# Patient Record
Sex: MALE | Race: WHITE | Employment: OTHER | URBAN - METROPOLITAN AREA
[De-identification: names, ages, dates, MRNs, and addresses within clinical notes are randomized per-mention and may not be internally consistent; named-entity substitution may affect disease eponyms.]

---

## 2017-03-16 ENCOUNTER — OFFICE VISIT (OUTPATIENT)
Dept: FAMILY MEDICINE CLINIC | Age: 67
End: 2017-03-16

## 2017-03-16 VITALS
SYSTOLIC BLOOD PRESSURE: 150 MMHG | WEIGHT: 232.8 LBS | HEIGHT: 70 IN | RESPIRATION RATE: 12 BRPM | TEMPERATURE: 96.5 F | DIASTOLIC BLOOD PRESSURE: 80 MMHG | HEART RATE: 84 BPM | BODY MASS INDEX: 33.33 KG/M2

## 2017-03-16 DIAGNOSIS — R19.7 DIARRHEA OF PRESUMED INFECTIOUS ORIGIN: Primary | ICD-10-CM

## 2017-03-16 PROCEDURE — 99213 OFFICE O/P EST LOW 20 MIN: CPT | Performed by: FAMILY MEDICINE

## 2017-03-16 RX ORDER — METRONIDAZOLE 500 MG/1
500 TABLET ORAL 3 TIMES DAILY
Qty: 21 TABLET | Refills: 0 | Status: SHIPPED | OUTPATIENT
Start: 2017-03-16 | End: 2017-06-03 | Stop reason: SDUPTHER

## 2017-03-16 RX ORDER — SILDENAFIL CITRATE 20 MG/1
20 TABLET ORAL PRN
COMMUNITY
End: 2019-03-26

## 2017-03-16 ASSESSMENT — PATIENT HEALTH QUESTIONNAIRE - PHQ9
SUM OF ALL RESPONSES TO PHQ9 QUESTIONS 1 & 2: 0
2. FEELING DOWN, DEPRESSED OR HOPELESS: 0
1. LITTLE INTEREST OR PLEASURE IN DOING THINGS: 0
SUM OF ALL RESPONSES TO PHQ QUESTIONS 1-9: 0

## 2017-03-17 DIAGNOSIS — R19.7 DIARRHEA OF PRESUMED INFECTIOUS ORIGIN: ICD-10-CM

## 2017-03-18 LAB
CLOSTRIDIUM DIFFICILE DNA AMPLIFICATION: NORMAL
CRYPTOSPORIDIUM ANTIGEN STOOL: NORMAL
GIARDIA ANTIGEN STOOL: NORMAL
LACTOFERRIN, FECAL: POSITIVE

## 2017-03-20 LAB
CULTURE, STOOL: NORMAL
E COLI SHIGA TOXIN ASSAY: NORMAL
OVA AND PARASITE TRICHROME: NEGATIVE
OVA AND PARASITE WET MOUNT: NEGATIVE

## 2017-03-30 RX ORDER — ESZOPICLONE 2 MG/1
TABLET, FILM COATED ORAL
Qty: 30 TABLET | Refills: 2 | Status: SHIPPED | OUTPATIENT
Start: 2017-03-30 | End: 2017-07-10 | Stop reason: SDUPTHER

## 2017-04-06 RX ORDER — INDOMETHACIN 50 MG/1
50 CAPSULE ORAL
Qty: 90 CAPSULE | Refills: 1 | Status: SHIPPED | OUTPATIENT
Start: 2017-04-06 | End: 2019-04-18 | Stop reason: ALTCHOICE

## 2017-06-05 RX ORDER — METRONIDAZOLE 500 MG/1
TABLET ORAL
Qty: 21 TABLET | Refills: 0 | Status: SHIPPED | OUTPATIENT
Start: 2017-06-05 | End: 2017-12-06 | Stop reason: ALTCHOICE

## 2017-07-10 RX ORDER — ESZOPICLONE 2 MG/1
TABLET, FILM COATED ORAL
Qty: 30 TABLET | Refills: 2 | Status: ON HOLD | OUTPATIENT
Start: 2017-07-10 | End: 2021-05-18

## 2017-10-12 RX ORDER — LISINOPRIL 20 MG/1
TABLET ORAL
Qty: 30 TABLET | Refills: 5 | Status: SHIPPED | OUTPATIENT
Start: 2017-10-12 | End: 2018-05-11 | Stop reason: SDUPTHER

## 2017-10-12 NOTE — TELEPHONE ENCOUNTER
Pharmacy requests refill on medication. Please approve or deny this request.  Last seen by you on 3/16/17. No future appointments.

## 2017-12-06 ENCOUNTER — OFFICE VISIT (OUTPATIENT)
Dept: FAMILY MEDICINE CLINIC | Age: 67
End: 2017-12-06

## 2017-12-06 ENCOUNTER — HOSPITAL ENCOUNTER (OUTPATIENT)
Dept: GENERAL RADIOLOGY | Age: 67
Discharge: HOME | End: 2017-12-06
Payer: COMMERCIAL

## 2017-12-06 VITALS
HEART RATE: 76 BPM | BODY MASS INDEX: 32.64 KG/M2 | DIASTOLIC BLOOD PRESSURE: 82 MMHG | TEMPERATURE: 98 F | OXYGEN SATURATION: 98 % | SYSTOLIC BLOOD PRESSURE: 130 MMHG | HEIGHT: 70 IN | WEIGHT: 228 LBS

## 2017-12-06 DIAGNOSIS — R20.2 RIGHT HAND PARESTHESIA: ICD-10-CM

## 2017-12-06 DIAGNOSIS — G89.29 CHRONIC RIGHT SHOULDER PAIN: ICD-10-CM

## 2017-12-06 DIAGNOSIS — M75.42 IMPINGEMENT SYNDROME OF LEFT SHOULDER: ICD-10-CM

## 2017-12-06 DIAGNOSIS — R06.83 SNORING: ICD-10-CM

## 2017-12-06 DIAGNOSIS — R20.2 RIGHT HAND PARESTHESIA: Primary | ICD-10-CM

## 2017-12-06 DIAGNOSIS — M25.511 CHRONIC RIGHT SHOULDER PAIN: ICD-10-CM

## 2017-12-06 PROCEDURE — 99214 OFFICE O/P EST MOD 30 MIN: CPT | Performed by: FAMILY MEDICINE

## 2017-12-06 PROCEDURE — 73030 X-RAY EXAM OF SHOULDER: CPT

## 2017-12-06 RX ORDER — MELOXICAM 7.5 MG/1
TABLET ORAL
Qty: 30 TABLET | Refills: 5 | Status: SHIPPED | OUTPATIENT
Start: 2017-12-06 | End: 2019-03-26

## 2017-12-06 NOTE — PROGRESS NOTES
Chief Complaint   Patient presents with    Snoring    Shoulder Pain     Both     HPI: Hayden Borden is a 74 y.o. male presenting for follow-up of Snoring and Shoulder Pain. I last saw the patient 9 months ago. Pain severity is 1/10 on the left shoulder. He is able to abduct to 90 degrees. He does have tingling in the right hand. He does do a lot of computer work. He does snore while sleeping and notes restless sleep. He will wake up with the right hand numb. His energy during the day is poor. He denies any neck pain. Past Medical History:   Diagnosis Date    Hyperlipidemia     Hypertension     Prostate cancer [facility])        Past Surgical History:   Procedure Laterality Date    OTHER SURGICAL HISTORY      stent       family history includes Cancer in his brother, father, and mother; Diabetes in his maternal grandmother. Social History     Social History    Marital status: Legally      Spouse name: N/A    Number of children: N/A    Years of education: N/A     Occupational History    Not on file.      Social History Main Topics    Smoking status: Former Smoker     Packs/day: 2.00     Years: 25.00     Quit date: 1/3/1995    Smokeless tobacco: Never Used    Alcohol use 2.4 oz/week     2 Cans of beer, 2 Shots of liquor per week    Drug use: No    Sexual activity: Not on file     Other Topics Concern    Not on file     Social History Narrative    No narrative on file       Allergies   Allergen Reactions    Morphine Swelling and Rash       Review of Systems - General ROS: negative  Psychological ROS: negative  ENT ROS: negative  Hematological and Lymphatic ROS: negative  Respiratory ROS: no cough, shortness of breath, or wheezing  Cardiovascular ROS: no chest pain or dyspnea on exertion  Gastrointestinal ROS: no abdominal pain, change in bowel habits, or black or bloody stools  Genito-Urinary ROS: no dysuria, trouble voiding, or hematuria  Musculoskeletal ROS: positive for - pain in bilateral shoulder  Neurological ROS: no TIA or stroke symptoms  Dermatological ROS: negative    Vitals:    12/06/17 1533   BP: 130/82   Site: Right Arm   Position: Sitting   Cuff Size: Medium Adult   Pulse: 76   Temp: 98 °F (36.7 °C)   TempSrc: Temporal   SpO2: 98%   Weight: 228 lb (103.4 kg)   Height: 5' 10\" (1.778 m)     Physical Examination: General appearance - alert, well appearing, and in no distress and acyanotic, in no respiratory distress. Obese. Mental status - alert, oriented to person, place, and time, normal mood, behavior, speech, dress, motor activity, and thought processes, affect appropriate to mood  Neck - supple, no significant adenopathy, carotids upstroke normal bilaterally, no bruits, thyroid exam: thyroid is normal in size without nodules or tenderness  Right shoulder exam: ROM reveals 90 External ROM. T7 Internal ROM, normal Abduction, and normal adduction across his chest. Motor Strength is 5/ 5 in all directions. Drop arm testing is negative. No pain is noted with ROM. DTRs are 2+ normal at the biceps and brachioradialis bilaterally and symmetrically. Left shoulder exam: ROM reveals 80 External ROM. T8 Internal ROM, 90 degrees of active Abduction, and normal adduction across his chest. Motor Strength is 4+/ 5 in all directions. Drop arm testing is negative. Pain is noted with abduction. DTRs are 2+ normal at the biceps and brachioradialis bilaterally and symmetrically. Extremities - peripheral pulses normal, no pedal edema, no clubbing or cyanosis  Skin - normal coloration and turgor, no rashes, no suspicious skin lesions noted    1. Right hand paresthesia  EMG    XR SHOULDER RIGHT (MIN 2 VIEWS)   2. Impingement syndrome of left shoulder  XR SHOULDER LEFT (MIN 2 VIEWS)    Ambulatory referral to Physical Therapy   3. Chronic right shoulder pain  Ambulatory referral to Physical Therapy   4.  Snoring  Baseline Diagnostic Sleep Study     I have reviewed the following diagnostic data: Bilateral shoulder x-rays - unremarkable. Please see report for additional information. Orders Placed This Encounter   Procedures    XR SHOULDER RIGHT (MIN 2 VIEWS)     Standing Status:   Future     Number of Occurrences:   1     Standing Expiration Date:   2018     Order Specific Question:   Reason for exam:     Answer:   right shoulder pain    XR SHOULDER LEFT (MIN 2 VIEWS)     Standing Status:   Future     Number of Occurrences:   1     Standing Expiration Date:   2018     Order Specific Question:   Reason for exam:     Answer:   left shoulder pain    Ambulatory referral to Physical Therapy     Referral Priority:   Routine     Referral Type:   Eval and Treat     Referral Reason:   Specialty Services Required     Requested Specialty:   Physical Therapy     Number of Visits Requested:   1    EMG     Paresthesias of the right hand  Bilateral shoulder pain     Scheduling Instructions:      Dr. Jimmy Moura     Order Specific Question:   Which body part? Answer:   upper extremities    Baseline Diagnostic Sleep Study     Order Specific Question:   Adult or Pediatric     Answer:   Adult Study (>7 Years)     Comments:   snoring       Orders Placed This Encounter   Medications    meloxicam (MOBIC) 7.5 MG tablet     Si po daily to twice daily with food prn     Dispense:  30 tablet     Refill:  5     Patient will be referred for an EMG/NCV to rule out possible carpal tunnel syndrome. He was begun on meloxicam 7.5 mg by mouth daily to twice a day with food when necessary. He was referred for PT as well. Will call patient with results of testing when available and need for follow up if indicated. Return in about 3 months (around 3/6/2018).

## 2017-12-27 ENCOUNTER — HOSPITAL ENCOUNTER (OUTPATIENT)
Dept: SLEEP CENTER | Age: 67
Discharge: HOME | End: 2017-12-27
Payer: COMMERCIAL

## 2017-12-27 PROCEDURE — 95810 POLYSOM 6/> YRS 4/> PARAM: CPT

## 2018-01-05 ENCOUNTER — HOSPITAL ENCOUNTER (OUTPATIENT)
Dept: SLEEP CENTER | Age: 68
Discharge: HOME | End: 2018-01-05
Payer: COMMERCIAL

## 2018-01-05 PROCEDURE — 95811 POLYSOM 6/>YRS CPAP 4/> PARM: CPT

## 2018-01-08 ENCOUNTER — HOSPITAL ENCOUNTER (OUTPATIENT)
Dept: PHYSICAL THERAPY | Age: 68
Setting detail: THERAPIES SERIES
Discharge: HOME | End: 2018-01-08
Payer: COMMERCIAL

## 2018-01-09 ENCOUNTER — OFFICE VISIT (OUTPATIENT)
Dept: FAMILY MEDICINE CLINIC | Age: 68
End: 2018-01-09

## 2018-01-09 VITALS
OXYGEN SATURATION: 97 % | WEIGHT: 225 LBS | HEIGHT: 70 IN | TEMPERATURE: 98.3 F | SYSTOLIC BLOOD PRESSURE: 140 MMHG | HEART RATE: 75 BPM | DIASTOLIC BLOOD PRESSURE: 80 MMHG | BODY MASS INDEX: 32.21 KG/M2

## 2018-01-09 DIAGNOSIS — G47.33 OSA ON CPAP: ICD-10-CM

## 2018-01-09 DIAGNOSIS — Z99.89 OSA ON CPAP: ICD-10-CM

## 2018-01-09 PROCEDURE — 99213 OFFICE O/P EST LOW 20 MIN: CPT | Performed by: FAMILY MEDICINE

## 2018-01-09 NOTE — PROGRESS NOTES
Sitting Sitting   Cuff Size: Large Adult Large Adult   Pulse: 75    Temp: 98.3 °F (36.8 °C)    TempSrc: Temporal    SpO2: 97%    Weight: 225 lb (102.1 kg)    Height: 5' 10\" (1.778 m)      Physical Examination: General appearance - alert, well appearing, and in no distress and acyanotic, in no respiratory distress. Obese. Mental status - alert, oriented to person, place, and time, normal mood, behavior, speech, dress, motor activity, and thought processes, affect appropriate to mood  No further exam was done today. 1. UTE on CPAP       I have reviewed the following diagnostic data: CPAP titration study was reviewed. It was recommended that he be prescribed nasal CPAP at 5-6 cm H2O. Please see report for additional information. Orders Placed This Encounter   Medications    CPAP Machine MISC     Sig: by Does not apply route Use nightly as directed. Pressure setting 5 cm H2O. Dispense:  1 each     Refill:  0     CPAP was ordered for nasal mask at 5 cm H2O. Patient will follow-up to see how he is doing on the CPAP and 4-6 weeks. Return in about 4 weeks (around 2/6/2018).

## 2018-01-11 ENCOUNTER — TELEPHONE (OUTPATIENT)
Dept: FAMILY MEDICINE CLINIC | Age: 68
End: 2018-01-11

## 2018-02-26 ENCOUNTER — OFFICE VISIT (OUTPATIENT)
Dept: FAMILY MEDICINE CLINIC | Age: 68
End: 2018-02-26
Payer: COMMERCIAL

## 2018-02-26 VITALS
SYSTOLIC BLOOD PRESSURE: 138 MMHG | HEART RATE: 76 BPM | RESPIRATION RATE: 18 BRPM | DIASTOLIC BLOOD PRESSURE: 74 MMHG | BODY MASS INDEX: 31.02 KG/M2 | TEMPERATURE: 97.5 F | OXYGEN SATURATION: 97 % | HEIGHT: 70 IN | WEIGHT: 216.7 LBS

## 2018-02-26 DIAGNOSIS — J01.90 ACUTE BACTERIAL SINUSITIS: Primary | ICD-10-CM

## 2018-02-26 DIAGNOSIS — B96.89 ACUTE BACTERIAL SINUSITIS: Primary | ICD-10-CM

## 2018-02-26 PROCEDURE — 99213 OFFICE O/P EST LOW 20 MIN: CPT | Performed by: NURSE PRACTITIONER

## 2018-02-26 RX ORDER — AMOXICILLIN AND CLAVULANATE POTASSIUM 875; 125 MG/1; MG/1
1 TABLET, FILM COATED ORAL 2 TIMES DAILY
Qty: 14 TABLET | Refills: 0 | Status: SHIPPED | OUTPATIENT
Start: 2018-02-26 | End: 2018-03-06 | Stop reason: SDUPTHER

## 2018-02-26 RX ORDER — FLUTICASONE PROPIONATE 50 MCG
2 SPRAY, SUSPENSION (ML) NASAL DAILY
Qty: 1 BOTTLE | Refills: 0 | Status: SHIPPED | OUTPATIENT
Start: 2018-02-26 | End: 2019-04-18 | Stop reason: ALTCHOICE

## 2018-02-27 ASSESSMENT — ENCOUNTER SYMPTOMS
SORE THROAT: 0
SHORTNESS OF BREATH: 0
SWOLLEN GLANDS: 0
HOARSE VOICE: 0
COUGH: 0
SINUS PRESSURE: 1
SINUS COMPLAINT: 1

## 2018-03-06 ENCOUNTER — TELEPHONE (OUTPATIENT)
Dept: FAMILY MEDICINE CLINIC | Age: 68
End: 2018-03-06

## 2018-03-06 DIAGNOSIS — B96.89 ACUTE BACTERIAL SINUSITIS: ICD-10-CM

## 2018-03-06 DIAGNOSIS — J01.90 ACUTE BACTERIAL SINUSITIS: ICD-10-CM

## 2018-03-06 RX ORDER — AMOXICILLIN AND CLAVULANATE POTASSIUM 875; 125 MG/1; MG/1
1 TABLET, FILM COATED ORAL 2 TIMES DAILY
Qty: 10 TABLET | Refills: 0 | Status: SHIPPED | OUTPATIENT
Start: 2018-03-06 | End: 2018-03-11

## 2018-03-06 RX ORDER — GEMFIBROZIL 600 MG/1
TABLET, FILM COATED ORAL
Qty: 30 TABLET | Refills: 2 | Status: SHIPPED | OUTPATIENT
Start: 2018-03-06 | End: 2018-06-11 | Stop reason: SDUPTHER

## 2018-03-06 RX ORDER — LOVASTATIN 20 MG/1
TABLET ORAL
Qty: 30 TABLET | Refills: 2 | Status: SHIPPED | OUTPATIENT
Start: 2018-03-06 | End: 2018-06-11 | Stop reason: SDUPTHER

## 2018-03-06 NOTE — TELEPHONE ENCOUNTER
Pharmacy requests refill on medication.  Please approve or deny this request.    LOV 1/9/2018    Future Appointments  Date Time Provider Sana Ramos   3/24/2018 9:15 AM Ezra Peters MD [address]

## 2018-03-24 ENCOUNTER — OFFICE VISIT (OUTPATIENT)
Dept: FAMILY MEDICINE CLINIC | Age: 68
End: 2018-03-24
Payer: COMMERCIAL

## 2018-03-24 VITALS
SYSTOLIC BLOOD PRESSURE: 138 MMHG | BODY MASS INDEX: 30.64 KG/M2 | HEIGHT: 70 IN | OXYGEN SATURATION: 98 % | TEMPERATURE: 98.1 F | WEIGHT: 214 LBS | DIASTOLIC BLOOD PRESSURE: 84 MMHG | HEART RATE: 78 BPM

## 2018-03-24 DIAGNOSIS — G47.33 OSA ON CPAP: Primary | ICD-10-CM

## 2018-03-24 DIAGNOSIS — F51.01 PRIMARY INSOMNIA: ICD-10-CM

## 2018-03-24 DIAGNOSIS — I25.10 CORONARY ARTERY DISEASE INVOLVING NATIVE CORONARY ARTERY OF NATIVE HEART WITHOUT ANGINA PECTORIS: ICD-10-CM

## 2018-03-24 DIAGNOSIS — B35.1 ONYCHOMYCOSIS: ICD-10-CM

## 2018-03-24 DIAGNOSIS — C61 PROSTATE CANCER (HCC): ICD-10-CM

## 2018-03-24 DIAGNOSIS — E78.00 PURE HYPERCHOLESTEROLEMIA: ICD-10-CM

## 2018-03-24 DIAGNOSIS — R01.1 CARDIAC MURMUR: ICD-10-CM

## 2018-03-24 DIAGNOSIS — I10 ESSENTIAL HYPERTENSION: ICD-10-CM

## 2018-03-24 DIAGNOSIS — Z99.89 OSA ON CPAP: Primary | ICD-10-CM

## 2018-03-24 PROCEDURE — 99213 OFFICE O/P EST LOW 20 MIN: CPT | Performed by: FAMILY MEDICINE

## 2018-03-24 RX ORDER — DOXEPIN HYDROCHLORIDE 3 MG/1
1 TABLET ORAL NIGHTLY PRN
Qty: 30 TABLET | Refills: 1 | Status: SHIPPED | OUTPATIENT
Start: 2018-03-24 | End: 2019-07-18

## 2018-03-24 ASSESSMENT — PATIENT HEALTH QUESTIONNAIRE - PHQ9
1. LITTLE INTEREST OR PLEASURE IN DOING THINGS: 0
SUM OF ALL RESPONSES TO PHQ9 QUESTIONS 1 & 2: 0
2. FEELING DOWN, DEPRESSED OR HOPELESS: 0
SUM OF ALL RESPONSES TO PHQ QUESTIONS 1-9: 0

## 2018-03-24 NOTE — PROGRESS NOTES
Chief Complaint   Patient presents with    Follow Up After Procedure     1/9/18     HPI: Constantin Long is a 74 y.o. male presenting for follow-up of testing and breathing. I last saw the patient 2 months ago. He is not sleeping as well as he would like despite using his CPAP machine. He is on Lupron for his prostate cancer. He does note a toenail fungus as well. He is taking apple cider vinegar as well. He uses the the CPAP machine nightly for 7-8 hours per night with 2 events per night. Patient was having some hot flashes with the Lupron but that has improved. Patient notes that he is no longer on the megestrol. Patient does note a history of a heart murmur as a child. Past Medical History:   Diagnosis Date    Hyperlipidemia     Hypertension     Prostate cancer [facility])        Past Surgical History:   Procedure Laterality Date    OTHER SURGICAL HISTORY      stent       family history includes Cancer in his brother, father, and mother; Diabetes in his maternal grandmother. Social History     Social History    Marital status: Legally      Spouse name: N/A    Number of children: N/A    Years of education: N/A     Occupational History    Not on file.      Social History Main Topics    Smoking status: Former Smoker     Packs/day: 2.00     Years: 25.00     Quit date: 1/3/1995    Smokeless tobacco: Never Used    Alcohol use 2.4 oz/week     2 Cans of beer, 2 Shots of liquor per week    Drug use: No    Sexual activity: Not on file     Other Topics Concern    Not on file     Social History Narrative    No narrative on file       Allergies   Allergen Reactions    Morphine Swelling and Rash       Review of Systems - General ROS: negative  Psychological ROS: negative  ENT ROS: negative  Hematological and Lymphatic ROS: negative  Respiratory ROS: no cough, shortness of breath, or wheezing  Cardiovascular ROS: no chest pain or dyspnea on exertion  Gastrointestinal ROS: no abdominal pain, change in bowel habits, or black or bloody stools  Genito-Urinary ROS: no dysuria, trouble voiding, or hematuria  Musculoskeletal ROS: negative  Neurological ROS: no TIA or stroke symptoms  Dermatological ROS: negative    Vitals:    03/24/18 0917   BP: 138/84   Pulse: 78   Temp: 98.1 °F (36.7 °C)   TempSrc: Temporal   SpO2: 98%   Weight: 214 lb (97.1 kg)   Height: 5' 10\" (1.778 m)     Physical Examination: General appearance - alert, well appearing, and in no distress and acyanotic, in no respiratory distress. Obese. Mental status - alert, oriented to person, place, and time, normal mood, behavior, speech, dress, motor activity, and thought processes, affect appropriate to mood  Neck - supple, no significant adenopathy, carotids upstroke normal bilaterally, no bruits, thyroid exam: thyroid is normal in size without nodules or tenderness  Chest - clear to auscultation, no wheezes, rales or rhonchi, symmetric air entry  Heart - normal rate, regular rhythm, normal S1, S2, no murmurs, rubs, clicks or gallops, systolic murmur 3/6 at apex. Occasional ectopic beats are noted. Lower extremities - no cyanosis, clubbing or edema. His left great toenail does reveal what appears to be more of a subungual hemorrhage at the proximal aspect of the nail. It should resolve with time. 1. UTE on CPAP     2. Essential hypertension     3. Pure hypercholesterolemia     4. Coronary artery disease involving native coronary artery of native heart without angina pectoris     5. Prostate cancer ([facility], [place] [address]     6. Primary insomnia     7. Onychomycosis     8. Cardiac murmur  ECHO Complete 2D W Doppler W Color     I have reviewed the following diagnostic data: NA. Please see report for additional information.       Orders Placed This Encounter   Medications    Doxepin HCl (SILENOR) 3 MG TABS     Sig: Take 3 mg by mouth nightly as needed (insom)     Dispense:  30 tablet     Refill:  1     Patient is to try melatonin 3-5 mg prior to bedtime to help with his restful sleep. If that is ineffective, he may try the Silenor 3 mg nightly as needed. Patient will continue on the CPAP. Patient is to return for an echocardiogram for further evaluation of his heart murmur. Return in about 3 months (around 6/24/2018) for follow up on medications, follow up on HTN.

## 2018-03-29 ENCOUNTER — TELEPHONE (OUTPATIENT)
Dept: FAMILY MEDICINE CLINIC | Age: 68
End: 2018-03-29

## 2018-03-31 NOTE — TELEPHONE ENCOUNTER
Recommend patient either try a multivitamin OTC or B complex vitamin OTC. The only other option might be taking some prescription Ritalin which is a stimulant.

## 2018-04-12 ENCOUNTER — HOSPITAL ENCOUNTER (OUTPATIENT)
Dept: NON INVASIVE DIAGNOSTICS | Age: 68
Discharge: HOME | End: 2018-04-12
Payer: COMMERCIAL

## 2018-04-12 DIAGNOSIS — R01.1 CARDIAC MURMUR: ICD-10-CM

## 2018-04-12 LAB
LV EF: 55 %
LVEF MODALITY: NORMAL

## 2018-04-12 PROCEDURE — 93306 TTE W/DOPPLER COMPLETE: CPT

## 2018-04-16 ENCOUNTER — TELEPHONE (OUTPATIENT)
Dept: FAMILY MEDICINE CLINIC | Age: 68
End: 2018-04-16

## 2018-04-16 RX ORDER — AMOXICILLIN 875 MG/1
875 TABLET, COATED ORAL 2 TIMES DAILY
Qty: 20 TABLET | Refills: 0 | Status: SHIPPED | OUTPATIENT
Start: 2018-04-16 | End: 2018-04-26

## 2018-05-11 RX ORDER — LISINOPRIL 20 MG/1
TABLET ORAL
Qty: 30 TABLET | Refills: 5 | Status: SHIPPED | OUTPATIENT
Start: 2018-05-11 | End: 2018-11-15 | Stop reason: SDUPTHER

## 2018-06-02 ENCOUNTER — OFFICE VISIT (OUTPATIENT)
Dept: FAMILY MEDICINE CLINIC | Age: 68
End: 2018-06-02
Payer: COMMERCIAL

## 2018-06-02 VITALS
WEIGHT: 212 LBS | TEMPERATURE: 97.7 F | HEIGHT: 70 IN | HEART RATE: 73 BPM | SYSTOLIC BLOOD PRESSURE: 138 MMHG | DIASTOLIC BLOOD PRESSURE: 72 MMHG | BODY MASS INDEX: 30.35 KG/M2 | OXYGEN SATURATION: 98 % | RESPIRATION RATE: 16 BRPM

## 2018-06-02 DIAGNOSIS — Z12.11 SCREENING FOR COLON CANCER: ICD-10-CM

## 2018-06-02 DIAGNOSIS — C61 PROSTATE CANCER (HCC): Primary | ICD-10-CM

## 2018-06-02 DIAGNOSIS — R53.83 FATIGUE, UNSPECIFIED TYPE: ICD-10-CM

## 2018-06-02 PROCEDURE — 96372 THER/PROPH/DIAG INJ SC/IM: CPT | Performed by: NURSE PRACTITIONER

## 2018-06-02 PROCEDURE — 99213 OFFICE O/P EST LOW 20 MIN: CPT | Performed by: NURSE PRACTITIONER

## 2018-06-02 RX ORDER — MELATONIN 10 MG
CAPSULE ORAL
Status: ON HOLD | COMMUNITY
End: 2021-05-18

## 2018-06-02 RX ORDER — CYANOCOBALAMIN 1000 UG/ML
1000 INJECTION INTRAMUSCULAR; SUBCUTANEOUS ONCE
Status: COMPLETED | OUTPATIENT
Start: 2018-06-02 | End: 2018-06-02

## 2018-06-02 RX ORDER — MEGESTROL ACETATE 40 MG/1
TABLET ORAL
Refills: 1 | COMMUNITY
Start: 2018-05-16 | End: 2019-03-26

## 2018-06-02 RX ADMIN — CYANOCOBALAMIN 1000 MCG: 1000 INJECTION INTRAMUSCULAR; SUBCUTANEOUS at 12:24

## 2018-06-11 RX ORDER — GEMFIBROZIL 600 MG/1
TABLET, FILM COATED ORAL
Qty: 30 TABLET | Refills: 2 | Status: SHIPPED | OUTPATIENT
Start: 2018-06-11 | End: 2018-09-12 | Stop reason: SDUPTHER

## 2018-06-11 RX ORDER — LOVASTATIN 20 MG/1
TABLET ORAL
Qty: 30 TABLET | Refills: 2 | Status: SHIPPED | OUTPATIENT
Start: 2018-06-11 | End: 2018-09-14 | Stop reason: SDUPTHER

## 2018-06-15 DIAGNOSIS — R53.83 FATIGUE, UNSPECIFIED TYPE: ICD-10-CM

## 2018-06-15 LAB
FOLATE: 11.6 NG/ML (ref 7.3–26.1)
VITAMIN B-12: 362 PG/ML (ref 232–1245)
VITAMIN D 25-HYDROXY: 36.3 NG/ML (ref 30–100)

## 2018-06-27 ENCOUNTER — OFFICE VISIT (OUTPATIENT)
Dept: FAMILY MEDICINE CLINIC | Age: 68
End: 2018-06-27
Payer: COMMERCIAL

## 2018-06-27 VITALS
TEMPERATURE: 98.9 F | BODY MASS INDEX: 30.03 KG/M2 | SYSTOLIC BLOOD PRESSURE: 124 MMHG | DIASTOLIC BLOOD PRESSURE: 74 MMHG | WEIGHT: 209.8 LBS | HEIGHT: 70 IN | OXYGEN SATURATION: 97 % | HEART RATE: 80 BPM

## 2018-06-27 DIAGNOSIS — E78.00 PURE HYPERCHOLESTEROLEMIA: ICD-10-CM

## 2018-06-27 DIAGNOSIS — I25.10 CORONARY ARTERY DISEASE INVOLVING NATIVE CORONARY ARTERY OF NATIVE HEART WITHOUT ANGINA PECTORIS: ICD-10-CM

## 2018-06-27 DIAGNOSIS — I10 ESSENTIAL HYPERTENSION: Primary | ICD-10-CM

## 2018-06-27 DIAGNOSIS — R53.83 FATIGUE, UNSPECIFIED TYPE: ICD-10-CM

## 2018-06-27 DIAGNOSIS — R73.03 PREDIABETES: ICD-10-CM

## 2018-06-27 PROCEDURE — 99214 OFFICE O/P EST MOD 30 MIN: CPT | Performed by: FAMILY MEDICINE

## 2018-06-27 PROCEDURE — 96372 THER/PROPH/DIAG INJ SC/IM: CPT | Performed by: FAMILY MEDICINE

## 2018-06-27 RX ORDER — CYANOCOBALAMIN 1000 UG/ML
1000 INJECTION INTRAMUSCULAR; SUBCUTANEOUS ONCE
Status: COMPLETED | OUTPATIENT
Start: 2018-06-27 | End: 2018-06-27

## 2018-06-27 RX ADMIN — CYANOCOBALAMIN 1000 MCG: 1000 INJECTION INTRAMUSCULAR; SUBCUTANEOUS at 18:15

## 2018-06-27 NOTE — PROGRESS NOTES
Chief Complaint   Patient presents with    Follow-up     03/24/2018    Sleep Apnea    Hypertension    Hyperlipidemia    Coronary Artery Disease    Prostate Cancer    Insomnia    Nail Problem    Heart Murmur    Discuss Medications     HPI: Cinthya Amezquita is a 74 y.o. male presenting for follow-up of UTE, HTN, HLD, CAD, Prostate Cancer, Insomnia, Onychomycosis, and Heart Murmur. I last saw the patient 03/24/2018. He did injure the left ring finger and is being followed by an orthopedic physician. He did just Have the splint removed from the finger and is now able to flex it. He does complain of some fatigue and would like a second injection of vitamin B12. He did seem to notice some improvement after his first injection. Past Medical History:   Diagnosis Date    Hyperlipidemia     Hypertension     Prostate cancer [facility])        Past Surgical History:   Procedure Laterality Date    OTHER SURGICAL HISTORY      stent       family history includes Cancer in his brother, father, and mother; Diabetes in his maternal grandmother. Social History     Social History    Marital status: Legally      Spouse name: N/A    Number of children: N/A    Years of education: N/A     Occupational History    Not on file.      Social History Main Topics    Smoking status: Former Smoker     Packs/day: 2.00     Years: 25.00     Quit date: 1/3/1995    Smokeless tobacco: Never Used    Alcohol use 2.4 oz/week     2 Cans of beer, 2 Shots of liquor per week    Drug use: No    Sexual activity: Not on file     Other Topics Concern    Not on file     Social History Narrative    No narrative on file       Allergies   Allergen Reactions    Morphine Swelling and Rash       Review of Systems - General ROS: negative  Psychological ROS: negative  ENT ROS: negative  Hematological and Lymphatic ROS: negative  Respiratory ROS: no cough, shortness of breath, or wheezing  Cardiovascular ROS: no chest pain or dyspnea on exertion  Gastrointestinal ROS: no abdominal pain, change in bowel habits, or black or bloody stools  Genito-Urinary ROS: frequency/urgency  Musculoskeletal ROS: positive for - pain in left 4th finger  Neurological ROS: no TIA or stroke symptoms  Dermatological ROS: negative    Vitals:    06/27/18 1630   BP: 124/74   Site: Right Arm   Position: Sitting   Cuff Size: Medium Adult   Pulse: 80   Temp: 98.9 °F (37.2 °C)   TempSrc: Temporal   SpO2: 97%   Weight: 209 lb 12.8 oz (95.2 kg)   Height: 5' 10\" (1.778 m)     BP on recheck 144/68. Physical Examination: General appearance - alert, well appearing, and in no distress, normal appearing weight and acyanotic, in no respiratory distress  Mental status - alert, oriented to person, place, and time, normal mood, behavior, speech, dress, motor activity, and thought processes, affect appropriate to mood  Neck - supple, no significant adenopathy, carotids upstroke normal bilaterally, no bruits, thyroid exam: thyroid is normal in size without nodules or tenderness  Chest - clear to auscultation, no wheezes, rales or rhonchi, symmetric air entry  Heart - normal rate, regular rhythm, normal S1, S2, no murmurs, rubs, clicks or gallops  Abdomen - soft, nontender, nondistended, no masses or organomegaly  bowel sounds normal  Extremities - peripheral pulses normal, no pedal edema, no clubbing or cyanosis  Skin - normal coloration and turgor, no rashes, no suspicious skin lesions noted. Diagnosis Orders   1. Essential hypertension     2. Pure hypercholesterolemia     3. Coronary artery disease involving native coronary artery of native heart without angina pectoris     4. Prediabetes     5. Fatigue, unspecified type       I have reviewed the following diagnostic data: NA.  Please see report for additional information.     Orders Placed This Encounter   Medications    cyanocobalamin injection 1,000 mcg     He will see if the oncologist can send me a copy of his labs done every month.  He is willing to proceed with a colonoscopy in the fall. Patient will continue current medications as prescribed. Patient was given a vitamin B12 injection IM today. Return in about 3 months (around 9/27/2018) for follow up on medications, follow up on test results. [facility] Counter

## 2018-08-25 ENCOUNTER — NURSE ONLY (OUTPATIENT)
Dept: FAMILY MEDICINE CLINIC | Age: 68
End: 2018-08-25
Payer: COMMERCIAL

## 2018-08-25 DIAGNOSIS — R53.83 FATIGUE, UNSPECIFIED TYPE: Primary | ICD-10-CM

## 2018-08-25 PROCEDURE — 96372 THER/PROPH/DIAG INJ SC/IM: CPT | Performed by: NURSE PRACTITIONER

## 2018-08-25 RX ORDER — CYANOCOBALAMIN 1000 UG/ML
1000 INJECTION INTRAMUSCULAR; SUBCUTANEOUS ONCE
Status: COMPLETED | OUTPATIENT
Start: 2018-08-25 | End: 2018-08-25

## 2018-08-25 RX ADMIN — CYANOCOBALAMIN 1000 MCG: 1000 INJECTION INTRAMUSCULAR; SUBCUTANEOUS at 12:36

## 2018-09-14 NOTE — TELEPHONE ENCOUNTER
PHARMACY REQUESTING REFILL  PATIENT LAST SEEN 6/27/18  LAST REFILL 6/11/18  PLEASE APPROVE OR DENY. No future appointments.

## 2018-09-17 RX ORDER — GEMFIBROZIL 600 MG/1
TABLET, FILM COATED ORAL
Qty: 30 TABLET | Refills: 3 | Status: SHIPPED | OUTPATIENT
Start: 2018-09-17 | End: 2019-01-19 | Stop reason: SDUPTHER

## 2018-09-17 RX ORDER — LOVASTATIN 20 MG/1
TABLET ORAL
Qty: 30 TABLET | Refills: 3 | Status: SHIPPED | OUTPATIENT
Start: 2018-09-17 | End: 2019-02-20 | Stop reason: SDUPTHER

## 2018-10-06 ENCOUNTER — NURSE ONLY (OUTPATIENT)
Dept: FAMILY MEDICINE CLINIC | Age: 68
End: 2018-10-06

## 2018-10-06 DIAGNOSIS — E53.8 B12 DEFICIENCY: Primary | ICD-10-CM

## 2018-10-06 RX ORDER — CYANOCOBALAMIN 1000 UG/ML
1000 INJECTION INTRAMUSCULAR; SUBCUTANEOUS ONCE
Status: DISCONTINUED | OUTPATIENT
Start: 2018-10-06 | End: 2022-06-05

## 2018-11-09 ENCOUNTER — NURSE ONLY (OUTPATIENT)
Dept: FAMILY MEDICINE CLINIC | Age: 68
End: 2018-11-09
Payer: COMMERCIAL

## 2018-11-09 DIAGNOSIS — R53.83 FATIGUE, UNSPECIFIED TYPE: Primary | ICD-10-CM

## 2018-11-09 PROCEDURE — 96372 THER/PROPH/DIAG INJ SC/IM: CPT | Performed by: FAMILY MEDICINE

## 2018-11-09 RX ORDER — CYANOCOBALAMIN 1000 UG/ML
1000 INJECTION INTRAMUSCULAR; SUBCUTANEOUS ONCE
Status: COMPLETED | OUTPATIENT
Start: 2018-11-09 | End: 2018-11-09

## 2018-11-09 RX ADMIN — CYANOCOBALAMIN 1000 MCG: 1000 INJECTION INTRAMUSCULAR; SUBCUTANEOUS at 15:40

## 2018-11-15 RX ORDER — LISINOPRIL 20 MG/1
TABLET ORAL
Qty: 30 TABLET | Refills: 5 | Status: SHIPPED | OUTPATIENT
Start: 2018-11-15 | End: 2019-04-18 | Stop reason: SDUPTHER

## 2018-11-15 NOTE — TELEPHONE ENCOUNTER
Pharmacy requests refill on medication. Please approve or deny this request.    LOV 6/2/2018    No future appointments.

## 2019-01-19 RX ORDER — GEMFIBROZIL 600 MG/1
TABLET, FILM COATED ORAL
Qty: 30 TABLET | Refills: 5 | Status: SHIPPED | OUTPATIENT
Start: 2019-01-19 | End: 2019-04-18 | Stop reason: SDUPTHER

## 2019-02-20 RX ORDER — LOVASTATIN 20 MG/1
TABLET ORAL
Qty: 30 TABLET | Refills: 3 | Status: SHIPPED | OUTPATIENT
Start: 2019-02-20 | End: 2019-04-22 | Stop reason: SDUPTHER

## 2019-02-25 ENCOUNTER — TELEPHONE (OUTPATIENT)
Dept: FAMILY MEDICINE CLINIC | Age: 69
End: 2019-02-25

## 2019-02-25 ENCOUNTER — TELEPHONE (OUTPATIENT)
Dept: ADMINISTRATIVE | Age: 69
End: 2019-02-25

## 2019-02-28 ENCOUNTER — TELEPHONE (OUTPATIENT)
Dept: ENDOCRINOLOGY | Age: 69
End: 2019-02-28

## 2019-03-10 ENCOUNTER — OFFICE VISIT (OUTPATIENT)
Dept: PRIMARY CARE CLINIC | Age: 69
End: 2019-03-10
Payer: COMMERCIAL

## 2019-03-10 VITALS
HEART RATE: 75 BPM | DIASTOLIC BLOOD PRESSURE: 76 MMHG | WEIGHT: 218 LBS | RESPIRATION RATE: 14 BRPM | OXYGEN SATURATION: 96 % | BODY MASS INDEX: 31.21 KG/M2 | TEMPERATURE: 97.7 F | SYSTOLIC BLOOD PRESSURE: 130 MMHG | HEIGHT: 70 IN

## 2019-03-10 DIAGNOSIS — B37.9 CANDIDIASIS: Primary | ICD-10-CM

## 2019-03-10 DIAGNOSIS — R21 RASH AND NONSPECIFIC SKIN ERUPTION: ICD-10-CM

## 2019-03-10 PROCEDURE — 99213 OFFICE O/P EST LOW 20 MIN: CPT | Performed by: NURSE PRACTITIONER

## 2019-03-10 RX ORDER — FLUCONAZOLE 150 MG/1
150 TABLET ORAL ONCE
Qty: 2 TABLET | Refills: 0 | Status: SHIPPED | OUTPATIENT
Start: 2019-03-10 | End: 2019-03-10

## 2019-03-10 ASSESSMENT — ENCOUNTER SYMPTOMS
NAUSEA: 0
SHORTNESS OF BREATH: 0
VOMITING: 0
BACK PAIN: 0
RHINORRHEA: 0
ABDOMINAL PAIN: 0
WHEEZING: 0
COUGH: 0
SORE THROAT: 0

## 2019-03-10 ASSESSMENT — PATIENT HEALTH QUESTIONNAIRE - PHQ9
SUM OF ALL RESPONSES TO PHQ QUESTIONS 1-9: 0
SUM OF ALL RESPONSES TO PHQ9 QUESTIONS 1 & 2: 0
2. FEELING DOWN, DEPRESSED OR HOPELESS: 0
1. LITTLE INTEREST OR PLEASURE IN DOING THINGS: 0
SUM OF ALL RESPONSES TO PHQ QUESTIONS 1-9: 0

## 2019-03-11 DIAGNOSIS — R21 RASH AND NONSPECIFIC SKIN ERUPTION: ICD-10-CM

## 2019-03-11 DIAGNOSIS — B37.9 CANDIDIASIS: ICD-10-CM

## 2019-03-14 LAB
GRAM STAIN RESULT: ABNORMAL
ORGANISM: ABNORMAL
ORGANISM: ABNORMAL
WOUND/ABSCESS: ABNORMAL

## 2019-03-26 ENCOUNTER — OFFICE VISIT (OUTPATIENT)
Dept: FAMILY MEDICINE CLINIC | Age: 69
End: 2019-03-26
Payer: COMMERCIAL

## 2019-03-26 VITALS
BODY MASS INDEX: 30.64 KG/M2 | OXYGEN SATURATION: 97 % | HEART RATE: 70 BPM | HEIGHT: 70 IN | DIASTOLIC BLOOD PRESSURE: 80 MMHG | SYSTOLIC BLOOD PRESSURE: 136 MMHG | WEIGHT: 214 LBS

## 2019-03-26 DIAGNOSIS — F51.01 PRIMARY INSOMNIA: ICD-10-CM

## 2019-03-26 DIAGNOSIS — G47.33 OSA ON CPAP: ICD-10-CM

## 2019-03-26 DIAGNOSIS — I25.10 CORONARY ARTERY DISEASE INVOLVING NATIVE CORONARY ARTERY OF NATIVE HEART WITHOUT ANGINA PECTORIS: ICD-10-CM

## 2019-03-26 DIAGNOSIS — R53.83 FATIGUE, UNSPECIFIED TYPE: ICD-10-CM

## 2019-03-26 DIAGNOSIS — I25.10 CORONARY ARTERY DISEASE INVOLVING NATIVE HEART WITHOUT ANGINA PECTORIS, UNSPECIFIED VESSEL OR LESION TYPE: ICD-10-CM

## 2019-03-26 DIAGNOSIS — Z99.89 OSA ON CPAP: ICD-10-CM

## 2019-03-26 DIAGNOSIS — Z92.3 S/P RADIATION THERAPY: ICD-10-CM

## 2019-03-26 DIAGNOSIS — N52.2 DRUG-INDUCED ERECTILE DYSFUNCTION: ICD-10-CM

## 2019-03-26 DIAGNOSIS — I25.2 OLD MI (MYOCARDIAL INFARCTION): ICD-10-CM

## 2019-03-26 DIAGNOSIS — L02.92 BOILS: ICD-10-CM

## 2019-03-26 DIAGNOSIS — I10 ESSENTIAL HYPERTENSION: ICD-10-CM

## 2019-03-26 DIAGNOSIS — C61 PROSTATE CANCER (HCC): Primary | ICD-10-CM

## 2019-03-26 DIAGNOSIS — R73.03 PREDIABETES: ICD-10-CM

## 2019-03-26 DIAGNOSIS — E78.00 PURE HYPERCHOLESTEROLEMIA: ICD-10-CM

## 2019-03-26 PROCEDURE — 96372 THER/PROPH/DIAG INJ SC/IM: CPT | Performed by: FAMILY MEDICINE

## 2019-03-26 PROCEDURE — 99214 OFFICE O/P EST MOD 30 MIN: CPT | Performed by: FAMILY MEDICINE

## 2019-03-26 RX ORDER — CYANOCOBALAMIN 1000 UG/ML
1000 INJECTION INTRAMUSCULAR; SUBCUTANEOUS ONCE
Status: COMPLETED | OUTPATIENT
Start: 2019-03-26 | End: 2019-03-26

## 2019-03-26 RX ORDER — CLOTRIMAZOLE AND BETAMETHASONE DIPROPIONATE 10; .64 MG/G; MG/G
CREAM TOPICAL
Refills: 1 | COMMUNITY
Start: 2019-03-11 | End: 2019-07-18

## 2019-03-26 RX ORDER — SULFAMETHOXAZOLE AND TRIMETHOPRIM 800; 160 MG/1; MG/1
1 TABLET ORAL 2 TIMES DAILY
Qty: 20 TABLET | Refills: 0 | Status: SHIPPED | OUTPATIENT
Start: 2019-03-26 | End: 2019-04-05

## 2019-03-26 RX ORDER — DIAPER,BRIEF,INFANT-TODD,DISP
EACH MISCELLANEOUS 2 TIMES DAILY
COMMUNITY
End: 2019-07-18

## 2019-03-26 RX ORDER — VITAMIN E 268 MG
400 CAPSULE ORAL DAILY
Status: ON HOLD | COMMUNITY
End: 2021-07-29

## 2019-03-26 RX ORDER — MULTIVIT-MIN/IRON/FOLIC ACID/K 18-600-40
CAPSULE ORAL
COMMUNITY
End: 2020-11-06

## 2019-03-26 RX ADMIN — CYANOCOBALAMIN 1000 MCG: 1000 INJECTION INTRAMUSCULAR; SUBCUTANEOUS at 09:39

## 2019-04-18 ENCOUNTER — OFFICE VISIT (OUTPATIENT)
Dept: FAMILY MEDICINE CLINIC | Age: 69
End: 2019-04-18
Payer: MEDICARE

## 2019-04-18 VITALS
TEMPERATURE: 97.4 F | HEART RATE: 65 BPM | HEIGHT: 70 IN | DIASTOLIC BLOOD PRESSURE: 72 MMHG | OXYGEN SATURATION: 99 % | BODY MASS INDEX: 30.92 KG/M2 | WEIGHT: 216 LBS | SYSTOLIC BLOOD PRESSURE: 132 MMHG

## 2019-04-18 DIAGNOSIS — M77.8 RIGHT ELBOW TENDONITIS: Primary | ICD-10-CM

## 2019-04-18 DIAGNOSIS — C61 PROSTATE CANCER (HCC): ICD-10-CM

## 2019-04-18 DIAGNOSIS — R23.2 HOT FLASH IN MALE: ICD-10-CM

## 2019-04-18 PROCEDURE — 1036F TOBACCO NON-USER: CPT | Performed by: NURSE PRACTITIONER

## 2019-04-18 PROCEDURE — G8598 ASA/ANTIPLAT THER USED: HCPCS | Performed by: NURSE PRACTITIONER

## 2019-04-18 PROCEDURE — 1123F ACP DISCUSS/DSCN MKR DOCD: CPT | Performed by: NURSE PRACTITIONER

## 2019-04-18 PROCEDURE — G8417 CALC BMI ABV UP PARAM F/U: HCPCS | Performed by: NURSE PRACTITIONER

## 2019-04-18 PROCEDURE — 99213 OFFICE O/P EST LOW 20 MIN: CPT | Performed by: NURSE PRACTITIONER

## 2019-04-18 PROCEDURE — 3017F COLORECTAL CA SCREEN DOC REV: CPT | Performed by: NURSE PRACTITIONER

## 2019-04-18 PROCEDURE — 4040F PNEUMOC VAC/ADMIN/RCVD: CPT | Performed by: NURSE PRACTITIONER

## 2019-04-18 PROCEDURE — G8427 DOCREV CUR MEDS BY ELIG CLIN: HCPCS | Performed by: NURSE PRACTITIONER

## 2019-04-18 RX ORDER — LISINOPRIL 20 MG/1
20 TABLET ORAL DAILY
Qty: 90 TABLET | Refills: 1 | Status: SHIPPED | OUTPATIENT
Start: 2019-04-18 | End: 2019-08-28 | Stop reason: SDUPTHER

## 2019-04-18 RX ORDER — GEMFIBROZIL 600 MG/1
600 TABLET, FILM COATED ORAL 2 TIMES DAILY
Qty: 180 TABLET | Refills: 1 | Status: ON HOLD | OUTPATIENT
Start: 2019-04-18 | End: 2019-08-19 | Stop reason: SINTOL

## 2019-04-18 ASSESSMENT — ENCOUNTER SYMPTOMS
SHORTNESS OF BREATH: 0
COUGH: 0

## 2019-04-18 NOTE — PROGRESS NOTES
Subjective  Chief Complaint   Patient presents with    Elbow Pain     states that he has tennis elbow in his RT elbow and his hand goes numb at night and throbs and tingles. Arm Pain    The incident occurred more than 1 week ago. The incident occurred at home. The injury mechanism was repetitive motion (was using a steamer doing carpet cleaning with a friend). The pain is present in the right elbow. The quality of the pain is described as aching and burning. The pain radiates to the right hand and right arm. The pain is moderate. The pain has been fluctuating since the incident. Associated symptoms include numbness (right hand) and tingling (right hand). Pertinent negatives include no chest pain. The symptoms are aggravated by movement, lifting and palpation. He has tried immobilization, NSAIDs and rest for the symptoms. Having hot flashes, was on a female fertility drug last year as a clinical trial for treatment of prostate CA and has struggled with hot flashes since that time. Has tried melatonin and a Rx from that doctor (can't remember what it was) and that did not help either. Hs had these since he went through radiation years ago, but has worsened since the hormone therapy. Will be seeing his oncologist again in June.      Past Medical History:   Diagnosis Date    CAD (coronary artery disease)     Hyperlipidemia     Hypertension     Old MI (myocardial infarction) 2/3/2006    UTE on CPAP     Prostate cancer [facility])      Patient Active Problem List    Diagnosis Date Noted    S/P radiation therapy 03/26/2019    Drug-induced erectile dysfunction 03/26/2019    CAD (coronary artery disease)     Primary insomnia 03/24/2018    UTE on CPAP 01/09/2018    Prediabetes 05/11/2016    Hypertension     Prostate cancer ([facility] [address]     Hyperlipidemia     Coronary artery disease involving native coronary artery of native heart without angina pectoris 02/03/2006    Old MI (myocardial infarction) 02/03/2006 Past Surgical History:   Procedure Laterality Date    CORONARY ANGIOPLASTY WITH STENT PLACEMENT      X3     Family History   Problem Relation Age of Onset    Cancer Mother     Cancer Father     Cancer Brother     Diabetes Maternal Grandmother      Social History     Socioeconomic History    Marital status: Legally      Spouse name: None    Number of children: None    Years of education: None    Highest education level: None   Occupational History    None   Social Needs    Financial resource strain: None    Food insecurity:     Worry: None     Inability: None    Transportation needs:     Medical: None     Non-medical: None   Tobacco Use    Smoking status: Former Smoker     Packs/day: 2.00     Years: 25.00     Pack years: 50.00     Last attempt to quit: 1/3/1995     Years since quittin.3    Smokeless tobacco: Never Used   Substance and Sexual Activity    Alcohol use: Yes     Alcohol/week: 2.4 oz     Types: 2 Cans of beer, 2 Shots of liquor per week    Drug use: No    Sexual activity: None   Lifestyle    Physical activity:     Days per week: None     Minutes per session: None    Stress: None   Relationships    Social connections:     Talks on phone: None     Gets together: None     Attends Yazidism service: None     Active member of club or organization: None     Attends meetings of clubs or organizations: None     Relationship status: None    Intimate partner violence:     Fear of current or ex partner: None     Emotionally abused: None     Physically abused: None     Forced sexual activity: None   Other Topics Concern    None   Social History Narrative    None     Current Outpatient Medications on File Prior to Visit   Medication Sig Dispense Refill    clotrimazole-betamethasone (LOTRISONE) 1-0.05 % cream APPLY TO THE AFFECTED AREA(S) THREE TIMES DAILY  1    bacitracin zinc 500 UNIT/GM ointment Apply topically 2 times daily Apply topically 2 times daily.       Cholecalciferol (VITAMIN D) 2000 units CAPS capsule Take by mouth      vitamin E 400 UNIT capsule Take 400 Units by mouth daily      lovastatin (MEVACOR) 20 MG tablet TAKE 1 TABLET BY MOUTH NIGHTLY WITH evening meal 30 tablet 3    gemfibrozil (LOPID) 600 MG tablet TAKE 1 TABLET BY MOUTH EVERY DAY 30 tablet 5    lisinopril (PRINIVIL;ZESTRIL) 20 MG tablet TAKE 1 TABLET BY MOUTH EVERY DAY 30 tablet 5    Melatonin 10 MG CAPS Take by mouth      CPAP Machine MISC by Does not apply route Use nightly as directed. Pressure setting 5 cm H2O. 1 each 0    eszopiclone (LUNESTA) 2 MG TABS TAKE 1 TABLET BY MOUTH IN THE EVENING for sleep 30 tablet 2    aspirin 81 MG tablet Take 81 mg by mouth daily      Ascorbic Acid (VITAMIN C PO) Take 500 mg by mouth      Doxepin HCl (SILENOR) 3 MG TABS Take 3 mg by mouth nightly as needed (insom) 30 tablet 1     Current Facility-Administered Medications on File Prior to Visit   Medication Dose Route Frequency Provider Last Rate Last Dose    cyanocobalamin injection 1,000 mcg  1,000 mcg Intramuscular Once [facility], APRN - [place]         Allergies   Allergen Reactions    Morphine Swelling and Rash       Review of Systems   Constitutional: Negative for chills, diaphoresis, fatigue and fever. Respiratory: Negative for cough and shortness of breath. Cardiovascular: Negative for chest pain, palpitations and leg swelling. Endocrine: Positive for heat intolerance. Musculoskeletal: Positive for arthralgias (right elbow). Neurological: Positive for tingling (right hand) and numbness (right hand). Negative for dizziness. Objective  Vitals:    04/18/19 1008   BP: 132/72   Site: Left Upper Arm   Position: Sitting   Cuff Size: Large Adult   Pulse: 65   Temp: 97.4 °F (36.3 °C)   TempSrc: Tympanic   SpO2: 99%   Weight: 216 lb (98 kg)   Height: 5' 10\" (1.778 m)     Physical Exam   Constitutional: He is oriented to person, place, and time.  He appears well-developed and well-nourished. No distress. HENT:   Head: Normocephalic and atraumatic. Right Ear: External ear normal.   Left Ear: External ear normal.   Cardiovascular: Normal rate, regular rhythm and normal heart sounds. Pulmonary/Chest: Effort normal and breath sounds normal. No respiratory distress. Musculoskeletal: Normal range of motion. Right elbow: He exhibits normal range of motion, no swelling, no effusion, no deformity and no laceration. Tenderness found. Radial head and medial epicondyle tenderness noted. Neurological: He is alert and oriented to person, place, and time. No cranial nerve deficit. Skin: He is not diaphoretic. Assessment& Plan    1. Right elbow tendonitis  Referral to PT as ordered. Elbow brace as discussed. Discussed referral to ortho for possible cortisone injection. He will hold off at this time. If no improvement with PT, will call for referral.  - [address]    2. Prostate cancer [facility])  F/u with oncologist in June as scheduled. 3. Hot flash in male  Will discuss further with his oncologist in June. Side effects, adverse effects of the medication prescribed today, as well as treatment plan/ rationale and result expectations have been discussed with the patient who expresses understanding and desires to proceed. Close follow up to evaluate treatment results and for coordination of care. I have reviewed the patient's medical history in detail and updated the computerized patient record. As always, patient is advised that if symptoms worsen in any way they will proceed to the nearest emergency room.        Orders Placed This Encounter   Procedures   [address] [facility]     Referral Priority:   Routine     Referral Type:   Eval and Treat     Referral Reason:   Specialty Services Required     Requested Specialty:   Physical Therapy     Number of Visits Requested:   1       No orders of the defined types were placed in this encounter. Return if symptoms worsen or fail to improve.     Haile Edmond, APRN - CNP

## 2019-04-22 RX ORDER — LOVASTATIN 20 MG/1
20 TABLET ORAL NIGHTLY
Qty: 90 TABLET | Refills: 1 | Status: SHIPPED | OUTPATIENT
Start: 2019-04-22 | End: 2019-08-28 | Stop reason: SDUPTHER

## 2019-05-06 ENCOUNTER — APPOINTMENT (OUTPATIENT)
Dept: GENERAL RADIOLOGY | Age: 69
End: 2019-05-06
Payer: MEDICARE

## 2019-05-06 ENCOUNTER — HOSPITAL ENCOUNTER (EMERGENCY)
Age: 69
Discharge: HOME | End: 2019-05-06
Payer: MEDICARE

## 2019-05-06 VITALS
HEART RATE: 70 BPM | RESPIRATION RATE: 18 BRPM | DIASTOLIC BLOOD PRESSURE: 69 MMHG | WEIGHT: 215 LBS | SYSTOLIC BLOOD PRESSURE: 137 MMHG | BODY MASS INDEX: 30.78 KG/M2 | TEMPERATURE: 97.7 F | HEIGHT: 70 IN | OXYGEN SATURATION: 96 %

## 2019-05-06 DIAGNOSIS — E86.0 DEHYDRATION: Primary | ICD-10-CM

## 2019-05-06 DIAGNOSIS — R55 NEAR SYNCOPE: ICD-10-CM

## 2019-05-06 LAB
ALBUMIN SERPL-MCNC: 4.2 G/DL (ref 3.5–4.6)
ALP BLD-CCNC: 187 U/L (ref 35–104)
ALT SERPL-CCNC: 13 U/L (ref 0–41)
ANION GAP SERPL CALCULATED.3IONS-SCNC: 20 MEQ/L (ref 9–15)
AST SERPL-CCNC: 16 U/L (ref 0–40)
BASOPHILS ABSOLUTE: 0.1 K/UL (ref 0–0.2)
BASOPHILS RELATIVE PERCENT: 0.8 %
BILIRUB SERPL-MCNC: <0.2 MG/DL (ref 0.2–0.7)
BUN BLDV-MCNC: 22 MG/DL (ref 8–23)
CALCIUM SERPL-MCNC: 9.7 MG/DL (ref 8.5–9.9)
CHLORIDE BLD-SCNC: 103 MEQ/L (ref 95–107)
CO2: 18 MEQ/L (ref 20–31)
CREAT SERPL-MCNC: 1.07 MG/DL (ref 0.7–1.2)
EKG ATRIAL RATE: 68 BPM
EKG P AXIS: 60 DEGREES
EKG P-R INTERVAL: 232 MS
EKG Q-T INTERVAL: 422 MS
EKG QRS DURATION: 98 MS
EKG QTC CALCULATION (BAZETT): 448 MS
EKG R AXIS: -29 DEGREES
EKG T AXIS: 41 DEGREES
EKG VENTRICULAR RATE: 68 BPM
EOSINOPHILS ABSOLUTE: 0.3 K/UL (ref 0–0.7)
EOSINOPHILS RELATIVE PERCENT: 3.3 %
GFR AFRICAN AMERICAN: >60
GFR NON-AFRICAN AMERICAN: >60
GLOBULIN: 2.6 G/DL (ref 2.3–3.5)
GLUCOSE BLD-MCNC: 151 MG/DL (ref 70–99)
HCT VFR BLD CALC: 42.4 % (ref 42–52)
HEMOGLOBIN: 14.9 G/DL (ref 14–18)
LYMPHOCYTES ABSOLUTE: 2 K/UL (ref 1–4.8)
LYMPHOCYTES RELATIVE PERCENT: 21.6 %
MCH RBC QN AUTO: 33 PG (ref 27–31.3)
MCHC RBC AUTO-ENTMCNC: 35.2 % (ref 33–37)
MCV RBC AUTO: 93.9 FL (ref 80–100)
MONOCYTES ABSOLUTE: 0.6 K/UL (ref 0.2–0.8)
MONOCYTES RELATIVE PERCENT: 6.2 %
NEUTROPHILS ABSOLUTE: 6.3 K/UL (ref 1.4–6.5)
NEUTROPHILS RELATIVE PERCENT: 68.1 %
PDW BLD-RTO: 14.2 % (ref 11.5–14.5)
PLATELET # BLD: 338 K/UL (ref 130–400)
POTASSIUM SERPL-SCNC: 3.7 MEQ/L (ref 3.4–4.9)
RBC # BLD: 4.52 M/UL (ref 4.7–6.1)
SODIUM BLD-SCNC: 141 MEQ/L (ref 135–144)
TOTAL CK: 68 U/L (ref 0–190)
TOTAL PROTEIN: 6.8 G/DL (ref 6.3–8)
TROPONIN: <0.01 NG/ML (ref 0–0.01)
WBC # BLD: 9.2 K/UL (ref 4.8–10.8)

## 2019-05-06 PROCEDURE — 99285 EMERGENCY DEPT VISIT HI MDM: CPT

## 2019-05-06 PROCEDURE — 96360 HYDRATION IV INFUSION INIT: CPT

## 2019-05-06 PROCEDURE — 71045 X-RAY EXAM CHEST 1 VIEW: CPT

## 2019-05-06 PROCEDURE — 85025 COMPLETE CBC W/AUTO DIFF WBC: CPT

## 2019-05-06 PROCEDURE — 93005 ELECTROCARDIOGRAM TRACING: CPT

## 2019-05-06 PROCEDURE — 96361 HYDRATE IV INFUSION ADD-ON: CPT

## 2019-05-06 PROCEDURE — 6370000000 HC RX 637 (ALT 250 FOR IP): Performed by: PHYSICIAN ASSISTANT

## 2019-05-06 PROCEDURE — 2580000003 HC RX 258: Performed by: PHYSICIAN ASSISTANT

## 2019-05-06 PROCEDURE — 80053 COMPREHEN METABOLIC PANEL: CPT

## 2019-05-06 PROCEDURE — 82550 ASSAY OF CK (CPK): CPT

## 2019-05-06 PROCEDURE — 84484 ASSAY OF TROPONIN QUANT: CPT

## 2019-05-06 RX ORDER — 0.9 % SODIUM CHLORIDE 0.9 %
1000 INTRAVENOUS SOLUTION INTRAVENOUS ONCE
Status: COMPLETED | OUTPATIENT
Start: 2019-05-06 | End: 2019-05-06

## 2019-05-06 RX ORDER — ASPIRIN 81 MG/1
162 TABLET, CHEWABLE ORAL ONCE
Status: COMPLETED | OUTPATIENT
Start: 2019-05-06 | End: 2019-05-06

## 2019-05-06 RX ADMIN — ASPIRIN 81 MG 162 MG: 81 TABLET ORAL at 21:09

## 2019-05-06 RX ADMIN — SODIUM CHLORIDE 1000 ML: 9 INJECTION, SOLUTION INTRAVENOUS at 21:09

## 2019-05-06 ASSESSMENT — ENCOUNTER SYMPTOMS
ALLERGIC/IMMUNOLOGIC NEGATIVE: 1
DIARRHEA: 0
ABDOMINAL PAIN: 0
SHORTNESS OF BREATH: 0
COLOR CHANGE: 0
APNEA: 0
NAUSEA: 1
VOMITING: 0
EYE PAIN: 0
TROUBLE SWALLOWING: 0

## 2019-05-06 ASSESSMENT — PAIN SCALES - GENERAL: PAINLEVEL_OUTOF10: 0

## 2019-05-06 NOTE — ED TRIAGE NOTES
Pt to ed from [facility] with complaints of dizziness, weakness and diaphoresis whie at [employer] awaiting food. Per ems, pt's BP was 82/48 on arrival. Pt denies hx of DM. Pt reports cardiac hx of 3 stents placed in 2006/2007. Pt is AOx3, calm and cooperative. PT report drinking one vodka cranberry and smoking marijuana PTA. Deneis NVD. PT Resps are even and unlabored. Pt recently completed clinical trial for prostate CA.  [facility]

## 2019-05-07 NOTE — ED NOTES
Pt had 20g PIV to RAC, placed by ems. Removed by this RN.  Catheter intact     Carlos Tobias  05/06/19 [id number]

## 2019-05-07 NOTE — ED PROVIDER NOTES
Negative for hematuria. Musculoskeletal: Negative for neck pain and neck stiffness. Skin: Negative for color change. Allergic/Immunologic: Negative. Neurological: Positive for dizziness, syncope (near) and light-headedness. Negative for numbness. Hematological: Negative. Psychiatric/Behavioral: Negative. All other systems reviewed and are negative. Except as noted above the remainder of the review of systems was reviewed and negative. PAST MEDICAL HISTORY     Past Medical History:   Diagnosis Date    CAD (coronary artery disease)     Hyperlipidemia     Hypertension     Old MI (myocardial infarction) 2/3/2006    UTE on CPAP     Prostate cancer ([facility])          SURGICALHISTORY       Past Surgical History:   Procedure Laterality Date    CORONARY ANGIOPLASTY WITH STENT PLACEMENT      X3         CURRENT MEDICATIONS       Previous Medications    ASCORBIC ACID (VITAMIN C PO)    Take 500 mg by mouth    ASPIRIN 81 MG TABLET    Take 81 mg by mouth daily    BACITRACIN ZINC 500 UNIT/GM OINTMENT    Apply topically 2 times daily Apply topically 2 times daily. CHOLECALCIFEROL (VITAMIN D) 2000 UNITS CAPS CAPSULE    Take by mouth    CLOTRIMAZOLE-BETAMETHASONE (LOTRISONE) 1-0.05 % CREAM    APPLY TO THE AFFECTED AREA(S) THREE TIMES DAILY    CPAP MACHINE MISC    by Does not apply route Use nightly as directed. Pressure setting 5 cm H2O.     DOXEPIN HCL (SILENOR) 3 MG TABS    Take 3 mg by mouth nightly as needed (insom)    ESZOPICLONE (LUNESTA) 2 MG TABS    TAKE 1 TABLET BY MOUTH IN THE EVENING for sleep    GEMFIBROZIL (LOPID) 600 MG TABLET    Take 1 tablet by mouth 2 times daily    LISINOPRIL (PRINIVIL;ZESTRIL) 20 MG TABLET    Take 1 tablet by mouth daily    LOVASTATIN (MEVACOR) 20 MG TABLET    Take 1 tablet by mouth nightly    MELATONIN 10 MG CAPS    Take by mouth    VITAMIN E 400 UNIT CAPSULE    Take 400 Units by mouth daily       ALLERGIES     Morphine    FAMILY HISTORY       Family History Problem Relation Age of Onset    Cancer Mother     Cancer Father     Cancer Brother     Diabetes Maternal Grandmother           SOCIAL HISTORY       Social History     Socioeconomic History    Marital status: Legally      Spouse name: None    Number of children: None    Years of education: None    Highest education level: None   Occupational History    None   Social Needs    Financial resource strain: None    Food insecurity:     Worry: None     Inability: None    Transportation needs:     Medical: None     Non-medical: None   Tobacco Use    Smoking status: Former Smoker     Packs/day: 2.00     Years: 25.00     Pack years: 50.00     Types: Cigars    Smokeless tobacco: Never Used   Substance and Sexual Activity    Alcohol use: Yes     Alcohol/week: 2.4 oz     Types: 2 Cans of beer, 2 Shots of liquor per week    Drug use: Yes     Types: Marijuana    Sexual activity: None   Lifestyle    Physical activity:     Days per week: None     Minutes per session: None    Stress: None   Relationships    Social connections:     Talks on phone: None     Gets together: None     Attends Sikhism service: None     Active member of club or organization: None     Attends meetings of clubs or organizations: None     Relationship status: None    Intimate partner violence:     Fear of current or ex partner: None     Emotionally abused: None     Physically abused: None     Forced sexual activity: None   Other Topics Concern    None   Social History Narrative    None       SCREENINGS              PHYSICAL EXAM    (up to 7 for level 4, 8 or more for level 5)     ED Triage Vitals [05/06/19 1948]   BP Temp Temp Source Pulse Resp SpO2 Height Weight   128/66 97.7 °F (36.5 °C) Oral 64 18 95 % 5' 10\" (1.778 m) 215 lb (97.5 kg)       Physical Exam   Constitutional: He is oriented to person, place, and time. He appears well-developed and well-nourished. No distress. HENT:   Head: Normocephalic and atraumatic. Mouth/Throat: Mucous membranes are dry. No oropharyngeal exudate. Eyes: Pupils are equal, round, and reactive to light. Conjunctivae and EOM are normal. No scleral icterus. Neck: Normal range of motion. Neck supple. No tracheal deviation present. Cardiovascular: Normal rate, normal heart sounds and intact distal pulses. Pulmonary/Chest: Effort normal and breath sounds normal. No respiratory distress. Abdominal: Soft. Bowel sounds are normal. He exhibits no distension. Musculoskeletal: Normal range of motion. Neurological: He is alert and oriented to person, place, and time. No cranial nerve deficit. He exhibits normal muscle tone. Skin: Skin is warm and dry. No rash noted. He is not diaphoretic. No erythema. Psychiatric: He has a normal mood and affect. His behavior is normal. Judgment and thought content normal.   Nursing note and vitals reviewed. DIAGNOSTIC RESULTS     EKG: All EKG's are interpreted by the Emergency Department Physician who either signs or Co-signsthis chart in the absence of a cardiologist.    Normal sinus rhythm, rate 68 bpm, first-degree AV block    RADIOLOGY:   Non-plain filmimages such as CT, Ultrasound and MRI are read by the radiologist. Plain radiographic images are visualized and preliminarily interpreted by the emergency physician with the below findings:    NAD    Interpretation per the Radiologist below, if available at the time ofthis note:    XR CHEST PORTABLE    (Results Pending)         ED BEDSIDE ULTRASOUND:   Performed by ED Physician - none    LABS:  Labs Reviewed   COMPREHENSIVE METABOLIC PANEL - Abnormal; Notable for the following components:       Result Value    CO2 18 (*)     Anion Gap 20 (*)     Glucose 151 (*)     Alkaline Phosphatase 187 (*)     All other components within normal limits   TROPONIN   CK   CBC WITH AUTO DIFFERENTIAL       All other labs were within normal range or not returned as of this dictation.     EMERGENCY DEPARTMENT COURSE and DIFFERENTIAL DIAGNOSIS/MDM:   Vitals:    Vitals:    05/06/19 1948 05/06/19 2113   BP: 128/66    Pulse: 64    Resp: 18 16   Temp: 97.7 °F (36.5 °C)    TempSrc: Oral    SpO2: 95% 96%   Weight: 215 lb (97.5 kg)    Height: 5' 10\" (1.778 m)        Patient has been completely asymptomatic while in the emergency department. Patient has a normal EKG, normal cardiac labs negative orthostatic vital signs and admits to poor fluid intake today. I discussed better hydration with patient. Patient has no signs of pulmonary embolism with no tachycardia, hypoxia or shortness of breath. Patient will be discharged home with better hydration and PCP follow-up    MDM      REASSESSMENT          CONSULTS:  None    PROCEDURES:  Unless otherwise noted below, none     Procedures    FINAL IMPRESSION      1. Dehydration    2. Near syncope          DISPOSITION/PLAN   DISPOSITION Decision To Discharge 05/06/2019 10:36:56 PM      PATIENT REFERREDTO:  Romulo Stearns MD  [address] [place] [address]  [phone]    In 1 day        DISCHARGEMEDICATIONS:  New Prescriptions    No medications on file     Controlled Substances Monitoring:     No flowsheet data found.     (Please note that portions of this note were completed with a voice recognition program.  Efforts were made to edit the dictations but occasionally words are mis-transcribed.)    Ashley Child PA-C (electronically signed)  Attending Emergency Physician          Ashley Child PA-C  05/06/19 3841

## 2019-05-09 PROCEDURE — 93010 ELECTROCARDIOGRAM REPORT: CPT | Performed by: INTERNAL MEDICINE

## 2019-05-10 ENCOUNTER — OFFICE VISIT (OUTPATIENT)
Dept: FAMILY MEDICINE CLINIC | Age: 69
End: 2019-05-10
Payer: MEDICARE

## 2019-05-10 VITALS
OXYGEN SATURATION: 98 % | WEIGHT: 219.2 LBS | HEART RATE: 94 BPM | SYSTOLIC BLOOD PRESSURE: 136 MMHG | HEIGHT: 70 IN | DIASTOLIC BLOOD PRESSURE: 70 MMHG | BODY MASS INDEX: 31.38 KG/M2

## 2019-05-10 DIAGNOSIS — C61 PROSTATE CANCER (HCC): Primary | ICD-10-CM

## 2019-05-10 DIAGNOSIS — R55 NEAR SYNCOPE: ICD-10-CM

## 2019-05-10 DIAGNOSIS — E86.0 DEHYDRATION: ICD-10-CM

## 2019-05-10 DIAGNOSIS — I10 ESSENTIAL HYPERTENSION: ICD-10-CM

## 2019-05-10 DIAGNOSIS — I25.10 CORONARY ARTERY DISEASE INVOLVING NATIVE CORONARY ARTERY OF NATIVE HEART WITHOUT ANGINA PECTORIS: ICD-10-CM

## 2019-05-10 PROCEDURE — 4040F PNEUMOC VAC/ADMIN/RCVD: CPT | Performed by: FAMILY MEDICINE

## 2019-05-10 PROCEDURE — 3017F COLORECTAL CA SCREEN DOC REV: CPT | Performed by: FAMILY MEDICINE

## 2019-05-10 PROCEDURE — 1036F TOBACCO NON-USER: CPT | Performed by: FAMILY MEDICINE

## 2019-05-10 PROCEDURE — 1123F ACP DISCUSS/DSCN MKR DOCD: CPT | Performed by: FAMILY MEDICINE

## 2019-05-10 PROCEDURE — G8427 DOCREV CUR MEDS BY ELIG CLIN: HCPCS | Performed by: FAMILY MEDICINE

## 2019-05-10 PROCEDURE — G8417 CALC BMI ABV UP PARAM F/U: HCPCS | Performed by: FAMILY MEDICINE

## 2019-05-10 PROCEDURE — 99213 OFFICE O/P EST LOW 20 MIN: CPT | Performed by: FAMILY MEDICINE

## 2019-05-10 PROCEDURE — G8598 ASA/ANTIPLAT THER USED: HCPCS | Performed by: FAMILY MEDICINE

## 2019-05-10 NOTE — PROGRESS NOTES
Chief Complaint   Patient presents with    Follow-Up from [facility] ER, bp dropped, 80/40, dehydration        HPI:  Tova Jarrett is a 79 y.o. male     ER follow up  Vasovagal episode  dehydration    Cold sweats at fast food restaurant  Dizzy, weakness  Didn't lose consciousness    IV fluids perked him right up  All labs normal, no issues  Fine since  Drinking gatorade    Prostate cancer patient  Dr. Thania Vera in [address]  Pt [facility]          Patient Active Problem List   Diagnosis    Hypertension    Prostate cancer ([facility] [address]    Hyperlipidemia    Coronary artery disease involving native coronary artery of native heart without angina pectoris    Old MI (myocardial infarction)    Prediabetes    UTE on CPAP    Primary insomnia    CAD (coronary artery disease)    S/P radiation therapy    Drug-induced erectile dysfunction       Current Outpatient Medications   Medication Sig Dispense Refill    lovastatin (MEVACOR) 20 MG tablet Take 1 tablet by mouth nightly 90 tablet 1    gemfibrozil (LOPID) 600 MG tablet Take 1 tablet by mouth 2 times daily 180 tablet 1    lisinopril (PRINIVIL;ZESTRIL) 20 MG tablet Take 1 tablet by mouth daily 90 tablet 1    clotrimazole-betamethasone (LOTRISONE) 1-0.05 % cream APPLY TO THE AFFECTED AREA(S) THREE TIMES DAILY  1    bacitracin zinc 500 UNIT/GM ointment Apply topically 2 times daily Apply topically 2 times daily.  Cholecalciferol (VITAMIN D) 2000 units CAPS capsule Take by mouth      vitamin E 400 UNIT capsule Take 400 Units by mouth daily      Melatonin 10 MG CAPS Take by mouth      Doxepin HCl (SILENOR) 3 MG TABS Take 3 mg by mouth nightly as needed (insom) 30 tablet 1    CPAP Machine MISC by Does not apply route Use nightly as directed.   Pressure setting 5 cm H2O. 1 each 0    eszopiclone (LUNESTA) 2 MG TABS TAKE 1 TABLET BY MOUTH IN THE EVENING for sleep 30 tablet 2    aspirin 81 MG tablet Take 81 mg by mouth daily      Ascorbic Acid (VITAMIN C PO) Take 500 mg by mouth       Current Facility-Administered Medications   Medication Dose Route Frequency Provider Last Rate Last Dose    cyanocobalamin injection 1,000 mcg  1,000 mcg Intramuscular Once [facility] JACQUELYN Drummond CNP             Past Medical History:   Diagnosis Date    CAD (coronary artery disease)     Hyperlipidemia     Hypertension     Old MI (myocardial infarction) 2/3/2006    UTE on CPAP     Prostate cancer [facility])      Past Surgical History:   Procedure Laterality Date    CORONARY ANGIOPLASTY WITH STENT PLACEMENT      X3     Family History   Problem Relation Age of Onset    Cancer Mother     Cancer Father     Cancer Brother     Diabetes Maternal Grandmother      Social History     Socioeconomic History    Marital status: Legally      Spouse name: None    Number of children: None    Years of education: None    Highest education level: None   Occupational History    None   Social Needs    Financial resource strain: None    Food insecurity:     Worry: None     Inability: None    Transportation needs:     Medical: None     Non-medical: None   Tobacco Use    Smoking status: Former Smoker     Packs/day: 2.00     Years: 25.00     Pack years: 50.00     Types: Cigars    Smokeless tobacco: Never Used   Substance and Sexual Activity    Alcohol use:  Yes     Alcohol/week: 2.4 oz     Types: 2 Cans of beer, 2 Shots of liquor per week    Drug use: Yes     Types: Marijuana    Sexual activity: None   Lifestyle    Physical activity:     Days per week: None     Minutes per session: None    Stress: None   Relationships    Social connections:     Talks on phone: None     Gets together: None     Attends Adventism service: None     Active member of club or organization: None     Attends meetings of clubs or organizations: None     Relationship status: None    Intimate partner violence:     Fear of current or ex partner: None     Emotionally without angina pectoris  Lipid Panel    Comprehensive Metabolic Panel    CBC   4. Near syncope     5.  Dehydration       Continue hydration  Labs as ordered    Kush Brandon MD

## 2019-05-28 DIAGNOSIS — I25.10 CORONARY ARTERY DISEASE INVOLVING NATIVE CORONARY ARTERY OF NATIVE HEART WITHOUT ANGINA PECTORIS: ICD-10-CM

## 2019-05-28 DIAGNOSIS — C61 PROSTATE CANCER (HCC): ICD-10-CM

## 2019-05-28 DIAGNOSIS — R73.09 ELEVATED GLUCOSE: Primary | ICD-10-CM

## 2019-05-28 LAB
ALBUMIN SERPL-MCNC: 4.3 G/DL (ref 3.5–4.6)
ALP BLD-CCNC: 243 U/L (ref 35–104)
ALT SERPL-CCNC: 6 U/L (ref 0–41)
ANION GAP SERPL CALCULATED.3IONS-SCNC: 18 MEQ/L (ref 9–15)
AST SERPL-CCNC: 14 U/L (ref 0–40)
BILIRUB SERPL-MCNC: 0.4 MG/DL (ref 0.2–0.7)
BUN BLDV-MCNC: 17 MG/DL (ref 8–23)
CALCIUM SERPL-MCNC: 10.2 MG/DL (ref 8.5–9.9)
CHLORIDE BLD-SCNC: 99 MEQ/L (ref 95–107)
CHOLESTEROL, TOTAL: 171 MG/DL (ref 0–199)
CO2: 20 MEQ/L (ref 20–31)
CREAT SERPL-MCNC: 0.91 MG/DL (ref 0.7–1.2)
GFR AFRICAN AMERICAN: >60
GFR NON-AFRICAN AMERICAN: >60
GLOBULIN: 3.4 G/DL (ref 2.3–3.5)
GLUCOSE BLD-MCNC: 132 MG/DL (ref 70–99)
HCT VFR BLD CALC: 44.7 % (ref 42–52)
HDLC SERPL-MCNC: 43 MG/DL (ref 40–59)
HEMOGLOBIN: 15.5 G/DL (ref 14–18)
LDL CHOLESTEROL CALCULATED: 113 MG/DL (ref 0–129)
MCH RBC QN AUTO: 33.1 PG (ref 27–31.3)
MCHC RBC AUTO-ENTMCNC: 34.7 % (ref 33–37)
MCV RBC AUTO: 95.5 FL (ref 80–100)
PDW BLD-RTO: 13.6 % (ref 11.5–14.5)
PLATELET # BLD: 340 K/UL (ref 130–400)
POTASSIUM SERPL-SCNC: 4.3 MEQ/L (ref 3.4–4.9)
PROSTATE SPECIFIC ANTIGEN: 31.74 NG/ML (ref 0–5.4)
RBC # BLD: 4.68 M/UL (ref 4.7–6.1)
SODIUM BLD-SCNC: 137 MEQ/L (ref 135–144)
TOTAL PROTEIN: 7.7 G/DL (ref 6.3–8)
TRIGL SERPL-MCNC: 77 MG/DL (ref 0–150)
WBC # BLD: 6.6 K/UL (ref 4.8–10.8)

## 2019-05-30 LAB — HBA1C MFR BLD: 5.6 % (ref 4.8–5.9)

## 2019-07-18 ENCOUNTER — OFFICE VISIT (OUTPATIENT)
Dept: FAMILY MEDICINE CLINIC | Age: 69
End: 2019-07-18
Payer: MEDICARE

## 2019-07-18 VITALS
DIASTOLIC BLOOD PRESSURE: 66 MMHG | OXYGEN SATURATION: 97 % | HEART RATE: 72 BPM | SYSTOLIC BLOOD PRESSURE: 138 MMHG | HEIGHT: 70 IN | WEIGHT: 217 LBS | BODY MASS INDEX: 31.07 KG/M2

## 2019-07-18 DIAGNOSIS — R51.9 NONINTRACTABLE EPISODIC HEADACHE, UNSPECIFIED HEADACHE TYPE: Primary | ICD-10-CM

## 2019-07-18 DIAGNOSIS — Z12.11 SCREEN FOR COLON CANCER: ICD-10-CM

## 2019-07-18 DIAGNOSIS — Z80.0 FAMILY HISTORY OF COLON CANCER: ICD-10-CM

## 2019-07-18 PROCEDURE — G8417 CALC BMI ABV UP PARAM F/U: HCPCS | Performed by: FAMILY MEDICINE

## 2019-07-18 PROCEDURE — G8427 DOCREV CUR MEDS BY ELIG CLIN: HCPCS | Performed by: FAMILY MEDICINE

## 2019-07-18 PROCEDURE — G8598 ASA/ANTIPLAT THER USED: HCPCS | Performed by: FAMILY MEDICINE

## 2019-07-18 PROCEDURE — 3017F COLORECTAL CA SCREEN DOC REV: CPT | Performed by: FAMILY MEDICINE

## 2019-07-18 PROCEDURE — 1123F ACP DISCUSS/DSCN MKR DOCD: CPT | Performed by: FAMILY MEDICINE

## 2019-07-18 PROCEDURE — 4040F PNEUMOC VAC/ADMIN/RCVD: CPT | Performed by: FAMILY MEDICINE

## 2019-07-18 PROCEDURE — 99213 OFFICE O/P EST LOW 20 MIN: CPT | Performed by: FAMILY MEDICINE

## 2019-07-18 PROCEDURE — 1036F TOBACCO NON-USER: CPT | Performed by: FAMILY MEDICINE

## 2019-07-18 RX ORDER — TAMSULOSIN HYDROCHLORIDE 0.4 MG/1
0.4 CAPSULE ORAL DAILY
Refills: 11 | COMMUNITY
Start: 2019-07-06 | End: 2022-07-26

## 2019-07-18 RX ORDER — POLYETHYLENE GLYCOL 3350, SODIUM CHLORIDE, SODIUM BICARBONATE, POTASSIUM CHLORIDE 420; 11.2; 5.72; 1.48 G/4L; G/4L; G/4L; G/4L
4000 POWDER, FOR SOLUTION ORAL ONCE
Qty: 4000 ML | Refills: 0 | Status: SHIPPED | OUTPATIENT
Start: 2019-07-18 | End: 2019-07-18

## 2019-07-18 NOTE — PROGRESS NOTES
violence:     Fear of current or ex partner: None     Emotionally abused: None     Physically abused: None     Forced sexual activity: None   Other Topics Concern    None   Social History Narrative    None     Allergies   Allergen Reactions    Morphine Swelling and Rash       Review of Systems:   General ROS: negative for - chills, fatigue, fever, malaise, weight gain or weight loss  Respiratory ROS: no cough, shortness of breath, or wheezing  Cardiovascular ROS: no chest pain or dyspnea on exertion  Gastrointestinal ROS: no abdominal pain, change in bowel habits, or black or bloody stools  Genito-Urinary ROS: no dysuria, trouble voiding  Musculoskeletal ROS: negative for - gait disturbance, joint pain or joint stiffness  Neurological ROS: negative for - behavioral changes, memory loss, numbness/tingling, tremors or weakness    In general patient otherwise reports feeling well. Physical Exam:  /66 (Site: Left Upper Arm)   Pulse 72   Ht 5' 10\" (1.778 m)   Wt 217 lb (98.4 kg)   SpO2 97%   BMI 31.14 kg/m²     Gen: Well, NAD, Alert, Oriented x 3   HEENT: EOMI, eyes clear, MMM  Skin: no rash  Neck: no significant lymphadenopathy or thyromegaly  Lungs: CTA B w/out Rales/Wheezes/Rhonchi, Good respiratory effort   Heart: RRR, S1S2, w/out M/R/G, non-displaced PMI   Ext: No C/C/E Bilaterally. Neuro: Neurovascularly intact w/ Sensory/Motor intact UE/LE Bilaterally. Lab Results   Component Value Date    WBC 6.6 05/28/2019    HGB 15.5 05/28/2019    HCT 44.7 05/28/2019     05/28/2019    CHOL 171 05/28/2019    TRIG 77 05/28/2019    HDL 43 05/28/2019    ALT 6 05/28/2019    AST 14 05/28/2019     05/28/2019    K 4.3 05/28/2019    CL 99 05/28/2019    CREATININE 0.91 05/28/2019    BUN 17 05/28/2019    CO2 20 05/28/2019    TSH 2.150 05/05/2016    PSA 31.74 (H) 05/28/2019    LABA1C 5.6 05/30/2019         A&P   Diagnosis Orders   1. Nonintractable episodic headache, unspecified headache type     2.

## 2019-07-22 ENCOUNTER — TELEPHONE (OUTPATIENT)
Dept: FAMILY MEDICINE CLINIC | Age: 69
End: 2019-07-22

## 2019-08-19 ENCOUNTER — ANESTHESIA EVENT (OUTPATIENT)
Dept: ENDOSCOPY | Age: 69
End: 2019-08-19
Payer: MEDICARE

## 2019-08-19 ENCOUNTER — HOSPITAL ENCOUNTER (OUTPATIENT)
Age: 69
Setting detail: OUTPATIENT SURGERY
Discharge: HOME | End: 2019-08-19
Attending: INTERNAL MEDICINE | Admitting: INTERNAL MEDICINE
Payer: MEDICARE

## 2019-08-19 ENCOUNTER — ANESTHESIA (OUTPATIENT)
Dept: ENDOSCOPY | Age: 69
End: 2019-08-19
Payer: MEDICARE

## 2019-08-19 VITALS
BODY MASS INDEX: 31.07 KG/M2 | SYSTOLIC BLOOD PRESSURE: 123 MMHG | RESPIRATION RATE: 16 BRPM | WEIGHT: 217 LBS | TEMPERATURE: 97.7 F | HEIGHT: 70 IN | OXYGEN SATURATION: 96 % | HEART RATE: 61 BPM | DIASTOLIC BLOOD PRESSURE: 62 MMHG

## 2019-08-19 VITALS
RESPIRATION RATE: 18 BRPM | DIASTOLIC BLOOD PRESSURE: 56 MMHG | SYSTOLIC BLOOD PRESSURE: 111 MMHG | OXYGEN SATURATION: 97 %

## 2019-08-19 PROCEDURE — 6360000002 HC RX W HCPCS: Performed by: NURSE ANESTHETIST, CERTIFIED REGISTERED

## 2019-08-19 PROCEDURE — 7100000010 HC PHASE II RECOVERY - FIRST 15 MIN: Performed by: INTERNAL MEDICINE

## 2019-08-19 PROCEDURE — 7100000011 HC PHASE II RECOVERY - ADDTL 15 MIN: Performed by: INTERNAL MEDICINE

## 2019-08-19 PROCEDURE — 2580000003 HC RX 258: Performed by: INTERNAL MEDICINE

## 2019-08-19 PROCEDURE — 3700000001 HC ADD 15 MINUTES (ANESTHESIA): Performed by: INTERNAL MEDICINE

## 2019-08-19 PROCEDURE — 2580000003 HC RX 258: Performed by: NURSE ANESTHETIST, CERTIFIED REGISTERED

## 2019-08-19 PROCEDURE — 45380 COLONOSCOPY AND BIOPSY: CPT | Performed by: INTERNAL MEDICINE

## 2019-08-19 PROCEDURE — 2500000003 HC RX 250 WO HCPCS: Performed by: NURSE ANESTHETIST, CERTIFIED REGISTERED

## 2019-08-19 PROCEDURE — 88305 TISSUE EXAM BY PATHOLOGIST: CPT

## 2019-08-19 PROCEDURE — 3700000000 HC ANESTHESIA ATTENDED CARE: Performed by: INTERNAL MEDICINE

## 2019-08-19 PROCEDURE — 3609027000 HC COLONOSCOPY: Performed by: INTERNAL MEDICINE

## 2019-08-19 RX ORDER — ONDANSETRON 2 MG/ML
4 INJECTION INTRAMUSCULAR; INTRAVENOUS
Status: DISCONTINUED | OUTPATIENT
Start: 2019-08-19 | End: 2019-08-19 | Stop reason: HOSPADM

## 2019-08-19 RX ORDER — SODIUM CHLORIDE 0.9 % (FLUSH) 0.9 %
10 SYRINGE (ML) INJECTION EVERY 12 HOURS SCHEDULED
Status: DISCONTINUED | OUTPATIENT
Start: 2019-08-19 | End: 2019-08-19 | Stop reason: HOSPADM

## 2019-08-19 RX ORDER — SODIUM CHLORIDE 9 MG/ML
INJECTION, SOLUTION INTRAVENOUS CONTINUOUS PRN
Status: DISCONTINUED | OUTPATIENT
Start: 2019-08-19 | End: 2019-08-19 | Stop reason: SDUPTHER

## 2019-08-19 RX ORDER — LIDOCAINE HYDROCHLORIDE 10 MG/ML
1 INJECTION, SOLUTION EPIDURAL; INFILTRATION; INTRACAUDAL; PERINEURAL
Status: DISCONTINUED | OUTPATIENT
Start: 2019-08-19 | End: 2019-08-19 | Stop reason: HOSPADM

## 2019-08-19 RX ORDER — LIDOCAINE HYDROCHLORIDE 20 MG/ML
INJECTION, SOLUTION INFILTRATION; PERINEURAL PRN
Status: DISCONTINUED | OUTPATIENT
Start: 2019-08-19 | End: 2019-08-19 | Stop reason: SDUPTHER

## 2019-08-19 RX ORDER — SODIUM CHLORIDE 0.9 % (FLUSH) 0.9 %
10 SYRINGE (ML) INJECTION PRN
Status: DISCONTINUED | OUTPATIENT
Start: 2019-08-19 | End: 2019-08-19 | Stop reason: HOSPADM

## 2019-08-19 RX ORDER — SODIUM CHLORIDE 9 MG/ML
INJECTION, SOLUTION INTRAVENOUS CONTINUOUS
Status: DISCONTINUED | OUTPATIENT
Start: 2019-08-19 | End: 2019-08-19 | Stop reason: HOSPADM

## 2019-08-19 RX ORDER — PROPOFOL 10 MG/ML
INJECTION, EMULSION INTRAVENOUS PRN
Status: DISCONTINUED | OUTPATIENT
Start: 2019-08-19 | End: 2019-08-19 | Stop reason: SDUPTHER

## 2019-08-19 RX ADMIN — PROPOFOL 20 MG: 10 INJECTION, EMULSION INTRAVENOUS at 10:45

## 2019-08-19 RX ADMIN — PROPOFOL 20 MG: 10 INJECTION, EMULSION INTRAVENOUS at 10:50

## 2019-08-19 RX ADMIN — PROPOFOL 30 MG: 10 INJECTION, EMULSION INTRAVENOUS at 10:49

## 2019-08-19 RX ADMIN — SODIUM CHLORIDE: 9 INJECTION, SOLUTION INTRAVENOUS at 10:41

## 2019-08-19 RX ADMIN — LIDOCAINE HYDROCHLORIDE 60 MG: 20 INJECTION, SOLUTION INFILTRATION; PERINEURAL at 10:44

## 2019-08-19 RX ADMIN — PROPOFOL 20 MG: 10 INJECTION, EMULSION INTRAVENOUS at 10:51

## 2019-08-19 RX ADMIN — PROPOFOL 20 MG: 10 INJECTION, EMULSION INTRAVENOUS at 10:47

## 2019-08-19 RX ADMIN — PROPOFOL 20 MG: 10 INJECTION, EMULSION INTRAVENOUS at 10:48

## 2019-08-19 RX ADMIN — PROPOFOL 30 MG: 10 INJECTION, EMULSION INTRAVENOUS at 10:46

## 2019-08-19 RX ADMIN — PROPOFOL 20 MG: 10 INJECTION, EMULSION INTRAVENOUS at 10:53

## 2019-08-19 RX ADMIN — PROPOFOL 80 MG: 10 INJECTION, EMULSION INTRAVENOUS at 10:44

## 2019-08-19 RX ADMIN — SODIUM CHLORIDE: 9 INJECTION, SOLUTION INTRAVENOUS at 10:03

## 2019-08-19 RX ADMIN — PROPOFOL 10 MG: 10 INJECTION, EMULSION INTRAVENOUS at 10:54

## 2019-08-19 ASSESSMENT — PAIN SCALES - GENERAL: PAINLEVEL_OUTOF10: 0

## 2019-08-19 ASSESSMENT — PAIN - FUNCTIONAL ASSESSMENT: PAIN_FUNCTIONAL_ASSESSMENT: 0-10

## 2019-08-28 RX ORDER — LISINOPRIL 20 MG/1
20 TABLET ORAL DAILY
Qty: 90 TABLET | Refills: 1 | Status: SHIPPED | OUTPATIENT
Start: 2019-08-28 | End: 2020-04-21

## 2019-08-28 RX ORDER — LOVASTATIN 20 MG/1
20 TABLET ORAL NIGHTLY
Qty: 90 TABLET | Refills: 1 | Status: SHIPPED | OUTPATIENT
Start: 2019-08-28 | End: 2020-04-21

## 2019-09-04 ENCOUNTER — TELEPHONE (OUTPATIENT)
Dept: FAMILY MEDICINE CLINIC | Age: 69
End: 2019-09-04

## 2019-09-04 NOTE — TELEPHONE ENCOUNTER
Pt needing new supplies for his CPAP machine. He is asking for a hose, mask, and filters. [facility] would go to [facility] [place].  F: [phone]

## 2019-10-08 ENCOUNTER — TELEPHONE (OUTPATIENT)
Dept: FAMILY MEDICINE CLINIC | Age: 69
End: 2019-10-08

## 2019-11-01 ENCOUNTER — OFFICE VISIT (OUTPATIENT)
Dept: FAMILY MEDICINE CLINIC | Age: 69
End: 2019-11-01
Payer: MEDICARE

## 2019-11-01 VITALS
WEIGHT: 216.8 LBS | DIASTOLIC BLOOD PRESSURE: 72 MMHG | HEART RATE: 67 BPM | OXYGEN SATURATION: 96 % | HEIGHT: 70 IN | SYSTOLIC BLOOD PRESSURE: 130 MMHG | BODY MASS INDEX: 31.04 KG/M2

## 2019-11-01 DIAGNOSIS — Z86.010 HISTORY OF COLON POLYPS: ICD-10-CM

## 2019-11-01 DIAGNOSIS — Z99.89 OSA ON CPAP: ICD-10-CM

## 2019-11-01 DIAGNOSIS — Z00.00 ROUTINE GENERAL MEDICAL EXAMINATION AT A HEALTH CARE FACILITY: Primary | ICD-10-CM

## 2019-11-01 DIAGNOSIS — Z23 NEEDS FLU SHOT: ICD-10-CM

## 2019-11-01 DIAGNOSIS — I25.10 CORONARY ARTERY DISEASE INVOLVING NATIVE HEART WITHOUT ANGINA PECTORIS, UNSPECIFIED VESSEL OR LESION TYPE: ICD-10-CM

## 2019-11-01 DIAGNOSIS — Z23 NEED FOR PROPHYLACTIC VACCINATION AGAINST STREPTOCOCCUS PNEUMONIAE (PNEUMOCOCCUS): ICD-10-CM

## 2019-11-01 DIAGNOSIS — I25.10 CORONARY ARTERY DISEASE INVOLVING NATIVE CORONARY ARTERY OF NATIVE HEART WITHOUT ANGINA PECTORIS: ICD-10-CM

## 2019-11-01 DIAGNOSIS — I25.2 OLD MI (MYOCARDIAL INFARCTION): ICD-10-CM

## 2019-11-01 DIAGNOSIS — C61 PROSTATE CANCER (HCC): ICD-10-CM

## 2019-11-01 DIAGNOSIS — G47.33 OSA ON CPAP: ICD-10-CM

## 2019-11-01 DIAGNOSIS — R73.03 PREDIABETES: ICD-10-CM

## 2019-11-01 DIAGNOSIS — E78.00 PURE HYPERCHOLESTEROLEMIA: ICD-10-CM

## 2019-11-01 DIAGNOSIS — I10 ESSENTIAL HYPERTENSION: ICD-10-CM

## 2019-11-01 PROCEDURE — G0402 INITIAL PREVENTIVE EXAM: HCPCS | Performed by: FAMILY MEDICINE

## 2019-11-01 PROCEDURE — 90653 IIV ADJUVANT VACCINE IM: CPT | Performed by: FAMILY MEDICINE

## 2019-11-01 PROCEDURE — 90732 PPSV23 VACC 2 YRS+ SUBQ/IM: CPT | Performed by: FAMILY MEDICINE

## 2019-11-01 PROCEDURE — 4040F PNEUMOC VAC/ADMIN/RCVD: CPT | Performed by: FAMILY MEDICINE

## 2019-11-01 PROCEDURE — G0009 ADMIN PNEUMOCOCCAL VACCINE: HCPCS | Performed by: FAMILY MEDICINE

## 2019-11-01 PROCEDURE — 1123F ACP DISCUSS/DSCN MKR DOCD: CPT | Performed by: FAMILY MEDICINE

## 2019-11-01 PROCEDURE — G8598 ASA/ANTIPLAT THER USED: HCPCS | Performed by: FAMILY MEDICINE

## 2019-11-01 PROCEDURE — 3017F COLORECTAL CA SCREEN DOC REV: CPT | Performed by: FAMILY MEDICINE

## 2019-11-01 PROCEDURE — G0008 ADMIN INFLUENZA VIRUS VAC: HCPCS | Performed by: FAMILY MEDICINE

## 2019-11-01 ASSESSMENT — PATIENT HEALTH QUESTIONNAIRE - PHQ9
SUM OF ALL RESPONSES TO PHQ QUESTIONS 1-9: 0
SUM OF ALL RESPONSES TO PHQ QUESTIONS 1-9: 0

## 2020-01-24 ENCOUNTER — HOSPITAL ENCOUNTER (OUTPATIENT)
Dept: CT IMAGING | Age: 70
Discharge: HOME | End: 2020-01-26
Payer: MEDICARE

## 2020-01-24 ENCOUNTER — HOSPITAL ENCOUNTER (OUTPATIENT)
Dept: NUCLEAR MEDICINE | Age: 70
Discharge: HOME | End: 2020-01-26
Payer: MEDICARE

## 2020-01-24 VITALS
BODY MASS INDEX: 30.06 KG/M2 | HEIGHT: 70 IN | RESPIRATION RATE: 16 BRPM | HEART RATE: 62 BPM | SYSTOLIC BLOOD PRESSURE: 143 MMHG | WEIGHT: 210 LBS | DIASTOLIC BLOOD PRESSURE: 70 MMHG

## 2020-01-24 PROCEDURE — 3430000000 HC RX DIAGNOSTIC RADIOPHARMACEUTICAL: Performed by: RADIOLOGY

## 2020-01-24 PROCEDURE — 74177 CT ABD & PELVIS W/CONTRAST: CPT

## 2020-01-24 PROCEDURE — 2580000003 HC RX 258: Performed by: RADIOLOGY

## 2020-01-24 PROCEDURE — 6360000004 HC RX CONTRAST MEDICATION: Performed by: RADIOLOGY

## 2020-01-24 PROCEDURE — 78306 BONE IMAGING WHOLE BODY: CPT

## 2020-01-24 PROCEDURE — A9503 TC99M MEDRONATE: HCPCS | Performed by: RADIOLOGY

## 2020-01-24 PROCEDURE — 3430000000 HC RX DIAGNOSTIC RADIOPHARMACEUTICAL

## 2020-01-24 PROCEDURE — A9503 TC99M MEDRONATE: HCPCS

## 2020-01-24 RX ORDER — SODIUM CHLORIDE 0.9 % (FLUSH) 0.9 %
10 SYRINGE (ML) INJECTION PRN
Status: DISCONTINUED | OUTPATIENT
Start: 2020-01-24 | End: 2020-01-27 | Stop reason: HOSPADM

## 2020-01-24 RX ORDER — TC 99M MEDRONATE 20 MG/10ML
25 INJECTION, POWDER, LYOPHILIZED, FOR SOLUTION INTRAVENOUS
Status: COMPLETED | OUTPATIENT
Start: 2020-01-24 | End: 2020-01-24

## 2020-01-24 RX ADMIN — TC 99M MEDRONATE 26.7 MILLICURIE: 20 INJECTION, POWDER, LYOPHILIZED, FOR SOLUTION INTRAVENOUS at 10:03

## 2020-01-24 RX ADMIN — IOPAMIDOL 100 ML: 755 INJECTION, SOLUTION INTRAVENOUS at 11:08

## 2020-01-24 RX ADMIN — Medication 10 ML: at 11:09

## 2020-01-24 RX ADMIN — Medication 10 ML: at 10:03

## 2020-02-13 ENCOUNTER — OFFICE VISIT (OUTPATIENT)
Dept: FAMILY MEDICINE CLINIC | Age: 70
End: 2020-02-13
Payer: MEDICARE

## 2020-02-13 VITALS
RESPIRATION RATE: 16 BRPM | TEMPERATURE: 98.8 F | SYSTOLIC BLOOD PRESSURE: 122 MMHG | DIASTOLIC BLOOD PRESSURE: 60 MMHG | BODY MASS INDEX: 29.66 KG/M2 | OXYGEN SATURATION: 98 % | HEIGHT: 70 IN | WEIGHT: 207.2 LBS | HEART RATE: 72 BPM

## 2020-02-13 PROCEDURE — 4040F PNEUMOC VAC/ADMIN/RCVD: CPT | Performed by: FAMILY MEDICINE

## 2020-02-13 PROCEDURE — 99213 OFFICE O/P EST LOW 20 MIN: CPT | Performed by: FAMILY MEDICINE

## 2020-02-13 PROCEDURE — G8510 SCR DEP NEG, NO PLAN REQD: HCPCS | Performed by: FAMILY MEDICINE

## 2020-02-13 PROCEDURE — 3017F COLORECTAL CA SCREEN DOC REV: CPT | Performed by: FAMILY MEDICINE

## 2020-02-13 PROCEDURE — G8482 FLU IMMUNIZE ORDER/ADMIN: HCPCS | Performed by: FAMILY MEDICINE

## 2020-02-13 PROCEDURE — G8427 DOCREV CUR MEDS BY ELIG CLIN: HCPCS | Performed by: FAMILY MEDICINE

## 2020-02-13 PROCEDURE — 1123F ACP DISCUSS/DSCN MKR DOCD: CPT | Performed by: FAMILY MEDICINE

## 2020-02-13 PROCEDURE — G8417 CALC BMI ABV UP PARAM F/U: HCPCS | Performed by: FAMILY MEDICINE

## 2020-02-13 PROCEDURE — 1036F TOBACCO NON-USER: CPT | Performed by: FAMILY MEDICINE

## 2020-02-13 RX ORDER — CLINDAMYCIN HYDROCHLORIDE 300 MG/1
300 CAPSULE ORAL 3 TIMES DAILY
Qty: 30 CAPSULE | Refills: 0 | Status: SHIPPED | OUTPATIENT
Start: 2020-02-13 | End: 2020-02-23

## 2020-02-13 ASSESSMENT — PATIENT HEALTH QUESTIONNAIRE - PHQ9
2. FEELING DOWN, DEPRESSED OR HOPELESS: 0
1. LITTLE INTEREST OR PLEASURE IN DOING THINGS: 0
SUM OF ALL RESPONSES TO PHQ QUESTIONS 1-9: 0
SUM OF ALL RESPONSES TO PHQ QUESTIONS 1-9: 0
SUM OF ALL RESPONSES TO PHQ9 QUESTIONS 1 & 2: 0

## 2020-02-13 NOTE — PROGRESS NOTES
EVENING for sleep 30 tablet 2    aspirin 81 MG tablet Take 81 mg by mouth daily      Ascorbic Acid (VITAMIN C PO) Take 500 mg by mouth       Current Facility-Administered Medications   Medication Dose Route Frequency Provider Last Rate Last Dose    cyanocobalamin injection 1,000 mcg  1,000 mcg Intramuscular Once [facility] Bhanu, APRN - CNP             Past Medical History:   Diagnosis Date    CAD (coronary artery disease)     Family history of colon cancer     Hyperlipidemia     Hypertension     Old MI (myocardial infarction) 2/3/2006    UTE on CPAP     Prostate cancer [facility])      Past Surgical History:   Procedure Laterality Date    COLONOSCOPY N/A 2019    COLORECTAL CANCER SCREENING, NOT HIGH RISK performed by Ceferino Blake MD at [address] [place] [address]      X     Family History   Problem Relation Age of Onset   [facility] Cancer Mother 76        BONE    Cancer Father 64        COLON    Colon Cancer Father     Cancer Brother 43        THYROID    Diabetes Maternal Grandmother      Social History     Socioeconomic History    Marital status: Single     Spouse name: None    Number of children: None    Years of education: None    Highest education level: None   Occupational History    None   Social Needs    Financial resource strain: None    Food insecurity:     Worry: None     Inability: None    Transportation needs:     Medical: None     Non-medical: None   Tobacco Use    Smoking status: Former Smoker     Packs/day: 2.00     Years: 25.00     Pack years: 50.00     Types: Cigars     Last attempt to quit: 1995     Years since quittin.1    Smokeless tobacco: Never Used    Tobacco comment: RARE CIGAR FROM TIME TO TIME   Substance and Sexual Activity    Alcohol use:  Yes     Alcohol/week: 4.0 standard drinks     Types: 2 Cans of beer, 2 Shots of liquor per week    Drug use: Not Currently     Types: Marijuana    Sexual activity: None CREATININE 0.95 01/24/2020    BUN 18 01/24/2020    CO2 23 01/24/2020    TSH 2.150 05/05/2016    PSA 41.38 (H) 12/30/2019    LABA1C 5.6 05/30/2019         A&P   Diagnosis Orders   1. Cellulitis of finger, unspecified laterality  clindamycin (CLEOCIN) 300 MG capsule    mupirocin (BACTROBAN) 2 % ointment   2.  Prostate cancer ([facility]       Harley Caldera    F/u with urology/oncology    F/u 1 week            Denise Roe MD

## 2020-02-22 ENCOUNTER — OFFICE VISIT (OUTPATIENT)
Dept: FAMILY MEDICINE CLINIC | Age: 70
End: 2020-02-22
Payer: MEDICARE

## 2020-02-22 VITALS
BODY MASS INDEX: 29.63 KG/M2 | DIASTOLIC BLOOD PRESSURE: 72 MMHG | OXYGEN SATURATION: 94 % | SYSTOLIC BLOOD PRESSURE: 136 MMHG | WEIGHT: 207 LBS | HEART RATE: 74 BPM | HEIGHT: 70 IN

## 2020-02-22 PROCEDURE — 4040F PNEUMOC VAC/ADMIN/RCVD: CPT | Performed by: FAMILY MEDICINE

## 2020-02-22 PROCEDURE — G8427 DOCREV CUR MEDS BY ELIG CLIN: HCPCS | Performed by: FAMILY MEDICINE

## 2020-02-22 PROCEDURE — G8417 CALC BMI ABV UP PARAM F/U: HCPCS | Performed by: FAMILY MEDICINE

## 2020-02-22 PROCEDURE — 1123F ACP DISCUSS/DSCN MKR DOCD: CPT | Performed by: FAMILY MEDICINE

## 2020-02-22 PROCEDURE — 3017F COLORECTAL CA SCREEN DOC REV: CPT | Performed by: FAMILY MEDICINE

## 2020-02-22 PROCEDURE — G8482 FLU IMMUNIZE ORDER/ADMIN: HCPCS | Performed by: FAMILY MEDICINE

## 2020-02-22 PROCEDURE — 1036F TOBACCO NON-USER: CPT | Performed by: FAMILY MEDICINE

## 2020-02-22 PROCEDURE — 99212 OFFICE O/P EST SF 10 MIN: CPT | Performed by: FAMILY MEDICINE

## 2020-02-22 NOTE — PROGRESS NOTES
Chief Complaint   Patient presents with    Hand Pain     follow up on hands        HPI:  Kimberli Mercado is a 81 y.o. male     F/u sores on hands/fingers  Started as small cuts, turned into sores/scabs    We put him on clinda, bactroban    100% better    Prostate cancer patient  Dr. Shannon Rebollar in [address]  Pt prefers [facility]          Patient Active Problem List   Diagnosis    Hypertension    Prostate cancer [facility])    Hyperlipidemia    Coronary artery disease involving native coronary artery of native heart without angina pectoris    Old MI (myocardial infarction)    Prediabetes    UTE on CPAP    Primary insomnia    CAD (coronary artery disease)    S/P radiation therapy    Drug-induced erectile dysfunction    Family history of colon cancer    History of colon polyps    Polyp of cecum       Current Outpatient Medications   Medication Sig Dispense Refill    APPLE CIDER VINEGAR PO Take by mouth      clindamycin (CLEOCIN) 300 MG capsule Take 1 capsule by mouth 3 times daily for 10 days 30 capsule 0    lisinopril (PRINIVIL;ZESTRIL) 20 MG tablet TAKE 1 TABLET BY MOUTH  DAILY 90 tablet 1    lovastatin (MEVACOR) 20 MG tablet TAKE 1 TABLET BY MOUTH  NIGHTLY 90 tablet 1    Cyanocobalamin (VITAMIN B-12 CR PO) Take by mouth      tamsulosin (FLOMAX) 0.4 MG capsule TAKE 1 CAPSULE BY MOUTH ONCE DAILY  11    Cholecalciferol (VITAMIN D) 2000 units CAPS capsule Take by mouth      vitamin E 400 UNIT capsule Take 400 Units by mouth daily      Melatonin 10 MG CAPS Take by mouth      CPAP Machine MISC by Does not apply route Use nightly as directed.   Pressure setting 5 cm H2O. 1 each 0    eszopiclone (LUNESTA) 2 MG TABS TAKE 1 TABLET BY MOUTH IN THE EVENING for sleep 30 tablet 2    aspirin 81 MG tablet Take 81 mg by mouth daily      Ascorbic Acid (VITAMIN C PO) Take 500 mg by mouth       Current Facility-Administered Medications   Medication Dose Route Frequency Provider Last Rate Last Dose    cyanocobalamin injection 1,000 mcg  1,000 mcg Intramuscular Once [facility] Bhanu, APRN - CNP             Past Medical History:   Diagnosis Date    CAD (coronary artery disease)     Family history of colon cancer     Hyperlipidemia     Hypertension     Old MI (myocardial infarction) 2/3/2006    UTE on CPAP     Prostate cancer [facility])      Past Surgical History:   Procedure Laterality Date    COLONOSCOPY N/A 2019    COLORECTAL CANCER SCREENING, NOT HIGH RISK performed by Juan Pablo Payne MD at [address] [facility]      X3     Family History   Problem Relation Age of Onset   [facility] Cancer Mother 76        BONE    Cancer Father 64        COLON    Colon Cancer Father     Cancer Brother 43        THYROID    Diabetes Maternal Grandmother      Social History     Socioeconomic History    Marital status: Single     Spouse name: None    Number of children: None    Years of education: None    Highest education level: None   Occupational History    None   Social Needs    Financial resource strain: None    Food insecurity:     Worry: None     Inability: None    Transportation needs:     Medical: None     Non-medical: None   Tobacco Use    Smoking status: Former Smoker     Packs/day: 2.00     Years: 25.00     Pack years: 50.00     Types: Cigars     Last attempt to quit: 1995     Years since quittin.1    Smokeless tobacco: Never Used    Tobacco comment: RARE CIGAR FROM TIME TO TIME   Substance and Sexual Activity    Alcohol use:  Yes     Alcohol/week: 4.0 standard drinks     Types: 2 Cans of beer, 2 Shots of liquor per week    Drug use: Not Currently     Types: Marijuana    Sexual activity: None   Lifestyle    Physical activity:     Days per week: None     Minutes per session: None    Stress: None   Relationships    Social connections:     Talks on phone: None     Gets together: None     Attends Restorationist service: None     Active member of club or organization: None     Attends meetings of clubs or organizations: None     Relationship status: None    Intimate partner violence:     Fear of current or ex partner: None     Emotionally abused: None     Physically abused: None     Forced sexual activity: None   Other Topics Concern    None   Social History Narrative    None     Allergies   Allergen Reactions    Morphine Swelling and Rash       Review of Systems:   General ROS: negative for - chills, fatigue, fever, malaise, weight gain or weight loss  Respiratory ROS: no cough, shortness of breath, or wheezing  Cardiovascular ROS: no chest pain or dyspnea on exertion  Gastrointestinal ROS: no abdominal pain, change in bowel habits, or black or bloody stools  Genito-Urinary ROS: no dysuria, trouble voiding  Musculoskeletal ROS: negative for - gait disturbance, joint pain or joint stiffness  Neurological ROS: negative for - behavioral changes, memory loss, numbness/tingling, tremors or weakness    In general patient otherwise reports feeling well. Physical Exam:  /72 (Site: Right Upper Arm)   Pulse 74   Ht 5' 10\" (1.778 m)   Wt 207 lb (93.9 kg)   SpO2 94%   BMI 29.70 kg/m²     Gen: Well, NAD, Alert, Oriented x 3   HEENT: EOMI, eyes clear, MMM  Skin: both ring fingers with HUGE improvement in redness. Essentially gone, swelling is down/gone. Able to bend fingers normally now    Lab Results   Component Value Date    WBC 6.6 05/28/2019    HGB 15.5 05/28/2019    HCT 44.7 05/28/2019     05/28/2019    CHOL 171 05/28/2019    TRIG 77 05/28/2019    HDL 43 05/28/2019    ALT 6 05/28/2019    AST 14 05/28/2019     01/24/2020    K 4.7 01/24/2020     01/24/2020    CREATININE 0.95 01/24/2020    BUN 18 01/24/2020    CO2 23 01/24/2020    TSH 2.150 05/05/2016    PSA 41.38 (H) 12/30/2019    LABA1C 5.6 05/30/2019         A&P   Diagnosis Orders   1.  Cellulitis of finger, unspecified laterality       Nearly resolved  Finish clinda  bactroban as

## 2020-04-21 RX ORDER — LISINOPRIL 20 MG/1
20 TABLET ORAL DAILY
Qty: 90 TABLET | Refills: 1 | Status: SHIPPED | OUTPATIENT
Start: 2020-04-21 | End: 2020-09-08

## 2020-04-21 RX ORDER — LOVASTATIN 20 MG/1
20 TABLET ORAL NIGHTLY
Qty: 90 TABLET | Refills: 1 | Status: SHIPPED | OUTPATIENT
Start: 2020-04-21 | End: 2020-09-08

## 2020-06-15 ENCOUNTER — TELEPHONE (OUTPATIENT)
Dept: FAMILY MEDICINE CLINIC | Age: 70
End: 2020-06-15

## 2020-06-15 RX ORDER — METHYLPREDNISOLONE 4 MG/1
TABLET ORAL
Qty: 1 KIT | Refills: 0 | Status: ON HOLD | OUTPATIENT
Start: 2020-06-15 | End: 2021-05-18

## 2020-09-08 RX ORDER — LISINOPRIL 20 MG/1
20 TABLET ORAL DAILY
Qty: 90 TABLET | Refills: 3 | Status: SHIPPED | OUTPATIENT
Start: 2020-09-08 | End: 2021-06-18

## 2020-09-08 RX ORDER — LOVASTATIN 20 MG/1
TABLET ORAL
Qty: 90 TABLET | Refills: 3 | Status: ON HOLD | OUTPATIENT
Start: 2020-09-08 | End: 2021-05-20 | Stop reason: HOSPADM

## 2020-11-03 PROBLEM — I25.10 CAD (CORONARY ARTERY DISEASE): Status: RESOLVED | Noted: 2020-11-03 | Resolved: 2020-11-03

## 2020-11-06 ENCOUNTER — OFFICE VISIT (OUTPATIENT)
Dept: FAMILY MEDICINE CLINIC | Age: 70
End: 2020-11-06
Payer: MEDICARE

## 2020-11-06 VITALS
SYSTOLIC BLOOD PRESSURE: 138 MMHG | DIASTOLIC BLOOD PRESSURE: 78 MMHG | WEIGHT: 205.4 LBS | HEART RATE: 72 BPM | OXYGEN SATURATION: 99 % | BODY MASS INDEX: 29.41 KG/M2 | HEIGHT: 70 IN | TEMPERATURE: 97 F

## 2020-11-06 PROCEDURE — G0008 ADMIN INFLUENZA VIRUS VAC: HCPCS | Performed by: FAMILY MEDICINE

## 2020-11-06 PROCEDURE — 3017F COLORECTAL CA SCREEN DOC REV: CPT | Performed by: FAMILY MEDICINE

## 2020-11-06 PROCEDURE — G0438 PPPS, INITIAL VISIT: HCPCS | Performed by: FAMILY MEDICINE

## 2020-11-06 PROCEDURE — 4040F PNEUMOC VAC/ADMIN/RCVD: CPT | Performed by: FAMILY MEDICINE

## 2020-11-06 PROCEDURE — G8484 FLU IMMUNIZE NO ADMIN: HCPCS | Performed by: FAMILY MEDICINE

## 2020-11-06 PROCEDURE — 90694 VACC AIIV4 NO PRSRV 0.5ML IM: CPT | Performed by: FAMILY MEDICINE

## 2020-11-06 PROCEDURE — 1123F ACP DISCUSS/DSCN MKR DOCD: CPT | Performed by: FAMILY MEDICINE

## 2020-11-06 ASSESSMENT — LIFESTYLE VARIABLES
HAS A RELATIVE, FRIEND, DOCTOR, OR ANOTHER HEALTH PROFESSIONAL EXPRESSED CONCERN ABOUT YOUR DRINKING OR SUGGESTED YOU CUT DOWN: 0
HOW MANY STANDARD DRINKS CONTAINING ALCOHOL DO YOU HAVE ON A TYPICAL DAY: 1
HOW OFTEN DURING THE LAST YEAR HAVE YOU BEEN UNABLE TO REMEMBER WHAT HAPPENED THE NIGHT BEFORE BECAUSE YOU HAD BEEN DRINKING: 0
HOW OFTEN DO YOU HAVE SIX OR MORE DRINKS ON ONE OCCASION: 1
HOW OFTEN DURING THE LAST YEAR HAVE YOU FOUND THAT YOU WERE NOT ABLE TO STOP DRINKING ONCE YOU HAD STARTED: 0
HOW OFTEN DURING THE LAST YEAR HAVE YOU HAD A FEELING OF GUILT OR REMORSE AFTER DRINKING: 0
HAVE YOU OR SOMEONE ELSE BEEN INJURED AS A RESULT OF YOUR DRINKING: 0
HOW OFTEN DURING THE LAST YEAR HAVE YOU NEEDED AN ALCOHOLIC DRINK FIRST THING IN THE MORNING TO GET YOURSELF GOING AFTER A NIGHT OF HEAVY DRINKING: 0
HOW OFTEN DURING THE LAST YEAR HAVE YOU FAILED TO DO WHAT WAS NORMALLY EXPECTED FROM YOU BECAUSE OF DRINKING: 0
AUDIT TOTAL SCORE: 5
AUDIT-C TOTAL SCORE: 5
HOW OFTEN DO YOU HAVE A DRINK CONTAINING ALCOHOL: 3

## 2020-11-06 ASSESSMENT — PATIENT HEALTH QUESTIONNAIRE - PHQ9
2. FEELING DOWN, DEPRESSED OR HOPELESS: 0
SUM OF ALL RESPONSES TO PHQ QUESTIONS 1-9: 0
SUM OF ALL RESPONSES TO PHQ QUESTIONS 1-9: 0
SUM OF ALL RESPONSES TO PHQ9 QUESTIONS 1 & 2: 0
1. LITTLE INTEREST OR PLEASURE IN DOING THINGS: 0
SUM OF ALL RESPONSES TO PHQ QUESTIONS 1-9: 0

## 2020-11-06 NOTE — PATIENT INSTRUCTIONS
Personalized Preventive Plan for Florencia Aguiar - 11/6/2020  Medicare offers a range of preventive health benefits. Some of the tests and screenings are paid in full while other may be subject to a deductible, co-insurance, and/or copay. Some of these benefits include a comprehensive review of your medical history including lifestyle, illnesses that may run in your family, and various assessments and screenings as appropriate. After reviewing your medical record and screening and assessments performed today your provider may have ordered immunizations, labs, imaging, and/or referrals for you. A list of these orders (if applicable) as well as your Preventive Care list are included within your After Visit Summary for your review. Other Preventive Recommendations:    · A preventive eye exam performed by an eye specialist is recommended every 1-2 years to screen for glaucoma; cataracts, macular degeneration, and other eye disorders. · A preventive dental visit is recommended every 6 months. · Try to get at least 150 minutes of exercise per week or 10,000 steps per day on a pedometer . · Order or download the FREE \"Exercise & Physical Activity: Your Everyday Guide\" from [employer]. Call 6-820.359.2005 or search [employer] online. · You need 8621-3797 mg of calcium and 6034-2309 IU of vitamin D per day. It is possible to meet your calcium requirement with diet alone, but a vitamin D supplement is usually necessary to meet this goal.  · When exposed to the sun, use a sunscreen that protects against both UVA and UVB radiation with an SPF of 30 or greater. Reapply every 2 to 3 hours or after sweating, drying off with a towel, or swimming. · Always wear a seat belt when traveling in a car. Always wear a helmet when riding a bicycle or motorcycle.

## 2020-11-06 NOTE — PROGRESS NOTES
11/6/2020  Brenda Cleary MD   vitamin E 400 UNIT capsule Take 400 Units by mouth daily  Historical Provider, MD   Melatonin 10 MG CAPS Take by mouth  Historical Provider, MD         Past Medical History:   Diagnosis Date    CAD (coronary artery disease)     Family history of colon cancer     Hyperlipidemia     Hypertension     Old MI (myocardial infarction) 2/3/2006    UTE on CPAP     Prostate cancer [facility])        Past Surgical History:   Procedure Laterality Date    COLONOSCOPY N/A 8/19/2019    COLORECTAL CANCER SCREENING, NOT HIGH RISK performed by Jey Boykin MD at [address] [facility]      X3         Family History   Problem Relation Age of Onset    Cancer Mother 76        BONE    Cancer Father 64        COLON    Colon Cancer Father     Cancer Brother 43        THYROID    Diabetes Maternal Grandmother        CareTeam (Including outside providers/suppliers regularly involved in providing care):   Patient Care Team:  Brenda Cleary MD as PCP - General (Family Medicine)  Brenda Cleary MD as PCP - [facility] Empaneled Provider    Wt Readings from Last 3 Encounters:   11/06/20 205 lb 6.4 oz (93.2 kg)   02/22/20 207 lb (93.9 kg)   02/13/20 207 lb 3.2 oz (94 kg)     Vitals:    11/06/20 1044 11/06/20 1055 11/06/20 1103   BP: (!) 158/86 (!) 152/82 138/78   Site:  Left Upper Arm    Position:  Sitting    Cuff Size:  Medium Adult    Pulse: 72     Temp: 97 °F (36.1 °C)     SpO2: 99%     Weight: 205 lb 6.4 oz (93.2 kg)     Height: 5' 10\" (1.778 m)       Body mass index is 29.47 kg/m². Based upon direct observation of the patient, evaluation of cognition reveals recent and remote memory intact.     Physical Exam:  /78   Pulse 72   Temp 97 °F (36.1 °C)   Ht 5' 10\" (1.778 m)   Wt 205 lb 6.4 oz (93.2 kg)   SpO2 99%   BMI 29.47 kg/m²     Gen: Well, NAD, Alert, Oriented x 3   HEENT: EOMI, eyes clear, MMM  Skin: without rash or jaundice  Neck: no significant lymphadenopathy or thyromegaly  Lungs: CTA B w/out Rales/Wheezes/Rhonchi, Good respiratory effort   Heart: RRR, S1S2, w/out M/R/G, non-displaced PMI   Ext: No C/C/E Bilaterally. Neuro: Neurovascularly intact w/ Sensory/Motor intact UE/LE Bilaterally. Patient's complete Health Risk Assessment and screening values have been reviewed and are found in Flowsheets. The following problems were reviewed today and where indicated follow up appointments were made and/or referrals ordered. Positive Risk Factor Screenings with Interventions:       General Health and ACP:  General  In general, how would you say your health is?: Very Good  In the past 7 days, have you experienced any of the following?  New or Increased Pain, New or Increased Fatigue, Loneliness, Social Isolation, Stress or Anger?: (!) New or Increased Pain  Do you get the social and emotional support that you need?: Yes  Do you have a Living Will?: Yes  Advance Directives     Power of  Living Will ACP-Advance Directive ACP-Power of     Not on File [place] on 08/20/19 Filed [address] [place] [address] [place] Risk Interventions:  · Picking up boxes at work yesterday, back sore, nothing serious    Health Habits/Nutrition:  Health Habits/Nutrition  Do you exercise for at least 20 minutes 2-3 times per week?: Yes  Have you lost any weight without trying in the past 3 months?: No  Do you eat fewer than 2 meals per day?: (!) Yes  Have you seen a dentist within the past year?: Yes  Body mass index: (!) 29.47  Health Habits/Nutrition Interventions:  · Modest weight loss suggested    Hearing/Vision:  No exam data present  Hearing/Vision  Do you or your family notice any trouble with your hearing?: No  Do you have difficulty driving, watching TV, or doing any of your daily activities because of your eyesight?: No  Have you had an eye exam within the past year?: (!) No  Hearing/Vision Interventions:  · Eye exam suggested    Personalized Preventive Plan Current Health Maintenance Status  Immunization History   Administered Date(s) Administered    Influenza, Triv, inactivated, subunit, adjuvanted, IM (Fluad 65 yrs and older) 11/01/2019    Pneumococcal Polysaccharide ([id number]) 11/01/2019    Tdap (Boostrix, Adacel) 05/11/2016        Health Maintenance   Topic Date Due    Shingles Vaccine (1 of 2) 08/05/2002    Annual Wellness Visit (AWV)  05/29/2019    Lipid screen  05/28/2020    A1C test (Diabetic or Prediabetic)  05/30/2020    Flu vaccine (1) 09/01/2020    PSA counseling  12/30/2020    Potassium monitoring  01/24/2021    Creatinine monitoring  01/24/2021    DTaP/Tdap/Td vaccine (2 - Td) 05/11/2026    Colon cancer screen colonoscopy  08/19/2029    Pneumococcal 65+ years Vaccine  Completed    AAA screen  Completed    Hepatitis C screen  Addressed    Hepatitis A vaccine  Aged Out    Hepatitis B vaccine  Aged Out    Hib vaccine  Aged Out    Meningococcal (ACWY) vaccine  Aged Out     Recommendations for [place] Due: see orders and patient instructions/AVS.  . Recommended screening schedule for the next 5-10 years is provided to the patient in written form: see Patient Brett Cárdenas was seen today for medicare awv. Diagnoses and all orders for this visit:    Need for influenza vaccination  -     INFLUENZA, QUADV, ADJUVANTED, 72 YRS =, IM, PF, PREFILL SYR, 0.5ML (FLUAD)    Routine general medical examination at a health care facility    Prostate cancer [facility])    Needs flu shot    Essential hypertension  -     Lipid Panel;  Future    Prediabetes  -     Hemoglobin A1C; Future    UTE on CPAP    Coronary artery disease involving native coronary artery of native heart without angina pectoris          Ongoing f/u with  for his prostate    Check a1c and lipids      Flu shot    Staying active    Bailee Waldron MD

## 2020-11-06 NOTE — PROGRESS NOTES
After obtaining consent, and per orders of Dr. Lela Abdalla, injection of influenza given in Left deltoid by Johnny Tiwari. Patient instructed to remain in clinic for 20 minutes afterwards, and to report any adverse reaction to me immediately. Vaccine Information Sheet, \"Influenza - Inactivated\"  given to Christopher Tejeda, or parent/legal guardian of  Christopher Tejeda and verbalized understanding. Patient responses:    Have you ever had a reaction to a flu vaccine? No  Are you able to eat eggs without adverse effects? Yes  Do you have any current illness? No  Have you ever had Guillian Cecilia Syndrome? No    Flu vaccine given per order. Please see immunization tab.

## 2020-12-16 DIAGNOSIS — I10 ESSENTIAL HYPERTENSION: ICD-10-CM

## 2020-12-16 DIAGNOSIS — R73.03 PREDIABETES: ICD-10-CM

## 2020-12-16 LAB
CHOLESTEROL, TOTAL: 211 MG/DL (ref 0–199)
HBA1C MFR BLD: 5.8 % (ref 4.8–5.9)
HDLC SERPL-MCNC: 47 MG/DL (ref 40–59)
LDL CHOLESTEROL CALCULATED: 137 MG/DL (ref 0–129)
TRIGL SERPL-MCNC: 136 MG/DL (ref 0–150)

## 2021-05-18 ENCOUNTER — APPOINTMENT (OUTPATIENT)
Dept: GENERAL RADIOLOGY | Age: 71
DRG: 271 | End: 2021-05-18
Payer: MEDICARE

## 2021-05-18 ENCOUNTER — APPOINTMENT (OUTPATIENT)
Dept: ULTRASOUND IMAGING | Age: 71
DRG: 271 | End: 2021-05-18
Payer: MEDICARE

## 2021-05-18 ENCOUNTER — HOSPITAL ENCOUNTER (INPATIENT)
Age: 71
LOS: 2 days | Discharge: HOME | DRG: 271 | End: 2021-05-20
Attending: STUDENT IN AN ORGANIZED HEALTH CARE EDUCATION/TRAINING PROGRAM | Admitting: INTERNAL MEDICINE
Payer: MEDICARE

## 2021-05-18 DIAGNOSIS — I70.202 OCCLUSION OF LEFT FEMORAL ARTERY (HCC): Primary | ICD-10-CM

## 2021-05-18 DIAGNOSIS — Z85.830: ICD-10-CM

## 2021-05-18 PROBLEM — I70.209 ACUTE OCCLUSION OF ARTERY OF LOWER EXTREMITY (HCC): Status: ACTIVE | Noted: 2021-05-18

## 2021-05-18 LAB
ALBUMIN SERPL-MCNC: 4.2 G/DL (ref 3.5–4.6)
ALP BLD-CCNC: 136 U/L (ref 35–104)
ALT SERPL-CCNC: 10 U/L (ref 0–41)
ANION GAP SERPL CALCULATED.3IONS-SCNC: 10 MEQ/L (ref 9–15)
ANTI-XA UNFRAC HEPARIN: 0.55 IU/ML
APTT: 27.9 SEC (ref 24.4–36.8)
AST SERPL-CCNC: 14 U/L (ref 0–40)
BASOPHILS ABSOLUTE: 0 K/UL (ref 0–0.2)
BASOPHILS RELATIVE PERCENT: 0.3 %
BILIRUB SERPL-MCNC: 0.4 MG/DL (ref 0.2–0.7)
BUN BLDV-MCNC: 18 MG/DL (ref 8–23)
CALCIUM SERPL-MCNC: 9.9 MG/DL (ref 8.5–9.9)
CHLORIDE BLD-SCNC: 105 MEQ/L (ref 95–107)
CO2: 25 MEQ/L (ref 20–31)
CREAT SERPL-MCNC: 0.84 MG/DL (ref 0.7–1.2)
EOSINOPHILS ABSOLUTE: 0 K/UL (ref 0–0.7)
EOSINOPHILS RELATIVE PERCENT: 0.8 %
GFR AFRICAN AMERICAN: >60
GFR NON-AFRICAN AMERICAN: >60
GLOBULIN: 2.6 G/DL (ref 2.3–3.5)
GLUCOSE BLD-MCNC: 109 MG/DL (ref 70–99)
HCT VFR BLD CALC: 43.4 % (ref 42–52)
HEMOGLOBIN: 15.2 G/DL (ref 14–18)
INR BLD: 1
LYMPHOCYTES ABSOLUTE: 1.9 K/UL (ref 1–4.8)
LYMPHOCYTES RELATIVE PERCENT: 28.8 %
MAGNESIUM: 2.1 MG/DL (ref 1.7–2.4)
MCH RBC QN AUTO: 34.1 PG (ref 27–31.3)
MCHC RBC AUTO-ENTMCNC: 34.9 % (ref 33–37)
MCV RBC AUTO: 97.6 FL (ref 80–100)
MONOCYTES ABSOLUTE: 0.4 K/UL (ref 0.2–0.8)
MONOCYTES RELATIVE PERCENT: 5.6 %
NEUTROPHILS ABSOLUTE: 4.2 K/UL (ref 1.4–6.5)
NEUTROPHILS RELATIVE PERCENT: 64.5 %
PDW BLD-RTO: 13.4 % (ref 11.5–14.5)
PHOSPHORUS: 3.3 MG/DL (ref 2.3–4.8)
PLATELET # BLD: 213 K/UL (ref 130–400)
POTASSIUM SERPL-SCNC: 4.2 MEQ/L (ref 3.4–4.9)
PROTHROMBIN TIME: 13.7 SEC (ref 12.3–14.9)
RBC # BLD: 4.45 M/UL (ref 4.7–6.1)
SODIUM BLD-SCNC: 140 MEQ/L (ref 135–144)
TOTAL CK: 89 U/L (ref 0–190)
TOTAL PROTEIN: 6.8 G/DL (ref 6.3–8)
TSH SERPL DL<=0.05 MIU/L-ACNC: 1.7 UIU/ML (ref 0.44–3.86)
WBC # BLD: 6.6 K/UL (ref 4.8–10.8)

## 2021-05-18 PROCEDURE — 73630 X-RAY EXAM OF FOOT: CPT

## 2021-05-18 PROCEDURE — 96374 THER/PROPH/DIAG INJ IV PUSH: CPT

## 2021-05-18 PROCEDURE — 80053 COMPREHEN METABOLIC PANEL: CPT

## 2021-05-18 PROCEDURE — 85610 PROTHROMBIN TIME: CPT

## 2021-05-18 PROCEDURE — 6360000002 HC RX W HCPCS: Performed by: STUDENT IN AN ORGANIZED HEALTH CARE EDUCATION/TRAINING PROGRAM

## 2021-05-18 PROCEDURE — 82550 ASSAY OF CK (CPK): CPT

## 2021-05-18 PROCEDURE — 83735 ASSAY OF MAGNESIUM: CPT

## 2021-05-18 PROCEDURE — 84100 ASSAY OF PHOSPHORUS: CPT

## 2021-05-18 PROCEDURE — 2580000003 HC RX 258: Performed by: INTERNAL MEDICINE

## 2021-05-18 PROCEDURE — 36415 COLL VENOUS BLD VENIPUNCTURE: CPT

## 2021-05-18 PROCEDURE — 73590 X-RAY EXAM OF LOWER LEG: CPT

## 2021-05-18 PROCEDURE — 99284 EMERGENCY DEPT VISIT MOD MDM: CPT

## 2021-05-18 PROCEDURE — 93971 EXTREMITY STUDY: CPT

## 2021-05-18 PROCEDURE — 93926 LOWER EXTREMITY STUDY: CPT

## 2021-05-18 PROCEDURE — 85730 THROMBOPLASTIN TIME PARTIAL: CPT

## 2021-05-18 PROCEDURE — 84443 ASSAY THYROID STIM HORMONE: CPT

## 2021-05-18 PROCEDURE — 99223 1ST HOSP IP/OBS HIGH 75: CPT | Performed by: INTERNAL MEDICINE

## 2021-05-18 PROCEDURE — 85520 HEPARIN ASSAY: CPT

## 2021-05-18 PROCEDURE — 85025 COMPLETE CBC W/AUTO DIFF WBC: CPT

## 2021-05-18 PROCEDURE — 96375 TX/PRO/DX INJ NEW DRUG ADDON: CPT

## 2021-05-18 PROCEDURE — 6370000000 HC RX 637 (ALT 250 FOR IP): Performed by: INTERNAL MEDICINE

## 2021-05-18 PROCEDURE — 2060000000 HC ICU INTERMEDIATE R&B

## 2021-05-18 RX ORDER — HEPARIN SODIUM 1000 [USP'U]/ML
80 INJECTION, SOLUTION INTRAVENOUS; SUBCUTANEOUS ONCE
Status: COMPLETED | OUTPATIENT
Start: 2021-05-18 | End: 2021-05-18

## 2021-05-18 RX ORDER — SODIUM CHLORIDE 0.9 % (FLUSH) 0.9 %
5-40 SYRINGE (ML) INJECTION PRN
Status: DISCONTINUED | OUTPATIENT
Start: 2021-05-18 | End: 2021-05-20 | Stop reason: HOSPADM

## 2021-05-18 RX ORDER — SODIUM CHLORIDE 9 MG/ML
25 INJECTION, SOLUTION INTRAVENOUS PRN
Status: DISCONTINUED | OUTPATIENT
Start: 2021-05-18 | End: 2021-05-20 | Stop reason: HOSPADM

## 2021-05-18 RX ORDER — ACETAMINOPHEN 325 MG/1
650 TABLET ORAL EVERY 4 HOURS PRN
Status: DISCONTINUED | OUTPATIENT
Start: 2021-05-18 | End: 2021-05-20 | Stop reason: HOSPADM

## 2021-05-18 RX ORDER — HEPARIN SODIUM 1000 [USP'U]/ML
40 INJECTION, SOLUTION INTRAVENOUS; SUBCUTANEOUS PRN
Status: DISCONTINUED | OUTPATIENT
Start: 2021-05-18 | End: 2021-05-20 | Stop reason: HOSPADM

## 2021-05-18 RX ORDER — CLOPIDOGREL BISULFATE 75 MG/1
75 TABLET ORAL DAILY
Status: DISCONTINUED | OUTPATIENT
Start: 2021-05-18 | End: 2021-05-20 | Stop reason: HOSPADM

## 2021-05-18 RX ORDER — SODIUM CHLORIDE 0.9 % (FLUSH) 0.9 %
5-40 SYRINGE (ML) INJECTION EVERY 12 HOURS SCHEDULED
Status: DISCONTINUED | OUTPATIENT
Start: 2021-05-18 | End: 2021-05-20 | Stop reason: HOSPADM

## 2021-05-18 RX ORDER — HEPARIN SODIUM 1000 [USP'U]/ML
80 INJECTION, SOLUTION INTRAVENOUS; SUBCUTANEOUS PRN
Status: DISCONTINUED | OUTPATIENT
Start: 2021-05-18 | End: 2021-05-20 | Stop reason: HOSPADM

## 2021-05-18 RX ORDER — HEPARIN SODIUM 10000 [USP'U]/100ML
5-30 INJECTION, SOLUTION INTRAVENOUS CONTINUOUS
Status: DISCONTINUED | OUTPATIENT
Start: 2021-05-18 | End: 2021-05-19

## 2021-05-18 RX ORDER — KETOROLAC TROMETHAMINE 15 MG/ML
15 INJECTION, SOLUTION INTRAMUSCULAR; INTRAVENOUS ONCE
Status: COMPLETED | OUTPATIENT
Start: 2021-05-18 | End: 2021-05-18

## 2021-05-18 RX ADMIN — KETOROLAC TROMETHAMINE 15 MG: 15 INJECTION, SOLUTION INTRAMUSCULAR; INTRAVENOUS at 12:30

## 2021-05-18 RX ADMIN — CLOPIDOGREL BISULFATE 75 MG: 75 TABLET ORAL at 16:14

## 2021-05-18 RX ADMIN — HEPARIN SODIUM 7180 UNITS: 1000 INJECTION, SOLUTION INTRAVENOUS; SUBCUTANEOUS at 12:34

## 2021-05-18 RX ADMIN — Medication 10 ML: at 21:00

## 2021-05-18 RX ADMIN — HEPARIN SODIUM 18 UNITS/KG/HR: 10000 INJECTION, SOLUTION INTRAVENOUS at 12:32

## 2021-05-18 ASSESSMENT — ENCOUNTER SYMPTOMS
EYE REDNESS: 0
WHEEZING: 0
SINUS PRESSURE: 0
GASTROINTESTINAL NEGATIVE: 1
ALLERGIC/IMMUNOLOGIC NEGATIVE: 1
CHOKING: 0
TROUBLE SWALLOWING: 0
ABDOMINAL PAIN: 0
FACIAL SWELLING: 0
COLOR CHANGE: 1
STRIDOR: 0
COUGH: 0
SHORTNESS OF BREATH: 0
DIARRHEA: 0
EYE DISCHARGE: 0
APNEA: 0
CHEST TIGHTNESS: 0
VOMITING: 0
RESPIRATORY NEGATIVE: 1
BACK PAIN: 0

## 2021-05-18 ASSESSMENT — PAIN SCALES - GENERAL
PAINLEVEL_OUTOF10: 0
PAINLEVEL_OUTOF10: 2
PAINLEVEL_OUTOF10: 2
PAINLEVEL_OUTOF10: 0

## 2021-05-18 ASSESSMENT — PAIN DESCRIPTION - LOCATION: LOCATION: FOOT

## 2021-05-18 ASSESSMENT — PAIN DESCRIPTION - PAIN TYPE: TYPE: ACUTE PAIN

## 2021-05-18 ASSESSMENT — PAIN DESCRIPTION - ORIENTATION: ORIENTATION: LEFT

## 2021-05-18 NOTE — LETTER
[facility]  [place] [address]  Phone: [phone]    No name on file. May 20, 2021     Patient: Adela Go   YOB: 1950   Date of Visit: 5/18/2021       To Whom It May Concern: It is my medical opinion that Coreen Ramon may return to light duty immediately with the following restrictions: lifting/carrying not to exceed 15 lbs. .    If you have any questions or concerns, please don't hesitate to call. Sincerely,    Electronically signed by Krystal Altamirano DO on 5/20/2021 at 11:41 AM      No name on file.

## 2021-05-18 NOTE — ED PROVIDER NOTES
swelling. Gastrointestinal: Negative for abdominal pain, diarrhea and vomiting. Endocrine: Negative for polydipsia and polyphagia. Genitourinary: Negative for dysuria, flank pain and frequency. Musculoskeletal: Negative for back pain and myalgias. Skin: Negative for pallor and rash. Neurological: Positive for numbness ( Left leg and foot). Negative for syncope, weakness and headaches. Hematological: Does not bruise/bleed easily. All other systems reviewed and are negative. Except as noted above the remainder of the review of systems was reviewed and negative. PAST MEDICAL HISTORY     Past Medical History:   Diagnosis Date    CAD (coronary artery disease)     Family history of colon cancer     Hyperlipidemia     Hypertension     Old MI (myocardial infarction) 2/3/2006    UTE on CPAP     Prostate cancer ([facility] [address]          SURGICALHISTORY       Past Surgical History:   Procedure Laterality Date    COLONOSCOPY N/A 8/19/2019    COLORECTAL CANCER SCREENING, NOT HIGH RISK performed by Jaylene Duran MD at [facility]      X3         CURRENT MEDICATIONS       Previous Medications    APPLE CIDER VINEGAR PO    Take by mouth    ASCORBIC ACID (VITAMIN C PO)    Take 500 mg by mouth    CPAP MACHINE MISC    by Does not apply route Use nightly as directed. Pressure setting 5 cm H2O. CYANOCOBALAMIN (VITAMIN B-12 CR PO)    Take by mouth    ESZOPICLONE (LUNESTA) 2 MG TABS    TAKE 1 TABLET BY MOUTH IN THE EVENING for sleep    LISINOPRIL (PRINIVIL;ZESTRIL) 20 MG TABLET    TAKE 1 TABLET BY MOUTH  DAILY    LOVASTATIN (MEVACOR) 20 MG TABLET    TAKE 1 TABLET BY MOUTH IN  THE EVENING    MELATONIN 10 MG CAPS    Take by mouth    METHYLPREDNISOLONE (MEDROL DOSEPACK) 4 MG TABLET    Take by mouth.     TAMSULOSIN (FLOMAX) 0.4 MG CAPSULE    TAKE 1 CAPSULE BY MOUTH ONCE DAILY    VITAMIN E 400 UNIT CAPSULE    Take 400 Units by mouth daily       ALLERGIES Morphine    FAMILY HISTORY       Family History   Problem Relation Age of Onset    Cancer Mother 76        BONE    Cancer Father 64        COLON    Colon Cancer Father     Cancer Brother 43        THYROID    Diabetes Maternal Grandmother           SOCIAL HISTORY       Social History     Socioeconomic History    Marital status: Single     Spouse name: None    Number of children: None    Years of education: None    Highest education level: None   Occupational History    None   Tobacco Use    Smoking status: Former Smoker     Packs/day: 2.00     Years: 25.00     Pack years: 50.00     Types: Cigars     Quit date: 1995     Years since quittin.3    Smokeless tobacco: Never Used    Tobacco comment: RARE CIGAR FROM TIME TO TIME   Vaping Use    Vaping Use: Never used   Substance and Sexual Activity    Alcohol use: Yes     Alcohol/week: 4.0 standard drinks     Types: 2 Cans of beer, 2 Shots of liquor per week    Drug use: Not Currently     Types: Marijuana    Sexual activity: None   Other Topics Concern    None   Social History Narrative    None     Social Determinants of Health     Financial Resource Strain:     Difficulty of Paying Living Expenses:    Food Insecurity:     Worried About Running Out of Food in the Last Year:     Ran Out of Food in the Last Year:    Transportation Needs:     Lack of Transportation (Medical):      Lack of Transportation (Non-Medical):    Physical Activity:     Days of Exercise per Week:     Minutes of Exercise per Session:    Stress:     Feeling of Stress :    Social Connections:     Frequency of Communication with Friends and Family:     Frequency of Social Gatherings with Friends and Family:     Attends Sabianist Services:     Active Member of Clubs or Organizations:     Attends Club or Organization Meetings:     Marital Status:    Intimate Partner Violence:     Fear of Current or Ex-Partner:     Emotionally Abused:     Physically Abused:     Sexually Abused:        SCREENINGS    Marisel Coma Scale  Eye Opening: Spontaneous  Best Verbal Response: Oriented  Best Motor Response: Obeys commands  Marisel Coma Scale Score: 15 @FLOW([id number])@      PHYSICAL EXAM    (up to 7 for level 4, 8 or more for level 5)     ED Triage Vitals   BP Temp Temp Source Pulse Resp SpO2 Height Weight   05/18/21 1119 05/18/21 1119 05/18/21 1119 05/18/21 1119 05/18/21 1119 05/18/21 1237 05/18/21 1119 05/18/21 1119   133/72 98.4 °F (36.9 °C) Oral 75 18 98 % 5' 10\" (1.778 m) 198 lb (89.8 kg)       Physical Exam  Vitals and nursing note reviewed. Constitutional:       General: He is awake. He is in acute distress. Appearance: Normal appearance. He is well-developed and normal weight. He is not ill-appearing, toxic-appearing or diaphoretic. Comments: No photophobia. No phonophobia. HENT:      Head: Normocephalic and atraumatic. No Crain's sign. Right Ear: External ear normal.      Left Ear: External ear normal.      Nose: Nose normal. No congestion or rhinorrhea. Mouth/Throat:      Mouth: Mucous membranes are moist.      Pharynx: Oropharynx is clear. No oropharyngeal exudate or posterior oropharyngeal erythema. Eyes:      General: No scleral icterus. Right eye: No foreign body or discharge. Left eye: No discharge. Extraocular Movements: Extraocular movements intact. Conjunctiva/sclera: Conjunctivae normal.      Left eye: No exudate. Pupils: Pupils are equal, round, and reactive to light. Neck:      Vascular: No JVD. Trachea: No tracheal deviation. Comments: No meningismus. Cardiovascular:      Rate and Rhythm: Normal rate and regular rhythm. Pulses:           Dorsalis pedis pulses are 2+ on the right side and 0 on the left side. Heart sounds: Normal heart sounds. Heart sounds not distant. No murmur heard. No friction rub. No gallop. Comments:  There is a trace dorsalis pedis pulse palpable on the left.  Pulmonary:      Effort: Pulmonary effort is normal. No respiratory distress. Breath sounds: Normal breath sounds. No stridor. No wheezing, rhonchi or rales. Chest:      Chest wall: No tenderness. Abdominal:      General: Abdomen is flat. Bowel sounds are normal. There is no distension. Palpations: Abdomen is soft. There is no mass. Tenderness: There is no abdominal tenderness. There is no right CVA tenderness, left CVA tenderness, guarding or rebound. Hernia: No hernia is present. Musculoskeletal:         General: No swelling, tenderness, deformity or signs of injury. Normal range of motion. Cervical back: Normal range of motion and neck supple. No rigidity. Right lower leg: No edema. Left lower leg: No edema. Legs:    Lymphadenopathy:      Head:      Right side of head: No submental adenopathy. Left side of head: No submental adenopathy. Skin:     General: Skin is warm and dry. Capillary Refill: Capillary refill takes less than 2 seconds. Coloration: Skin is not jaundiced or pale. Findings: No bruising, erythema, lesion or rash. Neurological:      General: No focal deficit present. Mental Status: He is alert and oriented to person, place, and time. Mental status is at baseline. Cranial Nerves: No cranial nerve deficit. Sensory: No sensory deficit. Motor: No weakness. Coordination: Coordination normal.      Deep Tendon Reflexes: Reflexes are normal and symmetric. Psychiatric:         Mood and Affect: Mood normal.         Behavior: Behavior normal. Behavior is cooperative. Thought Content:  Thought content normal.         Judgment: Judgment normal.         DIAGNOSTIC RESULTS     EKG: All EKG's are interpreted by the Emergency Department Physician who either signs or Co-signsthis chart in the absence of a cardiologist.        RADIOLOGY:   Non-plain filmimages such as CT, Ultrasound and MRI are read by the radiologist. Reyes Weldon radiographic images are visualized and preliminarily interpreted by the emergency physician with the below findings:        Interpretation per the Radiologist below, if available at the time ofthis note:    US DUP LOWER EXTREMITY LEFT ARTERIES   Final Result      Near complete occlusion involving the left distal femoral artery with minimal flow distal to this site. There is a collateral vessel from the left femoral artery with peak systolic velocity of 760 cm/s, suggestive of greater than 75% stenosis. Elevated velocity within the left common femoral artery, suggestive of around 50% stenosis. Findings discussed by ultrasound zackery Beckett with Dr. Virginie Escobedo, immediately after study performed. US DUP LOWER EXTREMITY LEFT MARINA   Final Result   NO ULTRASOUND SIGNS OF THROMBUS IN THE LEFT LOWER EXTREMITY DEEP VENOUS SYSTEM AS DETAILED ABOVE.      XR TIBIA FIBULA LEFT (2 VIEWS)    (Results Pending)   XR FOOT LEFT (MIN 3 VIEWS)    (Results Pending)         ED BEDSIDE ULTRASOUND:   Performed by ED Physician - none    LABS:  Labs Reviewed   CBC WITH AUTO DIFFERENTIAL - Abnormal; Notable for the following components:       Result Value    RBC 4.45 (*)     MCH 34.1 (*)     All other components within normal limits   COMPREHENSIVE METABOLIC PANEL - Abnormal; Notable for the following components:    Glucose 109 (*)     Alkaline Phosphatase 136 (*)     All other components within normal limits   CK   MAGNESIUM   PROTIME-INR   APTT   TSH WITHOUT REFLEX   PHOSPHORUS   HEPARIN LEVEL/ANTI-XA   HEPARIN LEVEL/ANTI-XA       All other labs were within normal range or not returned as of this dictation.     EMERGENCY DEPARTMENT COURSE and DIFFERENTIAL DIAGNOSIS/MDM:   Vitals:    Vitals:    05/18/21 1119 05/18/21 1237 05/18/21 1400   BP: 133/72  (!) 146/77   Pulse: 75  65   Resp: 18  18   Temp: 98.4 °F (36.9 °C)     TempSrc: Oral     SpO2:  98% 98%   Weight: 198 lb (89.8 kg) Height: 5' 10\" (1.778 m)             [facility]  ED attending ordered ultrasound both arterial and venous. Arterial ultrasound shows no acute occlusion. Patient was heparinized weightbase and bolus. Interventional cardiology consult. Dr. Nara Mulligan is at bedside. ED attending place transitional orders. The ER physician spoke with the  and wife regarding the diagnosis. CRITICAL CARE TIME   Total Critical Care time was 38 minutes, excluding separately reportableprocedures. There was a high probability of clinicallysignificant/life threatening deterioration in the patient's condition which required my urgent intervention. CONSULTS:  IP CONSULT TO PHARMACY  IP CONSULT TO CARDIOLOGY  PROCEDURES:  Unless otherwise noted below, none     Procedures    FINAL IMPRESSION      1. Occlusion of left femoral artery ([address]    2. History of bone cancer in adulthood          DISPOSITION/PLAN   DISPOSITION Admitted 05/18/2021 02:12:43 PM      PATIENT REFERRED TO:  No follow-up provider specified.     DISCHARGE MEDICATIONS:  New Prescriptions    No medications on file          (Please note that portions of this note were completed with a voice recognition program.  Efforts were made to edit the dictations but occasionally words are mis-transcribed.)    Shawna Rodriguez DO (electronically signed)  Attending Emergency Physician          Shawna Rodriguez DO  05/18/21 8027

## 2021-05-18 NOTE — H&P
Chief Complaint   Patient presents with    Other     leg cramping after dye from bond scan,     Numbness     to left foot after contrast        Patient is a 78 y.o. male who presents with a chief complaint of Left LEg discomfort, numbness as well as coldness. . Patient is followed on a regular basis by Dr. Jatinder Dominguez MD.  Symptoms started yesterday. Patient with history of coronary disease status post PCI remotely in 2006 at [address] to cigar use, hypertension and hyperlipidemia. Also history of UTE on CPAP. Patient is currently undergoing treatment for prostate cancer as well as bone mets. Was noted to have arterial occlusion on left extremity duplex ultrasound in the ER. Left lower 70 venous duplex ultrasound is negative for deep vein thrombosis. Left lower extremity arterial duplex ultrasound       Impression       Near complete occlusion involving the left distal femoral artery with minimal flow distal to this site.       There is a collateral vessel from the left femoral artery with peak systolic velocity of 037 cm/s, suggestive of greater than 75% stenosis.       Elevated velocity within the left common femoral artery, suggestive of around 50% stenosis.        Past Medical History:   Diagnosis Date    CAD (coronary artery disease)     Family history of colon cancer     Hyperlipidemia     Hypertension     Old MI (myocardial infarction) 2/3/2006    UTE on CPAP     Prostate cancer [facility])       Patient Active Problem List   Diagnosis    Hypertension    Prostate cancer ([facility] [address]    Hyperlipidemia    Coronary artery disease involving native coronary artery of native heart without angina pectoris    Old MI (myocardial infarction)    Prediabetes    UTE on CPAP    Primary insomnia    S/P radiation therapy    Drug-induced erectile dysfunction    Family history of colon cancer    History of colon polyps    Polyp of cecum    Acute occlusion of artery of lower extremity ([facility] [address] Past Surgical History:   Procedure Laterality Date    COLONOSCOPY N/A 2019    COLORECTAL CANCER SCREENING, NOT HIGH RISK performed by Emma Umanzor MD at [address] [place] [address]      X       Social History     Socioeconomic History    Marital status: Single     Spouse name: None    Number of children: None    Years of education: None    Highest education level: None   Occupational History    None   Tobacco Use    Smoking status: Former Smoker     Packs/day: 2.00     Years: 25.00     Pack years: 50.00     Types: Cigars     Quit date: 1995     Years since quittin.3    Smokeless tobacco: Never Used    Tobacco comment: RARE CIGAR FROM TIME TO TIME   Vaping Use    Vaping Use: Never used   Substance and Sexual Activity    Alcohol use: Yes     Alcohol/week: 4.0 standard drinks     Types: 2 Cans of beer, 2 Shots of liquor per week    Drug use: Not Currently     Types: Marijuana    Sexual activity: None   Other Topics Concern    None   Social History Narrative    None     Social Determinants of Health     Financial Resource Strain:     Difficulty of Paying Living Expenses:    Food Insecurity:     Worried About Running Out of Food in the Last Year:     Ran Out of Food in the Last Year:    Transportation Needs:     Lack of Transportation (Medical):      Lack of Transportation (Non-Medical):    Physical Activity:     Days of Exercise per Week:     Minutes of Exercise per Session:    Stress:     Feeling of Stress :    Social Connections:     Frequency of Communication with Friends and Family:     Frequency of Social Gatherings with Friends and Family:     Attends Buddhism Services:     Active Member of Clubs or Organizations:     Attends Club or Organization Meetings:     Marital Status:    Intimate Partner Violence:     Fear of Current or Ex-Partner:     Emotionally Abused:     Physically Abused:     Sexually Abused:        Family History   Problem Relation Age of Onset    Cancer Mother 76        BONE    Cancer Father 64        COLON    Colon Cancer Father     Cancer Brother 43        THYROID    Diabetes Maternal Grandmother        Current Facility-Administered Medications   Medication Dose Route Frequency Provider Last Rate Last Admin    heparin (porcine) injection 7,180 Units  80 Units/kg Intravenous PRN Wei A Romito, DO        heparin (porcine) injection 3,590 Units  40 Units/kg Intravenous PRN Wei A Romito, DO        heparin 25,000 units in dextrose 5% 250 mL (premix) infusion  5-30 Units/kg/hr Intravenous Continuous Wei A Romito, DO 16.2 mL/hr at 05/18/21 1232 18 Units/kg/hr at 05/18/21 1232       ALLERGIES: Morphine    Review of Systems   Constitutional: Negative for activity change, appetite change, chills, diaphoresis, fatigue and fever. HENT: Negative. Negative for facial swelling, nosebleeds and trouble swallowing. Eyes: Negative for discharge, redness and visual disturbance. Respiratory: Negative. Negative for apnea, cough, choking, chest tightness, shortness of breath, wheezing and stridor. Cardiovascular: Negative. Gastrointestinal: Negative. Endocrine: Negative. Genitourinary: Negative for difficulty urinating, flank pain, frequency and hematuria. Musculoskeletal: Positive for gait problem. Negative for neck pain. Skin: Positive for color change. Allergic/Immunologic: Negative. Neurological: Negative for dizziness, seizures, syncope, speech difficulty, weakness and light-headedness. Hematological: Negative. Psychiatric/Behavioral: Negative for behavioral problems, confusion and sleep disturbance. The patient is not nervous/anxious. VITALS:  Blood pressure (!) 146/77, pulse 65, temperature 98.4 °F (36.9 °C), temperature source Oral, resp. rate 18, height 5' 10\" (1.778 m), weight 198 lb (89.8 kg), SpO2 98 %. Body mass index is 28.41 kg/m².     Physical Exam  Constitutional: U/L   CBC Auto Differential    Collection Time: 05/18/21 12:15 PM   Result Value Ref Range    WBC 6.6 4.8 - 10.8 K/uL    RBC 4.45 (L) 4.70 - 6.10 M/uL    Hemoglobin 15.2 14.0 - 18.0 g/dL    Hematocrit 43.4 42.0 - 52.0 %    MCV 97.6 80.0 - 100.0 fL    MCH 34.1 (H) 27.0 - 31.3 pg    MCHC 34.9 33.0 - 37.0 %    RDW 13.4 11.5 - 14.5 %    Platelets 075 778 - 775 K/uL    Neutrophils % 64.5 %    Lymphocytes % 28.8 %    Monocytes % 5.6 %    Eosinophils % 0.8 %    Basophils % 0.3 %    Neutrophils Absolute 4.2 1.4 - 6.5 K/uL    Lymphocytes Absolute 1.9 1.0 - 4.8 K/uL    Monocytes Absolute 0.4 0.2 - 0.8 K/uL    Eosinophils Absolute 0.0 0.0 - 0.7 K/uL    Basophils Absolute 0.0 0.0 - 0.2 K/uL   Magnesium    Collection Time: 05/18/21 12:15 PM   Result Value Ref Range    Magnesium 2.1 1.7 - 2.4 mg/dL   Comprehensive Metabolic Panel    Collection Time: 05/18/21 12:15 PM   Result Value Ref Range    Sodium 140 135 - 144 mEq/L    Potassium 4.2 3.4 - 4.9 mEq/L    Chloride 105 95 - 107 mEq/L    CO2 25 20 - 31 mEq/L    Anion Gap 10 9 - 15 mEq/L    Glucose 109 (H) 70 - 99 mg/dL    BUN 18 8 - 23 mg/dL    CREATININE 0.84 0.70 - 1.20 mg/dL    GFR Non-African American >60.0 >60    GFR  >60.0 >60    Calcium 9.9 8.5 - 9.9 mg/dL    Total Protein 6.8 6.3 - 8.0 g/dL    Albumin 4.2 3.5 - 4.6 g/dL    Total Bilirubin 0.4 0.2 - 0.7 mg/dL    Alkaline Phosphatase 136 (H) 35 - 104 U/L    ALT 10 0 - 41 U/L    AST 14 0 - 40 U/L    Globulin 2.6 2.3 - 3.5 g/dL   Protime-INR    Collection Time: 05/18/21 12:15 PM   Result Value Ref Range    Protime 13.7 12.3 - 14.9 sec    INR 1.0    APTT    Collection Time: 05/18/21 12:15 PM   Result Value Ref Range    aPTT 27.9 24.4 - 36.8 sec   TSH without Reflex    Collection Time: 05/18/21 12:15 PM   Result Value Ref Range    TSH 1.700 0.440 - 3.860 uIU/mL   Phosphorus    Collection Time: 05/18/21 12:15 PM   Result Value Ref Range    Phosphorus 3.3 2.3 - 4.8 mg/dL     Troponin:   Lab Results   Component Value Date    TROPONINI <0.010 05/06/2019             ASSESSMENT:    Active Hospital Problems    Diagnosis Date Noted    Acute occlusion of artery of lower extremity [facility]) [I70.209] 05/18/2021     Priority: Low     Claudication Jeri class IV  History of CAD status post remote PCI  History of hypertension  Hyperlipidemia  Tobacco abuse  Prostate cancer with bone mets  UTE    PLAN:   As always, aggressive risk factor modification is strongly recommended. We should adhere to the JNC VIII guidelines for HTN management and the NCEPATP III guidelines for LDL-C management.   Plan for lower extremity angiogram possible intervention tomorrow  Continue with heparin drip  Aspirin 81 mg daily  Statin  Monitor on telemetry  Monitor H&H  GI/DVT prophylaxis  Smoking cessation was strongly recommended  Further recommendations to follow      Electronically signed by Bela Tanner DO on 5/18/2021 at 3:11 PM

## 2021-05-18 NOTE — ED NOTES
Patient to ultrasound via cart. Ultrasound will take patient to xray once complete. Claudette Sullivan [facility]  05/18/21 1474

## 2021-05-18 NOTE — ACP (ADVANCE CARE PLANNING)
Advance Care Planning     Advance Care Planning Activator (Inpatient)  Conversation Note      Date of ACP Conversation: 5/18/21    Conversation Conducted with: Patient with Decision Making Capacity    ACP Activator: Adina Cisneros RN    Health Care Decision Maker:     Current Designated Health Care Decision Maker:     Primary Decision Maker: Yissel Ortega - Jacy - [phone]     Click here to complete [place] including section of the Healthcare Decision Maker Relationship (ie \"Primary\")  Today we documented Decision Maker(s). The patient will provide ACP documents. Care Preferences    Ventilation: \"If you were in your present state of health and suddenly became very ill and were unable to breathe on your own, what would your preference be about the use of a ventilator (breathing machine) if it were available to you? \"      Would the patient desire the use of ventilator (breathing machine)?: yes    \"If your health worsens and it becomes clear that your chance of recovery is unlikely, what would your preference be about the use of a ventilator (breathing machine) if it were available to you? \"     Would the patient desire the use of ventilator (breathing machine)?: No    Resuscitation  \"CPR works best to restart the heart when there is a sudden event, like a heart attack, in someone who is otherwise healthy. Unfortunately, CPR does not typically restart the heart for people who have serious health conditions or who are very sick. \"    \"In the event your heart stopped as a result of an underlying serious health condition, would you want attempts to be made to restart your heart (answer \"yes\" for attempt to resuscitate) or would you prefer a natural death (answer \"no\" for do not attempt to resuscitate)? \" yes     [x] Yes   [] No   Educated Patient / Christine [place] regarding differences between Advance Directives and portable DNR orders.     Length of ACP Conversation in minutes:      Rohith Ortiz Outcomes:  [x] ACP discussion completed  [x] Existing advance directive reviewed with patient; no changes to patient's previously recorded wishes  [] New Advance Directive completed - patient's significant other will bring in documents. [] Portable Do Not Rescitate prepared for Provider review and signature  [] POLST/POST/MOLST/MOST prepared for Provider review and signature    Follow-up plan:    [] Schedule follow-up conversation to continue planning  [] Referred individual to Provider for additional questions/concerns   [x] Advised patient/agent/surrogate to review completed ACP document and update if needed with changes in condition, patient preferences or care setting    [x] This note routed to one or more involved healthcare providers.

## 2021-05-18 NOTE — CARE COORDINATION
[facility] EMERGENCY [facility] [place] Case Management Initial Discharge Assessment    Met with patient at bedside in ER to discuss discharge plan. PCP: Abram Mcintosh MD                                  Date of Last Visit: October 2020  If no PCP, list provided? N/A    Discharge Planning    Living Arrangements: Patient lives independently at home. Who do you live with? Significant other Jessy Egan    Who helps you with your care:  Patient is independent. If lives at home:  Do you have any barriers navigating in your home? No    Patient can perform ADL? Yes    Current Services (outpatient and in home) :  None   *Patient is in a clinical trial for bone cancer per his report. *    Dialysis: No    Is transportation available to get to your appointments? Yes    DME Equipment:  None    Respiratory equipment: CPAP without O2    Respiratory provider:  [address]:  Brennon Holcomb on [place] in [facility] with [address]?  No      Patient agreeable to [place] [address]? N/A    Patient agreeable to SNF/Rehab? N/A    Other discharge needs identified? N/A - No needs anticipated. Does Patient Have a High-Risk for Readmission Diagnosis (CHF, PN, MI, COPD)? No  History: Prostate and bone cancer, UTE on CPAP, MI. Initial Discharge Plan? (Note: please see concurrent daily documentation for any updates after initial note). Home with sig other, no needs anticipated.     Readmission Risk              Risk of Unplanned Readmission:  0         Electronically signed by Frances Casey RN on 5/18/2021 at 2:55 PM

## 2021-05-18 NOTE — ED NOTES
Dr. Froilan Guerrero at [address] [facility].  [facility]  05/18/21 7861

## 2021-05-19 ENCOUNTER — APPOINTMENT (OUTPATIENT)
Dept: CARDIAC CATH/INVASIVE PROCEDURES | Age: 71
DRG: 271 | End: 2021-05-19
Payer: MEDICARE

## 2021-05-19 LAB
ANION GAP SERPL CALCULATED.3IONS-SCNC: 10 MEQ/L (ref 9–15)
ANTI-XA UNFRAC HEPARIN: 0.51 IU/ML
BUN BLDV-MCNC: 23 MG/DL (ref 8–23)
CALCIUM SERPL-MCNC: 9.6 MG/DL (ref 8.5–9.9)
CHLORIDE BLD-SCNC: 106 MEQ/L (ref 95–107)
CHOLESTEROL, TOTAL: 177 MG/DL (ref 0–199)
CO2: 24 MEQ/L (ref 20–31)
CREAT SERPL-MCNC: 1.16 MG/DL (ref 0.7–1.2)
GFR AFRICAN AMERICAN: >60
GFR NON-AFRICAN AMERICAN: >60
GLUCOSE BLD-MCNC: 101 MG/DL (ref 70–99)
HCT VFR BLD CALC: 39.9 % (ref 42–52)
HDLC SERPL-MCNC: 41 MG/DL (ref 40–59)
HEMOGLOBIN: 13.7 G/DL (ref 14–18)
INR BLD: 1.1
LDL CHOLESTEROL CALCULATED: 110 MG/DL (ref 0–129)
MCH RBC QN AUTO: 33.8 PG (ref 27–31.3)
MCHC RBC AUTO-ENTMCNC: 34.3 % (ref 33–37)
MCV RBC AUTO: 98.5 FL (ref 80–100)
PDW BLD-RTO: 13.3 % (ref 11.5–14.5)
PERFORMED ON: ABNORMAL
PERFORMED ON: ABNORMAL
PLATELET # BLD: 191 K/UL (ref 130–400)
POC ACTIVATED CLOTTING TIME KAOLIN: 213 SEC (ref 82–152)
POC ACTIVATED CLOTTING TIME KAOLIN: 224 SEC (ref 82–152)
POC SAMPLE TYPE: ABNORMAL
POC SAMPLE TYPE: ABNORMAL
POTASSIUM SERPL-SCNC: 4.5 MEQ/L (ref 3.4–4.9)
PROTHROMBIN TIME: 14 SEC (ref 12.3–14.9)
RBC # BLD: 4.04 M/UL (ref 4.7–6.1)
SODIUM BLD-SCNC: 140 MEQ/L (ref 135–144)
TRIGL SERPL-MCNC: 130 MG/DL (ref 0–150)
WBC # BLD: 6.9 K/UL (ref 4.8–10.8)

## 2021-05-19 PROCEDURE — C2623 CATH, TRANSLUMIN, DRUG-COAT: HCPCS

## 2021-05-19 PROCEDURE — 2060000000 HC ICU INTERMEDIATE R&B

## 2021-05-19 PROCEDURE — 37221 HC ILIAC TERRITORY PLASTY STENT: CPT | Performed by: INTERNAL MEDICINE

## 2021-05-19 PROCEDURE — C1724 CATH, TRANS ATHEREC,ROTATION: HCPCS

## 2021-05-19 PROCEDURE — 85610 PROTHROMBIN TIME: CPT

## 2021-05-19 PROCEDURE — 2500000003 HC RX 250 WO HCPCS

## 2021-05-19 PROCEDURE — 2580000003 HC RX 258

## 2021-05-19 PROCEDURE — 85347 COAGULATION TIME ACTIVATED: CPT

## 2021-05-19 PROCEDURE — 6360000004 HC RX CONTRAST MEDICATION: Performed by: INTERNAL MEDICINE

## 2021-05-19 PROCEDURE — 36415 COLL VENOUS BLD VENIPUNCTURE: CPT

## 2021-05-19 PROCEDURE — C1769 GUIDE WIRE: HCPCS

## 2021-05-19 PROCEDURE — 6360000002 HC RX W HCPCS: Performed by: INTERNAL MEDICINE

## 2021-05-19 PROCEDURE — 6360000002 HC RX W HCPCS

## 2021-05-19 PROCEDURE — 75625 CONTRAST EXAM ABDOMINL AORTA: CPT | Performed by: INTERNAL MEDICINE

## 2021-05-19 PROCEDURE — 2580000003 HC RX 258: Performed by: INTERNAL MEDICINE

## 2021-05-19 PROCEDURE — 80061 LIPID PANEL: CPT

## 2021-05-19 PROCEDURE — 85027 COMPLETE CBC AUTOMATED: CPT

## 2021-05-19 PROCEDURE — C1876 STENT, NON-COA/NON-COV W/DEL: HCPCS

## 2021-05-19 PROCEDURE — 2709999900 HC NON-CHARGEABLE SUPPLY

## 2021-05-19 PROCEDURE — 85520 HEPARIN ASSAY: CPT

## 2021-05-19 PROCEDURE — 80048 BASIC METABOLIC PNL TOTAL CA: CPT

## 2021-05-19 PROCEDURE — C1887 CATHETER, GUIDING: HCPCS

## 2021-05-19 PROCEDURE — 75710 ARTERY X-RAYS ARM/LEG: CPT | Performed by: INTERNAL MEDICINE

## 2021-05-19 PROCEDURE — 6370000000 HC RX 637 (ALT 250 FOR IP)

## 2021-05-19 PROCEDURE — 6370000000 HC RX 637 (ALT 250 FOR IP): Performed by: INTERNAL MEDICINE

## 2021-05-19 PROCEDURE — C1894 INTRO/SHEATH, NON-LASER: HCPCS

## 2021-05-19 PROCEDURE — C1725 CATH, TRANSLUMIN NON-LASER: HCPCS

## 2021-05-19 PROCEDURE — 047L3DZ DILATION OF LEFT FEMORAL ARTERY WITH INTRALUMINAL DEVICE, PERCUTANEOUS APPROACH: ICD-10-PCS | Performed by: INTERNAL MEDICINE

## 2021-05-19 PROCEDURE — 04CL3ZZ EXTIRPATION OF MATTER FROM LEFT FEMORAL ARTERY, PERCUTANEOUS APPROACH: ICD-10-PCS | Performed by: INTERNAL MEDICINE

## 2021-05-19 PROCEDURE — 37227 PR REVSC OPN/PRQ FEM/POP W/STNT/ATHRC/ANGIOP SM VSL: CPT | Performed by: INTERNAL MEDICINE

## 2021-05-19 PROCEDURE — 37227 HC FEM/POPL REVASC STNT & ATHER: CPT | Performed by: INTERNAL MEDICINE

## 2021-05-19 PROCEDURE — 99233 SBSQ HOSP IP/OBS HIGH 50: CPT | Performed by: INTERNAL MEDICINE

## 2021-05-19 PROCEDURE — 75716 ARTERY X-RAYS ARMS/LEGS: CPT | Performed by: INTERNAL MEDICINE

## 2021-05-19 RX ORDER — SODIUM CHLORIDE 9 MG/ML
INJECTION, SOLUTION INTRAVENOUS CONTINUOUS
Status: ACTIVE | OUTPATIENT
Start: 2021-05-19 | End: 2021-05-20

## 2021-05-19 RX ORDER — SODIUM CHLORIDE 9 MG/ML
INJECTION, SOLUTION INTRAVENOUS CONTINUOUS
Status: DISCONTINUED | OUTPATIENT
Start: 2021-05-19 | End: 2021-05-20 | Stop reason: HOSPADM

## 2021-05-19 RX ORDER — HYDRALAZINE HYDROCHLORIDE 20 MG/ML
10 INJECTION INTRAMUSCULAR; INTRAVENOUS EVERY 10 MIN PRN
Status: DISCONTINUED | OUTPATIENT
Start: 2021-05-19 | End: 2021-05-20 | Stop reason: HOSPADM

## 2021-05-19 RX ORDER — ASPIRIN 81 MG/1
81 TABLET ORAL ONCE
Status: DISCONTINUED | OUTPATIENT
Start: 2021-05-19 | End: 2021-05-20 | Stop reason: HOSPADM

## 2021-05-19 RX ORDER — LABETALOL HYDROCHLORIDE 5 MG/ML
10 INJECTION, SOLUTION INTRAVENOUS EVERY 30 MIN PRN
Status: DISCONTINUED | OUTPATIENT
Start: 2021-05-19 | End: 2021-05-20 | Stop reason: HOSPADM

## 2021-05-19 RX ORDER — ONDANSETRON 2 MG/ML
4 INJECTION INTRAMUSCULAR; INTRAVENOUS EVERY 6 HOURS PRN
Status: DISCONTINUED | OUTPATIENT
Start: 2021-05-19 | End: 2021-05-20 | Stop reason: HOSPADM

## 2021-05-19 RX ORDER — ACETAMINOPHEN 325 MG/1
650 TABLET ORAL EVERY 4 HOURS PRN
Status: DISCONTINUED | OUTPATIENT
Start: 2021-05-19 | End: 2021-05-20 | Stop reason: HOSPADM

## 2021-05-19 RX ORDER — DIPHENHYDRAMINE HCL 25 MG
50 TABLET ORAL ONCE
Status: DISCONTINUED | OUTPATIENT
Start: 2021-05-19 | End: 2021-05-20 | Stop reason: HOSPADM

## 2021-05-19 RX ORDER — ATORVASTATIN CALCIUM 40 MG/1
40 TABLET, FILM COATED ORAL NIGHTLY
Status: DISCONTINUED | OUTPATIENT
Start: 2021-05-19 | End: 2021-05-20 | Stop reason: HOSPADM

## 2021-05-19 RX ADMIN — SODIUM CHLORIDE: 9 INJECTION, SOLUTION INTRAVENOUS at 05:47

## 2021-05-19 RX ADMIN — CLOPIDOGREL BISULFATE 75 MG: 75 TABLET ORAL at 08:45

## 2021-05-19 RX ADMIN — ATORVASTATIN CALCIUM 40 MG: 40 TABLET, FILM COATED ORAL at 20:22

## 2021-05-19 RX ADMIN — IOPAMIDOL 120 ML: 755 INJECTION, SOLUTION INTRAVENOUS at 13:56

## 2021-05-19 RX ADMIN — HEPARIN SODIUM 18 UNITS/KG/HR: 10000 INJECTION, SOLUTION INTRAVENOUS at 03:32

## 2021-05-19 RX ADMIN — Medication 10 ML: at 20:22

## 2021-05-19 ASSESSMENT — PAIN SCALES - GENERAL: PAINLEVEL_OUTOF10: 0

## 2021-05-19 NOTE — PROGRESS NOTES
Chief Complaint   Patient presents with    Other       leg cramping after dye from bond scan,     Numbness       to left foot after contrast          Patient is a 78 y.o. male who presents with a chief complaint of Left LEg discomfort, numbness as well as coldness. . Patient is followed on a regular basis by Dr. Sheela Shaffer MD.  Symptoms started yesterday. Patient with history of coronary disease status post PCI remotely in 2006 at [address] to cigar use, hypertension and hyperlipidemia. Also history of UTE on CPAP. Patient is currently undergoing treatment for prostate cancer as well as bone mets. Was noted to have arterial occlusion on left extremity duplex ultrasound in the ER. Left lower 70 venous duplex ultrasound is negative for deep vein thrombosis.       Left lower extremity arterial duplex ultrasound         Impression       Near complete occlusion involving the left distal femoral artery with minimal flow distal to this site.       There is a collateral vessel from the left femoral artery with peak systolic velocity of 095 cm/s, suggestive of greater than 75% stenosis.       Elevated velocity within the left common femoral artery, suggestive of around 50% stenosis.         5/19/2021: Patient is doing okay. On heparin drip. Hemodynamically stable.   No chest pain or shortness of breath      Past Medical History        Past Medical History:   Diagnosis Date    CAD (coronary artery disease)      Family history of colon cancer      Hyperlipidemia      Hypertension      Old MI (myocardial infarction) 2/3/2006    UTE on CPAP      Prostate cancer [facility])           Patient Active Problem List   Diagnosis    Hypertension    Prostate cancer ([facility] [address]    Hyperlipidemia    Coronary artery disease involving native coronary artery of native heart without angina pectoris    Old MI (myocardial infarction)    Prediabetes    UTE on CPAP    Primary insomnia    S/P radiation therapy    Drug-induced erectile dysfunction    Family history of colon cancer    History of colon polyps    Polyp of cecum    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)         Past Surgical History         Past Surgical History:   Procedure Laterality Date    COLONOSCOPY N/A 2019     COLORECTAL CANCER SCREENING, NOT HIGH RISK performed by Ade Mayfield MD at [address] [place] [address]         X3            Social History   Social History            Socioeconomic History    Marital status: Single       Spouse name: None    Number of children: None    Years of education: None    Highest education level: None   Occupational History    None   Tobacco Use    Smoking status: Former Smoker       Packs/day: 2.00       Years: 25.00       Pack years: 50.00       Types: Cigars       Quit date: 1995       Years since quittin.3    Smokeless tobacco: Never Used    Tobacco comment: RARE CIGAR FROM TIME TO TIME   Vaping Use    Vaping Use: Never used   Substance and Sexual Activity    Alcohol use: Yes       Alcohol/week: 4.0 standard drinks       Types: 2 Cans of beer, 2 Shots of liquor per week    Drug use: Not Currently       Types: Marijuana    Sexual activity: None   Other Topics Concern    None   Social History Narrative    None      Social Determinants of Health          Financial Resource Strain:     Difficulty of Paying Living Expenses:    Food Insecurity:     Worried About Running Out of Food in the Last Year:     Ran Out of Food in the Last Year:    Transportation Needs:     Lack of Transportation (Medical):      Lack of Transportation (Non-Medical):    Physical Activity:     Days of Exercise per Week:     Minutes of Exercise per Session:    Stress:     Feeling of Stress :    Social Connections:     Frequency of Communication with Friends and Family:     Frequency of Social Gatherings with Friends and Family:     Attends Hoahaoism Services:     Active Member of Clubs or Organizations:     Attends Club or Organization Meetings:     Marital Status:    Intimate Partner Violence:     Fear of Current or Ex-Partner:     Emotionally Abused:     Physically Abused:     Sexually Abused:             Family History         Family History   Problem Relation Age of Onset    Cancer Mother 76         BONE    Cancer Father 64         COLON    Colon Cancer Father      Cancer Brother 43         THYROID    Diabetes Maternal Grandmother              Current Facility-Administered Medications             Current Facility-Administered Medications   Medication Dose Route Frequency Provider Last Rate Last Admin    heparin (porcine) injection 7,180 Units  80 Units/kg Intravenous PRN Wei A Romito, DO        heparin (porcine) injection 3,590 Units  40 Units/kg Intravenous PRN Wei A Romito, DO        heparin 25,000 units in dextrose 5% 250 mL (premix) infusion  5-30 Units/kg/hr Intravenous Continuous Wei A Romito, DO 16.2 mL/hr at 05/18/21 1232 18 Units/kg/hr at 05/18/21 1232            ALLERGIES: Morphine     Review of Systems   Constitutional: Negative for activity change, appetite change, chills, diaphoresis, fatigue and fever. HENT: Negative. Negative for facial swelling, nosebleeds and trouble swallowing. Eyes: Negative for discharge, redness and visual disturbance. Respiratory: Negative. Negative for apnea, cough, choking, chest tightness, shortness of breath, wheezing and stridor. Cardiovascular: Negative. Gastrointestinal: Negative. Endocrine: Negative. Genitourinary: Negative for difficulty urinating, flank pain, frequency and hematuria. Musculoskeletal: Positive for gait problem. Negative for neck pain. Skin: Positive for color change. Allergic/Immunologic: Negative. Neurological: Negative for dizziness, seizures, syncope, speech difficulty, weakness and light-headedness. Hematological: Negative.     Psychiatric/Behavioral: oriented to person, place, and time. Cranial Nerves: No cranial nerve deficit. Psychiatric:         Behavior: Behavior normal.         Thought Content:  Thought content normal.         Judgment: Judgment normal.            LABS:  Recent Results         Recent Results (from the past 24 hour(s))   CK     Collection Time: 05/18/21 12:15 PM   Result Value Ref Range     Total CK 89 0 - 190 U/L   CBC Auto Differential     Collection Time: 05/18/21 12:15 PM   Result Value Ref Range     WBC 6.6 4.8 - 10.8 K/uL     RBC 4.45 (L) 4.70 - 6.10 M/uL     Hemoglobin 15.2 14.0 - 18.0 g/dL     Hematocrit 43.4 42.0 - 52.0 %     MCV 97.6 80.0 - 100.0 fL     MCH 34.1 (H) 27.0 - 31.3 pg     MCHC 34.9 33.0 - 37.0 %     RDW 13.4 11.5 - 14.5 %     Platelets 878 346 - 392 K/uL     Neutrophils % 64.5 %     Lymphocytes % 28.8 %     Monocytes % 5.6 %     Eosinophils % 0.8 %     Basophils % 0.3 %     Neutrophils Absolute 4.2 1.4 - 6.5 K/uL     Lymphocytes Absolute 1.9 1.0 - 4.8 K/uL     Monocytes Absolute 0.4 0.2 - 0.8 K/uL     Eosinophils Absolute 0.0 0.0 - 0.7 K/uL     Basophils Absolute 0.0 0.0 - 0.2 K/uL   Magnesium     Collection Time: 05/18/21 12:15 PM   Result Value Ref Range     Magnesium 2.1 1.7 - 2.4 mg/dL   Comprehensive Metabolic Panel     Collection Time: 05/18/21 12:15 PM   Result Value Ref Range     Sodium 140 135 - 144 mEq/L     Potassium 4.2 3.4 - 4.9 mEq/L     Chloride 105 95 - 107 mEq/L     CO2 25 20 - 31 mEq/L     Anion Gap 10 9 - 15 mEq/L     Glucose 109 (H) 70 - 99 mg/dL     BUN 18 8 - 23 mg/dL     CREATININE 0.84 0.70 - 1.20 mg/dL     GFR Non- >60.0 >60     GFR  >60.0 >60     Calcium 9.9 8.5 - 9.9 mg/dL     Total Protein 6.8 6.3 - 8.0 g/dL     Albumin 4.2 3.5 - 4.6 g/dL     Total Bilirubin 0.4 0.2 - 0.7 mg/dL     Alkaline Phosphatase 136 (H) 35 - 104 U/L     ALT 10 0 - 41 U/L     AST 14 0 - 40 U/L     Globulin 2.6 2.3 - 3.5 g/dL   Protime-INR     Collection Time: 05/18/21 12:15 PM Result Value Ref Range     Protime 13.7 12.3 - 14.9 sec     INR 1.0     APTT     Collection Time: 05/18/21 12:15 PM   Result Value Ref Range     aPTT 27.9 24.4 - 36.8 sec   TSH without Reflex     Collection Time: 05/18/21 12:15 PM   Result Value Ref Range     TSH 1.700 0.440 - 3.860 uIU/mL   Phosphorus     Collection Time: 05/18/21 12:15 PM   Result Value Ref Range     Phosphorus 3.3 2.3 - 4.8 mg/dL         Troponin:         Lab Results   Component Value Date     TROPONINI <0.010 05/06/2019                  ASSESSMENT:           Active Hospital Problems     Diagnosis Date Noted    Acute occlusion of artery of lower extremity ([facility] [address] [I70.209] 05/18/2021       Priority: Low      Claudication [place] class IV  History of CAD status post remote PCI  History of hypertension  Hyperlipidemia  Tobacco abuse  Prostate cancer with bone mets  UTE     PLAN:   1. As always, aggressive risk factor modification is strongly recommended. We should adhere to the JNC VIII guidelines for HTN management and the NCEPATP III guidelines for LDL-C management. 2. Low somebody angiogram with possible intervention today. 3. Hold heparin drip prior to procedure. 4. Aspirin 81 mg daily  5. Statin  6. Monitor on telemetry  7. Monitor H&H  8. GI/DVT prophylaxis  9. Smoking cessation was strongly recommended  10. Coronary evaluation in future when feasible  11.  Further recommendations to follow    Electronically signed by Martha Lezama DO on 5/19/2021 at 12:44 PM

## 2021-05-19 NOTE — PROGRESS NOTES
Pt arrived from cath lab and transferred from cart to bed with slide sheet and 3 staff. Right groin soft with no signs of bleeding or hematoma present. Wife at bedside. 1+ pedal pulse palpated on the left foot.

## 2021-05-19 NOTE — PROGRESS NOTES
6 Occitan sheath pulled from right groin and manual pressure applied along with the use of a quikclot.   Patient tolerated well

## 2021-05-19 NOTE — BRIEF OP NOTE
Procedure(s):  B/l LE angio, abd aortogram, left SFA/pop ath/PTA with DCB/stent.      Pre-operative Diagnosis:  claudication     H&P Status: Completed and reviewed.      Post-operative Diagnosis:       Left LE: 70% left EIA with 60mmHg gradient, left SFA 80% mid, 100% prox pop,      Right LE: 80% right IGNACIO, 100% right SFA.      Findings:  See full report     Complications:  none     Primary Proceduralist:   Dr. Daphine Scheuermann     DAPt  RFM  Right LE PVI in near future as outpatient.            Full procedure note to follow            Deleted by:  Fadi Cortez DO at 5/19/2021  2:42 PM

## 2021-05-19 NOTE — PROGRESS NOTES
Hemostasis to right groin and pressure dressing applied. No bleeding or hematoma.   Taking food and fluids and vitals remain stable

## 2021-05-20 VITALS
DIASTOLIC BLOOD PRESSURE: 58 MMHG | WEIGHT: 193.2 LBS | TEMPERATURE: 98.6 F | SYSTOLIC BLOOD PRESSURE: 120 MMHG | BODY MASS INDEX: 27.66 KG/M2 | HEART RATE: 64 BPM | OXYGEN SATURATION: 98 % | RESPIRATION RATE: 18 BRPM | HEIGHT: 70 IN

## 2021-05-20 LAB
HCT VFR BLD CALC: 40.4 % (ref 42–52)
HEMOGLOBIN: 13.9 G/DL (ref 14–18)
MCH RBC QN AUTO: 33.6 PG (ref 27–31.3)
MCHC RBC AUTO-ENTMCNC: 34.5 % (ref 33–37)
MCV RBC AUTO: 97.5 FL (ref 80–100)
PDW BLD-RTO: 13.4 % (ref 11.5–14.5)
PLATELET # BLD: 175 K/UL (ref 130–400)
RBC # BLD: 4.15 M/UL (ref 4.7–6.1)
WBC # BLD: 9.6 K/UL (ref 4.8–10.8)

## 2021-05-20 PROCEDURE — 85027 COMPLETE CBC AUTOMATED: CPT

## 2021-05-20 PROCEDURE — 99239 HOSP IP/OBS DSCHRG MGMT >30: CPT | Performed by: INTERNAL MEDICINE

## 2021-05-20 PROCEDURE — 36415 COLL VENOUS BLD VENIPUNCTURE: CPT

## 2021-05-20 PROCEDURE — 6370000000 HC RX 637 (ALT 250 FOR IP): Performed by: INTERNAL MEDICINE

## 2021-05-20 RX ORDER — CLOPIDOGREL BISULFATE 75 MG/1
75 TABLET ORAL DAILY
Qty: 30 TABLET | Refills: 3 | Status: SHIPPED | OUTPATIENT
Start: 2021-05-21 | End: 2021-05-26 | Stop reason: SDUPTHER

## 2021-05-20 RX ORDER — ATORVASTATIN CALCIUM 40 MG/1
40 TABLET, FILM COATED ORAL NIGHTLY
Qty: 30 TABLET | Refills: 3 | Status: SHIPPED | OUTPATIENT
Start: 2021-05-20 | End: 2021-09-23 | Stop reason: SDUPTHER

## 2021-05-20 RX ORDER — ASPIRIN 81 MG/1
81 TABLET ORAL ONCE
Qty: 30 TABLET | Refills: 3 | Status: ON HOLD | OUTPATIENT
Start: 2021-05-20 | End: 2022-03-07 | Stop reason: HOSPADM

## 2021-05-20 RX ADMIN — CLOPIDOGREL BISULFATE 75 MG: 75 TABLET ORAL at 09:05

## 2021-05-20 ASSESSMENT — PAIN SCALES - GENERAL
PAINLEVEL_OUTOF10: 0

## 2021-05-20 NOTE — DISCHARGE SUMMARY
Discharge Summary    Date: 5/20/2021  Patient Name: Desmond Mckenzie YOB: 1950 Age: 78 y.o. 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 [address] [facility] Stay  Narrative of Hospital Course:  Patient presented with resting claudication type symptoms to the emergency department. He was initiated on heparin drip. Eventually underwent Lotrimin angiogram showing severe left external iliac artery stenosis left SFA and popliteal disease status post intervention/stenting. He was also noted to have severe right SFA disease 100% blockage with reconstitution of distal segment. Also severe right common iliac artery stenosis. Patient was initiated on aspirin and Plavix as well as statin was changed to Lipitor. He was discharged home in stable and satisfactory condition. 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 Consultants:  IP CONSULT TO PHARMACYIP CONSULT TO CARDIOLOGYIP CONSULT TO CARDIAC REHAB    Surgeries/procedures Performed:  Lower extremity peripheral angiogram and left external neck artery/left SFA popliteal artery stenting. Treatments:    Cardiac Medications and Procedures    Other and Ace Inhibitor, Other    Discharge Plan/Disposition:  Home    Hospital/Incidental Findings Requiring Follow Up:    Patient Instructions:    Diet: Cardiac Diet    Activity:Activity as Tolerated and No Heavy Lifting  For number of days (if applicable): 5      Other Instructions:    Provider Follow-Up:   No follow-ups on file.      Significant Diagnostic Studies:    Recent Labs:  Admission on 05/18/2021Total CK                                      Date: 05/18/2021Value: 89 Date: 05/18/2021Value: 1.9         Ref range: 1.0 - 4.8 K/uL     Status: FinalMonocytes Absolute                            Date: 05/18/2021Value: 0.4         Ref range: 0.2 - 0.8 K/uL     Status: FinalEosinophils Absolute                          Date: 05/18/2021Value: 0.0         Ref range: 0.0 - 0.7 K/uL     Status: FinalBasophils Absolute                            Date: 05/18/2021Value: 0.0         Ref range: 0.0 - 0.2 K/uL     Status: FinalMagnesium                                     Date: 05/18/2021Value: 2.1         Ref range: 1.7 - 2.4 mg/dL    Status: FinalSodium                                        Date: 05/18/2021Value: 140         Ref range: 135 - 144 mEq/L    Status: FinalPotassium                                     Date: 05/18/2021Value: 4.2         Ref range: 3.4 - 4.9 mEq/L    Status: FinalChloride                                      Date: 05/18/2021Value: 105         Ref range: 95 - 107 mEq/L     Status: FinalCO2                                           Date: 05/18/2021Value: 25          Ref range: 20 - 31 mEq/L      Status: FinalAnion Gap                                     Date: 05/18/2021Value: 10          Ref range: 9 - 15 mEq/L       Status: FinalGlucose                                       Date: 05/18/2021Value: 109*        Ref range: 70 - 99 mg/dL      Status: FinalBUN                                           Date: 05/18/2021Value: 18          Ref range: 8 - 23 mg/dL       Status: FinalCREATININE                                    Date: 05/18/2021Value: 0.84        Ref range: 0.70 - 1.20 mg/dL  Status: FinalGFR Non-                      Date: 05/18/2021Value: >60.0       Ref range: >60                Status: Final              Comment: >60 mL/min/1.73m2 EGFR, calc. for ages 25 and older using theMDRD formula (not corrected for weight), is valid for stablerenal function. GFR                           Date: 05/18/2021Value: >60.0       Ref range: >60                Status: Final              Comment: >60 mL/min/1.73m2 EGFR, calc. for ages 25 and older using theMDRD formula (not corrected for weight), is valid for stablerenal function. Calcium                                       Date: 05/18/2021Value: 9.9         Ref range: 8.5 - 9.9 mg/dL    Status: FinalTotal Protein                                 Date: 05/18/2021Value: 6.8         Ref range: 6.3 - 8.0 g/dL     Status: FinalAlbumin                                       Date: 05/18/2021Value: 4.2         Ref range: 3.5 - 4.6 g/dL     Status: FinalTotal Bilirubin                               Date: 05/18/2021Value: 0.4         Ref range: 0.2 - 0.7 mg/dL    Status: FinalAlkaline Phosphatase                          Date: 05/18/2021Value: 136*        Ref range: 35 - 104 U/L       Status: FinalALT                                           Date: 05/18/2021Value: 10          Ref range: 0 - 41 U/L         Status: FinalAST                                           Date: 05/18/2021Value: 14          Ref range: 0 - 40 U/L         Status: FinalGlobulin                                      Date: 05/18/2021Value: 2.6         Ref range: 2.3 - 3.5 g/dL     Status: FinalProtime                                       Date: 05/18/2021Value: 13.7        Ref range: 12.3 - 14.9 sec    Status: FinalINR                                           Date: 05/18/2021Value: 1.0           Status: FinalaPTT                                          Date: 05/18/2021Value: 27.9        Ref range: 24.4 - 36.8 sec    Status: Final              Comment: Effective 11/4/2020:Heparin Therapeutic Range: 64.0 - 98.0 seconds. TSH                                           Date: 05/18/2021Value: 1.700       Ref range: 0.440 - 3.860 uI*  Status: FinalPhosphorus                                    Date: 05/18/2021Value: 3.3         Ref range: 2.3 - 4.8 mg/dL    Status: FinalAnti-XA Unfrac Heparin                        Date: 05/18/2021Value: 0.55        Ref range: IU/mL              Status: Final              Comment: Unfractionated Heparin Therapeutic Range:0.30 - 0.70 IU/mLAnti-XA Unfrac Heparin                        Date: 05/19/2021Value: 0.51        Ref range: IU/mL              Status: Final              Comment: Unfractionated Heparin Therapeutic Range:0.30 - 0.70 IU/mLSodium                                        Date: 05/19/2021Value: 140         Ref range: 135 - 144 mEq/L    Status: FinalPotassium                                     Date: 05/19/2021Value: 4.5         Ref range: 3.4 - 4.9 mEq/L    Status: FinalChloride                                      Date: 05/19/2021Value: 106         Ref range: 95 - 107 mEq/L     Status: FinalCO2                                           Date: 05/19/2021Value: 24          Ref range: 20 - 31 mEq/L      Status: FinalAnion Gap                                     Date: 05/19/2021Value: 10          Ref range: 9 - 15 mEq/L       Status: FinalGlucose                                       Date: 05/19/2021Value: 101*        Ref range: 70 - 99 mg/dL      Status: FinalBUN                                           Date: 05/19/2021Value: 23          Ref range: 8 - 23 mg/dL       Status: FinalCREATININE                                    Date: 05/19/2021Value: 1.16        Ref range: 0.70 - 1.20 mg/dL  Status: FinalGFR Non-                      Date: 05/19/2021Value: >60.0       Ref range: >60                Status: Final              Comment: >60 mL/min/1.73m2 EGFR, calc. for ages [address] [facility] [address] [facility] [address] and older using theMDRD formula (not corrected for weight), is valid for stablerenal function. GFR                           Date: 05/19/2021Value: >60.0       Ref range: >60                Status: Final              Comment: >60 mL/min/1.73m2 EGFR, calc. for ages [address] [facility] [address] [facility] [address] and older using theMDRD formula (not corrected for weight), is valid for stablerenal function. Calcium                                       Date: 05/19/2021Value: 9.6         Ref range: 8.5 - 9.9 mg/dL    Status: [address] [facility]                                           Date: 05/19/2021Value: 6.9         Ref range: 4.8 - 10.8 K/uL    Status: FinalRBC                                           Date: 05/19/2021Value: 4.04*       Ref range: 4.70 - 6.10 M/uL   Status: FinalHemoglobin                                    Date: 05/19/2021Value: 13.7*       Ref range: 14.0 - 18.0 g/dL   Status: FinalHematocrit                                    Date: 05/19/2021Value: 39.9*       Ref range: 42.0 - 52.0 %      Status: FinalMCV                                           Date: 05/19/2021Value: 98.5        Ref range: 80.0 - 100.0 fL    Status: [address]                                           Date: 05/19/2021Value: 33.8*       Ref range: 27.0 - 31.3 pg     Status: [address]                                          Date: 05/19/2021Value: 34.3        Ref range: 33.0 - 37.0 %      Status: FinalRDW                                           Date: 05/19/2021Value: 13.3        Ref range: 11.5 - 14.5 %      Status: FinalPlatelets                                     Date: 05/19/2021Value: 191         Ref range: 130 - 400 K/uL     Status: FinalProtime                                       Date: 05/19/2021Value: 14.0        Ref range: 12.3 - 14.9 sec    Status: FinalINR                                           Date: 05/19/2021Value: 1.1           Status: FinalCholesterol, Total                            Date: 05/19/2021Value: 177         Ref range: 0 - 199 mg/dL      Status: Final              Comment: ATP III Cholesterol classification is Desirable. Triglycerides                                 Date: 05/19/2021Value: 130         Ref range: 0 - 150 mg/dL      Status: Final              Comment: ATP III Triglycerides Classification is Normal.HDL                                           Date: 05/19/2021Value: 41          Ref range: 40 - 59 mg/dL      Status: Final              Comment: ATP III HDL Cholesterol Classification is Desirable. Expected Values:Males:    >55 = No Risk          35-55 = Moderate Risk          <35 = High RiskFemales:  >65 = No Risk          45-65 = Moderate Risk          <45 = High RiskNCEP Guidelines:   Third Report May 2001>59 = negative risk factor for CHD<40 = major risk factor for CHDLDL Calculated                                Date: 05/19/2021Value: 110         Ref range: 0 - 129 mg/dL      Status: Final              Comment: ATP III LDL Classification is Near Optimal.ACT-K                                         Date: 05/19/2021Value: 224*        Ref range: 82 - 152 sec       Status: FinalSample Type                                   Date: 05/19/2021Value: ART           Status: FinalPerformed on                                  Date: 05/19/2021Value: SEE BELOW     Status: Final              Comment: Performed on POCACT-K                                         Date: 05/19/2021Value: 213*        Ref range: 82 - 152 sec       Status: FinalSample Type                                   Date: 05/19/2021Value: ART           Status: FinalPerformed on                                  Date: 05/19/2021Value: SEE BELOW     Status: Final              Comment: Performed on [address]                                           Date: 05/20/2021Value: 9.6         Ref range: 4.8 - 10.8 K/uL    Status: FinalRBC                                           Date: 05/20/2021Value: 4.15*       Ref range: 4.70 - 6.10 M/uL   Status: FinalHemoglobin                                    Date: 05/20/2021Value: 13.9*       Ref range: 14.0 - 18.0 g/dL   Status: FinalHematocrit                                    Date: 05/20/2021Value: 40.4*       Ref range: 42.0 - 52.0 %      Status: FinalMCV                                           Date: 05/20/2021Value: 97.5        Ref range: 80.0 - 100.0 fL    Status: [address]                                           Date: 05/20/2021Value: 33.6*       Ref range: 27.0 - 31.3 pg     Status: [address]                                          Date: 05/20/2021Value: 34.5        Ref range: 33.0 - 37.0 %      Status: FinalRDW                                           Date: 05/20/2021Value: 13.4        Ref range: 11.5 - 14.5 %      Status: FinalPlatelets                                     Date: 05/20/2021Value: 175         Ref range: 130 - 400 K/uL     Status: Final------------    Radiology last 7 days:  XR TIBIA FIBULA LEFT (2 VIEWS)Result Date: 5/18/2021NO ACUTE OSSEOUS ABNORMALITYXR FOOT LEFT (MIN 3 VIEWS)Result Date: 5/18/2021NO ACUTE OSSEOUS ABNORMALITYUS DUP LOWER EXTREMITY LEFT ARTERIESResult Date: 5/18/2021Near complete occlusion involving the left distal femoral artery with minimal flow distal to this site. There is a collateral vessel from the left femoral artery with peak systolic velocity of 798 cm/s, suggestive of greater than 75% stenosis. Elevated velocity within the left common femoral artery, suggestive of around 50% stenosis. Findings discussed by ultrasound tech Sophy with Dr. Cristian Petersen, immediately after study performed.  US DUP LOWER EXTREMITY LEFT VENResult Date: 5/18/2021NO ULTRASOUND SIGNS OF THROMBUS IN THE LEFT LOWER EXTREMITY DEEP VENOUS SYSTEM AS DETAILED ABOVE.     [unfilled]    Discharge Medications    Current Discharge Medication ListSTART taking these medicationsaspirin 81 MG EC tabletTake 1 tablet by mouth once for 1 doseQty: 30 tablet Refills: 3atorvastatin (LIPITOR) 40 MG tabletTake 1 tablet by mouth nightlyQty: 30 tablet Refills: 3clopidogrel (PLAVIX) 75 MG tabletTake 1 tablet by mouth dailyQty: 30 tablet Refills: 3    Current Discharge Medication List    Current Discharge Medication ListCONTINUE these medications which have NOT CHANGEDenzalutamide (XTANDI) 40 MG capsuleTake by mouthTalazoparib Tosylate 0.25 MG CAPSTake by mouthlisinopril (PRINIVIL;ZESTRIL) 20 MG tabletTAKE 1 TABLET BY MOUTH  DAILYQty: 90 tablet Refills: 3Comments: Requesting 1 year

## 2021-05-20 NOTE — PROGRESS NOTES
0720am Report from night shift RN. Alert and oriented  VSS. MP-SR R groin cath site dry and intact. No thrill bruit or hematoma. 1= pedal pulse.   No complaints

## 2021-05-21 ENCOUNTER — CARE COORDINATION (OUTPATIENT)
Dept: CASE MANAGEMENT | Age: 71
End: 2021-05-21

## 2021-05-21 NOTE — CARE COORDINATION
[facility] Transitions Initial Follow Up Call    Call within 2 business days of discharge: Yes    Patient: Ascencion Olea Patient : 1950   MRN: [id number]  Reason for Admission: 2021 - 2021 Acute occlusion of artery of lower extremity, s/p B/l LE angio, abd aortogram, left SFA/pop ath/PTA with DCB/stent.   Discharge Date: 21 RARS: Readmission Risk Score: 16  CT    Last Discharge [facility]       Complaint Diagnosis Description Type Department Provider    21 Other; Numbness Occlusion of left femoral artery ([facility] [address] . .. ED to Hosp-Admission (Discharged) (ADMITTED) MLOZ1W Adan Cortez DO; Wei Rizo. .. Initial CT Outreach. Answer and immediate hang up x2 before introduction. PCP  1:00, Dr Maurizio Mcallister  12:15.     Care Transitions 24 Hour Call    Care Transitions Interventions         Follow Up  Future Appointments   Date Time Provider Sana Ramos   2021  1:00 PM JACQUELYN Carrion - CNP [facility] [place]   2021 12:15 PM DO Maya Sams [facility]   2021  1:00 PM Gail Mcmillan MD [facility] [address], [facility]

## 2021-05-24 ENCOUNTER — CARE COORDINATION (OUTPATIENT)
Dept: CASE MANAGEMENT | Age: 71
End: 2021-05-24

## 2021-05-24 NOTE — CARE COORDINATION
[facility] Transitions Initial Follow Up Call    Call within 2 business days of discharge: Yes    Patient: Valerie Ceballos Patient : 1950   MRN: <[id number]>  Reason for Admission:  2021 - 2021 Acute occlusion of artery of lower extremity, s/p B/l LE angio, abd aortogram, left SFA/pop ath/PTA with DCB/stent.   Discharge Date: 21 RARS: Readmission Risk Score: 16      Last Discharge [facility]       Complaint Diagnosis Description Type Department Provider    21 Other; Numbness Occlusion of left femoral artery ([facility] [address] . .. ED to Hosp-Admission (Discharged) (ADMITTED) MLOZ1W Adan Cortez DO; Wei Rizo. .. Second attempt at initial 24 hour CTN call     Spoke with: Called to speak with patient for initial transition of care. Left HIPPA compliant voice message with contact information [phone] for a call  Back with an update.     Facility: [facility] provided:  Obtained and reviewed discharge summary and/or continuity of care documents    Care Transitions 24 Hour Call    Care Transitions Interventions         Follow Up  Future Appointments   Date Time Provider Sana Ramos   2021  1:00 PM JACQUELYN Merida - CNP [facility] [place]   2021 12:15 PM Argelia Rivera DO [address] [place] [address]   2021  1:00 PM Edison Weiss MD [place], [place]

## 2021-05-25 ENCOUNTER — OFFICE VISIT (OUTPATIENT)
Dept: FAMILY MEDICINE CLINIC | Age: 71
End: 2021-05-25
Payer: MEDICARE

## 2021-05-25 VITALS
WEIGHT: 201 LBS | HEART RATE: 80 BPM | SYSTOLIC BLOOD PRESSURE: 132 MMHG | DIASTOLIC BLOOD PRESSURE: 80 MMHG | HEIGHT: 70 IN | OXYGEN SATURATION: 95 % | BODY MASS INDEX: 28.77 KG/M2

## 2021-05-25 DIAGNOSIS — I25.10 CORONARY ARTERY DISEASE INVOLVING NATIVE CORONARY ARTERY OF NATIVE HEART WITHOUT ANGINA PECTORIS: ICD-10-CM

## 2021-05-25 DIAGNOSIS — I70.209 ACUTE OCCLUSION OF ARTERY OF LOWER EXTREMITY (HCC): Primary | ICD-10-CM

## 2021-05-25 DIAGNOSIS — I10 ESSENTIAL HYPERTENSION: ICD-10-CM

## 2021-05-25 DIAGNOSIS — C61 PROSTATE CANCER (HCC): ICD-10-CM

## 2021-05-25 DIAGNOSIS — Z09 HOSPITAL DISCHARGE FOLLOW-UP: ICD-10-CM

## 2021-05-25 PROCEDURE — 1111F DSCHRG MED/CURRENT MED MERGE: CPT | Performed by: NURSE PRACTITIONER

## 2021-05-25 PROCEDURE — 99496 TRANSJ CARE MGMT HIGH F2F 7D: CPT | Performed by: NURSE PRACTITIONER

## 2021-05-25 SDOH — ECONOMIC STABILITY: FOOD INSECURITY: WITHIN THE PAST 12 MONTHS, YOU WORRIED THAT YOUR FOOD WOULD RUN OUT BEFORE YOU GOT MONEY TO BUY MORE.: NEVER TRUE

## 2021-05-25 ASSESSMENT — ENCOUNTER SYMPTOMS
COUGH: 0
SHORTNESS OF BREATH: 0
COLOR CHANGE: 0

## 2021-05-25 ASSESSMENT — PATIENT HEALTH QUESTIONNAIRE - PHQ9: SUM OF ALL RESPONSES TO PHQ9 QUESTIONS 1 & 2: 0

## 2021-05-25 NOTE — LETTER
[facility]  [address], [facility] [address] [facility]  Phone: [phone]  Fax: [address] [facility] Rafa, JACQUELYN - CNP        May 25, 2021     Patient: Marcus Mosher   YOB: 1950   Date of Visit: 5/25/2021       To Whom It May Concern: It is my medical opinion that Mary Belle may return to work on June 1st, 2021 with no restrictions. If you have any questions or concerns, please don't hesitate to call.     Sincerely,        JACQUELYN Brooks - CNP

## 2021-05-25 NOTE — PROGRESS NOTES
Post-Discharge Transitional Care Management Services or Hospital Follow Up      Jacob Warren   YOB: 1950    Date of Office Visit:  5/25/2021  Date of Hospital Admission: 5/18/21  Date of Hospital Discharge: 5/20/21  Readmission Risk Score(high >=14%. Medium >=10%):Readmission Risk Score: 16      Care management risk score Rising risk (score 2-5) and Complex Care (Scores >=6): 4     Non face to face  following discharge, date last encounter closed (first attempt may have been earlier): 5/24/2021  4:27 PM 5/24/2021  4:27 PM    Call initiated 2 business days of discharge: Yes     Patient Active Problem List   Diagnosis    Hypertension    [facility] ([facility] [address]    Hyperlipidemia    Coronary artery disease involving native coronary artery of native heart without angina pectoris    Old MI (myocardial infarction)    Prediabetes    UTE on CPAP    Primary insomnia    S/P radiation therapy    Drug-induced erectile dysfunction    Family history of colon cancer    History of colon polyps    Polyp of cecum    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)       Allergies   Allergen Reactions    Morphine Swelling and Rash       Medications listed as ordered at the time of discharge from [facility] Medication Instructions GERDA:    Printed on:05/25/21 5603   Medication Information                      APPLE CIDER VINEGAR PO  Take by mouth             Ascorbic Acid (VITAMIN C PO)  Take 500 mg by mouth             aspirin 81 MG EC tablet  Take 1 tablet by mouth once for 1 dose             atorvastatin (LIPITOR) 40 MG tablet  Take 1 tablet by mouth nightly             clopidogrel (PLAVIX) 75 MG tablet  Take 1 tablet by mouth daily             CPAP Machine MISC  by Does not apply route Use nightly as directed. Pressure setting 5 cm H2O.              Cyanocobalamin (VITAMIN B-12 CR PO)  Take by mouth daily              enzalutamide (XTANDI) 40 MG capsule  Take by mouth lisinopril (PRINIVIL;ZESTRIL) 20 MG tablet  TAKE 1 TABLET BY MOUTH  DAILY             Talazoparib Tosylate 0.25 MG CAPS  Take by mouth             tamsulosin (FLOMAX) 0.4 MG capsule  Take by mouth 2 times daily              vitamin E 400 UNIT capsule  Take 400 Units by mouth daily                   Medications marked \"taking\" at this time  Outpatient Medications Marked as Taking for the 5/25/21 encounter (Office Visit) with JACQUELYN Ball - CNP   Medication Sig Dispense Refill    aspirin 81 MG EC tablet Take 1 tablet by mouth once for 1 dose 30 tablet 3    atorvastatin (LIPITOR) 40 MG tablet Take 1 tablet by mouth nightly 30 tablet 3    clopidogrel (PLAVIX) 75 MG tablet Take 1 tablet by mouth daily 30 tablet 3    Talazoparib Tosylate 0.25 MG CAPS Take by mouth      APPLE CIDER VINEGAR PO Take by mouth      Cyanocobalamin (VITAMIN B-12 CR PO) Take by mouth daily       tamsulosin (FLOMAX) 0.4 MG capsule Take by mouth 2 times daily   11    vitamin E 400 UNIT capsule Take 400 Units by mouth daily      CPAP Machine MISC by Does not apply route Use nightly as directed. Pressure setting 5 cm H2O. 1 each 0    Ascorbic Acid (VITAMIN C PO) Take 500 mg by mouth          Medications patient taking as of now reconciled against medications ordered at time of hospital discharge: Yes    Chief Complaint   Patient presents with   [address] from Hospital     pt states artery blockage down left leg, 6 stents now. HPI    Inpatient course: Discharge summary reviewed- see chart. Interval history/Current status: went to ER after foot was \"ice cold\" numbness and tingling. Found to have a femoral artery occlusion. Had three stents placed by Dr. Lani Banuelos. Since then, pt is doing much better. States that incision is healing well. Following with oncology for prostate Ca. Metastatic to bone. Has been overall mostly stable. Would like to go back to work as of next week. Needs letter.       Review of Systems   Constitutional: Negative for fatigue. Respiratory: Negative for cough and shortness of breath. Skin: Negative for color change. Vitals:    05/25/21 1257   BP: 132/80   Pulse: 80   SpO2: 95%   Weight: 201 lb (91.2 kg)   Height: 5' 10\" (1.778 m)     Body mass index is 28.84 kg/m². Wt Readings from Last 3 Encounters:   05/25/21 201 lb (91.2 kg)   05/18/21 193 lb 3.2 oz (87.6 kg)   11/06/20 205 lb 6.4 oz (93.2 kg)     BP Readings from Last 3 Encounters:   05/25/21 132/80   05/20/21 (!) 120/58   11/06/20 138/78       Physical Exam  Vitals and nursing note reviewed. Constitutional:       Appearance: Normal appearance. He is normal weight. HENT:      Head: Normocephalic. Nose: Nose normal.      Mouth/Throat:      Mouth: Mucous membranes are moist.      Pharynx: Oropharynx is clear. Eyes:      Extraocular Movements: Extraocular movements intact. Conjunctiva/sclera: Conjunctivae normal.      Pupils: Pupils are equal, round, and reactive to light. Cardiovascular:      Rate and Rhythm: Normal rate and regular rhythm. Pulses: Normal pulses. Heart sounds: Normal heart sounds. Pulmonary:      Effort: Pulmonary effort is normal.      Breath sounds: Normal breath sounds. Skin:     General: Skin is warm. Neurological:      General: No focal deficit present. Mental Status: He is alert and oriented to person, place, and time. Mental status is at baseline. Psychiatric:         Mood and Affect: Mood normal.         Behavior: Behavior normal.         Thought Content: Thought content normal.         Judgment: Judgment normal.       Assessment/Plan:  1. Acute occlusion of artery of lower extremity ([address]    - [place] DISCHARGE MEDS RECONCILED W/ CURRENT OUTPATIENT MED LIST    2. Prostate cancer [facility])  - following with oncology. Mostly stable. Has mets to bones. 3. Hospital discharge follow-up      4. Essential hypertension      5.  Coronary artery disease involving native coronary

## 2021-05-26 RX ORDER — CLOPIDOGREL BISULFATE 75 MG/1
75 TABLET ORAL DAILY
Qty: 90 TABLET | Refills: 2 | Status: SHIPPED | OUTPATIENT
Start: 2021-05-26 | End: 2021-06-18 | Stop reason: SDUPTHER

## 2021-05-26 NOTE — PROCEDURES
[address]                      [address], [address] [facility]                                 PROCEDURE NOTE    PATIENT NAME: Cristina Lagos                  :        1950  MED REC NO:   [id number]                            ROOM:       [id number]  ACCOUNT NO:   [de-identified]                           ADMIT DATE: 2021  PROVIDER:     Fadi Cortez DO    DATE OF PROCEDURE:  2021    PROCEDURE PERFORMED:  Abdominal aortogram, bilateral lower extremity  angiography, left SFA, and popliteal artery orbital atherectomy followed  by PTA with drug-coated balloon as well as stenting of the proximal  popliteal artery as well as the mid left SFA. PROCEDURE PERFORMED BY:  Adan Cortez DO    INDICATIONS:  Claudication. COMPLICATIONS:  None. ACCESS SITE:  Right common femoral artery. DESCRIPTION OF THE PROCEDURE:  The patient was brought to the cardiac  catheterization laboratory suite, where he was sterilely prepped and  draped in the usual fashion. 1% lidocaine was used to anesthetize the  right inguinal area and a 4-Portuguese arterial sheath was placed without  any difficulty. Next, an VALERIE catheter was placed in a suprarenal  abdominal aorta and angiogram of the abdominal aorta as well as  bilateral iliac was obtained. Catheter was brought down to the level of  the left common iliac artery. Left lower extremity angiogram with  runoff was performed. Next, over an 0.035 wire, a 6-Portuguese 55-cm sheath  was placed over the wire into the left common femoral artery and  utilizing support catheters in 0.014 wire, the left SFA and popliteal  artery PTA was able to be traversed and an 0.014 wire was placed in the  distal tibial vessel. An exchange for an 0.014 Viper wire and CSI  orbital atherectomy was performed across the left SFA as well as left  popliteal artery followed by PTA with drug-coated balloon.   Please see  nursing notes for detailed balloon utilized and atmospheres achieved. Next, stent was deployed in the proximal left popliteal artery as well  as left SFA due to dissections. Dissection was post balloon inflation. Next, complete angiography was performed showing good results. Next, a  pullback gradient was performed across the left iliac system showing  greater than 40 mm gradient and angiogram of the left external iliac  artery was performed at the site of the gradient step-up and showing  70%-80% heavily calcified stenosis, therefore a self-expanding stent was  deployed and postdilated. Please see nursing notes for details of the  balloons utilized and atmosphere achieved. Next, the sheath was pulled  back to the right common iliac artery and right lower extremity  angiogram with runoff was performed. The patient was transferred to the  postcath holding area in stable and satisfactory condition. FINDINGS:  1. Abdominal aorta with heavily calcified vessel and no aneurysm,  dissection or stenosis. Bilateral renal arteries appear to be patent,  not well visualized. 2.  Left lower extremity. Left common iliac artery has mild-to-moderate  diffuse disease heavily calcified with no significant stenosis. Left  external iliac artery has an 80% proximal stenosis with greater than  40-mm gradient. The left common artery is calcified with mild disease. Left profundus was patent with left SFA does appear severe diffuse  disease with 80%-90% stenosis in the distal segment and 100% Jaleel's  canal as well as the proximal popliteal artery and reconstitution of mid  popliteal artery. The left below-the-knee vessels and peroneal  posterior tibial artery and anterior tibial artery are patent with mild  diffuse disease. There was a 70% distal and posterior tibial artery  stenosis just above the bifurcation of the plantar vessels. 3.  Right lower extremity. The right common iliac artery has an 80%  ostial stenosis heavily calcified.   The right external iliac artery has  diffuse disease of 60%-70%. The right common femoral artery is patent  with mild disease. The profunda was patent. Right SFA was 100%  occluded and ostial segment reconstituted distally. The right  below-the-knee vessels were not well visualized. ASSESSMENT:  1.  Status post successful left SFA, orbital atherectomy from a PTA with  drug-coated balloon and stenting in mid segment. 2.  Status post successful left proximal popliteal artery, orbital  atherectomy followed by PTA with drug-coated balloon as well as stenting  in the proximal segment. 3.  Status post successful left external iliac artery stenting with 0%  residual stenosis. 4.  80% right common iliac artery stenosis. 5.  60%-70% right external iliac artery stenosis. 6.  100% right SFA with reconstitution distally. 7.  No significant stenosis of the abdominal aorta. PLAN:  1. Postprocedure care as usual.  2.  As always aggressive risk factor modification. 3.  Maximize medical therapy. 4.  PTA of the right lower extremity in the near future.         Shiela Olguin DO    D: 05/26/2021 6:45:00       T: 05/26/2021 7:49:12     [email or web address]  Job#: [id number]     Doc#: [id number]    CC:

## 2021-06-18 ENCOUNTER — OFFICE VISIT (OUTPATIENT)
Dept: CARDIOLOGY CLINIC | Age: 71
End: 2021-06-18
Payer: MEDICARE

## 2021-06-18 ENCOUNTER — PREP FOR PROCEDURE (OUTPATIENT)
Dept: CARDIOLOGY CLINIC | Age: 71
End: 2021-06-18

## 2021-06-18 VITALS
OXYGEN SATURATION: 98 % | HEART RATE: 77 BPM | WEIGHT: 204 LBS | SYSTOLIC BLOOD PRESSURE: 130 MMHG | DIASTOLIC BLOOD PRESSURE: 80 MMHG | BODY MASS INDEX: 29.27 KG/M2

## 2021-06-18 DIAGNOSIS — I73.9 PAD (PERIPHERAL ARTERY DISEASE) (HCC): ICD-10-CM

## 2021-06-18 DIAGNOSIS — E78.00 PURE HYPERCHOLESTEROLEMIA: Primary | ICD-10-CM

## 2021-06-18 DIAGNOSIS — I25.10 CORONARY ARTERY DISEASE INVOLVING NATIVE CORONARY ARTERY OF NATIVE HEART WITHOUT ANGINA PECTORIS: ICD-10-CM

## 2021-06-18 DIAGNOSIS — I10 ESSENTIAL HYPERTENSION: ICD-10-CM

## 2021-06-18 DIAGNOSIS — I70.209 ACUTE OCCLUSION OF ARTERY OF LOWER EXTREMITY (HCC): ICD-10-CM

## 2021-06-18 PROCEDURE — 1036F TOBACCO NON-USER: CPT | Performed by: INTERNAL MEDICINE

## 2021-06-18 PROCEDURE — G8427 DOCREV CUR MEDS BY ELIG CLIN: HCPCS | Performed by: INTERNAL MEDICINE

## 2021-06-18 PROCEDURE — 1111F DSCHRG MED/CURRENT MED MERGE: CPT | Performed by: INTERNAL MEDICINE

## 2021-06-18 PROCEDURE — 1123F ACP DISCUSS/DSCN MKR DOCD: CPT | Performed by: INTERNAL MEDICINE

## 2021-06-18 PROCEDURE — 99214 OFFICE O/P EST MOD 30 MIN: CPT | Performed by: INTERNAL MEDICINE

## 2021-06-18 PROCEDURE — 4040F PNEUMOC VAC/ADMIN/RCVD: CPT | Performed by: INTERNAL MEDICINE

## 2021-06-18 PROCEDURE — G8417 CALC BMI ABV UP PARAM F/U: HCPCS | Performed by: INTERNAL MEDICINE

## 2021-06-18 PROCEDURE — 3017F COLORECTAL CA SCREEN DOC REV: CPT | Performed by: INTERNAL MEDICINE

## 2021-06-18 RX ORDER — CLOPIDOGREL BISULFATE 75 MG/1
75 TABLET ORAL DAILY
Qty: 90 TABLET | Refills: 3 | Status: SHIPPED | OUTPATIENT
Start: 2021-06-18 | End: 2022-05-05

## 2021-06-18 RX ORDER — PREDNISONE 1 MG/1
50 TABLET ORAL ONCE
Status: CANCELLED | OUTPATIENT
Start: 2021-06-18 | End: 2021-06-18

## 2021-06-18 RX ORDER — ASPIRIN 81 MG/1
81 TABLET ORAL ONCE
Status: CANCELLED | OUTPATIENT
Start: 2021-06-18 | End: 2021-06-18

## 2021-06-18 RX ORDER — SODIUM CHLORIDE 9 MG/ML
INJECTION, SOLUTION INTRAVENOUS CONTINUOUS
Status: CANCELLED | OUTPATIENT
Start: 2021-06-18

## 2021-06-18 RX ORDER — NITROGLYCERIN 0.4 MG/1
0.4 TABLET SUBLINGUAL EVERY 5 MIN PRN
Status: CANCELLED | OUTPATIENT
Start: 2021-06-18

## 2021-06-18 RX ORDER — ONDANSETRON 2 MG/ML
4 INJECTION INTRAMUSCULAR; INTRAVENOUS EVERY 6 HOURS PRN
Status: CANCELLED | OUTPATIENT
Start: 2021-06-18

## 2021-06-18 RX ORDER — DIPHENHYDRAMINE HCL 25 MG
50 TABLET ORAL ONCE
Status: CANCELLED | OUTPATIENT
Start: 2021-06-18 | End: 2021-06-18

## 2021-06-18 NOTE — PROGRESS NOTES
Chief Complaint   Patient presents with    Follow-Up from [facility]       5-18-21:  Patient is a 78 y.o. male who presents with a chief complaint of Left LEg discomfort, numbness as well as coldness. . Patient is followed on a regular basis by Dr. Robin Monaco MD.  Symptoms started yesterday. Patient with history of coronary disease status post PCI remotely in 2006 at [address] to cigar use, hypertension and hyperlipidemia. Also history of UTE on CPAP. Patient is currently undergoing treatment for prostate cancer as well as bone mets. Was noted to have arterial occlusion on left extremity duplex ultrasound in the ER. Left lower 70 venous duplex ultrasound is negative for deep vein thrombosis. 6/18/2021: Patient presents for initial medical evaluation. Patient is followed on a regular basis by Dr. Robin Monaco MD.  Status post hospitalization for claudication. Status post bilateral extremity angiogram/abdominal aorta gram and left SFA/popliteal artery atherectomy PTA with drug-coated balloon and stent. Patient was noted to have a an 80% right common iliac artery stenosis was 100% right SFA. He is mainly in symptomatic in his right lower extremity but does have right hip discomfort with walking at times. Also some tingling in his right foot. He is compliant with the medication. He smokes cigars.             Patient Active Problem List   Diagnosis    Hypertension    Prostate cancer ([address]    Hyperlipidemia    Coronary artery disease involving native coronary artery of native heart without angina pectoris    Old MI (myocardial infarction)    Prediabetes    UTE on CPAP    Primary insomnia    S/P radiation therapy    Drug-induced erectile dysfunction    Family history of colon cancer    History of colon polyps    Polyp of cecum    Acute occlusion of artery of lower extremity ([address]       Past Surgical History:   Procedure Laterality Date    COLONOSCOPY N/A 8/19/2019 COLORECTAL CANCER SCREENING, NOT HIGH RISK performed by Daly Bermeo MD at [address] [place] [address]      X       Social History     Socioeconomic History    Marital status: Single     Spouse name: Not on file    Number of children: Not on file    Years of education: Not on file    Highest education level: Not on file   Occupational History    Not on file   Tobacco Use    Smoking status: Former Smoker     Packs/day: 2.00     Years: 25.00     Pack years: 50.00     Types: Cigars     Quit date: 1995     Years since quittin.4    Smokeless tobacco: Never Used    Tobacco comment: RARE CIGAR FROM TIME TO TIME   Vaping Use    Vaping Use: Never used   Substance and Sexual Activity    Alcohol use: Yes     Alcohol/week: 4.0 standard drinks     Types: 2 Cans of beer, 2 Shots of liquor per week    Drug use: Not Currently     Types: Marijuana    Sexual activity: Not on file   Other Topics Concern    Not on file   Social History Narrative    Not on file     Social Determinants of Health     Financial Resource Strain: Low Risk     Difficulty of Paying Living Expenses: Not hard at all   Food Insecurity: No Food Insecurity    Worried About Running Out of Food in the Last Year: Never true    [address] in the Last Year: Never true   Transportation Needs:     Lack of Transportation (Medical):      Lack of Transportation (Non-Medical):    Physical Activity:     Days of Exercise per Week:     Minutes of Exercise per Session:    Stress:     Feeling of Stress :    Social Connections:     Frequency of Communication with Friends and Family:     Frequency of Social Gatherings with Friends and Family:     Attends [employer] Services:     Active Member of Clubs or Organizations:     Attends Club or Organization Meetings:     Marital Status:    Intimate Partner Violence:     Fear of Current or Ex-Partner:     Emotionally Abused:     Physically Abused:     Sexually Abused:        Family History   Problem Relation Age of Onset    Cancer Mother 76        BONE    Cancer Father 64        COLON    Colon Cancer Father     Cancer Brother 43        THYROID    Diabetes Maternal Grandmother        Current Outpatient Medications   Medication Sig Dispense Refill    clopidogrel (PLAVIX) 75 MG tablet Take 1 tablet by mouth daily 90 tablet 3    atorvastatin (LIPITOR) 40 MG tablet Take 1 tablet by mouth nightly 30 tablet 3    Talazoparib Tosylate 0.25 MG CAPS Take by mouth      APPLE CIDER VINEGAR PO Take by mouth      Cyanocobalamin (VITAMIN B-12 CR PO) Take by mouth daily       tamsulosin (FLOMAX) 0.4 MG capsule Take by mouth 2 times daily   11    vitamin E 400 UNIT capsule Take 400 Units by mouth daily      CPAP Machine MISC by Does not apply route Use nightly as directed. Pressure setting 5 cm H2O. 1 each 0    Ascorbic Acid (VITAMIN C PO) Take 500 mg by mouth      aspirin 81 MG EC tablet Take 1 tablet by mouth once for 1 dose 30 tablet 3     Current Facility-Administered Medications   Medication Dose Route Frequency Provider Last Rate Last Admin    cyanocobalamin injection 1,000 mcg  1,000 mcg Intramuscular Once [facility], APRN - CNP           Morphine    Review of Systems:  General ROS: negative  Psychological ROS: negative  Hematological and Lymphatic ROS: No history of blood clots or bleeding disorder. Respiratory ROS: no cough, shortness of breath, or wheezing  Cardiovascular ROS: no chest pain or dyspnea on exertion  Gastrointestinal ROS: no abdominal pain, change in bowel habits, or black or bloody stools  Genito-Urinary ROS: no dysuria, trouble voiding, or hematuria  Musculoskeletal ROS: negative  Neurological ROS: no TIA or stroke symptoms  Dermatological ROS: negative    VITALS:  Blood pressure 130/80, pulse 77, weight 204 lb (92.5 kg), SpO2 98 %. Body mass index is 29.27 kg/m².     Physical Examination:  General appearance - alert, well appearing, and in no distress  Mental status - alert, oriented to person, place, and time  Neck - Neck is supple, no JVD or carotid bruits. No thyromegaly or adenopathy. Chest - clear to auscultation, no wheezes, rales or rhonchi, symmetric air entry  Heart - normal rate, regular rhythm, normal S1, S2, no murmurs, rubs, clicks or gallops  Abdomen - soft, nontender, nondistended, no masses or organomegaly  Neurological - alert, oriented, normal speech, no focal findings or movement disorder noted  Extremities - peripheral pulses normal, no pedal edema, no clubbing or cyanosis  Skin - normal coloration and turgor, no rashes, no suspicious skin lesions noted          No orders of the defined types were placed in this encounter. ASSESSMENT:     Diagnosis Orders   1. Pure hypercholesterolemia     2. Essential hypertension     3. 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)     4. Coronary artery disease involving native coronary artery of native heart without angina pectoris           PLAN:         As always, aggressive risk factor modification is strongly recommended. We should adhere to the JNC VIII guidelines for HTN management and the NCEP ATP III guidelines for LDL-C management. Cardiac diet is always recommended with low fat, cholesterol, calories and sodium. Continue medications at current doses. We will plan for right external iliac artery stenting and possibly right SFA in the near future. Cont with dAPT    Smoking cessation was strongly recommended    Patient was advised and encouraged to check blood pressure at home or at a pharmacy, maintain a logbook, and also call us back if blood pressure are above the target ranges or if it is low. Patient clearly understands and agrees to the instructions.

## 2021-07-14 ENCOUNTER — PREP FOR PROCEDURE (OUTPATIENT)
Dept: CARDIOLOGY | Age: 71
End: 2021-07-14

## 2021-07-14 ENCOUNTER — HOSPITAL ENCOUNTER (OUTPATIENT)
Dept: CARDIAC CATH/INVASIVE PROCEDURES | Age: 71
Discharge: HOME | End: 2021-07-14
Attending: INTERNAL MEDICINE | Admitting: INTERNAL MEDICINE
Payer: MEDICARE

## 2021-07-14 VITALS
HEART RATE: 57 BPM | TEMPERATURE: 97 F | BODY MASS INDEX: 27.92 KG/M2 | RESPIRATION RATE: 15 BRPM | OXYGEN SATURATION: 99 % | WEIGHT: 195 LBS | DIASTOLIC BLOOD PRESSURE: 104 MMHG | HEIGHT: 70 IN | SYSTOLIC BLOOD PRESSURE: 130 MMHG

## 2021-07-14 DIAGNOSIS — I73.9 CLAUDICATION (HCC): Primary | ICD-10-CM

## 2021-07-14 LAB
ABO/RH: NORMAL
ANION GAP SERPL CALCULATED.3IONS-SCNC: 11 MEQ/L (ref 9–15)
ANTIBODY SCREEN: NORMAL
BUN BLDV-MCNC: 16 MG/DL (ref 8–23)
CALCIUM SERPL-MCNC: 9.5 MG/DL (ref 8.5–9.9)
CHLORIDE BLD-SCNC: 106 MEQ/L (ref 95–107)
CHOLESTEROL, TOTAL: 229 MG/DL (ref 0–199)
CO2: 24 MEQ/L (ref 20–31)
CREAT SERPL-MCNC: 0.85 MG/DL (ref 0.7–1.2)
GFR AFRICAN AMERICAN: >60
GFR NON-AFRICAN AMERICAN: >60
GLUCOSE BLD-MCNC: 113 MG/DL (ref 70–99)
HCT VFR BLD CALC: 42.5 % (ref 42–52)
HDLC SERPL-MCNC: 30 MG/DL (ref 40–59)
HEMOGLOBIN: 14.7 G/DL (ref 14–18)
LDL CHOLESTEROL CALCULATED: 154 MG/DL (ref 0–129)
MCH RBC QN AUTO: 33.8 PG (ref 27–31.3)
MCHC RBC AUTO-ENTMCNC: 34.5 % (ref 33–37)
MCV RBC AUTO: 97.8 FL (ref 80–100)
PDW BLD-RTO: 13.1 % (ref 11.5–14.5)
PERFORMED ON: ABNORMAL
PLATELET # BLD: 220 K/UL (ref 130–400)
POC ACTIVATED CLOTTING TIME KAOLIN: 224 SEC (ref 82–152)
POC SAMPLE TYPE: ABNORMAL
POTASSIUM SERPL-SCNC: 4.1 MEQ/L (ref 3.4–4.9)
RBC # BLD: 4.34 M/UL (ref 4.7–6.1)
SODIUM BLD-SCNC: 141 MEQ/L (ref 135–144)
TRIGL SERPL-MCNC: 223 MG/DL (ref 0–150)
WBC # BLD: 7 K/UL (ref 4.8–10.8)

## 2021-07-14 PROCEDURE — 86850 RBC ANTIBODY SCREEN: CPT

## 2021-07-14 PROCEDURE — 75630 X-RAY AORTA LEG ARTERIES: CPT | Performed by: INTERNAL MEDICINE

## 2021-07-14 PROCEDURE — 99152 MOD SED SAME PHYS/QHP 5/>YRS: CPT

## 2021-07-14 PROCEDURE — 80061 LIPID PANEL: CPT

## 2021-07-14 PROCEDURE — C1760 CLOSURE DEV, VASC: HCPCS

## 2021-07-14 PROCEDURE — 2500000003 HC RX 250 WO HCPCS

## 2021-07-14 PROCEDURE — 2709999900 HC NON-CHARGEABLE SUPPLY

## 2021-07-14 PROCEDURE — 37221 HC ILIAC TERRITORY PLASTY STENT: CPT | Performed by: INTERNAL MEDICINE

## 2021-07-14 PROCEDURE — 86900 BLOOD TYPING SEROLOGIC ABO: CPT

## 2021-07-14 PROCEDURE — 85347 COAGULATION TIME ACTIVATED: CPT

## 2021-07-14 PROCEDURE — 86901 BLOOD TYPING SEROLOGIC RH(D): CPT

## 2021-07-14 PROCEDURE — C1894 INTRO/SHEATH, NON-LASER: HCPCS

## 2021-07-14 PROCEDURE — 2580000003 HC RX 258

## 2021-07-14 PROCEDURE — 6360000004 HC RX CONTRAST MEDICATION: Performed by: INTERNAL MEDICINE

## 2021-07-14 PROCEDURE — C1769 GUIDE WIRE: HCPCS

## 2021-07-14 PROCEDURE — 80048 BASIC METABOLIC PNL TOTAL CA: CPT

## 2021-07-14 PROCEDURE — 75710 ARTERY X-RAYS ARM/LEG: CPT | Performed by: INTERNAL MEDICINE

## 2021-07-14 PROCEDURE — C1876 STENT, NON-COA/NON-COV W/DEL: HCPCS

## 2021-07-14 PROCEDURE — 75625 CONTRAST EXAM ABDOMINL AORTA: CPT | Performed by: INTERNAL MEDICINE

## 2021-07-14 PROCEDURE — 6360000002 HC RX W HCPCS

## 2021-07-14 PROCEDURE — 99153 MOD SED SAME PHYS/QHP EA: CPT

## 2021-07-14 PROCEDURE — 85027 COMPLETE CBC AUTOMATED: CPT

## 2021-07-14 PROCEDURE — 37221 PR REVSC OPN/PRQ ILIAC ART W/STNT PLMT & ANGIOPLSTY: CPT | Performed by: INTERNAL MEDICINE

## 2021-07-14 RX ORDER — ASPIRIN 81 MG/1
81 TABLET ORAL ONCE
Status: CANCELLED | OUTPATIENT
Start: 2021-07-14 | End: 2021-07-14

## 2021-07-14 RX ORDER — SODIUM CHLORIDE 9 MG/ML
INJECTION, SOLUTION INTRAVENOUS CONTINUOUS
Status: DISCONTINUED | OUTPATIENT
Start: 2021-07-14 | End: 2021-07-14 | Stop reason: HOSPADM

## 2021-07-14 RX ORDER — ASPIRIN 81 MG/1
81 TABLET ORAL ONCE
Status: DISCONTINUED | OUTPATIENT
Start: 2021-07-14 | End: 2021-07-14 | Stop reason: HOSPADM

## 2021-07-14 RX ORDER — SODIUM CHLORIDE 9 MG/ML
INJECTION, SOLUTION INTRAVENOUS CONTINUOUS
Status: CANCELLED | OUTPATIENT
Start: 2021-07-14

## 2021-07-14 RX ORDER — NITROGLYCERIN 0.4 MG/1
0.4 TABLET SUBLINGUAL EVERY 5 MIN PRN
Status: DISCONTINUED | OUTPATIENT
Start: 2021-07-14 | End: 2021-07-14 | Stop reason: HOSPADM

## 2021-07-14 RX ORDER — ONDANSETRON 2 MG/ML
4 INJECTION INTRAMUSCULAR; INTRAVENOUS EVERY 6 HOURS PRN
Status: DISCONTINUED | OUTPATIENT
Start: 2021-07-14 | End: 2021-07-14 | Stop reason: HOSPADM

## 2021-07-14 RX ORDER — ACETAMINOPHEN 325 MG/1
650 TABLET ORAL EVERY 4 HOURS PRN
Status: DISCONTINUED | OUTPATIENT
Start: 2021-07-14 | End: 2021-07-14 | Stop reason: HOSPADM

## 2021-07-14 RX ORDER — HYDRALAZINE HYDROCHLORIDE 20 MG/ML
10 INJECTION INTRAMUSCULAR; INTRAVENOUS EVERY 10 MIN PRN
Status: DISCONTINUED | OUTPATIENT
Start: 2021-07-14 | End: 2021-07-14 | Stop reason: HOSPADM

## 2021-07-14 RX ORDER — PREDNISONE 50 MG/1
50 TABLET ORAL ONCE
Status: DISCONTINUED | OUTPATIENT
Start: 2021-07-14 | End: 2021-07-14 | Stop reason: HOSPADM

## 2021-07-14 RX ORDER — IODIXANOL 320 MG/ML
100 INJECTION, SOLUTION INTRAVASCULAR
Status: COMPLETED | OUTPATIENT
Start: 2021-07-14 | End: 2021-07-14

## 2021-07-14 RX ORDER — DIPHENHYDRAMINE HCL 25 MG
50 TABLET ORAL ONCE
Status: DISCONTINUED | OUTPATIENT
Start: 2021-07-14 | End: 2021-07-14 | Stop reason: HOSPADM

## 2021-07-14 RX ORDER — DIPHENHYDRAMINE HCL 25 MG
50 TABLET ORAL ONCE
Status: CANCELLED | OUTPATIENT
Start: 2021-07-14 | End: 2021-07-14

## 2021-07-14 RX ORDER — LABETALOL 20 MG/4 ML (5 MG/ML) INTRAVENOUS SYRINGE
10 EVERY 30 MIN PRN
Status: DISCONTINUED | OUTPATIENT
Start: 2021-07-14 | End: 2021-07-14 | Stop reason: HOSPADM

## 2021-07-14 RX ORDER — NITROGLYCERIN 0.4 MG/1
0.4 TABLET SUBLINGUAL EVERY 5 MIN PRN
Status: CANCELLED | OUTPATIENT
Start: 2021-07-14

## 2021-07-14 RX ORDER — PREDNISONE 1 MG/1
50 TABLET ORAL ONCE
Status: CANCELLED | OUTPATIENT
Start: 2021-07-14 | End: 2021-07-14

## 2021-07-14 RX ORDER — ONDANSETRON 2 MG/ML
4 INJECTION INTRAMUSCULAR; INTRAVENOUS EVERY 6 HOURS PRN
Status: CANCELLED | OUTPATIENT
Start: 2021-07-14

## 2021-07-14 RX ADMIN — IODIXANOL 41 ML: 320 INJECTION, SOLUTION INTRAVASCULAR at 10:50

## 2021-07-14 NOTE — PROGRESS NOTES
Bilateral groins are stable. Dr. Leyda Parish reviewed results with patient at the bedside. Vitals are stable.   Will continue to monitor

## 2021-07-14 NOTE — BRIEF OP NOTE
Section of Cardiology  Adult Brief LE angio Procedure Note        Procedure(s):  abd aortogram, right LE angio, right IGNACIO stenting    Pre-operative Diagnosis:  Claudication, known severe PAD, staged procedure    H&P Status: Completed and reviewed. Post-operative Diagnosis:      abd aorto: heavily calcified aorta, no stenosis. Right lower extremity: Very heavily calcified, 90% ostial right common iliac artery stenosis. 50 to 60% right external iliac artery stenosis without any significant pressure gradient. 100% right ostial superficial femoral artery stenosis with reconstitution in the popliteal artery.     Findings:  See full report    Complications:  none    Primary Proceduralist:   Dr. Courtney MCGINNIS  m    Staged right SFA PVI in near future via antegrade and retrograde      Full procedure note to follow

## 2021-07-14 NOTE — PROGRESS NOTES
Act 169. Right and left groins remain stable. Vss.   Sheath may be pulled from left groin at this time

## 2021-07-14 NOTE — CONSULTS
Cardiac Rehab Consult Note  Name: Marcela Lennon  Age: 78 y.o. Gender: male    Chief Complaint:No chief complaint on file. Primary Care Provider: Gilson Cormier MD  InpatientTreatment Team: Treatment Team: Attending Provider: Len Pino DO  Admission Date: 7/14/2021    Consult Received from Dr. Jelena Rao. Reason for consult PAD. Patient visited at bedside. Program and benefits introduced. Brochures given. Patient refused, stating that he gets a lot of steps in at work and can go walking with his friend at the park. Active Problems:    Claudication [facility])  Resolved Problems:    * No resolved hospital problems. *       Plan: Nothing further. All of pt questions were answered. Case will be discussed with physician when appropriate.      Electronically signed by Anat Millard PTA on 7/14/2021 at 2:42 PM

## 2021-07-14 NOTE — PROGRESS NOTES
Arrived to pre/post from the cath lab and report from "[employer]. Right groin vascade is dry and intact with no bleeding or hematoma. Left groin 4 Nigerian sheath intact attached to pressure bag with no bleeding or hematoma. Attached to monitor and vitals are stable.   Will continue to monitor

## 2021-07-14 NOTE — PROGRESS NOTES
Bilateral groins stable. Patient up and ready for discharge. Discharge instructions given and IV dc'd from left ac intact.   Discharged to care of family via wheelchair

## 2021-07-14 NOTE — PROGRESS NOTES
4 Welsh sheath pulled from left groin and hemostasis obtained. Pressure dressing applied. No bleeding or hematoma. Right groin stable no bleeding or hematoma.   Vitals stable

## 2021-07-14 NOTE — PROGRESS NOTES
Right and left groins are stable. Patient eating and taking fluids. Vitals are stable.   Will continue to monitor

## 2021-07-18 NOTE — PROCEDURES
[address]                      [address], [address] [facility]                                 PROCEDURE NOTE    PATIENT NAME: Cody Ventura                  :        1950  MED REC NO:   [id number]                            ROOM:  ACCOUNT NO:   [de-identified]                           ADMIT DATE: 2021  PROVIDER:     Clarisa Cortez DO    DATE OF PROCEDURE:  2021    PROCEDURES PERFORMED:  Abdominal aortogram, right lower extremity  angiography, right ostial, iliac artery stenting. INDICATIONS:  Abnormal noninvasive testing, claudication. PROCEDURE PERFORMED BY:  Adan Cortez DO    DESCRIPTION OF PROCEDURE:  The patient was brought to the cardiac  catheterization laboratory suite, where he was sterilely prepped and  draped in the usual fashion. 1% lidocaine was used to anesthetize the  left inguinal area and a 6-German arterial sheath was placed without any  difficulty. Next, a pigtail catheter was placed in the infrarenal  abdominal aorta distal abdominal aortogram with bilateral iliac  angiography was obtained under DSA was obtained. Next, the catheter was  removed and an VALERIE was engaged in the right common iliac artery. Right  lower extremity angiogram with runoff was performed. Next, access was  obtained in the right common femoral artery and a 6-German sheath was  placed there and an 0.035 wire was utilized to cross the right common  iliac artery stenosis and placed in the aortic arch. PTA of the right  common iliac artery was performed and a stent was placed with a Visi-Pro  8 x 37 mm at 10 mm atmospheres with excellent results. Pullback  gradient was performed at the sheath across from the right common iliac  artery to the right common femoral artery without any significant setup  gradient. The patient was transferred to the postcath holding area in  stable and satisfactory condition. FINDINGS:  1.   Abdominal aorta:  Distal abdominal aorta is a normal caliber vessel  heavily calcified with no aneurysm, dissection, or stenosis. 2.  Left lower extremity:  The left common iliac artery, internal iliac  artery, external iliac artery are patent with mild diffuse disease. 3.  Right lower extremity:  The right common iliac artery has a 95%  proximal stenosis. The right external iliac artery has mild-to-moderate  diffuse disease with 50% stenosis in the proximal segment. The right  common femoral artery is patent. Right profunda is patent with mild  diffuse disease. Right SFA is a 100% occluded in the proximal segment  reconstitutes at the P1 segment of the popliteal artery. The right  below-the-knee vessels were not well-visualized. They appeared to have  a patent posterior tibial artery. ASSESSMENT:  1.  Status post successful right common iliac artery stenting with 0%  residual stenosis and WES 3 flow. 2.  50% proximal external iliac artery stenosis. 3.  100% right proximal SFA  with reconstitution at the P1 segment of  the popliteal artery. 4.  Patent left common iliac artery/proximal external iliac artery  stent. PLAN:  1. Postprocedure care as usual.  2.  As always aggressive risk factor modification.         Madeline Mclaughlin DO    D: 07/18/2021 14:01:06       T: 07/18/2021 15:27:29     [email or web address]  Job#: [id number]     Doc#: [id number]    CC:

## 2021-07-29 ENCOUNTER — TELEPHONE (OUTPATIENT)
Dept: CARDIOLOGY CLINIC | Age: 71
End: 2021-07-29

## 2021-07-29 VITALS
WEIGHT: 195 LBS | HEIGHT: 70 IN | HEART RATE: 63 BPM | SYSTOLIC BLOOD PRESSURE: 135 MMHG | OXYGEN SATURATION: 98 % | DIASTOLIC BLOOD PRESSURE: 67 MMHG | RESPIRATION RATE: 20 BRPM | BODY MASS INDEX: 27.92 KG/M2 | TEMPERATURE: 97.8 F

## 2021-07-29 LAB
ABO/RH: NORMAL
ANION GAP SERPL CALCULATED.3IONS-SCNC: 11 MEQ/L (ref 9–15)
ANTIBODY SCREEN: NORMAL
BUN BLDV-MCNC: 11 MG/DL (ref 8–23)
CALCIUM SERPL-MCNC: 9.4 MG/DL (ref 8.5–9.9)
CHLORIDE BLD-SCNC: 106 MEQ/L (ref 95–107)
CHOLESTEROL, TOTAL: 215 MG/DL (ref 0–199)
CO2: 25 MEQ/L (ref 20–31)
CREAT SERPL-MCNC: 0.85 MG/DL (ref 0.7–1.2)
GFR AFRICAN AMERICAN: >60
GFR NON-AFRICAN AMERICAN: >60
GLUCOSE BLD-MCNC: 104 MG/DL (ref 70–99)
HCT VFR BLD CALC: 42.5 % (ref 42–52)
HDLC SERPL-MCNC: 39 MG/DL (ref 40–59)
HEMOGLOBIN: 14.7 G/DL (ref 14–18)
INR BLD: 1
LDL CHOLESTEROL CALCULATED: 135 MG/DL (ref 0–129)
MCH RBC QN AUTO: 33.3 PG (ref 27–31.3)
MCHC RBC AUTO-ENTMCNC: 34.6 % (ref 33–37)
MCV RBC AUTO: 96.3 FL (ref 80–100)
PDW BLD-RTO: 13.3 % (ref 11.5–14.5)
PLATELET # BLD: 291 K/UL (ref 130–400)
POTASSIUM SERPL-SCNC: 3.8 MEQ/L (ref 3.4–4.9)
PROTHROMBIN TIME: 12.9 SEC (ref 12.3–14.9)
RBC # BLD: 4.41 M/UL (ref 4.7–6.1)
SODIUM BLD-SCNC: 142 MEQ/L (ref 135–144)
TRIGL SERPL-MCNC: 206 MG/DL (ref 0–150)
WBC # BLD: 6.1 K/UL (ref 4.8–10.8)

## 2021-07-29 PROCEDURE — 86900 BLOOD TYPING SEROLOGIC ABO: CPT

## 2021-07-29 PROCEDURE — 2580000003 HC RX 258: Performed by: INTERNAL MEDICINE

## 2021-07-29 PROCEDURE — 86901 BLOOD TYPING SEROLOGIC RH(D): CPT

## 2021-07-29 PROCEDURE — 6360000002 HC RX W HCPCS

## 2021-07-29 PROCEDURE — 2500000003 HC RX 250 WO HCPCS

## 2021-07-29 PROCEDURE — 85610 PROTHROMBIN TIME: CPT

## 2021-07-29 PROCEDURE — 2580000003 HC RX 258

## 2021-07-29 PROCEDURE — 80061 LIPID PANEL: CPT

## 2021-07-29 PROCEDURE — 86850 RBC ANTIBODY SCREEN: CPT

## 2021-07-29 PROCEDURE — 85027 COMPLETE CBC AUTOMATED: CPT

## 2021-07-29 PROCEDURE — 80048 BASIC METABOLIC PNL TOTAL CA: CPT

## 2021-07-29 RX ORDER — ONDANSETRON 2 MG/ML
4 INJECTION INTRAMUSCULAR; INTRAVENOUS EVERY 6 HOURS PRN
Status: DISCONTINUED | OUTPATIENT
Start: 2021-07-29 | End: 2021-07-29 | Stop reason: HOSPADM

## 2021-07-29 RX ORDER — SODIUM CHLORIDE 9 MG/ML
INJECTION, SOLUTION INTRAVENOUS CONTINUOUS
Status: DISCONTINUED | OUTPATIENT
Start: 2021-07-29 | End: 2021-07-29 | Stop reason: HOSPADM

## 2021-07-29 RX ORDER — DIPHENHYDRAMINE HCL 25 MG
50 TABLET ORAL ONCE
Status: DISCONTINUED | OUTPATIENT
Start: 2021-07-29 | End: 2021-07-29 | Stop reason: HOSPADM

## 2021-07-29 RX ORDER — NITROGLYCERIN 0.4 MG/1
0.4 TABLET SUBLINGUAL EVERY 5 MIN PRN
Status: DISCONTINUED | OUTPATIENT
Start: 2021-07-29 | End: 2021-07-29 | Stop reason: HOSPADM

## 2021-07-29 RX ORDER — PREDNISONE 50 MG/1
50 TABLET ORAL ONCE
Status: DISCONTINUED | OUTPATIENT
Start: 2021-07-29 | End: 2021-07-29 | Stop reason: HOSPADM

## 2021-07-29 RX ORDER — ASPIRIN 81 MG/1
81 TABLET ORAL ONCE
Status: DISCONTINUED | OUTPATIENT
Start: 2021-07-29 | End: 2021-07-29 | Stop reason: HOSPADM

## 2021-07-29 RX ADMIN — SODIUM CHLORIDE: 9 INJECTION, SOLUTION INTRAVENOUS at 09:17

## 2021-07-29 NOTE — TELEPHONE ENCOUNTER
Mara Davila, RN   Registered Nurse      Progress Notes      Signed   Date of Service:  7/29/2021 10:00 AM               Signed             Show:Clear all  [x]Manual[]Template[]Copied    Added by:  Joseph Guy RN    []Hover for details  Procedure cancelled due to cath lab conditions. Procedure reschedule and pt aware of date and time of when to return. Called for his ride and ambulated to the outpatient enter ance with this RN. Pt is A/O x 3. Skin is pwd. Gait is steady. Pt denies further questions.

## 2021-07-29 NOTE — PROGRESS NOTES
Procedure cancelled due to cath lab conditions. Procedure reschedule and pt aware of date and time of when to return. Called for his ride and ambulated to the outpatient enter ance with this RN. Pt is A/O x 3. Skin is pwd. Gait is steady. Pt denies further questions.

## 2021-07-29 NOTE — TELEPHONE ENCOUNTER
----- Message from Nickie Sultana DO sent at 7/29/2021  9:41 AM EDT -----  Please make sure patient is on schedule for end of august or so. Had to reschedule procedure from today.      Electronically signed by Nickie Sultana DO on 7/29/2021 at 9:41 AM

## 2021-08-23 ENCOUNTER — APPOINTMENT (OUTPATIENT)
Dept: ULTRASOUND IMAGING | Age: 71
End: 2021-08-23
Attending: INTERNAL MEDICINE
Payer: MEDICARE

## 2021-08-23 ENCOUNTER — HOSPITAL ENCOUNTER (OUTPATIENT)
Dept: ULTRASOUND IMAGING | Age: 71
Discharge: HOME | End: 2021-08-25
Attending: INTERNAL MEDICINE | Admitting: INTERNAL MEDICINE
Payer: MEDICARE

## 2021-08-23 ENCOUNTER — HOSPITAL ENCOUNTER (OUTPATIENT)
Dept: CARDIAC CATH/INVASIVE PROCEDURES | Age: 71
Discharge: HOME | End: 2021-08-23
Attending: INTERNAL MEDICINE | Admitting: INTERNAL MEDICINE
Payer: MEDICARE

## 2021-08-23 ENCOUNTER — HOSPITAL ENCOUNTER (OUTPATIENT)
Dept: CARDIAC CATH/INVASIVE PROCEDURES | Age: 71
Discharge: HOME | End: 2021-07-29
Attending: INTERNAL MEDICINE | Admitting: INTERNAL MEDICINE
Payer: MEDICARE

## 2021-08-23 VITALS
RESPIRATION RATE: 22 BRPM | SYSTOLIC BLOOD PRESSURE: 143 MMHG | HEART RATE: 60 BPM | DIASTOLIC BLOOD PRESSURE: 49 MMHG | TEMPERATURE: 98.3 F | OXYGEN SATURATION: 100 %

## 2021-08-23 DIAGNOSIS — E53.8 B12 DEFICIENCY: ICD-10-CM

## 2021-08-23 PROBLEM — I48.91 ATRIAL FIBRILLATION (HCC): Status: ACTIVE | Noted: 2021-08-23

## 2021-08-23 LAB
ABO/RH: NORMAL
ALBUMIN SERPL-MCNC: 4.2 G/DL (ref 3.5–4.6)
ALP BLD-CCNC: 150 U/L (ref 35–104)
ALT SERPL-CCNC: 7 U/L (ref 0–41)
ANION GAP SERPL CALCULATED.3IONS-SCNC: 12 MEQ/L (ref 9–15)
ANTIBODY SCREEN: NORMAL
AST SERPL-CCNC: 11 U/L (ref 0–40)
BILIRUB SERPL-MCNC: 0.5 MG/DL (ref 0.2–0.7)
BUN BLDV-MCNC: 10 MG/DL (ref 8–23)
CALCIUM SERPL-MCNC: 10.1 MG/DL (ref 8.5–9.9)
CHLORIDE BLD-SCNC: 105 MEQ/L (ref 95–107)
CO2: 25 MEQ/L (ref 20–31)
CREAT SERPL-MCNC: 0.82 MG/DL (ref 0.7–1.2)
GFR AFRICAN AMERICAN: >60
GFR NON-AFRICAN AMERICAN: >60
GLOBULIN: 3.1 G/DL (ref 2.3–3.5)
GLUCOSE BLD-MCNC: 106 MG/DL (ref 70–99)
HCT VFR BLD CALC: 45.1 % (ref 42–52)
HEMOGLOBIN: 15.6 G/DL (ref 14–18)
INR BLD: 1
MCH RBC QN AUTO: 34.7 PG (ref 27–31.3)
MCHC RBC AUTO-ENTMCNC: 34.7 % (ref 33–37)
MCV RBC AUTO: 100.1 FL (ref 80–100)
PDW BLD-RTO: 14.6 % (ref 11.5–14.5)
PERFORMED ON: ABNORMAL
PERFORMED ON: ABNORMAL
PLATELET # BLD: 271 K/UL (ref 130–400)
POC ACTIVATED CLOTTING TIME KAOLIN: 208 SEC (ref 82–152)
POC ACTIVATED CLOTTING TIME KAOLIN: 241 SEC (ref 82–152)
POC SAMPLE TYPE: ABNORMAL
POC SAMPLE TYPE: ABNORMAL
POTASSIUM SERPL-SCNC: 3.9 MEQ/L (ref 3.4–4.9)
PROTHROMBIN TIME: 12.9 SEC (ref 12.3–14.9)
RBC # BLD: 4.5 M/UL (ref 4.7–6.1)
SODIUM BLD-SCNC: 142 MEQ/L (ref 135–144)
TOTAL PROTEIN: 7.3 G/DL (ref 6.3–8)
WBC # BLD: 5.5 K/UL (ref 4.8–10.8)

## 2021-08-23 PROCEDURE — 37226 HC FEM POP TERR PLASTY AND STENT: CPT | Performed by: INTERNAL MEDICINE

## 2021-08-23 PROCEDURE — C1874 STENT, COATED/COV W/DEL SYS: HCPCS

## 2021-08-23 PROCEDURE — 86850 RBC ANTIBODY SCREEN: CPT

## 2021-08-23 PROCEDURE — 75710 ARTERY X-RAYS ARM/LEG: CPT | Performed by: INTERNAL MEDICINE

## 2021-08-23 PROCEDURE — 86900 BLOOD TYPING SEROLOGIC ABO: CPT

## 2021-08-23 PROCEDURE — 2709999900 HC NON-CHARGEABLE SUPPLY

## 2021-08-23 PROCEDURE — 6360000002 HC RX W HCPCS

## 2021-08-23 PROCEDURE — 85610 PROTHROMBIN TIME: CPT

## 2021-08-23 PROCEDURE — 76937 US GUIDE VASCULAR ACCESS: CPT | Performed by: INTERNAL MEDICINE

## 2021-08-23 PROCEDURE — 37221 PR REVSC OPN/PRQ ILIAC ART W/STNT PLMT & ANGIOPLSTY: CPT | Performed by: INTERNAL MEDICINE

## 2021-08-23 PROCEDURE — 76937 US GUIDE VASCULAR ACCESS: CPT

## 2021-08-23 PROCEDURE — C1894 INTRO/SHEATH, NON-LASER: HCPCS

## 2021-08-23 PROCEDURE — C1769 GUIDE WIRE: HCPCS

## 2021-08-23 PROCEDURE — 86901 BLOOD TYPING SEROLOGIC RH(D): CPT

## 2021-08-23 PROCEDURE — C1887 CATHETER, GUIDING: HCPCS

## 2021-08-23 PROCEDURE — 37226 PR REVSC OPN/PRQ FEM/POP W/STNT/ANGIOP SM VSL: CPT | Performed by: INTERNAL MEDICINE

## 2021-08-23 PROCEDURE — C1760 CLOSURE DEV, VASC: HCPCS

## 2021-08-23 PROCEDURE — C1876 STENT, NON-COA/NON-COV W/DEL: HCPCS

## 2021-08-23 PROCEDURE — 85027 COMPLETE CBC AUTOMATED: CPT

## 2021-08-23 PROCEDURE — C1725 CATH, TRANSLUMIN NON-LASER: HCPCS

## 2021-08-23 PROCEDURE — 85347 COAGULATION TIME ACTIVATED: CPT

## 2021-08-23 PROCEDURE — 80053 COMPREHEN METABOLIC PANEL: CPT

## 2021-08-23 RX ORDER — ASPIRIN 81 MG/1
81 TABLET ORAL ONCE
Status: CANCELLED | OUTPATIENT
Start: 2021-08-23 | End: 2021-08-23

## 2021-08-23 RX ORDER — IODIXANOL 320 MG/ML
100 INJECTION, SOLUTION INTRAVASCULAR ONCE
Status: DISCONTINUED | OUTPATIENT
Start: 2021-08-23 | End: 2021-08-23 | Stop reason: HOSPADM

## 2021-08-23 RX ORDER — SODIUM CHLORIDE 0.9 % (FLUSH) 0.9 %
5-40 SYRINGE (ML) INJECTION EVERY 12 HOURS SCHEDULED
Status: DISCONTINUED | OUTPATIENT
Start: 2021-08-23 | End: 2021-08-23 | Stop reason: HOSPADM

## 2021-08-23 RX ORDER — LISINOPRIL 20 MG/1
20 TABLET ORAL DAILY
COMMUNITY
Start: 2021-07-16 | End: 2021-08-24

## 2021-08-23 RX ORDER — ACETAMINOPHEN 325 MG/1
650 TABLET ORAL EVERY 4 HOURS PRN
Status: DISCONTINUED | OUTPATIENT
Start: 2021-08-23 | End: 2021-08-23 | Stop reason: HOSPADM

## 2021-08-23 RX ORDER — HYDRALAZINE HYDROCHLORIDE 20 MG/ML
10 INJECTION INTRAMUSCULAR; INTRAVENOUS EVERY 10 MIN PRN
Status: DISCONTINUED | OUTPATIENT
Start: 2021-08-23 | End: 2021-08-23 | Stop reason: HOSPADM

## 2021-08-23 RX ORDER — ATORVASTATIN CALCIUM 40 MG/1
40 TABLET, FILM COATED ORAL NIGHTLY
Status: CANCELLED | OUTPATIENT
Start: 2021-08-23

## 2021-08-23 RX ORDER — ONDANSETRON 2 MG/ML
4 INJECTION INTRAMUSCULAR; INTRAVENOUS EVERY 6 HOURS PRN
Status: DISCONTINUED | OUTPATIENT
Start: 2021-08-23 | End: 2021-08-23 | Stop reason: HOSPADM

## 2021-08-23 RX ORDER — LABETALOL HYDROCHLORIDE 5 MG/ML
10 INJECTION, SOLUTION INTRAVENOUS EVERY 30 MIN PRN
Status: DISCONTINUED | OUTPATIENT
Start: 2021-08-23 | End: 2021-08-23 | Stop reason: HOSPADM

## 2021-08-23 RX ORDER — TAMSULOSIN HYDROCHLORIDE 0.4 MG/1
0.4 CAPSULE ORAL 2 TIMES DAILY
Status: CANCELLED | OUTPATIENT
Start: 2021-08-23

## 2021-08-23 RX ORDER — FENTANYL CITRATE 50 UG/ML
25 INJECTION, SOLUTION INTRAMUSCULAR; INTRAVENOUS
Status: DISCONTINUED | OUTPATIENT
Start: 2021-08-23 | End: 2021-08-23 | Stop reason: HOSPADM

## 2021-08-23 RX ORDER — HYDRALAZINE HYDROCHLORIDE 20 MG/ML
10 INJECTION INTRAMUSCULAR; INTRAVENOUS EVERY 10 MIN PRN
OUTPATIENT
Start: 2021-08-23

## 2021-08-23 RX ORDER — SODIUM CHLORIDE 0.9 % (FLUSH) 0.9 %
5-40 SYRINGE (ML) INJECTION PRN
Status: DISCONTINUED | OUTPATIENT
Start: 2021-08-23 | End: 2021-08-23 | Stop reason: HOSPADM

## 2021-08-23 RX ORDER — SOTALOL HYDROCHLORIDE 80 MG/1
80 TABLET ORAL 2 TIMES DAILY
Status: CANCELLED | OUTPATIENT
Start: 2021-08-23

## 2021-08-23 RX ORDER — SODIUM CHLORIDE 9 MG/ML
INJECTION, SOLUTION INTRAVENOUS CONTINUOUS
Status: DISCONTINUED | OUTPATIENT
Start: 2021-08-23 | End: 2021-08-23 | Stop reason: HOSPADM

## 2021-08-23 RX ORDER — SODIUM CHLORIDE 9 MG/ML
25 INJECTION, SOLUTION INTRAVENOUS PRN
Status: DISCONTINUED | OUTPATIENT
Start: 2021-08-23 | End: 2021-08-23 | Stop reason: HOSPADM

## 2021-08-23 RX ORDER — CLOPIDOGREL BISULFATE 75 MG/1
75 TABLET ORAL DAILY
Status: CANCELLED | OUTPATIENT
Start: 2021-08-23

## 2021-08-23 RX ORDER — LABETALOL HYDROCHLORIDE 5 MG/ML
10 INJECTION, SOLUTION INTRAVENOUS EVERY 30 MIN PRN
OUTPATIENT
Start: 2021-08-23

## 2021-08-23 RX ORDER — SODIUM CHLORIDE 9 MG/ML
INJECTION, SOLUTION INTRAVENOUS
Status: DISPENSED
Start: 2021-08-23 | End: 2021-08-23

## 2021-08-23 RX ORDER — ACETAMINOPHEN 325 MG/1
650 TABLET ORAL EVERY 4 HOURS PRN
OUTPATIENT
Start: 2021-08-23

## 2021-08-23 NOTE — PROGRESS NOTES
1350:  Pulled popliteal sheath, held manual pressure for 15min, placed quick clott with tegaderm. Pt tolerated well with little pain. Pt is no resting flat, VSS, will continue to monitor both popliteal and femoral sites for bleeding or hematoma. At this time both sites are free of both bleeding of hematomas.

## 2021-08-23 NOTE — DISCHARGE INSTR - ACTIVITY
You may shower starting tomorrow NO TUB BATHS or sitting in water for 5 days. Remove old bandage and replace with clean dry band-aid. No strenuous activity for like heavy lifting for 5 days.

## 2021-08-23 NOTE — LETTER
Cardiac Cath Services  [address] [place] [address]  Phone: [phone]  Fax: [phone] CATH LAB RM 2        August 23, 2021     Patient: Carmela Evans   YOB: 1950   Date of Visit: 8/23/2021       To Whom It May Concern: It is my medical opinion that Eloy Dominguez may return to work on 8/30/21. If you have any questions or concerns, please don't hesitate to call. Sincerely,  Adan FELIZ  [address]  CATH LAB RM 2

## 2021-08-23 NOTE — DISCHARGE INSTR - COC
Continuity of Care Form    Patient Name: Lenard Campbell   :  1950  MRN:  [id number]    [address] [place] [address] date:  2021  Discharge date:  ***    Code Status Order: Full Code   Advance Directives:     Admitting Physician:  Jesusita Evans DO  PCP: Miki Bueno MD    Discharging Nurse: [facility] Unit/Room#: [facility]  Discharging Unit Phone Number: ***    Emergency Contact:   Extended Emergency Contact Information  Primary Emergency Contact: [address] Phone: [phone]  Work Phone: [phone]  Mobile Phone: [phone]  Relation: Other    Past Surgical History:  Past Surgical History:   Procedure Laterality Date    COLONOSCOPY N/A 2019    COLORECTAL CANCER SCREENING, NOT HIGH RISK performed by Aquiles Vega MD at [place] [address]       Immunization History:   Immunization History   Administered Date(s) Administered    COVID-19, Pfizer, PF, 30mcg/0.3mL 2021, 2021    Influenza, Quadv, adjuvanted, 65 yrs +, IM, PF (Fluad) 2020    Influenza, Triv, inactivated, subunit, adjuvanted, IM (Fluad 65 yrs and older) 2019    Pneumococcal Polysaccharide ([id number]) 2019    Tdap (Boostrix, Adacel) 2016       Active Problems:  Patient Active Problem List   Diagnosis Code    Hypertension I10    Prostate cancer ([facility] [address] C61    Hyperlipidemia E78.5    Coronary artery disease involving native coronary artery of native heart without angina pectoris I25.10    Old MI (myocardial infarction) I25.2    Prediabetes R73.03    UTE on CPAP G47.33, Z99.89    Primary insomnia F51.01    S/P radiation therapy Z92.3    Drug-induced erectile dysfunction N52.2    Family history of colon cancer Z80.0    History of colon polyps Z86.010    Polyp of cecum K63.5    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) I70.209    Claudication (HCC) I73.9    Paroxysmal atrial fibrillation (HCC) I48.0    Atrial fibrillation (HCC) I48.91       Isolation/Infection:   Isolation          No Isolation        Patient Infection Status     None to display          Nurse Assessment:  Last Vital Signs: /71   Pulse 60   Temp 98.3 °F (36.8 °C) (Temporal)   Resp 22   SpO2 100%     Last documented pain score (0-10 scale):    Last Weight:   Wt Readings from Last 1 Encounters:   07/29/21 195 lb (88.5 kg)     Mental Status:  {IP PT MENTAL STATUS:48680}    IV Access:  { KIRTI IV ACCESS:043826443}    Nursing Mobility/ADLs:  Walking   {P DME FUNR:121092250}  Transfer  {P DME IWTJ:519885428}  Bathing  {P DME MYDX:527178630}  Dressing  {P DME NRNI:683808285}  Toileting  {P DME OOBJ:628307574}  Feeding  {[facility] DME CKBQ:423456950}  Med Admin  {[facility] DME HIGW:879239052}  Med Delivery   { KIRTI MED Delivery:114157328}    Wound Care Documentation and Therapy:        Elimination:  Continence:   · Bowel: {YES / CM:48209}  · Bladder: {YES / ZC:49693}  Urinary Catheter: {Urinary Catheter:306834270}   Colostomy/Ileostomy/Ileal Conduit: {YES / SB:23267}       Date of Last BM: ***  No intake or output data in the 24 hours ending 08/23/21 1826  No intake/output data recorded.     Safety Concerns:     [address] Safety Concerns:175644664}    Impairments/Disabilities:      [address] Impairments/Disabilities:138222174}    Nutrition Therapy:  Current Nutrition Therapy:   [address] Diet List:281570950}    Routes of Feeding: {[facility] DME Other Feedings:767142351}  Liquids: {Slp liquid thickness:96549}  Daily Fluid Restriction: {CHP DME Yes amt example:821160642}  Last Modified Barium Swallow with Video (Video Swallowing Test): {Done Not Done LHMB:239763467}    Treatments at the Time of Hospital Discharge:   Respiratory Treatments: ***  Oxygen Therapy:  {Therapy; copd oxygen:01412}  Ventilator:    { CC Vent MIOB:456548205}    Rehab Therapies: {THERAPEUTIC INTERVENTION:4036534842}  Weight Bearing Status/Restrictions: { CC Weight Bearin}  Other Medical Equipment (for information only, NOT a DME order):  {EQUIPMENT:287596747}  Other Treatments: ***    Patient's personal belongings (please select all that are sent with patient):  {CHP DME Belongings:849364605}    RN SIGNATURE:  {Esignature:266695788}    CASE MANAGEMENT/SOCIAL WORK SECTION    Inpatient Status Date: ***    Readmission Risk Assessment Score:  Readmission Risk              Risk of Unplanned Readmission:  0           Discharging to Facility/ Agency   · Name:   · Address:  · Phone:  · Fax:    Dialysis Facility (if applicable)   · Name:  · Address:  · Dialysis Schedule:  · Phone:  · Fax:    / signature: {Esignature:475292569}    PHYSICIAN SECTION    Prognosis: {Prognosis:2307490778}    Condition at Discharge: [address] [place] [address] Patient Condition:161183257}    Rehab Potential (if transferring to Rehab): {Prognosis:4391414512}    Recommended Labs or Other Treatments After Discharge: ***    Physician Certification: I certify the above information and transfer of Bhavana Edgar  is necessary for the continuing treatment of the diagnosis listed and that he requires {Admit to Appropriate Level of Care:25778} for {GREATER/LESS:490694496} 30 days.      Update Admission H&P: {CHP DME Changes in IRV:456043069}    PHYSICIAN SIGNATURE:  {Esignature:983892588}

## 2021-08-23 NOTE — LETTER
Cardiac Cath Services  [address] [place] [address]  Phone: [phone]  Fax: [phone] CATH LAB RM 2        July 29, 2021     Patient: Isma Clemons   YOB: 1950   Date of Visit: 7/29/2021       To Whom It May Concern: It is my medical opinion that Elizabeth Kellogg may return to full duty immediately with no restrictions. If you have any questions or concerns, please don't hesitate to call.     Sincerely,    Electronically signed by Leonardo Hay DO on 7/29/2021 at 9:39 AM      [facility] 2

## 2021-08-23 NOTE — PROGRESS NOTES
[place] ([facility]) [place], [facility], and [facility] [place] mist given to patient.

## 2021-08-23 NOTE — BRIEF OP NOTE
Section of Cardiology  Adult Brief  Procedure Note        Procedure(s):  ILR insertion    Pre-operative Diagnosis:  SSS, Afib, Parox. H&P Status: Completed and reviewed.      Post-operative Diagnosis:  Successful ILR  placement    Findings: see full report    Complications:  none    Primary Proceduralist:   Dr. Alysia Roy       Full procedure note to follow

## 2021-08-24 RX ORDER — LISINOPRIL 20 MG/1
TABLET ORAL
Qty: 90 TABLET | Refills: 3 | Status: ON HOLD | OUTPATIENT
Start: 2021-08-24 | End: 2022-06-01 | Stop reason: HOSPADM

## 2021-08-24 NOTE — TELEPHONE ENCOUNTER
Future Appointments     Provider Department   11/8/2021 Evangelista Campbell, [address] [facility]     LOV 5/25/21  LAST FILL 7/16/21    You don't even prescribe this medication. Taylor Vasquez

## 2021-08-30 NOTE — PROCEDURES
[address]                      [address], [address] [facility]                                 PROCEDURE NOTE    PATIENT NAME: Milka Hutchinson                  :        1950  MED REC NO:   [id number]                            ROOM:  ACCOUNT NO:   [de-identified]                           ADMIT DATE: 2021  PROVIDER:     Natividad Cortez DO    DATE OF PROCEDURE:  2021    PROCEDURES PERFORMED:  Right lower extremity angiography, right SFA   crossing in an antegrade and retrograde fashion followed by PTA and  stenting with drug-eluting stent, right external iliac artery stenting. INDICATION:  Claudication and abnormal noninvasive testing. PROCEDURES PERFORMED BY:  Adan Cortez DO    ACCESS SITE:  Left common femoral artery as well as the right popliteal  artery. DESCRIPTION OF PROCEDURE:  The patient was brought to the cardiac cath  lab suite, where he was sterilely prepped and draped in the usual  fashion. 1% lidocaine was used to anesthetize the left inguinal area  and a 4-Luxembourgish arterial sheath was placed without any difficulty. Next,  an VALERIE catheter as well as an 0.035 wire were utilized to advance a  6-Luxembourgish 55 cm sheath over the wire into the right common femoral artery  and right lower extremity angiogram with runoff was performed. Next,  access was obtained of the right popliteal artery under ultrasound  guidance. The images were stored permanently in the records and the  needle as well as the wire were visualized entering the artery. The  right popliteal artery was very heavily calcified, but patent. Next,  utilizing an antegrade and retrograde fashion with 0.035 _____ catheters  as well as support wires, an wire was able to be externalized and PTA of  the right SFA was performed and stenting was performed. Please see  nursing notes for details of balloons utilized, stents utilized, and  atmospheres achieved.   Pullback gradient was performed across the right  iliac artery showing a significant gradient greater than 30 mmHg,  therefore a 6.0 x 80 Absolute Pro self-expanding stent was deployed and  pulse dilated with a 7.0 x 20 mm balloon at nominal pressure. Complete  angiography was performed showing excellent results. All catheters and  wires were removed from the patient and the patient was transferred to  postcath [facility] area in stable and satisfactory condition. FINDINGS:  Right common femoral artery is very heavily calcified. There  is mild diffuse disease. Right profunda is patent with mild diffuse  disease. Right SFA is 100% occluded in the ostial segment and  reconstitutes in the distal segment at Jaleel's canal.  The right  popliteal artery and below-the-knee vessels appeared to be patent  without any significant stenosis. ASSESSMENT:  1.  Status post successful right external iliac artery stenting with 0%  residual stenosis. 2.  Status post successful right SFA  crossing in antegrade as well  as retrograde fashion and followed by PTA with drug-eluting stents with  0% residual stenosis. PLAN:  1. Postprocedure care as usual.  2.  As always aggressive risk factor modification. 3.  Maximize medical therapy.         Saintclair Andrew, DO    D: 08/30/2021 10:27:36       T: 08/30/2021 11:43:17     [id number]  Job#: [id number]     Doc#: [id number]    CC:

## 2021-09-23 ENCOUNTER — TELEPHONE (OUTPATIENT)
Dept: CARDIOLOGY CLINIC | Age: 71
End: 2021-09-23

## 2021-09-23 NOTE — TELEPHONE ENCOUNTER
Basilia, DO, can we reorder/redirect to patient's mail order pharmacy?  Rx pended for your signature/modification as appropriate    LOV: 6/18/21  Next: 10/21/21    Thank you,  Eb Purcell, PharmD, [address] [place] [address], toll free: [phone], option 1

## 2021-09-27 RX ORDER — ATORVASTATIN CALCIUM 40 MG/1
40 TABLET, FILM COATED ORAL DAILY
Qty: 90 TABLET | Refills: 1 | Status: SHIPPED | OUTPATIENT
Start: 2021-09-27

## 2021-09-27 NOTE — TELEPHONE ENCOUNTER
Noted atorvastatin 40mg rx ordered to [facility] mail order now, thank you! Attempting again to reach patient.  Left another message and will send Mychart    =======================================================   For Pharmacy [address] in place:  No   Recommendation Provided To: Provider: 1 via Note to Provider   Intervention Detail: Refill(s) Provided   Gap Closed?: No    Intervention Accepted By: Provider: 1   Time Spent (min): 15

## 2021-12-06 ENCOUNTER — OFFICE VISIT (OUTPATIENT)
Dept: FAMILY MEDICINE CLINIC | Age: 71
End: 2021-12-06
Payer: MEDICARE

## 2021-12-06 VITALS
HEART RATE: 62 BPM | OXYGEN SATURATION: 98 % | HEIGHT: 70 IN | SYSTOLIC BLOOD PRESSURE: 132 MMHG | TEMPERATURE: 97.7 F | WEIGHT: 200.6 LBS | DIASTOLIC BLOOD PRESSURE: 84 MMHG | BODY MASS INDEX: 28.72 KG/M2

## 2021-12-06 DIAGNOSIS — G47.33 OSA ON CPAP: ICD-10-CM

## 2021-12-06 DIAGNOSIS — Z00.00 ROUTINE GENERAL MEDICAL EXAMINATION AT A HEALTH CARE FACILITY: Primary | ICD-10-CM

## 2021-12-06 DIAGNOSIS — C61 PROSTATE CANCER (HCC): ICD-10-CM

## 2021-12-06 DIAGNOSIS — Z99.89 OSA ON CPAP: ICD-10-CM

## 2021-12-06 DIAGNOSIS — R73.03 PREDIABETES: ICD-10-CM

## 2021-12-06 DIAGNOSIS — M54.9 UPPER BACK PAIN ON LEFT SIDE: ICD-10-CM

## 2021-12-06 DIAGNOSIS — I25.10 CORONARY ARTERY DISEASE INVOLVING NATIVE CORONARY ARTERY OF NATIVE HEART WITHOUT ANGINA PECTORIS: ICD-10-CM

## 2021-12-06 DIAGNOSIS — I70.209 ACUTE OCCLUSION OF ARTERY OF LOWER EXTREMITY (HCC): ICD-10-CM

## 2021-12-06 DIAGNOSIS — I10 ESSENTIAL HYPERTENSION: ICD-10-CM

## 2021-12-06 DIAGNOSIS — Z23 NEED FOR INFLUENZA VACCINATION: ICD-10-CM

## 2021-12-06 PROCEDURE — G0008 ADMIN INFLUENZA VIRUS VAC: HCPCS | Performed by: FAMILY MEDICINE

## 2021-12-06 PROCEDURE — 1123F ACP DISCUSS/DSCN MKR DOCD: CPT | Performed by: FAMILY MEDICINE

## 2021-12-06 PROCEDURE — G8484 FLU IMMUNIZE NO ADMIN: HCPCS | Performed by: FAMILY MEDICINE

## 2021-12-06 PROCEDURE — 90694 VACC AIIV4 NO PRSRV 0.5ML IM: CPT | Performed by: FAMILY MEDICINE

## 2021-12-06 PROCEDURE — G0439 PPPS, SUBSEQ VISIT: HCPCS | Performed by: FAMILY MEDICINE

## 2021-12-06 PROCEDURE — 4040F PNEUMOC VAC/ADMIN/RCVD: CPT | Performed by: FAMILY MEDICINE

## 2021-12-06 PROCEDURE — 3017F COLORECTAL CA SCREEN DOC REV: CPT | Performed by: FAMILY MEDICINE

## 2021-12-06 RX ORDER — METHYLPREDNISOLONE 4 MG/1
TABLET ORAL
Qty: 1 KIT | Refills: 0 | Status: SHIPPED | OUTPATIENT
Start: 2021-12-06 | End: 2022-03-05 | Stop reason: ALTCHOICE

## 2021-12-06 RX ORDER — TIZANIDINE 2 MG/1
2 TABLET ORAL NIGHTLY PRN
Qty: 30 TABLET | Refills: 0 | Status: ON HOLD | OUTPATIENT
Start: 2021-12-06 | End: 2022-06-01 | Stop reason: HOSPADM

## 2021-12-06 ASSESSMENT — PATIENT HEALTH QUESTIONNAIRE - PHQ9
SUM OF ALL RESPONSES TO PHQ QUESTIONS 1-9: 0
SUM OF ALL RESPONSES TO PHQ QUESTIONS 1-9: 0
1. LITTLE INTEREST OR PLEASURE IN DOING THINGS: 0
SUM OF ALL RESPONSES TO PHQ9 QUESTIONS 1 & 2: 0
2. FEELING DOWN, DEPRESSED OR HOPELESS: 0
SUM OF ALL RESPONSES TO PHQ QUESTIONS 1-9: 0

## 2021-12-06 ASSESSMENT — LIFESTYLE VARIABLES
HOW MANY STANDARD DRINKS CONTAINING ALCOHOL DO YOU HAVE ON A TYPICAL DAY: 0
AUDIT-C TOTAL SCORE: 3
HOW OFTEN DO YOU HAVE A DRINK CONTAINING ALCOHOL: 3
HOW OFTEN DO YOU HAVE SIX OR MORE DRINKS ON ONE OCCASION: 0

## 2021-12-06 NOTE — PROGRESS NOTES
Medicare Annual Wellness Visit  Name: Narendra Vasquez Date: 2021   MRN: [id number] Sex: Male   Age: 73 y.o. Ethnicity: Non- / Non    : 1950 Race: White (non-)      Amanda Velazquez is here for Medicare AWV    Screenings for behavioral, psychosocial and functional/safety risks, and cognitive dysfunction are all negative except as indicated below. These results, as well as other patient data from the [address] form, are documented in Flowsheets linked to this Encounter. Over the summer he had intervention on peripheral vascular disease in his legs     Multiple stents    Now on plavix     Patient Active Problem List   Diagnosis    Hypertension    Prostate cancer ([facility] [address]    Hyperlipidemia    Coronary artery disease involving native coronary artery of native heart without angina pectoris    Old MI (myocardial infarction)    Prediabetes    UTE on CPAP    Primary insomnia    S/P radiation therapy    Drug-induced erectile dysfunction    Family history of colon cancer    History of colon polyps    Polyp of cecum    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)    PAD (peripheral artery disease) (HCC)    Paroxysmal atrial fibrillation (HCC)    Atrial fibrillation (HCC)         Allergies   Allergen Reactions    Morphine Swelling and Rash         Prior to Visit Medications    Medication Sig Taking? Authorizing Provider   methylPREDNISolone (MEDROL DOSEPACK) 4 MG tablet Take by mouth.  Yes Michael Yarbrough MD   tiZANidine (ZANAFLEX) 2 MG tablet Take 1 tablet by mouth nightly as needed (muscle spasms) Yes Michael Yarbrough MD   atorvastatin (LIPITOR) 40 MG tablet Take 1 tablet by mouth daily Yes Adan Cortez, DO   clopidogrel (PLAVIX) 75 MG tablet Take 1 tablet by mouth daily Yes Adan Cortez, DO   Talazoparib Tosylate 0.25 MG CAPS Take 1 capsule by mouth daily  Yes Historical Provider, MD   APPLE CIDER VINEGAR PO Take 1 tablet by mouth daily  Yes Historical Provider, MD   Cyanocobalamin (VITAMIN B-12 CR PO) Take 1 tablet by mouth daily  Yes Historical Provider, MD   tamsulosin (FLOMAX) 0.4 MG capsule Take 0.4 mg by mouth 2 times daily  Yes Historical Provider, MD   CPAP Machine MISC by Does not apply route Use nightly as directed. Pressure setting 5 cm H2O. Patient taking differently: 1 Device by Does not apply route Use nightly as directed. Pressure setting 5 cm H2O.  Yes Taurus Millard MD   Ascorbic Acid (VITAMIN C PO) Take 500 mg by mouth Yes Historical Provider, MD   lisinopril (PRINIVIL;ZESTRIL) 20 MG tablet TAKE 1 TABLET BY MOUTH  DAILY  Alejandra Ott MD   aspirin 81 MG EC tablet Take 1 tablet by mouth once for 1 dose  Adan J Holiday, DO         Past Medical History:   Diagnosis Date    CAD (coronary artery disease)     Family history of colon cancer     Hyperlipidemia     Hypertension     Old MI (myocardial infarction) 02/03/2006    UTE on CPAP     PAD (peripheral artery disease) ([facility]     Prostate cancer [facility])        Past Surgical History:   Procedure Laterality Date    COLONOSCOPY N/A 8/19/2019    COLORECTAL CANCER SCREENING, NOT HIGH RISK performed by Severo Vargas MD at [address] [place] [address]      X         Family History   Problem Relation Age of Onset    Cancer Mother 76        BONE    Cancer Father 64        COLON    Colon Cancer Father     Cancer Brother 43        THYROID    Diabetes Maternal Grandmother        CareTeam (Including outside providers/suppliers regularly involved in providing care):   Patient Care Team:  Alejandra Ott MD as PCP - General (Family Medicine)  Alejandra Ott MD as PCP - [facility] Empaneled Provider    Wt Readings from Last 3 Encounters:   12/06/21 200 lb 9.6 oz (91 kg)   07/29/21 195 lb (88.5 kg)   07/14/21 195 lb (88.5 kg)     Vitals:    12/06/21 1539   BP: 132/84   Site: Left Upper Arm   Pulse: 62   Temp: 97.7 °F (36.5 °C)   SpO2: 98%   Weight: 200 lb 9.6 oz (91 kg) Height: 5' 10\" (1.778 m)     Body mass index is 28.78 kg/m². Based upon direct observation of the patient, evaluation of cognition reveals recent and remote memory intact. Physical Exam:  /84 (Site: Left Upper Arm)   Pulse 62   Temp 97.7 °F (36.5 °C)   Ht 5' 10\" (1.778 m)   Wt 200 lb 9.6 oz (91 kg)   SpO2 98%   BMI 28.78 kg/m²     Gen: Well, NAD, Alert, Oriented x 3   HEENT: EOMI, eyes clear, MMM  Skin: without rash or jaundice  Neck: no significant lymphadenopathy or thyromegaly  Lungs: CTA B w/out Rales/Wheezes/Rhonchi, Good respiratory effort   Heart: RRR, S1S2, w/out M/R/G, non-displaced PMI   Ext: No C/C/E Bilaterally. Neuro: Neurovascularly intact w/ Sensory/Motor intact UE/LE Bilaterally. Patient's complete Health Risk Assessment and screening values have been reviewed and are found in Flowsheets. The following problems were reviewed today and where indicated follow up appointments were made and/or referrals ordered.     Positive Risk Factor Screenings with Interventions:           Health Habits/Nutrition:  Health Habits/Nutrition  Do you exercise for at least 20 minutes 2-3 times per week?: Yes  Have you lost any weight without trying in the past 3 months?: No  Do you eat only one meal per day?: No  Have you seen the dentist within the past year?: (!) No  Body mass index: (!) 28.78  Health Habits/Nutrition Interventions:  · Suggest dentist        Personalized Preventive Plan   Current Health Maintenance Status  Immunization History   Administered Date(s) Administered    COVID-19, [place], PF, 30mcg/0.3mL 04/13/2021, 05/03/2021    Influenza, Quadv, adjuvanted, 65 yrs +, IM, PF (Fluad) 11/06/2020    Influenza, Triv, inactivated, subunit, adjuvanted, IM (Fluad 65 yrs and older) 11/01/2019    Pneumococcal Polysaccharide (Kbiumvbiq19) 11/01/2019    Tdap (Boostrix, Adacel) 05/11/2016        Health Maintenance   Topic Date Due    Shingles Vaccine (1 of 2) Never done   Courtenay Spurling Pneumococcal 65+ yrs at Risk Vaccine (2 of 2 - PCV13) 11/01/2020    PSA counseling  12/30/2020    COVID-19 Vaccine (3 - Pfizer risk 4-dose series) 05/31/2021    Flu vaccine (1) 09/01/2021    Annual Wellness Visit (AWV)  11/07/2021    A1C test (Diabetic or Prediabetic)  12/16/2021    Lipid screen  07/29/2022    Potassium monitoring  08/23/2022    Creatinine monitoring  08/23/2022    DTaP/Tdap/Td vaccine (2 - Td or Tdap) 05/11/2026    Colon cancer screen colonoscopy  08/19/2029    AAA screen  Completed    Hepatitis C screen  Addressed    Hepatitis A vaccine  Aged Out    Hepatitis B vaccine  Aged Out    Hib vaccine  Aged Out    Meningococcal (ACWY) vaccine  Aged Out     Recommendations for [place] Due: see orders and patient instructions/AVS.  . Recommended screening schedule for the next 5-10 years is provided to the patient in written form: see Patient Mame Meek was seen today for medicare awv. Diagnoses and all orders for this visit:    Routine general medical examination at a health care facility    Acute occlusion of artery of lower extremity ([facility] [address]    Prostate cancer ([facility] [address]    Essential hypertension    Coronary artery disease involving native coronary artery of native heart without angina pectoris    Prediabetes    Need for influenza vaccination  -     INFLUENZA, QUADV, ADJUVANTED, 65 YRS =, IM, PF, PREFILL SYR, 0.5ML (FLUAD)    UTE on CPAP    Upper back pain on left side  -     methylPREDNISolone (MEDROL DOSEPACK) 4 MG tablet; Take by mouth. -     tiZANidine (ZANAFLEX) 2 MG tablet;  Take 1 tablet by mouth nightly as needed (muscle spasms)             Follow up with cardiology  Consider seeing hand specialist for trigger finger        Linsey Vergara MD

## 2021-12-06 NOTE — PATIENT INSTRUCTIONS
Personalized Preventive Plan for Vance Lin - 12/6/2021  Medicare offers a range of preventive health benefits. Some of the tests and screenings are paid in full while other may be subject to a deductible, co-insurance, and/or copay. Some of these benefits include a comprehensive review of your medical history including lifestyle, illnesses that may run in your family, and various assessments and screenings as appropriate. After reviewing your medical record and screening and assessments performed today your provider may have ordered immunizations, labs, imaging, and/or referrals for you. A list of these orders (if applicable) as well as your Preventive Care list are included within your After Visit Summary for your review. Other Preventive Recommendations:    · A preventive eye exam performed by an eye specialist is recommended every 1-2 years to screen for glaucoma; cataracts, macular degeneration, and other eye disorders. · A preventive dental visit is recommended every 6 months. · Try to get at least 150 minutes of exercise per week or 10,000 steps per day on a pedometer . · Order or download the FREE \"Exercise & Physical Activity: Your Everyday Guide\" from [employer]. Call 8-520.653.9836 or search [employer] online. · You need 3390-9739 mg of calcium and 7721-4697 IU of vitamin D per day. It is possible to meet your calcium requirement with diet alone, but a vitamin D supplement is usually necessary to meet this goal.  · When exposed to the sun, use a sunscreen that protects against both UVA and UVB radiation with an SPF of 30 or greater. Reapply every 2 to 3 hours or after sweating, drying off with a towel, or swimming. · Always wear a seat belt when traveling in a car. Always wear a helmet when riding a bicycle or motorcycle.

## 2021-12-30 ENCOUNTER — OFFICE VISIT (OUTPATIENT)
Dept: CARDIOLOGY CLINIC | Age: 71
End: 2021-12-30
Payer: MEDICARE

## 2021-12-30 VITALS
HEART RATE: 61 BPM | BODY MASS INDEX: 28.55 KG/M2 | OXYGEN SATURATION: 98 % | WEIGHT: 199 LBS | SYSTOLIC BLOOD PRESSURE: 134 MMHG | DIASTOLIC BLOOD PRESSURE: 70 MMHG

## 2021-12-30 DIAGNOSIS — I73.9 PAD (PERIPHERAL ARTERY DISEASE) (HCC): ICD-10-CM

## 2021-12-30 DIAGNOSIS — I10 PRIMARY HYPERTENSION: ICD-10-CM

## 2021-12-30 DIAGNOSIS — I25.10 CORONARY ARTERY DISEASE INVOLVING NATIVE CORONARY ARTERY OF NATIVE HEART WITHOUT ANGINA PECTORIS: ICD-10-CM

## 2021-12-30 DIAGNOSIS — I48.0 PAROXYSMAL ATRIAL FIBRILLATION (HCC): Primary | ICD-10-CM

## 2021-12-30 DIAGNOSIS — R94.31 ABNORMAL EKG: ICD-10-CM

## 2021-12-30 DIAGNOSIS — E78.00 PURE HYPERCHOLESTEROLEMIA: ICD-10-CM

## 2021-12-30 PROBLEM — I48.91 ATRIAL FIBRILLATION (HCC): Status: RESOLVED | Noted: 2021-08-23 | Resolved: 2021-12-30

## 2021-12-30 PROCEDURE — 1123F ACP DISCUSS/DSCN MKR DOCD: CPT | Performed by: INTERNAL MEDICINE

## 2021-12-30 PROCEDURE — 3017F COLORECTAL CA SCREEN DOC REV: CPT | Performed by: INTERNAL MEDICINE

## 2021-12-30 PROCEDURE — 99214 OFFICE O/P EST MOD 30 MIN: CPT | Performed by: INTERNAL MEDICINE

## 2021-12-30 PROCEDURE — 1036F TOBACCO NON-USER: CPT | Performed by: INTERNAL MEDICINE

## 2021-12-30 PROCEDURE — G8427 DOCREV CUR MEDS BY ELIG CLIN: HCPCS | Performed by: INTERNAL MEDICINE

## 2021-12-30 PROCEDURE — G8417 CALC BMI ABV UP PARAM F/U: HCPCS | Performed by: INTERNAL MEDICINE

## 2021-12-30 PROCEDURE — G8484 FLU IMMUNIZE NO ADMIN: HCPCS | Performed by: INTERNAL MEDICINE

## 2021-12-30 PROCEDURE — 93000 ELECTROCARDIOGRAM COMPLETE: CPT | Performed by: INTERNAL MEDICINE

## 2021-12-30 PROCEDURE — 4040F PNEUMOC VAC/ADMIN/RCVD: CPT | Performed by: INTERNAL MEDICINE

## 2021-12-30 NOTE — PROGRESS NOTES
Chief Complaint   Patient presents with    Atrial Fibrillation    Hypertension    6 Month Follow-Up       5-18-21:  Patient is a 78 y.o. male who presents with a chief complaint of Left LEg discomfort, numbness as well as coldness. . Patient is followed on a regular basis by Dr. Hakeem Mitchell MD.  Symptoms started yesterday. Patient with history of coronary disease status post PCI remotely in 2006 at [address] [place] [address] Road to cigar use, hypertension and hyperlipidemia. Also history of UTE on CPAP. Patient is currently undergoing treatment for prostate cancer as well as bone mets. Was noted to have arterial occlusion on left extremity duplex ultrasound in the ER. Left lower 70 venous duplex ultrasound is negative for deep vein thrombosis. 6/18/2021: Patient presents for initial medical evaluation. Patient is followed on a regular basis by Dr. Hakeem Mitchell MD.  Status post hospitalization for claudication. Status post bilateral extremity angiogram/abdominal aorta gram and left SFA/popliteal artery atherectomy PTA with drug-coated balloon and stent. Patient was noted to have a an 80% right common iliac artery stenosis was 100% right SFA. He is mainly in symptomatic in his right lower extremity but does have right hip discomfort with walking at times. Also some tingling in his right foot. He is compliant with the medication. He smokes cigars. 12-30-21; status post right common iliac artery stenting on July 14, 2021. Patient states his symptoms have resolved. He is doing well overall. He is compliant with his medications. Patient with history of left SFA/popliteal artery atherectomy PTA and stenting. Pt denies chest pain, nausea, vomiting, diarrhea, constipation, motor weakness, insomnia, weight loss, syncope, dizziness, lightheadedness, palpitations, PND, orthopnea, or claudication. Patient with history of coronary disease status post PCI remotely in 2006 at [facility]. No significant SOB  EKG with NSR, old anterior wall MI. Patient Active Problem List   Diagnosis    Hypertension    Pro[facility] ([facility]    Hyperlipidemia    Coronary artery disease involving native coronary artery of native heart without angina pectoris    Old MI (myocardial infarction)    Prediabetes    UTE on CPAP    Primary insomnia    S/P radiation therapy    Drug-induced erectile dysfunction    Family history of colon cancer    History of colon polyps    Polyp of cecum    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)    PAD (peripheral artery disease) (HCC)    Paroxysmal atrial fibrillation ([facility]       Past Surgical History:   Procedure Laterality Date    COLONOSCOPY N/A 2019    COLORECTAL CANCER SCREENING, NOT HIGH RISK performed by Malaika Oliveira MD at [address] [place] [address]      X       Social History     Socioeconomic History    Marital status: Single     Spouse name: Not on file    Number of children: Not on file    Years of education: Not on file    Highest education level: Not on file   Occupational History    Not on file   Tobacco Use    Smoking status: Former Smoker     Packs/day: 2.00     Years: 25.00     Pack years: 50.00     Types: Cigars     Quit date: 1995     Years since quittin.0    Smokeless tobacco: Never Used    Tobacco comment: RARE CIGAR FROM TIME TO TIME   Vaping Use    Vaping Use: Never used   Substance and Sexual Activity    Alcohol use:  Yes     Alcohol/week: 4.0 standard drinks     Types: 2 Cans of beer, 2 Shots of liquor per week    Drug use: Not Currently     Types: Marijuana Champ Cue)    Sexual activity: Not on file   Other Topics Concern    Not on file   Social History Narrative    Not on file     Social Determinants of Health     Financial Resource Strain: Low Risk     Difficulty of Paying Living Expenses: Not hard at all   Food Insecurity: No Food Insecurity    Worried About Running Out of Food in the Last Year: Never true    Ran Out of Food in the Last Year: Never true   Transportation Needs:     Lack of Transportation (Medical): Not on file    Lack of Transportation (Non-Medical): Not on file   Physical Activity:     Days of Exercise per Week: Not on file    Minutes of Exercise per Session: Not on file   Stress:     Feeling of Stress : Not on file   Social Connections:     Frequency of Communication with Friends and Family: Not on file    Frequency of Social Gatherings with Friends and Family: Not on file    Attends [employer] Services: Not on file    Active Member of [address] [place] [address] [employer] or Organizations: Not on file    Attends Club or Organization Meetings: Not on file    Marital Status: Not on file   Intimate Partner Violence:     Fear of Current or Ex-Partner: Not on file    Emotionally Abused: Not on file    Physically Abused: Not on file    Sexually Abused: Not on file   Housing Stability:     Unable to Pay for Housing in the Last Year: Not on file    Number of [place] in the Last Year: Not on file    Unstable Housing in the Last Year: Not on file       Family History   Problem Relation Age of Onset    Cancer Mother 76        BONE    Cancer Father 64        COLON    Colon Cancer Father     Cancer Brother 43        THYROID    Diabetes Maternal Grandmother        Current Outpatient Medications   Medication Sig Dispense Refill    methylPREDNISolone (MEDROL DOSEPACK) 4 MG tablet Take by mouth.  1 kit 0    tiZANidine (ZANAFLEX) 2 MG tablet Take 1 tablet by mouth nightly as needed (muscle spasms) 30 tablet 0    atorvastatin (LIPITOR) 40 MG tablet Take 1 tablet by mouth daily 90 tablet 1    lisinopril (PRINIVIL;ZESTRIL) 20 MG tablet TAKE 1 TABLET BY MOUTH  DAILY 90 tablet 3    clopidogrel (PLAVIX) 75 MG tablet Take 1 tablet by mouth daily 90 tablet 3    Talazoparib Tosylate 0.25 MG CAPS Take 1 capsule by mouth daily       APPLE CIDER VINEGAR PO Take 1 tablet by mouth daily disorder noted  Extremities - peripheral pulses normal, no pedal edema, no clubbing or cyanosis  Skin - normal coloration and turgor, no rashes, no suspicious skin lesions noted          Orders Placed This Encounter   Procedures    NM MYOCARDIAL SPECT REST EXERCISE OR RX    EKG 12 Lead       ASSESSMENT:     Diagnosis Orders   1. Paroxysmal atrial fibrillation (HCC)  EKG 12 Lead   2. PAD (peripheral artery disease) ([address]     3. Primary hypertension     4. Pure hypercholesterolemia     5. Coronary artery disease involving native coronary artery of native heart without angina pectoris     6. Abnormal EKG  NM MYOCARDIAL SPECT REST EXERCISE OR RX         PLAN:         As always, aggressive risk factor modification is strongly recommended. We should adhere to the JNC VIII guidelines for HTN management and the NCEP ATP III guidelines for LDL-C management. Cardiac diet is always recommended with low fat, cholesterol, calories and sodium. Continue medications at current doses. .    Obtain a nuclear myocardial perfusion stress test to r/o cardiac ischemia. Given abn EKG    Cont with dAPT    Smoking cessation was strongly recommended    Check EKG     Patient was advised and encouraged to check blood pressure at home or at a pharmacy, maintain a logbook, and also call us back if blood pressure are above the target ranges or if it is low. Patient clearly understands and agrees to the instructions.

## 2022-02-21 ENCOUNTER — HOSPITAL ENCOUNTER (OUTPATIENT)
Dept: NUCLEAR MEDICINE | Age: 72
Discharge: HOME | End: 2022-02-23
Payer: MEDICARE

## 2022-02-21 ENCOUNTER — HOSPITAL ENCOUNTER (OUTPATIENT)
Dept: NON INVASIVE DIAGNOSTICS | Age: 72
Discharge: HOME | End: 2022-02-21
Payer: MEDICARE

## 2022-02-21 DIAGNOSIS — R94.31 ABNORMAL EKG: ICD-10-CM

## 2022-02-21 PROCEDURE — 6360000002 HC RX W HCPCS: Performed by: INTERNAL MEDICINE

## 2022-02-21 PROCEDURE — 93017 CV STRESS TEST TRACING ONLY: CPT

## 2022-02-21 PROCEDURE — 3430000000 HC RX DIAGNOSTIC RADIOPHARMACEUTICAL: Performed by: INTERNAL MEDICINE

## 2022-02-21 PROCEDURE — 93018 CV STRESS TEST I&R ONLY: CPT | Performed by: INTERNAL MEDICINE

## 2022-02-21 PROCEDURE — A9502 TC99M TETROFOSMIN: HCPCS | Performed by: INTERNAL MEDICINE

## 2022-02-21 PROCEDURE — 78452 HT MUSCLE IMAGE SPECT MULT: CPT

## 2022-02-21 PROCEDURE — 2580000003 HC RX 258: Performed by: INTERNAL MEDICINE

## 2022-02-21 RX ORDER — SODIUM CHLORIDE 0.9 % (FLUSH) 0.9 %
10 SYRINGE (ML) INJECTION PRN
Status: COMPLETED | OUTPATIENT
Start: 2022-02-21 | End: 2022-02-21

## 2022-02-21 RX ADMIN — Medication 10 ML: at 08:00

## 2022-02-21 RX ADMIN — TETROFOSMIN 32.2 MILLICURIE: 1.38 INJECTION, POWDER, LYOPHILIZED, FOR SOLUTION INTRAVENOUS at 09:34

## 2022-02-21 RX ADMIN — REGADENOSON 0.4 MG: 0.08 INJECTION, SOLUTION INTRAVENOUS at 09:34

## 2022-02-21 RX ADMIN — Medication 10 ML: at 09:34

## 2022-02-21 RX ADMIN — TETROFOSMIN 12 MILLICURIE: 1.38 INJECTION, POWDER, LYOPHILIZED, FOR SOLUTION INTRAVENOUS at 08:00

## 2022-02-21 RX ADMIN — Medication 10 ML: at 09:35

## 2022-02-21 NOTE — PROGRESS NOTES
Reviewed history, allergies, and medications. Patient took all his morning medications prior to testing. Consent confirmed. Lexiscan exam explained. Placed patient on monitor. @ [place] [id number] here to inject Jesu Peak. SOB noted during recovery phase. Denied chest pain. No ectopy noted. Patient off monitor and instructed to eat, will have last part of exam in 1 hour.

## 2022-02-22 NOTE — PROCEDURES
[address]                      [address], [address] [facility]                              CARDIAC STRESS TEST    PATIENT NAME: Mary Gibbs                  :        1950  MED REC NO:   [id number]                            ROOM:  ACCOUNT NO:   [de-identified]                           ADMIT DATE: 2022  PROVIDER:     Avery Dupree MD    CARDIOVASCULAR DIAGNOSTIC DEPARTMENT    DATE OF STUDY:  2022    NUCLEAR STRESS TEST REPORT    ORDERING PROVIDER:  Talya Mulligan Holiday, DO    INDICATIONS:  Abnormal EKG. PROCEDURE IN DETAIL:  After informed written consent, a baseline EKG  showed normal sinus rhythm, low voltage with first degree AV block and  Q-wave _____ septal leads. For the rest portion of the test, the patient received 12.0 mCi of  Myoview IV. After appropriate delay, rest images were obtained. For the stress portion of the test, the patient received 0.4 mg of  Lexiscan IV followed by 32.2 mCi of Myoview IV. After appropriate  delay, the stress portion and stress SPECT images were obtained. FINDINGS:  1. The patient was stressed according to Lexiscan protocol for 29  seconds achieving a work level of 1 MET. 2.  The resting heart rate of 61 beats per minute gricelda to 82 beats per  minute. This value represents 54% of the maximum age-predicted heart  rate. 3.  The resting blood pressure of 156/68 gricelda to a maximum blood  pressure of 156/68. 4. EKG during the stress portion of the test showed occasional PVCs  from couplets without electrocardiographic evidence of ischemia. 5.  The PVCs resolved during recovery. 6.  The patient asymptomatic during the stress portion of the test.    REST/STRESS IMAGES:  EF 41%. TID 1.2, which falls within the normal ranges.     SPECT images showed large area of anterior septal fixed defects as well  as a large area of inferior wall fixed defect consistent with infarct  without evidence of reversible stress induced ischemia. CONCLUSION:  1. This was an abnormal nuclear stress test.  There was a large area of  fixed defect in the anteroseptal wall without evidence of reversible  stress induced ischemia and a large area of fixed defect in the inferior  wall consistent with infarct without tristian-infarct ischemia. 2.  EF mildly reduced at 41%. 3.  Clinical correlation is advised.         Maia Su MD    D: 02/21/2022 #20:47:19       T: 02/21/2022 22:15:54     [email or web address]  Job#: [id number]     Doc#: [id number]    CC:

## 2022-03-03 ENCOUNTER — APPOINTMENT (OUTPATIENT)
Dept: CT IMAGING | Age: 72
DRG: 542 | End: 2022-03-03
Payer: MEDICARE

## 2022-03-03 ENCOUNTER — HOSPITAL ENCOUNTER (EMERGENCY)
Age: 72
Discharge: HOME | DRG: 542 | End: 2022-03-03
Payer: MEDICARE

## 2022-03-03 VITALS
SYSTOLIC BLOOD PRESSURE: 144 MMHG | WEIGHT: 190 LBS | TEMPERATURE: 96.7 F | BODY MASS INDEX: 27.2 KG/M2 | OXYGEN SATURATION: 100 % | HEART RATE: 75 BPM | DIASTOLIC BLOOD PRESSURE: 72 MMHG | RESPIRATION RATE: 17 BRPM | HEIGHT: 70 IN

## 2022-03-03 DIAGNOSIS — J90 PLEURAL EFFUSION: ICD-10-CM

## 2022-03-03 DIAGNOSIS — R06.02 SHORTNESS OF BREATH: Primary | ICD-10-CM

## 2022-03-03 LAB
ALBUMIN SERPL-MCNC: 3.6 G/DL (ref 3.5–4.6)
ALP BLD-CCNC: 95 U/L (ref 35–104)
ALT SERPL-CCNC: 8 U/L (ref 0–41)
ANION GAP SERPL CALCULATED.3IONS-SCNC: 12 MEQ/L (ref 9–15)
AST SERPL-CCNC: 15 U/L (ref 0–40)
BASOPHILS ABSOLUTE: 0.1 K/UL (ref 0–0.2)
BASOPHILS RELATIVE PERCENT: 0.7 %
BILIRUB SERPL-MCNC: 0.3 MG/DL (ref 0.2–0.7)
BUN BLDV-MCNC: 15 MG/DL (ref 8–23)
CALCIUM SERPL-MCNC: 9 MG/DL (ref 8.5–9.9)
CHLORIDE BLD-SCNC: 107 MEQ/L (ref 95–107)
CO2: 20 MEQ/L (ref 20–31)
CREAT SERPL-MCNC: 1.02 MG/DL (ref 0.7–1.2)
EOSINOPHILS ABSOLUTE: 0.3 K/UL (ref 0–0.7)
EOSINOPHILS RELATIVE PERCENT: 3.2 %
GFR AFRICAN AMERICAN: >60
GFR NON-AFRICAN AMERICAN: >60
GLOBULIN: 2.9 G/DL (ref 2.3–3.5)
GLUCOSE BLD-MCNC: 88 MG/DL (ref 70–99)
HCT VFR BLD CALC: 35.7 % (ref 42–52)
HEMOGLOBIN: 12.4 G/DL (ref 14–18)
INFLUENZA A BY PCR: NEGATIVE
INFLUENZA B BY PCR: NEGATIVE
LYMPHOCYTES ABSOLUTE: 1.6 K/UL (ref 1–4.8)
LYMPHOCYTES RELATIVE PERCENT: 19.8 %
MAGNESIUM: 2.1 MG/DL (ref 1.7–2.4)
MCH RBC QN AUTO: 31.5 PG (ref 27–31.3)
MCHC RBC AUTO-ENTMCNC: 34.6 % (ref 33–37)
MCV RBC AUTO: 91.1 FL (ref 80–100)
MONOCYTES ABSOLUTE: 0.5 K/UL (ref 0.2–0.8)
MONOCYTES RELATIVE PERCENT: 6.4 %
NEUTROPHILS ABSOLUTE: 5.5 K/UL (ref 1.4–6.5)
NEUTROPHILS RELATIVE PERCENT: 69.9 %
PDW BLD-RTO: 13.7 % (ref 11.5–14.5)
PLATELET # BLD: 295 K/UL (ref 130–400)
POTASSIUM SERPL-SCNC: 3.9 MEQ/L (ref 3.4–4.9)
PRO-BNP: 4321 PG/ML
RBC # BLD: 3.92 M/UL (ref 4.7–6.1)
SARS-COV-2, NAAT: NOT DETECTED
SODIUM BLD-SCNC: 139 MEQ/L (ref 135–144)
TOTAL PROTEIN: 6.5 G/DL (ref 6.3–8)
TROPONIN: <0.01 NG/ML (ref 0–0.01)
WBC # BLD: 7.9 K/UL (ref 4.8–10.8)

## 2022-03-03 PROCEDURE — 36415 COLL VENOUS BLD VENIPUNCTURE: CPT

## 2022-03-03 PROCEDURE — 71275 CT ANGIOGRAPHY CHEST: CPT

## 2022-03-03 PROCEDURE — 6360000004 HC RX CONTRAST MEDICATION: Performed by: PHYSICIAN ASSISTANT

## 2022-03-03 PROCEDURE — 87502 INFLUENZA DNA AMP PROBE: CPT

## 2022-03-03 PROCEDURE — 85025 COMPLETE CBC W/AUTO DIFF WBC: CPT

## 2022-03-03 PROCEDURE — 83880 ASSAY OF NATRIURETIC PEPTIDE: CPT

## 2022-03-03 PROCEDURE — 80053 COMPREHEN METABOLIC PANEL: CPT

## 2022-03-03 PROCEDURE — 99285 EMERGENCY DEPT VISIT HI MDM: CPT

## 2022-03-03 PROCEDURE — 87635 SARS-COV-2 COVID-19 AMP PRB: CPT

## 2022-03-03 PROCEDURE — 96374 THER/PROPH/DIAG INJ IV PUSH: CPT

## 2022-03-03 PROCEDURE — 83735 ASSAY OF MAGNESIUM: CPT

## 2022-03-03 PROCEDURE — 84484 ASSAY OF TROPONIN QUANT: CPT

## 2022-03-03 PROCEDURE — 6360000002 HC RX W HCPCS: Performed by: PHYSICIAN ASSISTANT

## 2022-03-03 RX ORDER — METHYLPREDNISOLONE SODIUM SUCCINATE 125 MG/2ML
125 INJECTION, POWDER, LYOPHILIZED, FOR SOLUTION INTRAMUSCULAR; INTRAVENOUS ONCE
Status: COMPLETED | OUTPATIENT
Start: 2022-03-03 | End: 2022-03-03

## 2022-03-03 RX ADMIN — METHYLPREDNISOLONE SODIUM SUCCINATE 125 MG: 125 INJECTION, POWDER, FOR SOLUTION INTRAMUSCULAR; INTRAVENOUS at 13:20

## 2022-03-03 RX ADMIN — IOPAMIDOL 100 ML: 612 INJECTION, SOLUTION INTRAVENOUS at 14:21

## 2022-03-03 NOTE — ED PROVIDER NOTES
[address] [facility] ED  eMERGENCY dEPARTMENTeNCOUnter      Pt Name: Bronson Garibay  MRN: [id number]  Arielgfjosette 1950  Date ofevaluation: 3/3/2022  Provider: Bharat Pickett PA-C    CHIEF COMPLAINT       Chief Complaint   Patient presents with    Shortness of Breath     that started last night. Pt states his cpap machine wasn't working. pt states he had bone scan yesterday. HISTORY OF PRESENT ILLNESS   (Location/Symptom, Timing/Onset,Context/Setting, Quality, Duration, Modifying Factors, Severity)  Note limiting factors. Bronson Garibay is a 73 y.o. male who presents to the emergency department sob since last night. It is worse with lying flat. Denies exertional dyspnea. Denies cough. Currently being treated for prostate cancer and bone cancer, had a bone scan yesterday. Denies any chest pain or dizziness. HPI    NursingNotes were reviewed. REVIEW OF SYSTEMS    (2-9 systems for level 4, 10 or more for level 5)     Review of Systems   Constitutional: Negative for chills, diaphoresis, fatigue and fever. HENT: Negative for congestion, rhinorrhea and sore throat. Eyes: Negative for photophobia and pain. Respiratory: Positive for shortness of breath. Negative for cough. Cardiovascular: Negative for chest pain and palpitations. Gastrointestinal: Negative for abdominal pain, diarrhea, nausea and vomiting. Genitourinary: Negative for dysuria and flank pain. Musculoskeletal: Negative for back pain. Skin: Negative for rash. Neurological: Negative for dizziness, light-headedness and headaches. Psychiatric/Behavioral: Negative. All other systems reviewed and are negative. Except as noted above the remainder of the review of systems was reviewed and negative.        PAST MEDICAL HISTORY     Past Medical History:   Diagnosis Date    CAD (coronary artery disease)     Family history of colon cancer     Hyperlipidemia     Hypertension     Old MI (myocardial infarction) 02/03/2006    UTE on CPAP     PAD (peripheral artery disease) (HCC)     Prostate cancer ([facility]          SURGICALHISTORY       Past Surgical History:   Procedure Laterality Date    COLONOSCOPY N/A 8/19/2019    COLORECTAL CANCER SCREENING, NOT HIGH RISK performed by Birdie Kilgore MD at [facility]       Discharge Medication List as of 3/3/2022  4:27 PM      CONTINUE these medications which have NOT CHANGED    Details   methylPREDNISolone (MEDROL DOSEPACK) 4 MG tablet Take by mouth., Disp-1 kit, R-0Normal      tiZANidine (ZANAFLEX) 2 MG tablet Take 1 tablet by mouth nightly as needed (muscle spasms), Disp-30 tablet, R-0Normal      atorvastatin (LIPITOR) 40 MG tablet Take 1 tablet by mouth daily, Disp-90 tablet, R-1Normal      lisinopril (PRINIVIL;ZESTRIL) 20 MG tablet TAKE 1 TABLET BY MOUTH  DAILY, Disp-90 tablet, R-3Requesting 1 year supplyNormal      clopidogrel (PLAVIX) 75 MG tablet Take 1 tablet by mouth daily, Disp-90 tablet, R-3Normal      Talazoparib Tosylate 0.25 MG CAPS Take 1 capsule by mouth daily Historical Med      APPLE CIDER VINEGAR PO Take 1 tablet by mouth daily Historical Med      Cyanocobalamin (VITAMIN B-12 CR PO) Take 1 tablet by mouth daily Historical Med      tamsulosin (FLOMAX) 0.4 MG capsule Take 0.4 mg by mouth 2 times daily , R-11Historical Med      CPAP Machine MISC Starting Tue 1/9/2018, Disp-1 each, R-0, PrintUse nightly as directed. Pressure setting 5 cm H2O.       Ascorbic Acid (VITAMIN C PO) Take 500 mg by mouth      aspirin 81 MG EC tablet Take 1 tablet by mouth once for 1 dose, Disp-30 tablet, R-3Normal             ALLERGIES     Morphine    FAMILY HISTORY       Family History   Problem Relation Age of Onset    Cancer Mother 76        BONE    Cancer Father 64        COLON    Colon Cancer Father     Cancer Brother 43        THYROID    Diabetes Maternal Grandmother           SOCIAL HISTORY Social History     Socioeconomic History    Marital status: Single     Spouse name: None    Number of children: None    Years of education: None    Highest education level: None   Occupational History    None   Tobacco Use    Smoking status: Former Smoker     Packs/day: 2.00     Years: 25.00     Pack years: 50.00     Types: Cigars     Quit date: 1995     Years since quittin.1    Smokeless tobacco: Never Used    Tobacco comment: RARE CIGAR FROM TIME TO TIME   Vaping Use    Vaping Use: Never used   Substance and Sexual Activity    Alcohol use: Yes     Alcohol/week: 4.0 standard drinks     Types: 2 Cans of beer, 2 Shots of liquor per week    Drug use: Not Currently     Types: Marijuana Isa Coho)    Sexual activity: None   Other Topics Concern    None   Social History Narrative    None     Social Determinants of Health     Financial Resource Strain: Low Risk     Difficulty of Paying Living Expenses: Not hard at all   Food Insecurity: No Food Insecurity    Worried About Running Out of Food in the Last Year: Never true    Eloy of Food in the Last Year: Never true   Transportation Needs:     Lack of Transportation (Medical): Not on file    Lack of Transportation (Non-Medical):  Not on file   Physical Activity:     Days of Exercise per Week: Not on file    Minutes of Exercise per Session: Not on file   Stress:     Feeling of Stress : Not on file   Social Connections:     Frequency of Communication with Friends and Family: Not on file    Frequency of Social Gatherings with Friends and Family: Not on file    Attends Sikh Services: Not on file    Active Member of Clubs or Organizations: Not on file    Attends Club or Organization Meetings: Not on file    Marital Status: Not on file   Intimate Partner Violence:     Fear of Current or Ex-Partner: Not on file    Emotionally Abused: Not on file    Physically Abused: Not on file    Sexually Abused: Not on file   Housing Stability:  Unable to Pay for Housing in the Last Year: Not on file    Number of Places Lived in the Last Year: Not on file    Unstable Housing in the Last Year: Not on file       SCREENINGS    [place] Coma Scale  Eye Opening: Spontaneous  Best Verbal Response: Oriented  Best Motor Response: Obeys commands  [place] Coma Scale Score: 15 @FLOW([id number])@      PHYSICAL EXAM    (up to 7 for level 4, 8 or more for level 5)     ED Triage Vitals [03/03/22 1235]   BP Temp Temp Source Pulse Resp SpO2 Height Weight   (!) 152/77 96.7 °F (35.9 °C) Oral 71 22 98 % 5' 10\" (1.778 m) 190 lb (86.2 kg)       Physical Exam  Vitals and nursing note reviewed. Constitutional:       General: He is not in acute distress. Appearance: Normal appearance. He is well-developed. He is not diaphoretic. HENT:      Head: Normocephalic and atraumatic. Eyes:      General: Lids are normal.      Conjunctiva/sclera: Conjunctivae normal.   Cardiovascular:      Rate and Rhythm: Normal rate and regular rhythm. Pulses: Normal pulses. Heart sounds: Normal heart sounds. Pulmonary:      Effort: Pulmonary effort is normal.      Breath sounds: Normal breath sounds. Abdominal:      General: Bowel sounds are normal.      Palpations: Abdomen is soft. Tenderness: There is no abdominal tenderness. Musculoskeletal:      Cervical back: Normal range of motion and neck supple. Lymphadenopathy:      Cervical: No cervical adenopathy. Skin:     General: Skin is warm and dry. Capillary Refill: Capillary refill takes less than 2 seconds. Findings: No rash. Neurological:      Mental Status: He is alert and oriented to person, place, and time. Psychiatric:         Thought Content:  Thought content normal.         Judgment: Judgment normal.         DIAGNOSTIC RESULTS     EKG: All EKG's are interpreted by the Emergency Department Physician who either signs or Co-signsthis chart in the absence of a cardiologist.    Sinus rhytm with 1st degree av block no stemi. RADIOLOGY:   Non-plain filmimages such as CT, Ultrasound and MRI are read by the radiologist. Plain radiographic images are visualized and preliminarily interpreted by the emergency physician with the below findings:        Interpretation per the Radiologist below, if available at the time ofthis note:    CTA Chest W WO  (PE study)   Final Result   No evidence of pulmonary embolism is seen. Extensive hilar and mediastinal lymphadenopathy is seen. Moderate size bilateral pleural effusions are seen. Heterogenous bone density cannot rule out underlying lesions. If clinically indicated would recommend MRI of the spine. Multiple nodules visualized within the thyroid gland, recommend dedicated thyroid ultrasound for evaluation. ED BEDSIDE ULTRASOUND:   Performed by ED Physician - none    LABS:  Labs Reviewed   CBC WITH AUTO DIFFERENTIAL - Abnormal; Notable for the following components:       Result Value    RBC 3.92 (*)     Hemoglobin 12.4 (*)     Hematocrit 35.7 (*)     MCH 31.5 (*)     All other components within normal limits   COVID-19, RAPID   RAPID INFLUENZA A/B ANTIGENS   COMPREHENSIVE METABOLIC PANEL   TROPONIN   MAGNESIUM   BRAIN NATRIURETIC PEPTIDE       All other labs were within normal range or not returned as of this dictation. EMERGENCY DEPARTMENT COURSE and DIFFERENTIAL DIAGNOSIS/MDM:   Vitals:    Vitals:    03/03/22 1500 03/03/22 1530 03/03/22 1600 03/03/22 1630   BP: (!) 156/89 (!) 149/71 (!) 155/74 (!) 144/72   Pulse:  70 79 75   Resp:  18 20 17   Temp:       TempSrc:       SpO2:  99% 100% 100%   Weight:       Height:               MDM    Presents with shortness of breath his oxygen saturations have been 98 to 100% on room air he is in no acute respiratory distress resting comfortably. Labs are grossly unremarkable. CTA of the chest was ordered due to shortness of breath cancer concern for hypercoagulable state.   CT reveals bilateral moderate pleural effusions, but not hypoxic and no resp distress. D/w Dr. Nelly Ramachandran, he made an apt for the pt for his office for Tuesday at 1pm. Pt d/c home with home pulse ox, given strict warning precautions to return for. REASSESSMENT          CRITICAL CARE TIME   Total Critical Care time was  minutes, excluding separatelyreportable procedures. There was a high probability ofclinically significant/life threatening deterioration in the patient's condition which required my urgent intervention. CONSULTS:  None    PROCEDURES:  Unless otherwise noted below, none     Procedures    FINAL IMPRESSION      1. Shortness of breath    2.  Pleural effusion          DISPOSITION/PLAN   DISPOSITION Decision To Discharge 03/03/2022 04:26:38 PM      PATIENT REFERREDTO:  Viky Rehman MD  [address] [place] [address]  [phone]    Schedule an appointment as soon as possible for a visit in 2 days      Quinton Ferrera MD  [place] [address]  [phone]    Go to   tuesday at [address]:  Discharge Medication List as of 3/3/2022  4:27 PM             (Please note that portions of this note were completed with a voice recognition program.  Efforts were made to edit the dictations but occasionally words are mis-transcribed.)    Dedra Rodgers PA-C (electronically signed)  Attending Emergency Physician         Dedra Rodgers PA-C  03/04/22 6945

## 2022-03-03 NOTE — ED NOTES
Pt ambulated in romero with no problems. Pt SpO2 96-98% for ambulation in romero of approximately 200 ft.      Maggie Holden RN  03/03/22 1903

## 2022-03-04 ASSESSMENT — ENCOUNTER SYMPTOMS
VOMITING: 0
SORE THROAT: 0
BACK PAIN: 0
RHINORRHEA: 0
COUGH: 0
EYE PAIN: 0
ABDOMINAL PAIN: 0
SHORTNESS OF BREATH: 1
DIARRHEA: 0
PHOTOPHOBIA: 0
NAUSEA: 0

## 2022-03-05 ENCOUNTER — HOSPITAL ENCOUNTER (INPATIENT)
Age: 72
LOS: 2 days | Discharge: HOME | DRG: 542 | End: 2022-03-07
Attending: FAMILY MEDICINE | Admitting: INTERNAL MEDICINE
Payer: MEDICARE

## 2022-03-05 ENCOUNTER — APPOINTMENT (OUTPATIENT)
Dept: GENERAL RADIOLOGY | Age: 72
DRG: 542 | End: 2022-03-05
Payer: MEDICARE

## 2022-03-05 ENCOUNTER — APPOINTMENT (OUTPATIENT)
Dept: CT IMAGING | Age: 72
DRG: 542 | End: 2022-03-05
Payer: MEDICARE

## 2022-03-05 DIAGNOSIS — C61 PROSTATE CANCER METASTATIC TO BONE (HCC): ICD-10-CM

## 2022-03-05 DIAGNOSIS — J18.9 PNEUMONIA OF RIGHT MIDDLE LOBE DUE TO INFECTIOUS ORGANISM: Primary | ICD-10-CM

## 2022-03-05 DIAGNOSIS — C79.51 PROSTATE CANCER METASTATIC TO BONE (HCC): ICD-10-CM

## 2022-03-05 DIAGNOSIS — J90 BILIARY PLEURAL EFFUSION: ICD-10-CM

## 2022-03-05 LAB
ALBUMIN SERPL-MCNC: 3.6 G/DL (ref 3.5–4.6)
ALP BLD-CCNC: 100 U/L (ref 35–104)
ALT SERPL-CCNC: 9 U/L (ref 0–41)
ANION GAP SERPL CALCULATED.3IONS-SCNC: 15 MEQ/L (ref 9–15)
AST SERPL-CCNC: 14 U/L (ref 0–40)
BASOPHILS ABSOLUTE: 0 K/UL (ref 0–0.2)
BASOPHILS RELATIVE PERCENT: 0.4 %
BILIRUB SERPL-MCNC: 0.4 MG/DL (ref 0.2–0.7)
BILIRUBIN URINE: NEGATIVE
BLOOD, URINE: NEGATIVE
BUN BLDV-MCNC: 19 MG/DL (ref 8–23)
CALCIUM SERPL-MCNC: 8.8 MG/DL (ref 8.5–9.9)
CHLORIDE BLD-SCNC: 108 MEQ/L (ref 95–107)
CLARITY: CLEAR
CO2: 19 MEQ/L (ref 20–31)
COLOR: YELLOW
CREAT SERPL-MCNC: 0.99 MG/DL (ref 0.7–1.2)
EOSINOPHILS ABSOLUTE: 0.2 K/UL (ref 0–0.7)
EOSINOPHILS RELATIVE PERCENT: 2.3 %
GFR AFRICAN AMERICAN: >60
GFR NON-AFRICAN AMERICAN: >60
GLOBULIN: 2.8 G/DL (ref 2.3–3.5)
GLUCOSE BLD-MCNC: 150 MG/DL (ref 70–99)
GLUCOSE URINE: NEGATIVE MG/DL
HCT VFR BLD CALC: 35.5 % (ref 42–52)
HEMOGLOBIN: 12.1 G/DL (ref 14–18)
KETONES, URINE: NEGATIVE MG/DL
LACTIC ACID: 2.3 MMOL/L (ref 0.5–2.2)
LEUKOCYTE ESTERASE, URINE: NEGATIVE
LYMPHOCYTES ABSOLUTE: 1.5 K/UL (ref 1–4.8)
LYMPHOCYTES RELATIVE PERCENT: 19.9 %
MCH RBC QN AUTO: 31.1 PG (ref 27–31.3)
MCHC RBC AUTO-ENTMCNC: 34.2 % (ref 33–37)
MCV RBC AUTO: 90.7 FL (ref 80–100)
MONOCYTES ABSOLUTE: 0.4 K/UL (ref 0.2–0.8)
MONOCYTES RELATIVE PERCENT: 5.1 %
NEUTROPHILS ABSOLUTE: 5.3 K/UL (ref 1.4–6.5)
NEUTROPHILS RELATIVE PERCENT: 72.3 %
NITRITE, URINE: NEGATIVE
PDW BLD-RTO: 14.3 % (ref 11.5–14.5)
PH UA: 5.5 (ref 5–9)
PLATELET # BLD: 280 K/UL (ref 130–400)
POTASSIUM SERPL-SCNC: 3.6 MEQ/L (ref 3.4–4.9)
PROCALCITONIN: 0.07 NG/ML (ref 0–0.15)
PROCALCITONIN: 0.08 NG/ML (ref 0–0.15)
PROTEIN UA: NEGATIVE MG/DL
RBC # BLD: 3.91 M/UL (ref 4.7–6.1)
SODIUM BLD-SCNC: 142 MEQ/L (ref 135–144)
SPECIFIC GRAVITY UA: 1.01 (ref 1–1.03)
TOTAL CK: 187 U/L (ref 0–190)
TOTAL PROTEIN: 6.4 G/DL (ref 6.3–8)
TROPONIN: <0.01 NG/ML (ref 0–0.01)
URINE REFLEX TO CULTURE: NORMAL
UROBILINOGEN, URINE: 0.2 E.U./DL
WBC # BLD: 7.3 K/UL (ref 4.8–10.8)

## 2022-03-05 PROCEDURE — 82550 ASSAY OF CK (CPK): CPT

## 2022-03-05 PROCEDURE — 71045 X-RAY EXAM CHEST 1 VIEW: CPT

## 2022-03-05 PROCEDURE — 80053 COMPREHEN METABOLIC PANEL: CPT

## 2022-03-05 PROCEDURE — 81003 URINALYSIS AUTO W/O SCOPE: CPT

## 2022-03-05 PROCEDURE — 84145 PROCALCITONIN (PCT): CPT

## 2022-03-05 PROCEDURE — 6360000002 HC RX W HCPCS: Performed by: FAMILY MEDICINE

## 2022-03-05 PROCEDURE — 83605 ASSAY OF LACTIC ACID: CPT

## 2022-03-05 PROCEDURE — 99284 EMERGENCY DEPT VISIT MOD MDM: CPT

## 2022-03-05 PROCEDURE — 6370000000 HC RX 637 (ALT 250 FOR IP): Performed by: FAMILY MEDICINE

## 2022-03-05 PROCEDURE — 84484 ASSAY OF TROPONIN QUANT: CPT

## 2022-03-05 PROCEDURE — 93005 ELECTROCARDIOGRAM TRACING: CPT | Performed by: FAMILY MEDICINE

## 2022-03-05 PROCEDURE — 2580000003 HC RX 258: Performed by: FAMILY MEDICINE

## 2022-03-05 PROCEDURE — 36415 COLL VENOUS BLD VENIPUNCTURE: CPT

## 2022-03-05 PROCEDURE — 71250 CT THORAX DX C-: CPT

## 2022-03-05 PROCEDURE — 2060000000 HC ICU INTERMEDIATE R&B

## 2022-03-05 PROCEDURE — 85025 COMPLETE CBC W/AUTO DIFF WBC: CPT

## 2022-03-05 PROCEDURE — 2580000003 HC RX 258: Performed by: NURSE PRACTITIONER

## 2022-03-05 RX ORDER — SODIUM CHLORIDE 9 MG/ML
25 INJECTION, SOLUTION INTRAVENOUS PRN
Status: DISCONTINUED | OUTPATIENT
Start: 2022-03-05 | End: 2022-03-07 | Stop reason: HOSPADM

## 2022-03-05 RX ORDER — ACETAMINOPHEN 325 MG/1
650 TABLET ORAL EVERY 6 HOURS PRN
Status: DISCONTINUED | OUTPATIENT
Start: 2022-03-05 | End: 2022-03-07 | Stop reason: HOSPADM

## 2022-03-05 RX ORDER — ONDANSETRON 4 MG/1
4 TABLET, ORALLY DISINTEGRATING ORAL EVERY 8 HOURS PRN
Status: DISCONTINUED | OUTPATIENT
Start: 2022-03-05 | End: 2022-03-07 | Stop reason: HOSPADM

## 2022-03-05 RX ORDER — FUROSEMIDE 10 MG/ML
40 INJECTION INTRAMUSCULAR; INTRAVENOUS DAILY
Status: DISCONTINUED | OUTPATIENT
Start: 2022-03-05 | End: 2022-03-07 | Stop reason: HOSPADM

## 2022-03-05 RX ORDER — IBUPROFEN 800 MG/1
800 TABLET ORAL ONCE
Status: COMPLETED | OUTPATIENT
Start: 2022-03-05 | End: 2022-03-05

## 2022-03-05 RX ORDER — AZITHROMYCIN 500 MG/1
500 TABLET, FILM COATED ORAL EVERY 24 HOURS
Status: DISCONTINUED | OUTPATIENT
Start: 2022-03-06 | End: 2022-03-07

## 2022-03-05 RX ORDER — KETOROLAC TROMETHAMINE 15 MG/ML
15 INJECTION, SOLUTION INTRAMUSCULAR; INTRAVENOUS EVERY 6 HOURS PRN
Status: DISCONTINUED | OUTPATIENT
Start: 2022-03-05 | End: 2022-03-07 | Stop reason: HOSPADM

## 2022-03-05 RX ORDER — POLYETHYLENE GLYCOL 3350 17 G/17G
17 POWDER, FOR SOLUTION ORAL DAILY PRN
Status: DISCONTINUED | OUTPATIENT
Start: 2022-03-05 | End: 2022-03-07 | Stop reason: HOSPADM

## 2022-03-05 RX ORDER — ONDANSETRON 2 MG/ML
4 INJECTION INTRAMUSCULAR; INTRAVENOUS EVERY 6 HOURS PRN
Status: DISCONTINUED | OUTPATIENT
Start: 2022-03-05 | End: 2022-03-07 | Stop reason: HOSPADM

## 2022-03-05 RX ORDER — ACETAMINOPHEN 650 MG/1
650 SUPPOSITORY RECTAL EVERY 6 HOURS PRN
Status: DISCONTINUED | OUTPATIENT
Start: 2022-03-05 | End: 2022-03-07 | Stop reason: HOSPADM

## 2022-03-05 RX ORDER — IPRATROPIUM BROMIDE AND ALBUTEROL SULFATE 2.5; .5 MG/3ML; MG/3ML
1 SOLUTION RESPIRATORY (INHALATION) 4 TIMES DAILY
Status: DISCONTINUED | OUTPATIENT
Start: 2022-03-05 | End: 2022-03-06

## 2022-03-05 RX ORDER — SODIUM CHLORIDE 0.9 % (FLUSH) 0.9 %
5-40 SYRINGE (ML) INJECTION EVERY 12 HOURS SCHEDULED
Status: DISCONTINUED | OUTPATIENT
Start: 2022-03-05 | End: 2022-03-07 | Stop reason: HOSPADM

## 2022-03-05 RX ORDER — SODIUM CHLORIDE 0.9 % (FLUSH) 0.9 %
5-40 SYRINGE (ML) INJECTION PRN
Status: DISCONTINUED | OUTPATIENT
Start: 2022-03-05 | End: 2022-03-07 | Stop reason: HOSPADM

## 2022-03-05 RX ADMIN — AZITHROMYCIN MONOHYDRATE 500 MG: 500 INJECTION, POWDER, LYOPHILIZED, FOR SOLUTION INTRAVENOUS at 21:26

## 2022-03-05 RX ADMIN — Medication 10 ML: at 22:51

## 2022-03-05 RX ADMIN — CEFTRIAXONE SODIUM 1000 MG: 1 INJECTION, POWDER, FOR SOLUTION INTRAMUSCULAR; INTRAVENOUS at 21:12

## 2022-03-05 RX ADMIN — IBUPROFEN 800 MG: 800 TABLET, FILM COATED ORAL at 20:28

## 2022-03-05 ASSESSMENT — PAIN DESCRIPTION - LOCATION
LOCATION: HEAD
LOCATION: RIB CAGE

## 2022-03-05 ASSESSMENT — PAIN DESCRIPTION - DESCRIPTORS
DESCRIPTORS: ACHING
DESCRIPTORS: ACHING

## 2022-03-05 ASSESSMENT — ENCOUNTER SYMPTOMS
GASTROINTESTINAL NEGATIVE: 1
WHEEZING: 0
CHOKING: 0
CHEST TIGHTNESS: 0
COUGH: 1
EYES NEGATIVE: 1
STRIDOR: 0
BACK PAIN: 1
CHEST TIGHTNESS: 1
SHORTNESS OF BREATH: 1
ALLERGIC/IMMUNOLOGIC NEGATIVE: 1

## 2022-03-05 ASSESSMENT — PAIN DESCRIPTION - PAIN TYPE
TYPE: ACUTE PAIN
TYPE: ACUTE PAIN

## 2022-03-05 ASSESSMENT — PAIN SCALES - GENERAL
PAINLEVEL_OUTOF10: 1
PAINLEVEL_OUTOF10: 1
PAINLEVEL_OUTOF10: 0
PAINLEVEL_OUTOF10: 1

## 2022-03-05 ASSESSMENT — PAIN - FUNCTIONAL ASSESSMENT: PAIN_FUNCTIONAL_ASSESSMENT: 0-10

## 2022-03-05 NOTE — ED TRIAGE NOTES
Patient was seen here on 03/03 for same complaint. Patient states that he was given Solu-medrol while here but not sent home with any medication. States that shortness of breath has not improved. States that he is unable to lay flat and dyspnea worsens with exertion. Patient states he has an appointment on Tuesday with pulmonology.

## 2022-03-06 LAB
ANION GAP SERPL CALCULATED.3IONS-SCNC: 11 MEQ/L (ref 9–15)
BASOPHILS ABSOLUTE: 0 K/UL (ref 0–0.2)
BASOPHILS RELATIVE PERCENT: 0.5 %
BUN BLDV-MCNC: 20 MG/DL (ref 8–23)
CALCIUM SERPL-MCNC: 8.9 MG/DL (ref 8.5–9.9)
CHLORIDE BLD-SCNC: 110 MEQ/L (ref 95–107)
CO2: 22 MEQ/L (ref 20–31)
CREAT SERPL-MCNC: 1.23 MG/DL (ref 0.7–1.2)
EOSINOPHILS ABSOLUTE: 0.3 K/UL (ref 0–0.7)
EOSINOPHILS RELATIVE PERCENT: 5.1 %
GFR AFRICAN AMERICAN: >60
GFR NON-AFRICAN AMERICAN: 58.2
GLUCOSE BLD-MCNC: 135 MG/DL (ref 70–99)
HCT VFR BLD CALC: 36 % (ref 42–52)
HEMOGLOBIN: 12.2 G/DL (ref 14–18)
LACTIC ACID: 1.2 MMOL/L (ref 0.5–2.2)
LYMPHOCYTES ABSOLUTE: 1.4 K/UL (ref 1–4.8)
LYMPHOCYTES RELATIVE PERCENT: 20.4 %
MCH RBC QN AUTO: 31.3 PG (ref 27–31.3)
MCHC RBC AUTO-ENTMCNC: 33.8 % (ref 33–37)
MCV RBC AUTO: 92.5 FL (ref 80–100)
MONOCYTES ABSOLUTE: 0.4 K/UL (ref 0.2–0.8)
MONOCYTES RELATIVE PERCENT: 6.5 %
NEUTROPHILS ABSOLUTE: 4.5 K/UL (ref 1.4–6.5)
NEUTROPHILS RELATIVE PERCENT: 67.5 %
PDW BLD-RTO: 13.9 % (ref 11.5–14.5)
PLATELET # BLD: 265 K/UL (ref 130–400)
POTASSIUM REFLEX MAGNESIUM: 4.5 MEQ/L (ref 3.4–4.9)
PROCALCITONIN: 0.07 NG/ML (ref 0–0.15)
RBC # BLD: 3.89 M/UL (ref 4.7–6.1)
SODIUM BLD-SCNC: 143 MEQ/L (ref 135–144)
WBC # BLD: 6.7 K/UL (ref 4.8–10.8)

## 2022-03-06 PROCEDURE — 80048 BASIC METABOLIC PNL TOTAL CA: CPT

## 2022-03-06 PROCEDURE — 36415 COLL VENOUS BLD VENIPUNCTURE: CPT

## 2022-03-06 PROCEDURE — 6360000002 HC RX W HCPCS: Performed by: NURSE PRACTITIONER

## 2022-03-06 PROCEDURE — 6370000000 HC RX 637 (ALT 250 FOR IP): Performed by: NURSE PRACTITIONER

## 2022-03-06 PROCEDURE — 2580000003 HC RX 258: Performed by: NURSE PRACTITIONER

## 2022-03-06 PROCEDURE — 94664 DEMO&/EVAL PT USE INHALER: CPT

## 2022-03-06 PROCEDURE — 2060000000 HC ICU INTERMEDIATE R&B

## 2022-03-06 PROCEDURE — 85025 COMPLETE CBC W/AUTO DIFF WBC: CPT

## 2022-03-06 PROCEDURE — 83605 ASSAY OF LACTIC ACID: CPT

## 2022-03-06 RX ORDER — ATORVASTATIN CALCIUM 40 MG/1
40 TABLET, FILM COATED ORAL DAILY
Status: DISCONTINUED | OUTPATIENT
Start: 2022-03-06 | End: 2022-03-07 | Stop reason: HOSPADM

## 2022-03-06 RX ORDER — IPRATROPIUM BROMIDE AND ALBUTEROL SULFATE 2.5; .5 MG/3ML; MG/3ML
1 SOLUTION RESPIRATORY (INHALATION) EVERY 4 HOURS PRN
Status: DISCONTINUED | OUTPATIENT
Start: 2022-03-06 | End: 2022-03-07 | Stop reason: HOSPADM

## 2022-03-06 RX ORDER — CLOPIDOGREL BISULFATE 75 MG/1
75 TABLET ORAL DAILY
Status: DISCONTINUED | OUTPATIENT
Start: 2022-03-06 | End: 2022-03-07 | Stop reason: HOSPADM

## 2022-03-06 RX ORDER — TAMSULOSIN HYDROCHLORIDE 0.4 MG/1
0.4 CAPSULE ORAL 2 TIMES DAILY
Status: DISCONTINUED | OUTPATIENT
Start: 2022-03-06 | End: 2022-03-07 | Stop reason: HOSPADM

## 2022-03-06 RX ORDER — ASCORBIC ACID 500 MG
500 TABLET ORAL DAILY
Status: DISCONTINUED | OUTPATIENT
Start: 2022-03-06 | End: 2022-03-07 | Stop reason: HOSPADM

## 2022-03-06 RX ORDER — CHOLECALCIFEROL (VITAMIN D3) 125 MCG
1000 CAPSULE ORAL DAILY
Status: DISCONTINUED | OUTPATIENT
Start: 2022-03-06 | End: 2022-03-07 | Stop reason: HOSPADM

## 2022-03-06 RX ORDER — ASPIRIN 81 MG/1
81 TABLET ORAL ONCE
Status: COMPLETED | OUTPATIENT
Start: 2022-03-06 | End: 2022-03-06

## 2022-03-06 RX ORDER — LISINOPRIL 20 MG/1
20 TABLET ORAL DAILY
Status: DISCONTINUED | OUTPATIENT
Start: 2022-03-06 | End: 2022-03-07 | Stop reason: HOSPADM

## 2022-03-06 RX ADMIN — FUROSEMIDE 40 MG: 10 INJECTION, SOLUTION INTRAMUSCULAR; INTRAVENOUS at 08:58

## 2022-03-06 RX ADMIN — AZITHROMYCIN MONOHYDRATE 500 MG: 500 TABLET ORAL at 21:37

## 2022-03-06 RX ADMIN — CYANOCOBALAMIN TAB 500 MCG 1000 MCG: 500 TAB at 09:08

## 2022-03-06 RX ADMIN — LISINOPRIL 20 MG: 20 TABLET ORAL at 09:08

## 2022-03-06 RX ADMIN — CLOPIDOGREL BISULFATE 75 MG: 75 TABLET ORAL at 08:58

## 2022-03-06 RX ADMIN — KETOROLAC TROMETHAMINE 15 MG: 15 INJECTION, SOLUTION INTRAMUSCULAR; INTRAVENOUS at 21:47

## 2022-03-06 RX ADMIN — ENOXAPARIN SODIUM 40 MG: 100 INJECTION SUBCUTANEOUS at 08:58

## 2022-03-06 RX ADMIN — TAMSULOSIN HYDROCHLORIDE 0.4 MG: 0.4 CAPSULE ORAL at 18:51

## 2022-03-06 RX ADMIN — CEFTRIAXONE SODIUM 1000 MG: 1 INJECTION, POWDER, FOR SOLUTION INTRAMUSCULAR; INTRAVENOUS at 21:40

## 2022-03-06 RX ADMIN — ASPIRIN 81 MG: 81 TABLET, COATED ORAL at 05:59

## 2022-03-06 RX ADMIN — OXYCODONE HYDROCHLORIDE AND ACETAMINOPHEN 500 MG: 500 TABLET ORAL at 08:58

## 2022-03-06 RX ADMIN — Medication 10 ML: at 21:43

## 2022-03-06 RX ADMIN — FUROSEMIDE 40 MG: 10 INJECTION, SOLUTION INTRAMUSCULAR; INTRAVENOUS at 00:01

## 2022-03-06 RX ADMIN — ATORVASTATIN CALCIUM 40 MG: 40 TABLET, FILM COATED ORAL at 09:09

## 2022-03-06 RX ADMIN — TAMSULOSIN HYDROCHLORIDE 0.4 MG: 0.4 CAPSULE ORAL at 09:08

## 2022-03-06 ASSESSMENT — PAIN SCALES - GENERAL
PAINLEVEL_OUTOF10: 0
PAINLEVEL_OUTOF10: 7
PAINLEVEL_OUTOF10: 0
PAINLEVEL_OUTOF10: 0

## 2022-03-06 NOTE — PROGRESS NOTES
Patient ID:  Kellie Mehta  [id number]  73 y.o.  1950    Received pt. From ER. Assessment Complete, please see flow sheets. Labs, orders, plan of care and meds reviewed. [id number] Dr. Priyanka Miller at bedside for assessment.     Labs:   Recent Labs     03/03/22  1315 03/05/22  1730   WBC 7.9 7.3   HGB 12.4* 12.1*   HCT 35.7* 35.5*    280     Recent Labs     03/03/22  1315 03/05/22  1730    142   K 3.9 3.6    108*   CO2 20 19*   BUN 15 19   CREATININE 1.02 0.99   CALCIUM 9.0 8.8     Recent Labs     03/03/22  1315 03/05/22  1730   AST 15 14   ALT 8 9   BILITOT 0.3 0.4   ALKPHOS 95 100               Electronically signed by Camryn Zepeda RN

## 2022-03-06 NOTE — ED NOTES
Pt up walking around the unit, states he is bored and tired of waiting and laying in bed. Pt amb with steady gait. No sob or acute distress noted.       Maribeth Maurer RN  03/05/22 2022

## 2022-03-06 NOTE — ED NOTES
Report to [place], [facility]  03/05/22 212

## 2022-03-06 NOTE — PROGRESS NOTES
[facility] Respiratory Therapy Evaluation   Current Order:  DUONEB QID AND ALBUTEROL Q2 PRN       Home Regimen: PRN       Ordering Physician: Dre Navarro   Re-evaluation Date:  EVAL DONE     Diagnosis: PLEURAL EFFUSION       Patient Status: Stable / Unstable + Physician notified    The following MDI Criteria must be met in order to convert aerosol to MDI with spacer.  If unable to meet, MDI will be converted to aerosol:  []  Patient able to demonstrate the ability to use MDI effectively  []  Patient alert and cooperative  []  Patient able to take deep breath with 5-10 second hold  []  Medication(s) available in this delivery method   []  Peak flow greater than or equal to 200 ml/min            Current Order Substituted To  (same drug, same frequency)   Aerosol to MDI [] Albuterol Sulfate 0.083% unit dose by aerosol Albuterol Sulfate MDI 2 puffs by inhalation with spacer    [] Levalbuterol 1.25 mg unit dose by aerosol Levalbuterol MDI 2 puffs by inhalation with spacer    [] Levalbuterol 0.63 mg unit dose by aerosol Levalbuterol MDI 2 puffs by inhalation with spacer    [] Ipratropium Bromide 0.02% unit dose by aerosol Ipratropium Bromide MDI 2 puffs by inhalation with spacer    [] Duoneb (Ipratropium + Albuterol) unit dose by aerosol Ipratropium MDI + Albuterol MDI 2 puffs by inhalation w/spacer   MDI to Aerosol [] Albuterol Sulfate MDI Albuterol Sulfate 0.083% unit dose by aerosol    [] Levalbuterol MDI 2 puffs by inhalation Levalbuterol 1.25 mg unit dose by aerosol    [] Ipratropium Bromide MDI by inhalation Ipratropium Bromide 0.02% unit dose by aerosol    [] Combivent (Ipratropium + Albuterol) MDI by inhalation Duoneb (Ipratropium + Albuterol) unit dose by aerosol       Treatment Assessment [Frequency/Schedule]:  Change frequency to: ___________DUONEB Q4 PRN_______________________________________per Protocol, P&T, MEC      Points 0 1 2 3 4   Pulmonary Status  Non-Smoker  []   Smoking history   < 20 pack years  [] Smoking history  ?  20 pack years  [x]   Pulmonary Disorder  (acute or chronic)  []   Severe or Chronic w/ Exacerbation  []     Surgical Status No [x]   Surgeries     General []   Surgery Lower []   Abdominal Thoracic or []   Upper Abdominal Thoracic with  PulmonaryDisorder  []     Chest X-ray Clear/Not  Ordered     []  Chronic Changes  Results Pending  []  Infiltrates, atelectasis, pleural effusion, or edema  [x]  Infiltrates in more than one lobe []  Infiltrate + Atelectasis, &/or pleural effusion  []    Respiratory Pattern Regular,  RR = 12-20 [x]  Increased,  RR = 21-25 []  CANO, irregular,  or RR = 26-30 []  Decreased FEV1  or RR = 31-35 []  Severe SOB, use  of accessory muscles, or RR ? 35  []    Mental Status Alert, oriented,  Cooperative [x]  Confused but Follows commands []  Lethargic or unable to follow commands []  Obtunded  []  Comatose  []    Breath Sounds Clear to  auscultation  []  Decreased unilaterally or  in bases only [x]  Decreased  bilaterally  []  Crackles or intermittent wheezes []  Wheezes []    Cough Strong, Spontan., & nonproductive [x]  Strong,  spontaneous, &  productive []  Weak,  Nonproductive []  Weak, productive or  with wheezes []  No spontaneous  cough or may require suctioning []    Level of Activity Ambulatory [x]  Ambulatory w/ Assist  []  Non-ambulatory []  Paraplegic []  Quadriplegic []    Total    Score:_____5__     Triage Score:____5____      Tri       Triage:     1. (>20) Freq: Q3    2. (16-20) Freq: Q4   3. (11-15) Freq: QID & Albuterol Q2 PRN    4. (6-10) Freq: TID & Albuterol Q2 PRN    5. (0-5) Freq Q4prn

## 2022-03-06 NOTE — PROGRESS NOTES
Department of Internal Medicine  General Internal Medicine  Attending Progress Note      SUBJECTIVE:  Pt seen and examined. Feels improved with lasix.  Upset as he was told by the ED that he would have thoracentesis today, but informed pt that IR is no on on the weekends and will likely happen Monday and pt frustrated    OBJECTIVE      Medications    Current Facility-Administered Medications: ipratropium-albuterol (DUONEB) nebulizer solution 1 ampule, 1 ampule, Inhalation, Q4H PRN  ascorbic acid (VITAMIN C) tablet 500 mg, 500 mg, Oral, Daily  atorvastatin (LIPITOR) tablet 40 mg, 40 mg, Oral, Daily  clopidogrel (PLAVIX) tablet 75 mg, 75 mg, Oral, Daily  vitamin B-12 (CYANOCOBALAMIN) tablet 1,000 mcg, 1,000 mcg, Oral, Daily  lisinopril (PRINIVIL;ZESTRIL) tablet 20 mg, 20 mg, Oral, Daily  tamsulosin (FLOMAX) capsule 0.4 mg, 0.4 mg, Oral, BID  Talazoparib Tosylate CAPS 1 capsule, 1 capsule, Oral, Daily  sodium chloride flush 0.9 % injection 5-40 mL, 5-40 mL, IntraVENous, 2 times per day  sodium chloride flush 0.9 % injection 5-40 mL, 5-40 mL, IntraVENous, PRN  0.9 % sodium chloride infusion, 25 mL, IntraVENous, PRN  enoxaparin (LOVENOX) injection 40 mg, 40 mg, SubCUTAneous, Daily  ondansetron (ZOFRAN-ODT) disintegrating tablet 4 mg, 4 mg, Oral, Q8H PRN **OR** ondansetron (ZOFRAN) injection 4 mg, 4 mg, IntraVENous, Q6H PRN  polyethylene glycol (GLYCOLAX) packet 17 g, 17 g, Oral, Daily PRN  acetaminophen (TYLENOL) tablet 650 mg, 650 mg, Oral, Q6H PRN **OR** acetaminophen (TYLENOL) suppository 650 mg, 650 mg, Rectal, Q6H PRN  cefTRIAXone (ROCEPHIN) 1000 mg IVPB in 50 mL D5W minibag, 1,000 mg, IntraVENous, Q24H **AND** azithromycin (ZITHROMAX) tablet 500 mg, 500 mg, Oral, Q24H  ketorolac (TORADOL) injection 15 mg, 15 mg, IntraVENous, Q6H PRN  furosemide (LASIX) injection 40 mg, 40 mg, IntraVENous, Daily  Physical    VITALS:  BP (!) 143/73   Pulse 68   Temp 98.1 °F (36.7 °C) (Oral)   Resp 18   Ht 5' 10\" (1.778 m)   Wt 195 lb (88.5 kg)   SpO2 94%   BMI 27.98 kg/m²   Constitutional: Awake and alert in no acute distress. Lying in bed comfortably  Head: Normocephalic, atraumatic  Eyes: EOMI, PERRLA  ENT: moist mucous membranes  Neck: neck supple, trachea midline  Lungs: Good inspiratory effort, bibasilar crackles, no wheeze  Heart: RRR, normal S1 and S2  GI: Soft, non-distended, non tender, no guarding, no rebound, +BS  MSK: no edema noted  Skin: warm, dry  Psych: appropriate affect     Data    CBC:   Lab Results   Component Value Date    WBC 6.7 03/06/2022    RBC 3.89 03/06/2022    HGB 12.2 03/06/2022    HCT 36.0 03/06/2022    MCV 92.5 03/06/2022    MCH 31.3 03/06/2022    MCHC 33.8 03/06/2022    RDW 13.9 03/06/2022     03/06/2022     CMP:    Lab Results   Component Value Date     03/06/2022    K 4.5 03/06/2022     03/06/2022    CO2 22 03/06/2022    BUN 20 03/06/2022    CREATININE 1.23 03/06/2022    GFRAA >60.0 03/06/2022    LABGLOM 58.2 03/06/2022    GLUCOSE 135 03/06/2022    PROT 6.4 03/05/2022    LABALBU 3.6 03/05/2022    CALCIUM 8.9 03/06/2022    BILITOT 0.4 03/05/2022    ALKPHOS 100 03/05/2022    AST 14 03/05/2022    ALT 9 03/05/2022       ASSESSMENT AND PLAN      # SOB and dyspnea 2/2 bilateral pleural effusions  - was seen Thursday and set up with Dr. Cristobal Edwards in his office Tues, but symptoms worsened and pt returned. Admitted for thoracentesis  - possibly due to metastatic cancer  - pulm, IR consulted - likely thoracentesis Monday or Tuesday  - O2 as needed - on RA today  - CXR with RML infiltrate - no leukocytosis  - started on abx, but negative procalcitonin. Will defer to pulm  - lasix    # UTE  - on CPAP at home    # Prostate CA with bone mets  - symptomatic management  - diagnostic thoracentesis    DVT: lovenox    Disposition: Pt admitted with symptomatic sob and worsening bilateral pleural effusions. Pulm and IR consulted for thoracentesis.  Less likely pneumonia as no leukocytosis and negative procalcitonin. Will defer abx to pulm.      Nalini Pires,   Internal Medicine   Hospitalist    >35 minutes in total care time

## 2022-03-06 NOTE — ED NOTES
Pt given ice pack. Dr Art aware of pain and pt's frustration over waiting on CT results. Per Dr Art, CT results not back. This RN explained to pt and visitor that radiologist is backed up and hopefully will have results soon. Pt and visitor remain very upset, apology offered again.  Pt in no distress     Taylor Cole, RN  03/05/22 0167

## 2022-03-06 NOTE — PLAN OF CARE
Problem: Falls - Risk of:  Goal: Will remain free from falls  Description: Will remain free from falls  Outcome: Ongoing  Goal: Absence of physical injury  Description: Absence of physical injury  Outcome: Ongoing     Problem: Breathing Pattern - Ineffective:  Goal: Ability to achieve and maintain a regular respiratory rate will improve  Description: Ability to achieve and maintain a regular respiratory rate will improve  Outcome: Ongoing     Problem: Pain:  Description: Pain management should include both nonpharmacologic and pharmacologic interventions.   Goal: Pain level will decrease  Description: Pain level will decrease  Outcome: Ongoing  Goal: Control of acute pain  Description: Control of acute pain  Outcome: Ongoing  Goal: Control of chronic pain  Description: Control of chronic pain  Outcome: Ongoing

## 2022-03-06 NOTE — CARE COORDINATION
[facility] [place] Case Management Initial Discharge Assessment    Met with Patient to discuss discharge plan. PCP: Artur Reynoso MD                                Date of Last Visit: DECEMBER    [facility] Patient: No         Discharge Planning    Living Arrangements: independently at home    Who do you live with? Mary KENNEDY    Who helps you with your care:  self    If lives at home:     Do you have any barriers navigating in your home? no    Patient can perform ADL? Yes    Current Services (outpatient and in home) : CLINICAL TRIAL [facility]RU [facility]    Dialysis: No    Is transportation available to get to your appointments? Yes DRIVES    DME Equipment:  no    Respiratory equipment: CPAP without O2    Respiratory provider: UNSURE, BUT DID CALL LAST WEEK TO REQUEST MORE SUPPLIES     Pharmacy:  yes - [facility] [address]    Consult with Medication Assistance Program?  No      Patient agreeable to [facility] [place]? Declined    Patient agreeable to SNF/Rehab? Declined    Other discharge needs identified? N/A    Does Patient Have a High-Risk for Readmission Diagnosis (CHF, PN, MI, COPD)? No    Initial Discharge Plan? (Note: please see concurrent daily documentation for any updates after initial note). MET WITH PATIENT, DENIES HOME NEEDS. PT WAS TO SEE DR HOFFMAN IN OFFICE Tuesday. LSW/CM TO FOLLOW.       Readmission Risk              Risk of Unplanned Readmission:  13         Electronically signed by Lars Goode RN on 3/6/2022 at 9:19 AM

## 2022-03-06 NOTE — ED NOTES
This RN at bedside for change of shift. Pt c/o pain in left scapula. Pillows adjusted. Spot in back rubbed for pt and offered ice. Pt agreeable to try ice pack. Pt and visitor very upset about length of stay. This RN offered apology.       Matthew Mcdonald RN  03/05/22 1879

## 2022-03-06 NOTE — H&P
[place] [address] MEDICINE    HISTORY AND PHYSICAL EXAM    PATIENT NAME:  Campbell Church    MRN:  [id number]  SERVICE DATE:  3/5/2022   SERVICE TIME:  10:36 PM    Primary Care Physician: Herold Cheadle, MD     SUBJECTIVE  CHIEF COMPLAINT:  Shortness of breath    HPI:  This is a 73 y.o. male who presents with progressive four-day history of acute-onset shortness of breath and occasional productive cough. He was evaluated in ED on 3/3/22 and diagnosed with bilateral moderate pleural effusions. Plan was for OP follow-up with pulmonology, but symptoms have continued to worsen. He returned to ED this evening for re-evaluation. He has a history of prostate cancer with bone metastasis. He has been in a double-blind drug trial through [facility] for the past 3+ years. He reports he has been having bone scans every few months and they have been stable until most recent scan, which unfortunately reportedly shows disease progression.     PAST MEDICAL HISTORY:    Past Medical History:   Diagnosis Date    CAD (coronary artery disease)     Family history of colon cancer     Hyperlipidemia     Hypertension     Old MI (myocardial infarction) 02/03/2006    UTE on CPAP     PAD (peripheral artery disease) (HCC)     Prostate cancer ([facility] [address]      PAST SURGICAL HISTORY:    Past Surgical History:   Procedure Laterality Date    COLONOSCOPY N/A 8/19/2019    COLORECTAL CANCER SCREENING, NOT HIGH RISK performed by Kb Ortiz MD at [address] [place] [address]      X     FAMILY HISTORY:    Family History   Problem Relation Age of Onset    Cancer Mother 76        BONE    Cancer Father 64        COLON    Colon Cancer Father     Cancer Brother 43        THYROID    Diabetes Maternal Grandmother      SOCIAL HISTORY:    Social History     Socioeconomic History    Marital status: Single     Spouse name: Not on file    Number of children: Not on file    Years of education: Not on file    Highest education level: Not on file   Occupational History    Not on file   Tobacco Use    Smoking status: Former Smoker     Packs/day: 2.00     Years: 25.00     Pack years: 50.00     Types: Cigars     Quit date: 1995     Years since quittin.1    Smokeless tobacco: Never Used    Tobacco comment: RARE CIGAR FROM TIME TO TIME   Vaping Use    Vaping Use: Never used   Substance and Sexual Activity    Alcohol use: Yes     Alcohol/week: 4.0 standard drinks     Types: 2 Cans of beer, 2 Shots of liquor per week    Drug use: Not Currently     Types: Marijuana [place] Jerry)    Sexual activity: Not on file   Other Topics Concern    Not on file   Social History Narrative    Not on file     Social Determinants of Health     Financial Resource Strain: Low Risk     Difficulty of Paying Living Expenses: Not hard at all   Food Insecurity: No Food Insecurity    Worried About [address] in the Last Year: Never true    [facility] in the Last Year: Never true   Transportation Needs:     Lack of Transportation (Medical): Not on file    Lack of Transportation (Non-Medical):  Not on file   Physical Activity:     Days of Exercise per Week: Not on file    Minutes of Exercise per Session: Not on file   Stress:     Feeling of Stress : Not on file   Social Connections:     Frequency of Communication with Friends and Family: Not on file    Frequency of Social Gatherings with Friends and Family: Not on file    Attends Advent Services: Not on file    Active Member of Clubs or Organizations: Not on file    Attends Club or Organization Meetings: Not on file    Marital Status: Not on file   Intimate Partner Violence:     Fear of Current or Ex-Partner: Not on file    Emotionally Abused: Not on file    Physically Abused: Not on file    Sexually Abused: Not on file   Housing Stability:     Unable to Pay for Housing in the Last Year: Not on file    Number of [facility] in the Last Year: Not on file    Unstable Housing in the Last Year: Not on file     MEDICATIONS:   Prior to Admission medications    Medication Sig Start Date End Date Taking? Authorizing Provider   tiZANidine (ZANAFLEX) 2 MG tablet Take 1 tablet by mouth nightly as needed (muscle spasms) 12/6/21   Gage Joseph MD   atorvastatin (LIPITOR) 40 MG tablet Take 1 tablet by mouth daily 9/27/21   [facility] Holiday, DO   lisinopril (PRINIVIL;ZESTRIL) 20 MG tablet TAKE 1 TABLET BY MOUTH  DAILY 8/24/21   Gage Joseph MD   clopidogrel (PLAVIX) 75 MG tablet Take 1 tablet by mouth daily 6/18/21   Adan  Holiday, DO   aspirin 81 MG EC tablet Take 1 tablet by mouth once for 1 dose 5/20/21 7/29/21  [facility] Holiday, DO   Talazoparib Tosylate 0.25 MG CAPS Take 1 capsule by mouth daily     Historical Provider, MD   APPLE CIDER VINEGAR PO Take 1 tablet by mouth daily     Historical Provider, MD   Cyanocobalamin (VITAMIN B-12 CR PO) Take 1 tablet by mouth daily     Historical Provider, MD   tamsulosin (FLOMAX) 0.4 MG capsule Take 0.4 mg by mouth 2 times daily  7/6/19   Historical Provider, MD   CPAP Machine MISC by Does not apply route Use nightly as directed. Pressure setting 5 cm H2O. Patient taking differently: 1 Device by Does not apply route Use nightly as directed. Pressure setting 5 cm H2O. 1/9/18   Flower Godfrey MD   Ascorbic Acid (VITAMIN C PO) Take 500 mg by mouth    Historical Provider, MD       ALLERGIES: Morphine    REVIEW OF SYSTEM:   Review of Systems   Constitutional: Negative for activity change, appetite change, chills, diaphoresis, fatigue, fever and unexpected weight change. HENT: Negative. Eyes: Negative. Respiratory: Positive for cough and shortness of breath. Negative for choking, chest tightness, wheezing and stridor. Cardiovascular: Negative. Gastrointestinal: Negative. Endocrine: Negative. Genitourinary: Negative. Musculoskeletal: Positive for arthralgias and back pain.  Negative for gait problem, joint swelling and myalgias. Skin: Negative. Allergic/Immunologic: Negative. Neurological: Negative. Hematological: Negative. Psychiatric/Behavioral: Negative. OBJECTIVE  PHYSICAL EXAM: BP (!) 149/67   Pulse 65   Temp 97.9 °F (36.6 °C) (Oral)   Resp 22   Wt 195 lb (88.5 kg)   SpO2 97%   BMI 27.98 kg/m²     Physical Exam:  General Appearance: Alert and oriented to person, place and time, well developed and well- nourished. No acute distress. Skin: Warm and dry, no rash or erythema, good turgor. Head: Normocephalic and atraumatic  Eyes: Pupils equal, round, and reactive to light and accomodation, extraocular eye movements intact, conjunctivae normal.  ENT: External ear and ear canal normal bilaterally, nose without deformity, nasal mucosa and turbinates normal.  Neck: Supple and non-tender without mass, no thyromegaly or palpable thyroid nodules, no cervical lymphadenopathy. Pulmonary/Chest: Crackles bilateral bases. Mild dyspnea with ambulation and conversation. Cardiovascular: Normal rate, regular rhythm, normal S1 and S2, no murmurs, rubs, clicks, or gallops, distal pulses intact, no carotid bruits. Abdomen: Soft, non-tender, non-distended, normal bowel sounds, no palpable masses or organomegaly. Extremities: No cyanosis, clubbing or edema. Musculoskeletal: Normal range of motion, no joint swelling, deformity or tenderness. Neurologic: Reflexes normal and symmetric, no gross cranial nerve deficit, gait, coordination and speech normal.      DATA:     Diagnostic tests reviewed for today's visit:    Most recent labs and imaging results reviewed.      LABS:    Recent Results (from the past 24 hour(s))   Comprehensive Metabolic Panel    Collection Time: 03/05/22  5:30 PM   Result Value Ref Range    Sodium 142 135 - 144 mEq/L    Potassium 3.6 3.4 - 4.9 mEq/L    Chloride 108 (H) 95 - 107 mEq/L    CO2 19 (L) 20 - 31 mEq/L    Anion Gap 15 9 - 15 mEq/L    Glucose 150 (H) 70 - 99 mg/dL    BUN 19 8 - 23 mg/dL    CREATININE 0.99 0.70 - 1.20 mg/dL    GFR Non-African American >60.0 >60    GFR  >60.0 >60    Calcium 8.8 8.5 - 9.9 mg/dL    Total Protein 6.4 6.3 - 8.0 g/dL    Albumin 3.6 3.5 - 4.6 g/dL    Total Bilirubin 0.4 0.2 - 0.7 mg/dL    Alkaline Phosphatase 100 35 - 104 U/L    ALT 9 0 - 41 U/L    AST 14 0 - 40 U/L    Globulin 2.8 2.3 - 3.5 g/dL   CBC with Auto Differential    Collection Time: 03/05/22  5:30 PM   Result Value Ref Range    WBC 7.3 4.8 - 10.8 K/uL    RBC 3.91 (L) 4.70 - 6.10 M/uL    Hemoglobin 12.1 (L) 14.0 - 18.0 g/dL    Hematocrit 35.5 (L) 42.0 - 52.0 %    MCV 90.7 80.0 - 100.0 fL    MCH 31.1 27.0 - 31.3 pg    MCHC 34.2 33.0 - 37.0 %    RDW 14.3 11.5 - 14.5 %    Platelets 656 447 - 739 K/uL    Neutrophils % 72.3 %    Lymphocytes % 19.9 %    Monocytes % 5.1 %    Eosinophils % 2.3 %    Basophils % 0.4 %    Neutrophils Absolute 5.3 1.4 - 6.5 K/uL    Lymphocytes Absolute 1.5 1.0 - 4.8 K/uL    Monocytes Absolute 0.4 0.2 - 0.8 K/uL    Eosinophils Absolute 0.2 0.0 - 0.7 K/uL    Basophils Absolute 0.0 0.0 - 0.2 K/uL   Procalcitonin    Collection Time: 03/05/22  5:30 PM   Result Value Ref Range    Procalcitonin 0.08 0.00 - 0.15 ng/mL   Lactic Acid    Collection Time: 03/05/22  5:45 PM   Result Value Ref Range    Lactic Acid 2.3 (H) 0.5 - 2.2 mmol/L   Troponin    Collection Time: 03/05/22  5:45 PM   Result Value Ref Range    Troponin <0.010 0.000 - 0.010 ng/mL   Urinalysis with Reflex to Culture    Collection Time: 03/05/22  5:45 PM    Specimen: Urine   Result Value Ref Range    Color, UA Yellow Straw/Yellow    Clarity, UA Clear Clear    Glucose, Ur Negative Negative mg/dL    Bilirubin Urine Negative Negative    Ketones, Urine Negative Negative mg/dL    Specific Gravity, UA 1.013 1.005 - 1.030    Blood, Urine Negative Negative    pH, UA 5.5 5.0 - 9.0    Protein, UA Negative Negative mg/dL    Urobilinogen, Urine 0.2 <2.0 E.U./dL    Nitrite, Urine Negative Negative    Leukocyte Esterase, Urine Negative Negative    Urine Reflex to Culture Not Indicated    CK    Collection Time: 03/05/22  5:45 PM   Result Value Ref Range    Total  0 - 190 U/L   Procalcitonin    Collection Time: 03/05/22  5:45 PM   Result Value Ref Range    Procalcitonin 0.07 0.00 - 0.15 ng/mL       IMAGING:  CT CHEST WO CONTRAST    Result Date: 3/5/2022  Patient: Diego Mijares  Time Out: 20:38 Exam(s): CT CHEST Without Contrast  EXAM:   CT Chest Without Intravenous Contrast  CLINICAL HISTORY:    Reason for exam: sob. Chief complaint sob, cp, cough, pt had pe study on 3/3/22, told he has fluid around lungs  TECHNIQUE:   Axial computed tomography images of the chest without intravenous contrast.  All CT scan at this facility use dose modulation, iterative reconstruction, and/or weight based dosing when appropriate to reduce radiation dose to as low as reasonably achievable. COMPARISON:    Chest x-ray done earlier today, and a CTA chest 3/3/22. FINDINGS:   Lungs: Predominant bibasilar infiltrates, nonspecific, cannot rule out pneumonia. Slight progressive right middle lobe infiltrate. Stable probable emphysema with small blebs right upper lobe. Pulmonary arteries:    No engorgement. Aorta:  No thoracic aortic aneurysm. Pleural space: Moderate to large bilateral pleural effusion has progressed. No pneumothorax. Heart: Stable, moderate cardiomegaly. No significant pericardial effusion. Bones/joints:  No acute fracture. Stable heterogeneous bone density, cannot rule out metastatic disease. Soft tissues: Thyroid nodules, stable and nonspecific. Punctate calcification right thyroid again noted. Lymph nodes:  Stable, mediastinal adenopathy, enlarged in the subcarinal region. Electronically signed by Earnestine Ceja M.D. on 03-05-22 at 2038    1. Moderate to large bilateral pleural effusions have mildly progressed. 2.  Slight progressive right middle lobe infiltrate with stable bibasilar infiltrates.  3.  Stable and PRN. 4. Prostate cancer with bone metastasis - Toradol as needed for pain management. 5. Anemia - Hgb 12.1 on admission, stable as compared to 12.4 on 3/3/22. No s/s of active bleeding. Continue to monitor with daily CBC.       Plan of care discussed with: patient, supervising physician Dr. Jj Grossman: JACQUELYN Carreon - NP  DATE: March 5, 2022  TIME: 10:36 PM

## 2022-03-06 NOTE — ED PROVIDER NOTES
[facility] ED  eMERGENCY dEPARTMENT eNCOUnter      Pt Name: Nataliia Jordan  MRN: [id number]  [place] 1950  Date of evaluation: 3/5/2022  Provider: Ashley Gaming MD    CHIEF COMPLAINT       Chief Complaint   Patient presents with    Shortness of Breath     Seen here on Thursday for same complaint         HISTORY OF PRESENT ILLNESS   (Location/Symptom, Timing/Onset,Context/Setting, Quality, Duration, Modifying Factors, Severity)  Note limiting factors. Nataliia Jordan is a 73 y.o. male who presents to the emergency department SOB    73years old with known history of prostate CA with mets to the bone first diagnosed with bone mass since 2017 is currently going under a clinical trial for treatment at [facility].  Presented to the ER today to follow-up on acute shortness of breath that started 3 days ago was seen in the ER had a CTA of the lung that showed bilateral pleural effusion but no PE. Patient was sent home with steroid. Today due to worsening shortness of breath states severe shortness of breath with minimal exertion unable to sleep or lay flat at night due to his shortness of breath unable to tolerate at home. The history is provided by the patient. NursingNotes were reviewed. REVIEW OF SYSTEMS    (2-9 systems for level 4, 10 or more for level 5)     Review of Systems   Constitutional: Positive for fatigue. HENT: Negative. Eyes: Negative. Respiratory: Positive for chest tightness and shortness of breath. Cardiovascular: Negative. Endocrine: Negative. Genitourinary: Negative. Musculoskeletal: Positive for arthralgias and back pain. Skin: Negative. Allergic/Immunologic: Negative. Neurological: Negative. Hematological: Negative. Psychiatric/Behavioral: Positive for sleep disturbance. All other systems reviewed and are negative. Except as noted above the remainder of the review of systems was reviewed and negative.        PAST MEDICAL HISTORY     Past Medical History:   Diagnosis Date    CAD (coronary artery disease)     Family history of colon cancer     Hyperlipidemia     Hypertension     Old MI (myocardial infarction) 02/03/2006    UTE on CPAP     PAD (peripheral artery disease) (HCC)     Prostate cancer ([facility] [address]          SURGICALHISTORY       Past Surgical History:   Procedure Laterality Date    COLONOSCOPY N/A 8/19/2019    COLORECTAL CANCER SCREENING, NOT HIGH RISK performed by Walter Bains MD at [facility]      X3         CURRENT MEDICATIONS       Previous Medications    APPLE CIDER VINEGAR PO    Take 1 tablet by mouth daily     ASCORBIC ACID (VITAMIN C PO)    Take 500 mg by mouth    ASPIRIN 81 MG EC TABLET    Take 1 tablet by mouth once for 1 dose    ATORVASTATIN (LIPITOR) 40 MG TABLET    Take 1 tablet by mouth daily    CLOPIDOGREL (PLAVIX) 75 MG TABLET    Take 1 tablet by mouth daily    CPAP MACHINE MISC    by Does not apply route Use nightly as directed. Pressure setting 5 cm H2O.     CYANOCOBALAMIN (VITAMIN B-12 CR PO)    Take 1 tablet by mouth daily     LISINOPRIL (PRINIVIL;ZESTRIL) 20 MG TABLET    TAKE 1 TABLET BY MOUTH  DAILY    TALAZOPARIB TOSYLATE 0.25 MG CAPS    Take 1 capsule by mouth daily     TAMSULOSIN (FLOMAX) 0.4 MG CAPSULE    Take 0.4 mg by mouth 2 times daily     TIZANIDINE (ZANAFLEX) 2 MG TABLET    Take 1 tablet by mouth nightly as needed (muscle spasms)       ALLERGIES     Morphine    FAMILY HISTORY       Family History   Problem Relation Age of Onset    Cancer Mother 76        BONE    Cancer Father 64        COLON    Colon Cancer Father     Cancer Brother 43        THYROID    Diabetes Maternal Grandmother           SOCIAL HISTORY       Social History     Socioeconomic History    Marital status: Single     Spouse name: None    Number of children: None    Years of education: None    Highest education level: None   Occupational History    None   Tobacco Use    Smoking status: Former Smoker     Packs/day: 2.00     Years: 25.00     Pack years: 50.00     Types: Cigars     Quit date: 1995     Years since quittin.1    Smokeless tobacco: Never Used    Tobacco comment: RARE CIGAR FROM TIME TO TIME   Vaping Use    Vaping Use: Never used   Substance and Sexual Activity    Alcohol use: Yes     Alcohol/week: 4.0 standard drinks     Types: 2 Cans of beer, 2 Shots of liquor per week    Drug use: Not Currently     Types: Marijuana Aakash Dasdilip)    Sexual activity: None   Other Topics Concern    None   Social History Narrative    None     Social Determinants of Health     Financial Resource Strain: Low Risk     Difficulty of Paying Living Expenses: Not hard at all   Food Insecurity: No Food Insecurity    Worried About Running Out of Food in the Last Year: Never true    [place] of Food in the Last Year: Never true   Transportation Needs:     Lack of Transportation (Medical): Not on file    Lack of Transportation (Non-Medical):  Not on file   Physical Activity:     Days of Exercise per Week: Not on file    Minutes of Exercise per Session: Not on file   Stress:     Feeling of Stress : Not on file   Social Connections:     Frequency of Communication with Friends and Family: Not on file    Frequency of Social Gatherings with Friends and Family: Not on file    Attends Restoration Services: Not on file    Active Member of [address] [place] [address] or Organizations: Not on file    Attends Club or Organization Meetings: Not on file    Marital Status: Not on file   Intimate Partner Violence:     Fear of Current or Ex-Partner: Not on file    Emotionally Abused: Not on file    Physically Abused: Not on file    Sexually Abused: Not on file   Housing Stability:     Unable to Pay for Housing in the Last Year: Not on file    Number of [place] in the Last Year: Not on file    Unstable Housing in the Last Year: Not on file       SCREENINGS    [place]ley Coma Scale  Eye Opening: Spontaneous  Best Verbal Response: Oriented  Best Motor Response: Obeys commands  [place] Coma Scale Score: 15 @FLOW([id number])@      PHYSICAL EXAM    (up to 7 for level 4, 8 or more for level 5)     ED Triage Vitals [03/05/22 1714]   BP Temp Temp Source Pulse Resp SpO2 Height Weight   (!) 150/69 97.9 °F (36.6 °C) Oral 68 22 97 % -- 195 lb (88.5 kg)       Physical Exam  Vitals and nursing note reviewed. Constitutional:       Appearance: He is well-developed. HENT:      Head: Normocephalic and atraumatic. Right Ear: External ear normal.      Left Ear: External ear normal.      Nose: Nose normal.   Eyes:      Pupils: Pupils are equal, round, and reactive to light. Cardiovascular:      Rate and Rhythm: Normal rate and regular rhythm. Heart sounds: Normal heart sounds. Pulmonary:      Effort: Pulmonary effort is normal. No respiratory distress. Breath sounds: Normal breath sounds. No decreased breath sounds, wheezing or rales. Chest:      Chest wall: No tenderness. Abdominal:      General: Bowel sounds are normal.      Palpations: Abdomen is soft. Musculoskeletal:         General: Normal range of motion. Cervical back: Normal range of motion and neck supple. Skin:     General: Skin is warm and dry. Neurological:      Mental Status: He is alert and oriented to person, place, and time. Cranial Nerves: No cranial nerve deficit. Sensory: No sensory deficit. Motor: No abnormal muscle tone. Coordination: Coordination normal.      Deep Tendon Reflexes: Reflexes normal.   Psychiatric:         Behavior: Behavior normal.         Thought Content:  Thought content normal.         Judgment: Judgment normal.         DIAGNOSTIC RESULTS     EKG: All EKG's are interpreted by the Emergency Department Physician who either signs or Co-signsthis chart in the absence of a cardiologist.    EKG: Sinus rhythm with first-degree AV block occasional PVC rate 66 no acute ST or T wave.    RADIOLOGY:   Non-plain filmimages such as CT, Ultrasound and MRI are read by the radiologist. Plain radiographic images are visualized and preliminarily interpreted by the emergency physician with the below findings:       Interpretation per the Radiologist below, if available at the time ofthis note:    CT CHEST WO CONTRAST   Final Result   1. Moderate to large bilateral pleural effusions have mildly progressed. 2.  Slight progressive right middle lobe infiltrate with stable bibasilar    infiltrates. 3.  Stable mediastinal adenopathy, nonspecific. Neoplasm not excluded. 4.  Stable heterogeneous bone density, metastatic disease not excluded. Stable and nonspecific thyroid nodules and punctate calcification on the    right      XR CHEST PORTABLE   Final Result      Interval reduction in bilateral pleural effusions. ED BEDSIDE ULTRASOUND:   Performed by ED Physician - none    LABS:  Labs Reviewed   COMPREHENSIVE METABOLIC PANEL - Abnormal; Notable for the following components:       Result Value    Chloride 108 (*)     CO2 19 (*)     Glucose 150 (*)     All other components within normal limits   CBC WITH AUTO DIFFERENTIAL - Abnormal; Notable for the following components:    RBC 3.91 (*)     Hemoglobin 12.1 (*)     Hematocrit 35.5 (*)     All other components within normal limits   LACTIC ACID - Abnormal; Notable for the following components:    Lactic Acid 2.3 (*)     All other components within normal limits   PROCALCITONIN   TROPONIN   URINALYSIS WITH REFLEX TO CULTURE   CK   PROCALCITONIN       All other labs were within normal range or not returned as of this dictation.     EMERGENCY DEPARTMENT COURSE and DIFFERENTIAL DIAGNOSIS/MDM:   Vitals:    Vitals:    03/05/22 1714 03/05/22 2015   BP: (!) 150/69 136/71   Pulse: 68 65   Resp: 22 18   Temp: 97.9 °F (36.6 °C)    TempSrc: Oral    SpO2: 97% 97%   Weight: 195 lb (88.5 kg)         MDM  Number of Diagnoses or Management Options  Biliary pleural effusion: established and worsening  Pneumonia of right middle lobe due to infectious organism: established and worsening  Prostate cancer metastatic to bone [facility]): established and worsening  Diagnosis management comments: 73years old male patient with known history of prostate cancer metastatic to the bone since 2017. Presented to the ER for acute onset shortness of breath that started 3 days ago have been getting worse despite outpatient steroid and antibiotic. Today CT of the lung confirmed worsening bilateral pleural effusion infectious versus malignant also right middle lobe infiltrate. Patient will be admitted for IV antibiotic pulmonary consult under the hospitalist service for further management. Patient discussed with patient and his wife in details both verbalized understanding and agreed with the plan        Amount and/or Complexity of Data Reviewed  Clinical lab tests: ordered and reviewed  Tests in the radiology section of CPT®: ordered and reviewed          CONSULTS:  None    PROCEDURES:  Unless otherwise noted below, none     Procedures    FINAL IMPRESSION      1. Pneumonia of right middle lobe due to infectious organism    2. Biliary pleural effusion    3. Prostate cancer metastatic to bone [facility])          DISPOSITION/PLAN   DISPOSITION Decision To Admit 03/05/2022 09:10:56 PM      PATIENT REFERRED TO:  No follow-up provider specified.     DISCHARGE MEDICATIONS:  New Prescriptions    No medications on file          (Please note thatportions of this note were completed with a voice recognition program.  Efforts were made to edit the dictations but occasionally words are mis-transcribed.)    Frankie Martinez MD (electronically signed)  Attending Emergency Physician         Bertha Art MD  03/05/22 2139

## 2022-03-07 ENCOUNTER — APPOINTMENT (OUTPATIENT)
Dept: GENERAL RADIOLOGY | Age: 72
DRG: 542 | End: 2022-03-07
Payer: MEDICARE

## 2022-03-07 ENCOUNTER — PRE-PROCEDURE TELEPHONE (OUTPATIENT)
Dept: INTERVENTIONAL RADIOLOGY/VASCULAR | Age: 72
End: 2022-03-07

## 2022-03-07 ENCOUNTER — CARE COORDINATION (OUTPATIENT)
Dept: CARE COORDINATION | Age: 72
End: 2022-03-07

## 2022-03-07 ENCOUNTER — APPOINTMENT (OUTPATIENT)
Dept: INTERVENTIONAL RADIOLOGY/VASCULAR | Age: 72
DRG: 542 | End: 2022-03-07
Payer: MEDICARE

## 2022-03-07 VITALS
DIASTOLIC BLOOD PRESSURE: 64 MMHG | HEART RATE: 70 BPM | BODY MASS INDEX: 27.92 KG/M2 | HEIGHT: 70 IN | OXYGEN SATURATION: 98 % | SYSTOLIC BLOOD PRESSURE: 137 MMHG | TEMPERATURE: 98.1 F | RESPIRATION RATE: 18 BRPM | WEIGHT: 195 LBS

## 2022-03-07 LAB
AMYLASE FLUID: 18 U/L
ANION GAP SERPL CALCULATED.3IONS-SCNC: 12 MEQ/L (ref 9–15)
APPEARANCE FLUID: NORMAL
BASOPHILS ABSOLUTE: 0 K/UL (ref 0–0.2)
BASOPHILS RELATIVE PERCENT: 0.5 %
BUN BLDV-MCNC: 23 MG/DL (ref 8–23)
CALCIUM SERPL-MCNC: 8.6 MG/DL (ref 8.5–9.9)
CELL COUNT FLUID TYPE: NORMAL
CHLORIDE BLD-SCNC: 106 MEQ/L (ref 95–107)
CLOT EVALUATION: NORMAL
CO2: 21 MEQ/L (ref 20–31)
COLOR FLUID: NORMAL
CREAT SERPL-MCNC: 1.16 MG/DL (ref 0.7–1.2)
EKG ATRIAL RATE: 66 BPM
EKG P AXIS: 34 DEGREES
EKG P-R INTERVAL: 210 MS
EKG Q-T INTERVAL: 386 MS
EKG QRS DURATION: 104 MS
EKG QTC CALCULATION (BAZETT): 404 MS
EKG R AXIS: -13 DEGREES
EKG T AXIS: 0 DEGREES
EKG VENTRICULAR RATE: 66 BPM
EOSINOPHILS ABSOLUTE: 0.7 K/UL (ref 0–0.7)
EOSINOPHILS RELATIVE PERCENT: 12.2 %
FLUID TYPE: NORMAL
GFR AFRICAN AMERICAN: >60
GFR NON-AFRICAN AMERICAN: >60
GLUCOSE BLD-MCNC: 107 MG/DL (ref 70–99)
GLUCOSE, FLUID: 118 MG/DL
HCT VFR BLD CALC: 34.6 % (ref 42–52)
HEMOGLOBIN: 11.4 G/DL (ref 14–18)
LACTATE DEHYDROGENASE, FLUID: 92 U/L
LYMPHOCYTES ABSOLUTE: 1.3 K/UL (ref 1–4.8)
LYMPHOCYTES RELATIVE PERCENT: 22.8 %
LYMPHOCYTES, BODY FLUID: 55 %
MCH RBC QN AUTO: 30.4 PG (ref 27–31.3)
MCHC RBC AUTO-ENTMCNC: 33 % (ref 33–37)
MCV RBC AUTO: 92.1 FL (ref 80–100)
MONOCYTE, FLUID: 14 %
MONOCYTES ABSOLUTE: 0.4 K/UL (ref 0.2–0.8)
MONOCYTES RELATIVE PERCENT: 7.4 %
NEUTROPHIL, FLUID: 31 %
NEUTROPHILS ABSOLUTE: 3.4 K/UL (ref 1.4–6.5)
NEUTROPHILS RELATIVE PERCENT: 57.1 %
NUCLEATED CELLS FLUID: 566 /CUMM
NUMBER OF CELLS COUNTED FLUID: 100
PDW BLD-RTO: 13.9 % (ref 11.5–14.5)
PLATELET # BLD: 239 K/UL (ref 130–400)
POTASSIUM REFLEX MAGNESIUM: 3.9 MEQ/L (ref 3.4–4.9)
PROTEIN FLUID: 1.5 G/DL
RBC # BLD: 3.76 M/UL (ref 4.7–6.1)
RBC FLUID: 1141 /CUMM
SODIUM BLD-SCNC: 139 MEQ/L (ref 135–144)
SPECIMEN TYPE: NORMAL
SPECIMEN TYPE: NORMAL
WBC # BLD: 5.9 K/UL (ref 4.8–10.8)

## 2022-03-07 PROCEDURE — 36415 COLL VENOUS BLD VENIPUNCTURE: CPT

## 2022-03-07 PROCEDURE — 2580000003 HC RX 258: Performed by: NURSE PRACTITIONER

## 2022-03-07 PROCEDURE — 88341 IMHCHEM/IMCYTCHM EA ADD ANTB: CPT

## 2022-03-07 PROCEDURE — 87205 SMEAR GRAM STAIN: CPT

## 2022-03-07 PROCEDURE — 0W993ZZ DRAINAGE OF RIGHT PLEURAL CAVITY, PERCUTANEOUS APPROACH: ICD-10-PCS | Performed by: RADIOLOGY

## 2022-03-07 PROCEDURE — 80048 BASIC METABOLIC PNL TOTAL CA: CPT

## 2022-03-07 PROCEDURE — 82945 GLUCOSE OTHER FLUID: CPT

## 2022-03-07 PROCEDURE — 6360000002 HC RX W HCPCS: Performed by: NURSE PRACTITIONER

## 2022-03-07 PROCEDURE — 99223 1ST HOSP IP/OBS HIGH 75: CPT | Performed by: RADIOLOGY

## 2022-03-07 PROCEDURE — 82150 ASSAY OF AMYLASE: CPT

## 2022-03-07 PROCEDURE — 88305 TISSUE EXAM BY PATHOLOGIST: CPT

## 2022-03-07 PROCEDURE — 88112 CYTOPATH CELL ENHANCE TECH: CPT

## 2022-03-07 PROCEDURE — 32555 ASPIRATE PLEURA W/ IMAGING: CPT | Performed by: RADIOLOGY

## 2022-03-07 PROCEDURE — 71046 X-RAY EXAM CHEST 2 VIEWS: CPT

## 2022-03-07 PROCEDURE — 85025 COMPLETE CBC W/AUTO DIFF WBC: CPT

## 2022-03-07 PROCEDURE — 89051 BODY FLUID CELL COUNT: CPT

## 2022-03-07 PROCEDURE — 2709999900 IR GUIDED THORACENTESIS PLEURAL

## 2022-03-07 PROCEDURE — 88342 IMHCHEM/IMCYTCHM 1ST ANTB: CPT

## 2022-03-07 PROCEDURE — 6370000000 HC RX 637 (ALT 250 FOR IP): Performed by: NURSE PRACTITIONER

## 2022-03-07 PROCEDURE — 83615 LACTATE (LD) (LDH) ENZYME: CPT

## 2022-03-07 PROCEDURE — 87070 CULTURE OTHR SPECIMN AEROBIC: CPT

## 2022-03-07 PROCEDURE — 32555 ASPIRATE PLEURA W/ IMAGING: CPT

## 2022-03-07 PROCEDURE — 84157 ASSAY OF PROTEIN OTHER: CPT

## 2022-03-07 RX ORDER — FUROSEMIDE 20 MG/1
20 TABLET ORAL DAILY
Qty: 5 TABLET | Refills: 0 | Status: SHIPPED | OUTPATIENT
Start: 2022-03-07 | End: 2022-03-07 | Stop reason: SDUPTHER

## 2022-03-07 RX ORDER — FUROSEMIDE 20 MG/1
20 TABLET ORAL DAILY
Qty: 5 TABLET | Refills: 0 | Status: SHIPPED | OUTPATIENT
Start: 2022-03-07 | End: 2022-03-15

## 2022-03-07 RX ADMIN — CLOPIDOGREL BISULFATE 75 MG: 75 TABLET ORAL at 08:45

## 2022-03-07 RX ADMIN — Medication 10 ML: at 09:01

## 2022-03-07 RX ADMIN — ATORVASTATIN CALCIUM 40 MG: 40 TABLET, FILM COATED ORAL at 08:45

## 2022-03-07 RX ADMIN — CYANOCOBALAMIN TAB 500 MCG 1000 MCG: 500 TAB at 08:44

## 2022-03-07 RX ADMIN — TAMSULOSIN HYDROCHLORIDE 0.4 MG: 0.4 CAPSULE ORAL at 08:45

## 2022-03-07 RX ADMIN — FUROSEMIDE 40 MG: 10 INJECTION, SOLUTION INTRAMUSCULAR; INTRAVENOUS at 08:45

## 2022-03-07 RX ADMIN — LISINOPRIL 20 MG: 20 TABLET ORAL at 08:45

## 2022-03-07 RX ADMIN — OXYCODONE HYDROCHLORIDE AND ACETAMINOPHEN 500 MG: 500 TABLET ORAL at 08:52

## 2022-03-07 ASSESSMENT — ENCOUNTER SYMPTOMS
GASTROINTESTINAL NEGATIVE: 1
DIARRHEA: 0
WHEEZING: 0
COUGH: 0
BACK PAIN: 0
EYES NEGATIVE: 1
PHOTOPHOBIA: 0
CONSTIPATION: 0
VOMITING: 0
ABDOMINAL PAIN: 0
SHORTNESS OF BREATH: 1
NAUSEA: 0

## 2022-03-07 NOTE — CONSULTS
CC:   Chief Complaint   Patient presents with    Shortness of Breath     Seen here on Thursday for same complaint        HPI: Patient is a pleasant 81-year-old gentleman who presented to the hospital with a several day history of progressive shortness of breath. Patient has a past medical history of prostate cancer, coronary artery disease status post angioplasty, peripheral arterial disease with stents. Upon presentation a CT scan demonstrated bilateral pleural effusions. Interventional radiology was consulted for diagnostic and therapeutic ultrasound-guided thoracentesis. Patient denies any chest pain or other complaints.     Family History   Problem Relation Age of Onset   Gary Vega Cancer Mother 76        BONE    Cancer Father 64        COLON    Colon Cancer Father     Cancer Brother 43        THYROID    Diabetes Maternal Grandmother        Past Surgical History:   Procedure Laterality Date    COLONOSCOPY N/A 2019    COLORECTAL CANCER SCREENING, NOT HIGH RISK performed by Kiko Galindo MD at [address] [place] [address]      X3    THORACENTESIS Right 2022    870 ml removed by Dr. Linda Garcia - diagnostics sent        Past Medical History:   Diagnosis Date    CAD (coronary artery disease)     Family history of colon cancer     Hyperlipidemia     Hypertension     Old MI (myocardial infarction) 2006    UTE on CPAP     PAD (peripheral artery disease) (HCC)     [facility]state cancer ([facility]        Social History     Socioeconomic History    Marital status: Single     Spouse name: None    Number of children: None    Years of education: None    Highest education level: None   Occupational History    None   Tobacco Use    Smoking status: Former Smoker     Packs/day: 2.00     Years: 25.00     Pack years: 50.00     Types: Cigars     Quit date: 1995     Years since quittin.1    Smokeless tobacco: Never Used    Tobacco comment: RARE CIGAR FROM TIME TO TIME Vaping Use    Vaping Use: Never used   Substance and Sexual Activity    Alcohol use: Yes     Alcohol/week: 4.0 standard drinks     Types: 2 Cans of beer, 2 Shots of liquor per week    Drug use: Not Currently     Types: Marijuana Denny Virk)    Sexual activity: None   Other Topics Concern    None   Social History Narrative    None     Social Determinants of Health     Financial Resource Strain: Low Risk     Difficulty of Paying Living Expenses: Not hard at all   Food Insecurity: No Food Insecurity    Worried About Running Out of Food in the Last Year: Never true    Eloy of Food in the Last Year: Never true   Transportation Needs:     Lack of Transportation (Medical): Not on file    Lack of Transportation (Non-Medical):  Not on file   Physical Activity:     Days of Exercise per Week: Not on file    Minutes of Exercise per Session: Not on file   Stress:     Feeling of Stress : Not on file   Social Connections:     Frequency of Communication with Friends and Family: Not on file    Frequency of Social Gatherings with Friends and Family: Not on file    Attends Pentecostal Services: Not on file    Active Member of [address] [place] [address] or Organizations: Not on file    Attends Club or Organization Meetings: Not on file    Marital Status: Not on file   Intimate Partner Violence:     Fear of Current or Ex-Partner: Not on file    Emotionally Abused: Not on file    Physically Abused: Not on file    Sexually Abused: Not on file   Housing Stability:     Unable to Pay for Housing in the Last Year: Not on file    Number of [place] in the Last Year: Not on file    Unstable Housing in the Last Year: Not on file       Allergies   Allergen Reactions    Morphine Swelling and Rash       Current Facility-Administered Medications on File Prior to Encounter   Medication Dose Route Frequency Provider Last Rate Last Admin    cyanocobalamin injection 1,000 mcg  1,000 mcg IntraMUSCular Once [place], [address] [place] [address] Road Current Outpatient Medications on File Prior to Encounter   Medication Sig Dispense Refill    tiZANidine (ZANAFLEX) 2 MG tablet Take 1 tablet by mouth nightly as needed (muscle spasms) 30 tablet 0    atorvastatin (LIPITOR) 40 MG tablet Take 1 tablet by mouth daily 90 tablet 1    lisinopril (PRINIVIL;ZESTRIL) 20 MG tablet TAKE 1 TABLET BY MOUTH  DAILY 90 tablet 3    clopidogrel (PLAVIX) 75 MG tablet Take 1 tablet by mouth daily 90 tablet 3    Talazoparib Tosylate 0.25 MG CAPS Take 1 capsule by mouth daily       APPLE CIDER VINEGAR PO Take 1 tablet by mouth daily       Cyanocobalamin (VITAMIN B-12 CR PO) Take 1 tablet by mouth daily       tamsulosin (FLOMAX) 0.4 MG capsule Take 0.4 mg by mouth 2 times daily   11    Ascorbic Acid (VITAMIN C PO) Take 500 mg by mouth      CPAP Machine MISC by Does not apply route Use nightly as directed. Pressure setting 5 cm H2O. (Patient taking differently: 1 Device by Does not apply route Use nightly as directed. Pressure setting 5 cm H2O.) 1 each 0       Review of Systems   Constitutional: Negative. Negative for chills, diaphoresis and fever. HENT: Negative. Negative for congestion, ear pain, hearing loss and tinnitus. Eyes: Negative. Negative for photophobia. Respiratory: Positive for shortness of breath. Negative for cough and wheezing. Cardiovascular: Negative. Negative for chest pain, palpitations and leg swelling. Gastrointestinal: Negative. Negative for abdominal pain, constipation, diarrhea, nausea and vomiting. Genitourinary: Negative. Negative for dysuria, flank pain, frequency, hematuria and urgency. Musculoskeletal: Negative. Negative for back pain and neck pain. Skin: Negative. Negative for rash. Allergic/Immunologic: Negative for environmental allergies. Neurological: Negative. Negative for dizziness, tremors, weakness and headaches. Hematological: Does not bruise/bleed easily. Psychiatric/Behavioral: Negative. Negative for hallucinations and suicidal ideas. The patient is not nervous/anxious. OBJECTIVE:  /64   Pulse 70   Temp 98.1 °F (36.7 °C) (Oral)   Resp 18   Ht 5' 10\" (1.778 m)   Wt 195 lb (88.5 kg)   SpO2 98%   BMI 27.98 kg/m²     Physical Exam  Constitutional:       General: He is not in acute distress. Appearance: He is well-developed. He is not diaphoretic. HENT:      Head: Normocephalic and atraumatic. Nose: Nose normal.      Mouth/Throat:      Pharynx: No oropharyngeal exudate. Eyes:      General: No scleral icterus. Right eye: No discharge. Left eye: No discharge. Conjunctiva/sclera: Conjunctivae normal.   Neck:      Thyroid: No thyromegaly. Vascular: No JVD. Trachea: No tracheal deviation. Cardiovascular:      Rate and Rhythm: Normal rate and regular rhythm. Heart sounds: Normal heart sounds. No murmur heard. No friction rub. No gallop. Pulmonary:      Effort: No respiratory distress. Breath sounds: No stridor. Examination of the right-lower field reveals decreased breath sounds. Examination of the left-lower field reveals decreased breath sounds. Decreased breath sounds present. No wheezing or rales. Chest:      Chest wall: No tenderness. Abdominal:      General: Bowel sounds are normal. There is no distension. Palpations: Abdomen is soft. There is no mass. Tenderness: There is no abdominal tenderness. There is no guarding or rebound. Hernia: No hernia is present. Musculoskeletal:         General: No tenderness or deformity. Cervical back: Neck supple. Skin:     General: Skin is dry. Coloration: Skin is not pale. Findings: No erythema or rash. Neurological:      Mental Status: He is alert and oriented to person, place, and time. Cranial Nerves: No cranial nerve deficit. Psychiatric:         Behavior: Behavior normal.         Thought Content:  Thought content normal.         Judgment: Judgment normal.           CT CHEST WO CONTRAST    Result Date: 3/5/2022  Patient: Beny Coffman  Time Out: 20:38 Exam(s): CT CHEST Without Contrast  EXAM:   CT Chest Without Intravenous Contrast  CLINICAL HISTORY:    Reason for exam: sob. Chief complaint sob, cp, cough, pt had pe study on 3/3/22, told he has fluid around lungs  TECHNIQUE:   Axial computed tomography images of the chest without intravenous contrast.  All CT scan at this facility use dose modulation, iterative reconstruction, and/or weight based dosing when appropriate to reduce radiation dose to as low as reasonably achievable. COMPARISON:    Chest x-ray done earlier today, and a CTA chest 3/3/22. FINDINGS:   Lungs: Predominant bibasilar infiltrates, nonspecific, cannot rule out pneumonia. Slight progressive right middle lobe infiltrate. Stable probable emphysema with small blebs right upper lobe. Pulmonary arteries:    No engorgement. Aorta:  No thoracic aortic aneurysm. Pleural space: Moderate to large bilateral pleural effusion has progressed. No pneumothorax. Heart: Stable, moderate cardiomegaly. No significant pericardial effusion. Bones/joints:  No acute fracture. Stable heterogeneous bone density, cannot rule out metastatic disease. Soft tissues: Thyroid nodules, stable and nonspecific. Punctate calcification right thyroid again noted. Lymph nodes:  Stable, mediastinal adenopathy, enlarged in the subcarinal region. Electronically signed by Yuki Kapoor M.D. on 03-05-22 at 2038    1. Moderate to large bilateral pleural effusions have mildly progressed. 2.  Slight progressive right middle lobe infiltrate with stable bibasilar infiltrates. 3.  Stable mediastinal adenopathy, nonspecific. Neoplasm not excluded. 4.  Stable heterogeneous bone density, metastatic disease not excluded.  Stable and nonspecific thyroid nodules and punctate calcification on the right    XR CHEST PORTABLE    Result Date: 3/5/2022  EXAMINATION:  CHEST RADIOGRAPH (PORTABLE SINGLE VIEW AP) Exam Date/Time:  3/5/2022 5:56 PM Clinical History:   sob Comparison:  CT chest 3/3/2022  RESULT: Lines, tubes, and devices:  None. Lungs and pleura:  No focal consolidation. Bibasilar atelectasis. Bilateral low lung volumes. No pneumothorax. Small bilateral pleural effusions smaller compared with the prior CT. Cardiomediastinal silhouette:  Stable cardiomediastinal silhouette. Atherosclerotic calcification of the aortic arch. Other: No acute osseous process. Interval reduction in bilateral pleural effusions. ASSESSMENT ANDPLAN:    Assessment: Patient with bilateral pleural effusion, very related to pneumonia versus other causes such as known prostate malignancy and bone metastatic disease. Plan: Diagnostic and therapeutic ultrasound-guided thoracentesis. We will perform one side today, and the other side tomorrow.     Sal Peñaloza MD, Radha Gill

## 2022-03-07 NOTE — FLOWSHEET NOTE
Left voice mail regarding thoracentesis procedure on 3/8/2022 at 1230. Asked pt to arrive at 1200 in radiology department. Phone number given to call back for more details.

## 2022-03-07 NOTE — LETTER
3/7/2022      Vish Panda  [address]      Dear Vish Panda,    My name is Jamie Keith, MATTY and I am a registered nurse who partners with Anibal Uribe MD to improve patients' health. Anibal Uribe MD believes you would benefit from working with me. As a member of your health care team, I would work with other providers involved in your care, offer education for your specific health conditions, and connect you with additional resources as needed. I will collaborate with Anibal Uribe MD to support you in following your treatment plan. The additional support I provide is no additional cost to you. My primary focus is to help you achieve specific goals and improve your health. We are committed to walk with you on this journey and look forward to working with you. Please call me to further discuss your healthcare needs. I am available by phone or for appointments at the office. You can reach me at [phone]. In good health,       Virginia Nguyen RN, BSN    Jamie Keith, RN

## 2022-03-07 NOTE — CARE COORDINATION
PT FOR POSSIBLE THORACENTESIS. CONFIRM WITH [facility], CPAP IS THRU THIS COMPANY AND PT NEEDS ADDITIONAL SUPPLIES. [id number]  CPAP SUPPLY ORDER FAXED TO [id number] [facility],[address].

## 2022-03-07 NOTE — DISCHARGE SUMMARY
Physician Discharge Summary     Patient ID:  Martín Gonzalez  [id number]  88 y.o.  1950    Admit date: 3/5/2022    Discharge date : 03/07/22     Admitting Physician: Betzaida Rodriguez MD     Discharge Physician: Dilia Swartz DO     Admission Diagnoses: Pleural effusion [J90]  Biliary pleural effusion [J90]  Prostate cancer metastatic to bone [facility]) [C61, C79.51]  Pneumonia of right middle lobe due to infectious organism [J18.9]    Discharge Diagnoses: Pleural effusion [J90]  Biliary pleural effusion [J90]  Prostate cancer metastatic to bone ([facility] [address] [C61, C79.51]  Pneumonia of right middle lobe due to infectious organism [J18.9]    Admission Condition: fair    Discharged Condition: good    Hospital Course: 73 y.o. male with a history of CAD, hyperlipidemia, hypertension, prostate cancer with mets to the bones, UTE on CPAP presents with shortness of breath. Patient has been having progressive shortness of breath for the past 3 days. He has orthopnea and difficulty ambulating because of the shortness of breath. He complains some cough but not significant. He has some chest tightness and pain when he takes deep breath. Pt was seen a couple days prior and discharged home with follow up with Dr. Miller Late, but symptoms worsened so he returned and was admitted with symptomatic bilateral pleural effusions. Initially placed on O2, but was weaned quickly to RA. Pulm and IR consulted. Pt was anxious to go home. Had R thoracentesis done on Monday and had approx 800cc removed and sent for labs, cultures. Plan was for L thoracentesis on Tues, but patient was adamant that he wanted to go home and come back outpatient for procedure. Dr. Corina Munoz ok with this and scheduled him for outpatient thoracentesis on 3/8. Pt will reschedule his appt with pulmonology for another week in order to review final results from his thoracentesis labs. Pt discharged home in stable and improved condition for repeat thoracentesis tomorrow outpatient with IR. Consults: pulmonary/intensive care and IR    Discharge Exam:  Constitutional: Awake and alert in no acute distress. Lying in bed comfortably  Head: Normocephalic, atraumatic  Eyes: EOMI, PERRLA  ENT: moist mucous membranes  Neck: neck supple, trachea midline  Lungs: Good inspiratory effort, no crackles, no wheeze  Heart: RRR, normal S1 and S2  GI: Soft, non-distended, non tender, no guarding, no rebound, +BS  MSK: no edema noted  Skin: warm, dry  Psych: appropriate affect    Labs:   Recent Labs     03/05/22  1730 03/06/22  0649 03/07/22  0555   WBC 7.3 6.7 5.9   HGB 12.1* 12.2* 11.4*   HCT 35.5* 36.0* 34.6*    265 239     Recent Labs     03/05/22  1730 03/06/22  0649 03/07/22  0555    143 139   K 3.6 4.5 3.9   * 110* 106   CO2 19* 22 21   BUN 19 20 23   CREATININE 0.99 1.23* 1.16   CALCIUM 8.8 8.9 8.6     Recent Labs     03/05/22  1730   AST 14   ALT 9   BILITOT 0.4   ALKPHOS 100     No results for input(s): INR in the last 72 hours. Recent Labs     03/05/22  1745   CKTOTAL 187   TROPONINI <0.010       Urinalysis:   Lab Results   Component Value Date    NITRU Negative 03/05/2022    BLOODU Negative 03/05/2022    SPECGRAV 1.013 03/05/2022    GLUCOSEU Negative 03/05/2022       Radiology:   Most recent    Chest CT      WITH CONTRAST:No results found for this or any previous visit. WITHOUT CONTRAST: Results for orders placed during the hospital encounter of 03/05/22    CT CHEST [address]    Narrative  Patient: Ralph Aguiar  Time Out: 20:38  Exam(s): CT CHEST Without Contrast    EXAM:  CT Chest Without Intravenous Contrast    CLINICAL HISTORY:  Reason for exam: sob.  Chief complaint sob, cp, cough, pt had pe study  on 3/3/22, told he has fluid around lungs    TECHNIQUE:  Axial computed tomography images of the chest without intravenous  contrast.  All CT scan at this facility use dose modulation, iterative  reconstruction, and/or weight based dosing when appropriate to reduce  radiation dose to as low as reasonably achievable. COMPARISON:  Chest x-ray done earlier today, and a CTA chest 3/3/22. FINDINGS:  Lungs: Predominant bibasilar infiltrates, nonspecific, cannot rule out  pneumonia. Slight progressive right middle lobe infiltrate. Stable  probable emphysema with small blebs right upper lobe. Pulmonary arteries:    No engorgement. Aorta:  No thoracic aortic aneurysm. Pleural space: Moderate to large bilateral pleural effusion has  progressed. No pneumothorax. Heart: Stable, moderate cardiomegaly. No significant pericardial  effusion. Bones/joints:  No acute fracture. Stable heterogeneous bone density,  cannot rule out metastatic disease. Soft tissues: Thyroid nodules, stable and nonspecific. Punctate  calcification right thyroid again noted. Lymph nodes:  Stable, mediastinal adenopathy, enlarged in the  subcarinal region. Electronically signed by Je Cedillo M.D. on 03-05-22 at 2038    Impression  1. Moderate to large bilateral pleural effusions have mildly progressed. 2.  Slight progressive right middle lobe infiltrate with stable bibasilar  infiltrates. 3.  Stable mediastinal adenopathy, nonspecific. Neoplasm not excluded. 4.  Stable heterogeneous bone density, metastatic disease not excluded. Stable and nonspecific thyroid nodules and punctate calcification on the  right      CXR      2-view: No results found for this or any previous visit. Portable: Results for orders placed during the hospital encounter of 03/05/22    XR CHEST PORTABLE    Narrative  EXAMINATION:  CHEST RADIOGRAPH (PORTABLE SINGLE VIEW AP)    Exam Date/Time:  3/5/2022 5:56 PM  Clinical History:   sob  Comparison:  CT chest 3/3/2022      RESULT:    Lines, tubes, and devices:  None. Lungs and pleura:  No focal consolidation. Bibasilar atelectasis. Bilateral low lung volumes. No pneumothorax. Small bilateral pleural effusions smaller compared with the prior CT.     Cardiomediastinal silhouette:  Stable cardiomediastinal silhouette. Atherosclerotic calcification of the aortic arch. Other: No acute osseous process. Impression  Interval reduction in bilateral pleural effusions. Echo No results found for this or any previous visit.       Disposition: home    In process/preliminary results:  Outstanding Order Results     Date and Time Order Name Status Description    3/7/2022 12:22 PM IR GUIDED THORACENTESIS PLEURAL In process     3/7/2022 12:16 PM Culture, Body Fluid In process     3/7/2022 12:16 PM Lactate Dehydrogenase, Body Fluid Preliminary     3/7/2022 12:16 PM Amylase, Body Fluid Preliminary     3/7/2022 11:51 AM XR CHEST (2 VW) In process     3/5/2022  6:04 PM EKG 12 Lead - Chest Pain Preliminary           Patient Instructions:   Discharge Medication List as of 3/7/2022  1:12 PM      START taking these medications    Details   furosemide (LASIX) 20 MG tablet Take 1 tablet by mouth daily for 5 days, Disp-5 tablet, R-0Normal         CONTINUE these medications which have NOT CHANGED    Details   tiZANidine (ZANAFLEX) 2 MG tablet Take 1 tablet by mouth nightly as needed (muscle spasms), Disp-30 tablet, R-0Normal      atorvastatin (LIPITOR) 40 MG tablet Take 1 tablet by mouth daily, Disp-90 tablet, R-1Normal      lisinopril (PRINIVIL;ZESTRIL) 20 MG tablet TAKE 1 TABLET BY MOUTH  DAILY, Disp-90 tablet, R-3Requesting 1 year supplyNormal      clopidogrel (PLAVIX) 75 MG tablet Take 1 tablet by mouth daily, Disp-90 tablet, R-3Normal      Talazoparib Tosylate 0.25 MG CAPS Take 1 capsule by mouth daily Historical Med      APPLE CIDER VINEGAR PO Take 1 tablet by mouth daily Historical Med      Cyanocobalamin (VITAMIN B-12 CR PO) Take 1 tablet by mouth daily Historical Med      tamsulosin (FLOMAX) 0.4 MG capsule Take 0.4 mg by mouth 2 times daily , R-11Historical Med      Ascorbic Acid (VITAMIN C PO) Take 500 mg by mouth      CPAP Machine MISC Starting Tue 1/9/2018, Disp-1 each, R-0, PrintUse nightly as directed. Pressure setting 5 cm H2O. STOP taking these medications       aspirin 81 MG EC tablet Comments:   Reason for Stopping:             Activity: activity as tolerated  Diet: regular diet  Wound Care: as directed    Follow-up with Floyd Moscoso MD  in 1 week.     DC time 35 minutes    Signed:  Electronically signed by Flaquita Meehan DO on 3/7/2022 at 1:54 PM

## 2022-03-08 ENCOUNTER — HOSPITAL ENCOUNTER (OUTPATIENT)
Dept: INTERVENTIONAL RADIOLOGY/VASCULAR | Age: 72
Discharge: HOME | End: 2022-03-10
Payer: MEDICARE

## 2022-03-08 ENCOUNTER — CARE COORDINATION (OUTPATIENT)
Dept: CASE MANAGEMENT | Age: 72
End: 2022-03-08

## 2022-03-08 ENCOUNTER — HOSPITAL ENCOUNTER (OUTPATIENT)
Dept: GENERAL RADIOLOGY | Age: 72
Discharge: HOME | End: 2022-03-10
Payer: MEDICARE

## 2022-03-08 VITALS
SYSTOLIC BLOOD PRESSURE: 125 MMHG | DIASTOLIC BLOOD PRESSURE: 62 MMHG | RESPIRATION RATE: 16 BRPM | OXYGEN SATURATION: 100 % | HEART RATE: 66 BPM

## 2022-03-08 DIAGNOSIS — J90 PLEURAL EFFUSION: Primary | ICD-10-CM

## 2022-03-08 PROCEDURE — 88305 TISSUE EXAM BY PATHOLOGIST: CPT

## 2022-03-08 PROCEDURE — 32555 ASPIRATE PLEURA W/ IMAGING: CPT | Performed by: RADIOLOGY

## 2022-03-08 PROCEDURE — 88341 IMHCHEM/IMCYTCHM EA ADD ANTB: CPT

## 2022-03-08 PROCEDURE — 88342 IMHCHEM/IMCYTCHM 1ST ANTB: CPT

## 2022-03-08 PROCEDURE — 2709999900 IR GUIDED THORACENTESIS PLEURAL

## 2022-03-08 PROCEDURE — 2500000003 HC RX 250 WO HCPCS: Performed by: RADIOLOGY

## 2022-03-08 PROCEDURE — 32555 ASPIRATE PLEURA W/ IMAGING: CPT

## 2022-03-08 PROCEDURE — 71046 X-RAY EXAM CHEST 2 VIEWS: CPT

## 2022-03-08 PROCEDURE — 88112 CYTOPATH CELL ENHANCE TECH: CPT

## 2022-03-08 PROCEDURE — 1111F DSCHRG MED/CURRENT MED MERGE: CPT | Performed by: FAMILY MEDICINE

## 2022-03-08 RX ORDER — LIDOCAINE HYDROCHLORIDE 20 MG/ML
INJECTION, SOLUTION INFILTRATION; PERINEURAL
Status: COMPLETED | OUTPATIENT
Start: 2022-03-08 | End: 2022-03-08

## 2022-03-08 RX ADMIN — LIDOCAINE HYDROCHLORIDE 8 ML: 20 INJECTION, SOLUTION INFILTRATION; PERINEURAL at 12:59

## 2022-03-08 NOTE — FLOWSHEET NOTE
Pt denies SOB after yesterday's right thoracentesis. Band-aide to right back C,D,I. Pt here to have left thoracentesis today with Dr Dante Infante.

## 2022-03-08 NOTE — FLOWSHEET NOTE
200- Pt arrived to radiology for post left thoracentesis cxr. Still awaiting Dr. Chao Gill to place those orders. Pt respirations even and unlabored, O2 sat 99%. 1320- CXR order in. Pt CXR complete. Pt provided discharge instructions and verbalized understanding, while awaiting Dr. Chao Gill to read cxr for pt to be discharged. Electronically signed by Jalil Ortega RN on 3/8/2022 at 1:26 PM  [phone]- CXR looks good and pt can be discharged per Dr. Chao Gill. Pt discharged via ambulation, gait steady, to exit. Specimen taken to lab. Electronically signed by Jalil Ortega RN on 3/8/2022 at 1:37 PM

## 2022-03-08 NOTE — SEDATION DOCUMENTATION
NO SEDATION         Pt arrived to special via ambulation, gait steady. Pt A&Ox4, respirations even and unlabored, skin p/w/d. VSS. Consent obtained by PEPPER-RN. Dr. Dante Infante arrived. Patient positioned sitting on cart side leaning over tray table. Time out complete for Ultrasound Guided Thoracentesis Left. Using U/S, Dr. Dante Infante marked left posterior chest and area cleansed with large tinted chloraprep. Once dry, using sterile technique, Dr. Dante Infante prepped and draped area. Using U/S guidance, Dr. Dante Infante numbed site with 2% lidocaine, see eMar.     Using U/S guidance, access obtained with [id number] centesis 5F catheter with return of fluid that appears clear yellow and ~65ml aspirated for cytology only, Dr. Dante Infante to place order. Thoracentesis tubing connected to Kamla with suction at 60mmHg and draining well. Pt tolerating well. VSS. Total 285 ml fluid removed. Bandaid at left posterior chest site, which is soft, no crepitus and no drainage noted. VSS. Pt tolerated well. Patient taken for CXR. Electronically signed by Medina Martinez RN on 3/8/2022 at 1:08 PM

## 2022-03-08 NOTE — CARE COORDINATION
[place] [facility] Transitions Initial Follow Up Call    Call within 2 business days of discharge: Yes    Patient: Catherine Aburto Patient : 1950   MRN: [id number]  Reason for Admission: Pleural Effusion  Discharge Date: 3/7/22 RARS: Readmission Risk Score: 10.7 ( )      Last Discharge [facility]       Complaint Diagnosis Description Type Department Provider    3/5/22 Shortness of Breath Pneumonia of right middle lobe due to infectious organism . .. ED to Hosp-Admission (Discharged) (ADMIT) Eduardo Wells DO; Ludmila Art. .. Transitions of Care Initial Call    Was this an external facility discharge? No Discharge Facility: N/A    Challenges to be reviewed by the provider   Additional needs identified to be addressed with provider: No  none             Method of communication with provider : none      Advance Care Planning:   Does patient have an Advance Directive: reviewed and current. Was this a readmission? No  Patient stated reason for admission: shortness of breath  Patients top risk factors for readmission: medical condition-PAD, Afib, and prostate cancer with mets to bone  polypharmacy    Care Transition Nurse (CTN) contacted the patient by telephone to perform post hospital discharge assessment. Verified name and  with patient as identifiers. Provided introduction to self, and explanation of the CTN role. CTN reviewed discharge instructions, medical action plan and red flags with patient who verbalized understanding. Patient given an opportunity to ask questions and does not have any further questions or concerns at this time. Were discharge instructions available to patient? Yes. Reviewed appropriate site of care based on symptoms and resources available to patient including: PCP, Urgent care clinics and When to call 911. The patient agrees to contact the PCP office for questions related to their healthcare.      Medication reconciliation was performed with patient, who verbalizes understanding of administration of home medications. Advised obtaining a 90-day supply of all daily and as-needed medications. Covid Risk Education     Educated patient about risk for severe COVID-19 due to risk factors according to CDC guidelines. CTN reviewed discharge instructions, medical action plan and red flag symptoms with the patient who verbalized understanding. Discussed COVID vaccination status: Yes. Education provided on COVID-19 vaccination as appropriate. Discussed exposure protocols and quarantine with CDC Guidelines. Patient was given an opportunity to verbalize any questions and concerns and agrees to contact CTN or health care provider for questions related to their healthcare. Reviewed and educated patient on any new and changed medications related to discharge diagnosis. Was patient discharged with a pulse oximeter? No Discussed and confirmed pulse oximeter discharge instructions and when to notify provider or seek emergency care. Non-face-to-face services provided:  Scheduled appointment with PCP-CTN confirmed with patient he is scheduled for HFU with Dr. Marixa Moulton (PCP) on 3/15/22 at 4:30 pm.   Obtained and reviewed discharge summary and/or continuity of care documents  Education of patient/family/caregiver/guardian to support self-management-Discussed s/s of infection and knowing when to seek medical attention.      Care Transitions 24 Hour Call    Do you have any ongoing symptoms?: No  Do you have a copy of your discharge instructions?: Yes  Do you have all of your prescriptions and are they filled?: Yes  Have you been contacted by a [address]?: No  Have you scheduled your follow up appointment?: Yes  How are you going to get to your appointment?: Car - drive self  Were you discharged with any Home Care or Post Acute Services: No  Do you feel like you have everything you need to keep you well at home?: Yes  Care Transitions Interventions  No Identified Needs Spoke with patient today 3/8/22 for TCM/hospital discharge follow up for Pleural Effusion. Patient states he is feeling better since discharge and offers no complaints at this time. Denies any shortness of breath, cough/congetion, chest pain, chest discomfort, abdominal pain, nausea, vomiting, chills or fever. Denies being on home oxygen therapy but does utilize a CPAP at night d/t UTE. CT of chest obtained on admission noted to show the following below:     1.  Moderate to large bilateral pleural effusions have mildly progressed. 2.  Slight progressive right middle lobe infiltrate with stable bibasilar    infiltrates. 3.  Stable mediastinal adenopathy, nonspecific.  Neoplasm not excluded. 4.  Stable heterogeneous bone density, metastatic disease not excluded. Stable and nonspecific thyroid nodules and punctate calcification on the    right     Noted patient had a right thoracentesis while IP with 800 cc removed and is scheduled to have left thoracentesis performed OP today. Denies any pain, redness or swelling at right thoracentesis site. Discussed s/s of infection and knowing when to seek medical attention. Confirmed patient has history of prostate cancer with mets to bone and on oral chemotherapy Doug Go). Provided a complete review of home meds with patient who confirms he obtained Lasix newly ordered on discharge. 1111F code entered. Confirmed with patient he is scheduled for HFU appt with Dr. Donovan Garcia (PCP) on 3/15/22 at 4:30 pm. Denies any needs or concerns at this time. Confirmed patient is active with Ambulatory Care Coordination. CTN signing off and will provide warm hand off to ACM.      Follow Up  Future Appointments   Date Time Provider Sana Ramos   3/8/2022 12:30 PM [facility] SPECIAL PROCEDURES ROOM 1 [facility] SP PROC [facility] Fac RAD   3/15/2022  4:30 PM Viky Rehman MD [facility] [place]   6/6/2022  1:30 PM Viky Rehman MD [facility] 119, APRN

## 2022-03-09 ENCOUNTER — POST-OP TELEPHONE (OUTPATIENT)
Dept: INTERVENTIONAL RADIOLOGY/VASCULAR | Age: 72
End: 2022-03-09

## 2022-03-09 LAB
EKG ATRIAL RATE: 63 BPM
EKG P AXIS: 31 DEGREES
EKG P-R INTERVAL: 216 MS
EKG Q-T INTERVAL: 464 MS
EKG QRS DURATION: 104 MS
EKG QTC CALCULATION (BAZETT): 474 MS
EKG R AXIS: -14 DEGREES
EKG T AXIS: -19 DEGREES
EKG VENTRICULAR RATE: 63 BPM

## 2022-03-10 ENCOUNTER — CARE COORDINATION (OUTPATIENT)
Dept: CARE COORDINATION | Age: 72
End: 2022-03-10

## 2022-03-10 ASSESSMENT — PATIENT HEALTH QUESTIONNAIRE - PHQ9
SUM OF ALL RESPONSES TO PHQ QUESTIONS 1-9: 3

## 2022-03-10 NOTE — PROGRESS NOTES
Physician Progress Note      PATIENT:               Trung Santana  CSN #:                  [id number]  :                       1950  ADMIT DATE:       3/5/2022 5:21 PM  [facility] DATE:        3/7/2022 1:51 PM  RESPONDING  PROVIDER #:        Ramón Turk DO          QUERY TEXT:    Pt admitted with pleural effusion. Pt noted to have pneumonia in the DC   summary and prostate cancer with mets to the bone. If possible, please   document in progress notes and discharge summary the cause of the pleural   effusion. The medical record reflects the following:  Risk Factors: prostate cancer with bone mets, pleural effusion  Clinical Indicators: SOB, \"symptomatic sob and worsening bilateral pleural   effusions\", Attending PN: \" Less likely pneumonia as no leukocytosis and   negative procalcitonin\"; Dr. Esperanza Foley \"Patient with bilateral pleural effusion,   very related to pneumonia versus other causes such as known prostate   malignancy and bone metastatic disease. \"; DC Summary: Pneumonia of right   middle lobe due to infectious organism  Treatment: Pulmonology & IR consults, thoracentesis, pathology pending, O2,   CXR, IV Zithromax, IV Rocephin, labs and monitoring    Thank you,  Alanna Millard, RN BSN [facility]  Outside Callers: [phone]  Options provided:  -- Pleural effusion due to pneumonia  -- Probable malignant pleural effusion  -- Other - I will add my own diagnosis  -- Disagree - Not applicable / Not valid  -- Disagree - Clinically unable to determine / Unknown  -- Refer to Clinical Documentation Reviewer    PROVIDER RESPONSE TEXT:    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     Query created by: Kia Ruiz on 3/10/2022 2:55 PM      Electronically signed by:  Ramón Turk DO 3/10/2022 3:51 PM

## 2022-03-10 NOTE — CARE COORDINATION
Ambulatory Care Coordination Note  3/10/2022  CM Risk Score: 4  [place] 10 Year Mortality Risk Score: 98%     ACC: Elaine Warner, RN    Summary Note:     I called to begin care coordination enrollment  [facility] has had pleural thoracentesis on both lungs this week  He says that he had 1000 cc drained the first time and a little less the with the second lung  He is checking his SaO2 and it is %   He states that he was diagnosed with prostate cancer in 2008 with recurrence in 2016  He ads that he was also just diagnosed with bone metastasis   He is in a clinical trial for his prostate cancer and the study group is aware of the bone metastasis   He denies any issues with SOB, cough, or chest pain. He denies drainage, fever, or chills. I have reviewed allergies, current medical history, falls, initiated CC protocol, full medication review, travel screening, and ACP review. PHQ- 9 = 3 in spite of the changes he remains positive. He is current with all vaccines and boosters. I have provided him with my contact information and I have asked him to call me with any questions or concerns. He feels that he has everything on track at this point       Care Coordination Interventions    Program Enrollment: Complex Care  Referral from Primary Care Provider: No  Suggested Interventions and Community Resources  Disease Specific Clinic: In Process  Other Services or Interventions: Pharmacy referral declined (in Clinical Study) Dietician referral declined, Walk in Clinic hours and usage discussed           Goals Addressed    None         Prior to Admission medications    Medication Sig Start Date End Date Taking?  Authorizing Provider   furosemide (LASIX) 20 MG tablet Take 1 tablet by mouth daily for 5 days 3/7/22 3/12/22 Yes Solo Ariza, DO   atorvastatin (LIPITOR) 40 MG tablet Take 1 tablet by mouth daily 9/27/21  Yes Adan Cortez, DO   lisinopril (PRINIVIL;ZESTRIL) 20 MG tablet TAKE 1 TABLET BY MOUTH  DAILY 8/24/21  Yes Jesse Miguel MD   clopidogrel (PLAVIX) 75 MG tablet Take 1 tablet by mouth daily 6/18/21  Yes Adan Cortez, DO   Talazoparib Tosylate 0.25 MG CAPS Take 1 capsule by mouth daily Take 2 tablets daily   Yes Historical Provider, MD   APPLE CIDER VINEGAR PO Take 1 tablet by mouth daily    Yes Historical Provider, MD   Cyanocobalamin (VITAMIN B-12 CR PO) Take 1 tablet by mouth daily    Yes Historical Provider, MD   tamsulosin (FLOMAX) 0.4 MG capsule Take 0.4 mg by mouth 2 times daily  7/6/19  Yes Historical Provider, MD   CPAP Machine MISC by Does not apply route Use nightly as directed. Pressure setting 5 cm H2O. Patient taking differently: 1 Device by Does not apply route Use nightly as directed.   Pressure setting 5 cm H2O. 1/9/18  Yes Farida Munroe MD   Ascorbic Acid (VITAMIN C PO) Take 500 mg by mouth daily    Yes Historical Provider, MD   tiZANidine (ZANAFLEX) 2 MG tablet Take 1 tablet by mouth nightly as needed (muscle spasms)  Patient not taking: Reported on 3/10/2022 12/6/21   Jesse Miguel MD       Future Appointments   Date Time Provider Sana Ramos   3/15/2022  4:30 PM Jesse Miguel MD [facility] EMERGENCY [facility] [place]   3/16/2022  1:45 PM Bianca Harden MD [address]   6/6/2022  1:30 PM Jesse Miguel MD [facility] [place]

## 2022-03-14 LAB — BODY FLUID CULTURE, STERILE: NORMAL

## 2022-03-15 ENCOUNTER — TELEMEDICINE (OUTPATIENT)
Dept: FAMILY MEDICINE CLINIC | Age: 72
End: 2022-03-15
Payer: MEDICARE

## 2022-03-15 DIAGNOSIS — I73.9 PAD (PERIPHERAL ARTERY DISEASE) (HCC): ICD-10-CM

## 2022-03-15 DIAGNOSIS — I48.0 PAROXYSMAL ATRIAL FIBRILLATION (HCC): ICD-10-CM

## 2022-03-15 PROCEDURE — 99442 PR PHYS/QHP TELEPHONE EVALUATION 11-20 MIN: CPT | Performed by: FAMILY MEDICINE

## 2022-03-15 NOTE — PROGRESS NOTES
in order to review final results from his thoracentesis labs. Pt discharged home in stable and improved condition for repeat thoracentesis tomorrow outpatient with IR. Current symptoms:    Feeling better than he was in hospital    S/p bilateral Thoracentesis     He is in clinical trial for prostate CA    Recent scan had shown more bony metastases    Will have more scans in April  Continues on active treatment    Will see pulm in follow up tomorrow    Patient Active Problem List   Diagnosis    Hypertension    Prostate cancer ([facility] [place] [address]    Hyperlipidemia    Coronary artery disease involving native coronary artery of native heart without angina pectoris    Old MI (myocardial infarction)    Prediabetes    UTE on CPAP    Primary insomnia    S/P radiation therapy    Drug-induced erectile dysfunction    Family history of colon cancer    History of colon polyps    Polyp of cecum    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)    PAD (peripheral artery disease) (HCC)    Paroxysmal atrial fibrillation (HCC)    Pleural effusion     Past Medical History:   Diagnosis Date    CAD (coronary artery disease)     Family history of colon cancer     Hyperlipidemia     Hypertension     Old MI (myocardial infarction) 02/03/2006    UTE on CPAP     PAD (peripheral artery disease) (HCC)     Prostate cancer ([facility] [place] [address]          Review of Systems    Otherwise physically feels healthy without sob/palpitations/chest pain/f/c/n/v/weight gain/weight loss/abd pain      Prior to Visit Medications    Medication Sig Taking?  Authorizing Provider   tiZANidine (ZANAFLEX) 2 MG tablet Take 1 tablet by mouth nightly as needed (muscle spasms) Yes Mily Tovar MD   atorvastatin (LIPITOR) 40 MG tablet Take 1 tablet by mouth daily Yes Adan Cortez, DO   lisinopril (PRINIVIL;ZESTRIL) 20 MG tablet TAKE 1 TABLET BY MOUTH  DAILY Yes Mily Tovar MD   clopidogrel (PLAVIX) 75 MG tablet Take 1 tablet by mouth daily Yes Adan Cortez, DO Talazoparib Tosylate 0.25 MG CAPS Take 1 capsule by mouth daily Take 2 tablets daily Yes Historical Provider, MD   Cyanocobalamin (VITAMIN B-12 CR PO) Take 1 tablet by mouth daily  Yes Historical Provider, MD   tamsulosin (FLOMAX) 0.4 MG capsule Take 0.4 mg by mouth 2 times daily  Yes Historical Provider, MD   CPAP Machine MISC by Does not apply route Use nightly as directed. Pressure setting 5 cm H2O. Patient taking differently: 1 Device by Does not apply route Use nightly as directed. Pressure setting 5 cm H2O. Yes Mia Bower MD   Ascorbic Acid (VITAMIN C PO) Take 500 mg by mouth daily  Yes Historical Provider, MD       Social History     Tobacco Use    Smoking status: Former Smoker     Packs/day: 2.00     Years: 25.00     Pack years: 50.00     Types: Cigars     Quit date: 1995     Years since quittin.2    Smokeless tobacco: Never Used    Tobacco comment: RARE CIGAR FROM TIME TO TIME   Vaping Use    Vaping Use: Never used   Substance Use Topics    Alcohol use: Yes     Alcohol/week: 4.0 standard drinks     Types: 2 Cans of beer, 2 Shots of liquor per week    Drug use: Not Currently     Types: Marijuana Jose Don)            PHYSICAL EXAMINATION:  Patient-Reported Vitals 3/15/2022   Patient-Reported Weight 186 lb   Patient-Reported Height 5'10   Patient-Reported Systolic 947   Patient-Reported Diastolic 74   Patient-Reported Pulse 72   Patient-Reported SpO2 97         No exam  Phone visit  Patient in no significant distress, conversant    Cytology of effusion not showing malignant cells     Due to this being a TeleHealth encounter, evaluation of the following organ systems is limited: Vitals/Constitutional/EENT/Resp/CV/GI//MS/Neuro/Skin/Heme-Lymph-Imm.     Lab Results   Component Value Date    WBC 5.9 2022    HGB 11.4 (L) 2022    HCT 34.6 (L) 2022     2022    CHOL 215 (H) 2021    TRIG 206 (H) 2021    HDL 39 (L) 2021    ALT 9 2022    AST 14 03/05/2022     03/07/2022    K 3.9 03/07/2022     03/07/2022    CREATININE 1.16 03/07/2022    BUN 23 03/07/2022    CO2 21 03/07/2022    TSH 1.700 05/18/2021    PSA 41.38 (H) 12/30/2019    INR 1.0 08/23/2021    LABA1C 5.8 12/16/2020         ASSESSMENT/PLAN:     Diagnosis Orders   1. PAD (peripheral artery disease) (HCC)     2. Paroxysmal atrial fibrillation [facility])       Multiple specialist follow up   Currently stable  Will see pulmonology in office tomorrow  He is staying off work for right now  Oncology may be adjusting his treatment regimen     No follow-ups on file. An  electronic signature was used to authenticate this note. --Delmas Brittle, MD on 3/15/2022 at 4:43 PM        Pursuant to the emergency declaration under the [facility] waiver authority and the [employer] Act, this Virtual  Visit was conducted, with patient's consent, to reduce the patient's risk of exposure to COVID-19 and provide continuity of care for an established patient.     15 minute call

## 2022-03-16 ENCOUNTER — OFFICE VISIT (OUTPATIENT)
Dept: PULMONOLOGY | Age: 72
End: 2022-03-16
Payer: MEDICARE

## 2022-03-16 VITALS
DIASTOLIC BLOOD PRESSURE: 66 MMHG | BODY MASS INDEX: 26.83 KG/M2 | HEART RATE: 74 BPM | OXYGEN SATURATION: 99 % | RESPIRATION RATE: 16 BRPM | TEMPERATURE: 97.2 F | SYSTOLIC BLOOD PRESSURE: 128 MMHG | HEIGHT: 70 IN | WEIGHT: 187.4 LBS

## 2022-03-16 DIAGNOSIS — J43.8 OTHER EMPHYSEMA (HCC): ICD-10-CM

## 2022-03-16 DIAGNOSIS — F17.200 SMOKING: ICD-10-CM

## 2022-03-16 DIAGNOSIS — R93.89 ABNORMAL CT OF THE CHEST: ICD-10-CM

## 2022-03-16 DIAGNOSIS — G47.33 OSA (OBSTRUCTIVE SLEEP APNEA): ICD-10-CM

## 2022-03-16 DIAGNOSIS — J90 PLEURAL EFFUSION: Primary | ICD-10-CM

## 2022-03-16 PROCEDURE — 1036F TOBACCO NON-USER: CPT | Performed by: INTERNAL MEDICINE

## 2022-03-16 PROCEDURE — 1111F DSCHRG MED/CURRENT MED MERGE: CPT | Performed by: INTERNAL MEDICINE

## 2022-03-16 PROCEDURE — 3023F SPIROM DOC REV: CPT | Performed by: INTERNAL MEDICINE

## 2022-03-16 PROCEDURE — G8484 FLU IMMUNIZE NO ADMIN: HCPCS | Performed by: INTERNAL MEDICINE

## 2022-03-16 PROCEDURE — 99204 OFFICE O/P NEW MOD 45 MIN: CPT | Performed by: INTERNAL MEDICINE

## 2022-03-16 PROCEDURE — G8417 CALC BMI ABV UP PARAM F/U: HCPCS | Performed by: INTERNAL MEDICINE

## 2022-03-16 PROCEDURE — 1123F ACP DISCUSS/DSCN MKR DOCD: CPT | Performed by: INTERNAL MEDICINE

## 2022-03-16 PROCEDURE — 3017F COLORECTAL CA SCREEN DOC REV: CPT | Performed by: INTERNAL MEDICINE

## 2022-03-16 PROCEDURE — G8427 DOCREV CUR MEDS BY ELIG CLIN: HCPCS | Performed by: INTERNAL MEDICINE

## 2022-03-16 PROCEDURE — 4040F PNEUMOC VAC/ADMIN/RCVD: CPT | Performed by: INTERNAL MEDICINE

## 2022-03-16 NOTE — PROGRESS NOTES
Subjective:     Campbell Church is a 73 y.o. male who complains today of:     Chief Complaint   Patient presents with    New Patient     Pleural Effusion and UTE on CPAP    Results     Thoracentesis       HPI  Patient presents for pleural effusion      Patient presents for evaluation of pleural effusion, patient was admitted to the hospital earlier this month with bilateral pleural effusion, he underwent bilateral thoracentesis, likely transudate however serum LDH and LFT was not done. He feels better since his discharge, denies chest pain, no shortness of breath, no coughing, no fever no chills, and no lower extremity edema.   He has sleep apnea, diagnosed 2017 AHI 22, he is on CPAP 6 cm H2O, and compliant with treatment            Allergies:  Morphine  Past Medical History:   Diagnosis Date    CAD (coronary artery disease)     Family history of colon cancer     Hyperlipidemia     Hypertension     Old MI (myocardial infarction) 02/03/2006    UTE on CPAP     PAD (peripheral artery disease) (HCC)     Prostate cancer [facility])      Past Surgical History:   Procedure Laterality Date    COLONOSCOPY N/A 08/19/2019    COLORECTAL CANCER SCREENING, NOT HIGH RISK performed by Kb Ortiz MD at [address] [place] [address]      X3    THORACENTESIS Right 03/07/2022    870 ml removed by Dr. Nicole Martel - diagnostics sent    THORACENTESIS Left 03/08/2022    285 ml removed by Dr Nicole Martel - cytology only sent     Family History   Problem Relation Age of Onset    Cancer Mother 76        BONE    Cancer Father 64        COLON    Colon Cancer Father     Cancer Brother 43        THYROID    Diabetes Maternal Grandmother      Social History     Socioeconomic History    Marital status: Single     Spouse name: Not on file    Number of children: Not on file    Years of education: Not on file    Highest education level: Not on file   Occupational History    Not on file   Tobacco Use    Smoking status: Former Smoker     Packs/day: 2.00     Years: 25.00     Pack years: 50.00     Types: Cigars     Quit date: 1995     Years since quittin.2    Smokeless tobacco: Never Used    Tobacco comment: RARE CIGAR FROM TIME TO TIME   Vaping Use    Vaping Use: Never used   Substance and Sexual Activity    Alcohol use: Yes     Alcohol/week: 4.0 standard drinks     Types: 2 Cans of beer, 2 Shots of liquor per week    Drug use: Not Currently     Types: Marijuana Isa Coho)    Sexual activity: Not on file   Other Topics Concern    Not on file   Social History Narrative    Not on file     Social Determinants of Health     Financial Resource Strain: Low Risk     Difficulty of Paying Living Expenses: Not hard at all   Food Insecurity: No Food Insecurity    Worried About [address] [employer] in the Last Year: Never true    [facility] in the Last Year: Never true   Transportation Needs:     Lack of Transportation (Medical): Not on file    Lack of Transportation (Non-Medical):  Not on file   Physical Activity:     Days of Exercise per Week: Not on file    Minutes of Exercise per Session: Not on file   Stress:     Feeling of Stress : Not on file   Social Connections:     Frequency of Communication with Friends and Family: Not on file    Frequency of Social Gatherings with Friends and Family: Not on file    Attends Confucianism Services: Not on file    Active Member of [address] [place] [address] or Organizations: Not on file    Attends Club or Organization Meetings: Not on file    Marital Status: Not on file   Intimate Partner Violence:     Fear of Current or Ex-Partner: Not on file    Emotionally Abused: Not on file    Physically Abused: Not on file    Sexually Abused: Not on file   Housing Stability:     Unable to Pay for Housing in the Last Year: Not on file    Number of [place] in the Last Year: Not on file    Unstable Housing in the Last Year: Not on file         Review of Systems      ROS: 10 organs review of system is done including general, psychological, ENT, hematological, endocrine, respiratory, cardiovascular, gastrointestinal,musculoskeletal, neurological,  allergy and Immunology is done and is otherwise negative. Current Outpatient Medications   Medication Sig Dispense Refill    tiZANidine (ZANAFLEX) 2 MG tablet Take 1 tablet by mouth nightly as needed (muscle spasms) 30 tablet 0    atorvastatin (LIPITOR) 40 MG tablet Take 1 tablet by mouth daily 90 tablet 1    lisinopril (PRINIVIL;ZESTRIL) 20 MG tablet TAKE 1 TABLET BY MOUTH  DAILY 90 tablet 3    clopidogrel (PLAVIX) 75 MG tablet Take 1 tablet by mouth daily 90 tablet 3    Talazoparib Tosylate 0.25 MG CAPS Take 1 capsule by mouth daily Take 2 tablets daily      Cyanocobalamin (VITAMIN B-12 CR PO) Take 1 tablet by mouth daily       tamsulosin (FLOMAX) 0.4 MG capsule Take 0.4 mg by mouth 2 times daily   11    CPAP Machine MISC by Does not apply route Use nightly as directed. Pressure setting 5 cm H2O. (Patient taking differently: 1 Device by Does not apply route Use nightly as directed. Pressure setting 5 cm H2O.) 1 each 0    Ascorbic Acid (VITAMIN C PO) Take 500 mg by mouth daily        Current Facility-Administered Medications   Medication Dose Route Frequency Provider Last Rate Last Admin    cyanocobalamin injection 1,000 mcg  1,000 mcg IntraMUSCular Once [facility]           Objective:     Vitals:    03/16/22 1332   BP: 128/66   Site: Right Upper Arm   Position: Sitting   Cuff Size: Medium Adult   Pulse: 74   Resp: 16   Temp: 97.2 °F (36.2 °C)   TempSrc: Infrared   SpO2: 99%   Weight: 187 lb 6.4 oz (85 kg)   Height: 5' 10\" (1.778 m)         Physical Exam  Constitutional:       Appearance: He is well-developed. HENT:      Head: Normocephalic and atraumatic. Eyes:      General:         Left eye: No discharge.       Conjunctiva/sclera: Conjunctivae normal.      Pupils: Pupils are equal, round, and reactive to light. Neck:      Vascular: No JVD. Cardiovascular:      Rate and Rhythm: Normal rate and regular rhythm. Heart sounds: Normal heart sounds. No murmur heard. No friction rub. No gallop. Pulmonary:      Effort: Pulmonary effort is normal. No respiratory distress. Breath sounds: Normal breath sounds. No wheezing or rales. Chest:      Chest wall: No tenderness. Abdominal:      General: Bowel sounds are normal. There is no distension. Palpations: Abdomen is soft. Tenderness: There is no abdominal tenderness. There is no rebound. Musculoskeletal:         General: No deformity. Cervical back: Normal range of motion and neck supple. Right lower leg: No edema. Left lower leg: No edema. Skin:     General: Skin is warm and dry. Neurological:      Mental Status: He is alert and oriented to person, place, and time. Psychiatric:         Judgment: Judgment normal.         Imaging studies reviewed by me CT chest March of this year, shows prominent mediastinal lymph nodes, hazy groundglass changes, area of nodularity right upper lobe and right middle lobe, bilateral pleural effusion. Mild subpleural emphysema  Lab results reviewed in chart  PFT none  ECHO: 2018, shows EF 67%, with diastolic dysfunction    Assessment and Plan       Diagnosis Orders   1. Pleural effusion     2. Abnormal CT of the chest  CT CHEST WO CONTRAST   3. UTE (obstructive sleep apnea)  DME Order for CPAP as OP   4. Other emphysema ([address]  Full PFT Study With Bronchodilator   5. Smoking       · Pleural effusion, bilateral, transudative, likely due to underlying diastolic dysfunction, with acute exacerbation and volume overload, etiology of acute exacerbation is not clear, patient has calcified LAD, recent stress test shows fixed defect, I discussed with Dr. Catracho Schaeffer over the phone, he will schedule the patient for an appointment evaluate need for further work-up.   · Abnormal chest x-ray with groundglass changes, likely due to pulmonary edema, however will need follow-up confirm resolution, will repeat CT chest in 2 months  · UTE, patient reports compliance with CPAP machine, will continue supplies, evaluate compliance on follow-up  · Smoking cessation counseling done today, will obtain PFT patient has subpleural emphysema, rule out obstructive airway disease      Orders Placed This Encounter   Procedures    CT CHEST WO CONTRAST     Standing Status:   Future     Standing Expiration Date:   3/16/2023     Order Specific Question:   Reason for exam:     Answer:   abnormal ct chest follwo up    Full PFT Study With Bronchodilator     Standing Status:   Future     Standing Expiration Date:   9/16/2023    DME Order for CPAP as OP     Nasal Pillow 1 per 3 months and Replacement Pillows 2 per month    Headgear 1 per 6 months, Chin Strap 1 per 6 months, Disposable Filters 2 per month, Non-disposable Filters 1 per 6 months, Chambers 1 per 6 months and Other Related Supplies. Replace supplies and accessories as needed. Patient may choose another interface for compliance and comfort. Comments: Supplies   Diagnosis: UTE   Length of need: Lifetime     No orders of the defined types were placed in this encounter. Discussed with patient the importance of exercise and weight control and  overall health and well-being. Reviewed with the patient: current clinical status, medications, activities and diet. Side effects, adverse effects of the medication prescribed today, as well as treatment plan and result expectations have been discussed with the patient who expresses understanding and desires to proceed. Return in about 2 months (around 5/16/2022).       Quinton Ferrera MD

## 2022-03-18 ENCOUNTER — TELEPHONE (OUTPATIENT)
Dept: CARDIOLOGY CLINIC | Age: 72
End: 2022-03-18

## 2022-03-18 NOTE — TELEPHONE ENCOUNTER
----- Message from Sophia Santa DO sent at 3/18/2022  1:26 PM EDT -----  Please have patient see me or ritika next week per DR Awa Gonzales. Patient will likely need LHC given abn stress test/ calcification of coronaries.      Electronically signed by Sophia Santa DO on 3/18/2022 at 1:27 PM

## 2022-03-24 ENCOUNTER — CARE COORDINATION (OUTPATIENT)
Dept: CARE COORDINATION | Age: 72
End: 2022-03-24

## 2022-03-24 ENCOUNTER — OFFICE VISIT (OUTPATIENT)
Dept: CARDIOLOGY CLINIC | Age: 72
End: 2022-03-24
Payer: MEDICARE

## 2022-03-24 ENCOUNTER — PREP FOR PROCEDURE (OUTPATIENT)
Dept: CARDIOLOGY CLINIC | Age: 72
End: 2022-03-24

## 2022-03-24 VITALS
BODY MASS INDEX: 27.55 KG/M2 | SYSTOLIC BLOOD PRESSURE: 124 MMHG | HEART RATE: 58 BPM | DIASTOLIC BLOOD PRESSURE: 60 MMHG | WEIGHT: 192 LBS

## 2022-03-24 DIAGNOSIS — I48.0 PAROXYSMAL ATRIAL FIBRILLATION (HCC): Primary | ICD-10-CM

## 2022-03-24 DIAGNOSIS — R94.39 ABNORMAL STRESS TEST: ICD-10-CM

## 2022-03-24 DIAGNOSIS — E78.00 PURE HYPERCHOLESTEROLEMIA: ICD-10-CM

## 2022-03-24 DIAGNOSIS — I25.2 OLD MI (MYOCARDIAL INFARCTION): ICD-10-CM

## 2022-03-24 DIAGNOSIS — I25.10 CORONARY ARTERY DISEASE INVOLVING NATIVE CORONARY ARTERY OF NATIVE HEART WITHOUT ANGINA PECTORIS: ICD-10-CM

## 2022-03-24 DIAGNOSIS — R06.09 DYSPNEA ON EXERTION: ICD-10-CM

## 2022-03-24 DIAGNOSIS — I10 PRIMARY HYPERTENSION: ICD-10-CM

## 2022-03-24 PROCEDURE — 4040F PNEUMOC VAC/ADMIN/RCVD: CPT | Performed by: INTERNAL MEDICINE

## 2022-03-24 PROCEDURE — G8484 FLU IMMUNIZE NO ADMIN: HCPCS | Performed by: INTERNAL MEDICINE

## 2022-03-24 PROCEDURE — 1111F DSCHRG MED/CURRENT MED MERGE: CPT | Performed by: INTERNAL MEDICINE

## 2022-03-24 PROCEDURE — 1123F ACP DISCUSS/DSCN MKR DOCD: CPT | Performed by: INTERNAL MEDICINE

## 2022-03-24 PROCEDURE — 3017F COLORECTAL CA SCREEN DOC REV: CPT | Performed by: INTERNAL MEDICINE

## 2022-03-24 PROCEDURE — 99215 OFFICE O/P EST HI 40 MIN: CPT | Performed by: INTERNAL MEDICINE

## 2022-03-24 PROCEDURE — 1036F TOBACCO NON-USER: CPT | Performed by: INTERNAL MEDICINE

## 2022-03-24 PROCEDURE — 93000 ELECTROCARDIOGRAM COMPLETE: CPT | Performed by: INTERNAL MEDICINE

## 2022-03-24 PROCEDURE — G8427 DOCREV CUR MEDS BY ELIG CLIN: HCPCS | Performed by: INTERNAL MEDICINE

## 2022-03-24 PROCEDURE — G8417 CALC BMI ABV UP PARAM F/U: HCPCS | Performed by: INTERNAL MEDICINE

## 2022-03-24 RX ORDER — ASPIRIN 81 MG/1
81 TABLET ORAL ONCE
Status: CANCELLED | OUTPATIENT
Start: 2022-03-24 | End: 2022-03-24

## 2022-03-24 RX ORDER — SODIUM CHLORIDE 9 MG/ML
25 INJECTION, SOLUTION INTRAVENOUS PRN
Status: CANCELLED | OUTPATIENT
Start: 2022-03-24

## 2022-03-24 RX ORDER — DIPHENHYDRAMINE HCL 25 MG
50 TABLET ORAL ONCE
Status: CANCELLED | OUTPATIENT
Start: 2022-03-24 | End: 2022-03-24

## 2022-03-24 RX ORDER — PREDNISONE 1 MG/1
50 TABLET ORAL ONCE
Status: CANCELLED | OUTPATIENT
Start: 2022-03-24 | End: 2022-03-24

## 2022-03-24 RX ORDER — SODIUM CHLORIDE 9 MG/ML
INJECTION, SOLUTION INTRAVENOUS CONTINUOUS
Status: CANCELLED | OUTPATIENT
Start: 2022-03-24

## 2022-03-24 RX ORDER — SODIUM CHLORIDE 0.9 % (FLUSH) 0.9 %
5-40 SYRINGE (ML) INJECTION PRN
Status: CANCELLED | OUTPATIENT
Start: 2022-03-24

## 2022-03-24 RX ORDER — SODIUM CHLORIDE 0.9 % (FLUSH) 0.9 %
5-40 SYRINGE (ML) INJECTION EVERY 12 HOURS SCHEDULED
Status: CANCELLED | OUTPATIENT
Start: 2022-03-24

## 2022-03-24 RX ORDER — ONDANSETRON 2 MG/ML
4 INJECTION INTRAMUSCULAR; INTRAVENOUS EVERY 6 HOURS PRN
Status: CANCELLED | OUTPATIENT
Start: 2022-03-24

## 2022-03-24 RX ORDER — ASPIRIN 81 MG/1
81 TABLET ORAL DAILY
COMMUNITY

## 2022-03-24 RX ORDER — NITROGLYCERIN 0.4 MG/1
0.4 TABLET SUBLINGUAL EVERY 5 MIN PRN
Status: CANCELLED | OUTPATIENT
Start: 2022-03-24

## 2022-03-24 RX ORDER — LORAZEPAM 0.5 MG/1
0.5 TABLET ORAL
Status: CANCELLED | OUTPATIENT
Start: 2022-03-24 | End: 2022-03-24

## 2022-03-24 ASSESSMENT — ENCOUNTER SYMPTOMS: DYSPNEA ASSOCIATED WITH: EXERTION

## 2022-03-24 NOTE — CARE COORDINATION
Initial Contact Social Work Note - Ambulatory  3/24/2022      Date of referral: 3/24/2022  Referral received from: JOSE Neff  Reason for referral: Medical Bills    Previous SW referral: No  If yes, brief summary of outcome:     Two Identifiers Verified: Yes    Insurance Provider: [facility]/Medicare    Support System:  Spouse/Partner     Status: [place]. Discussed [place] and he has contacted them in the past and was told that he make too much. Discussed contacting [facility] Worker to see if he will be eligible for any assistance. Community Providers: None    ADL Assistance Needed: Independent    Housing/Living Concerns or Home Modification Needs:  None  Transportation Concern:    Medication Cost Concern: [employer] part D paying for all of his medications. Medication Adherence Concern:     Financial Concern(s): 2,200    Income (only if applicable):     Ability to Read/Write: Yes    Advance Care Plan:  Not discussed. Other: On a clinical trails with a [facility] and they pay for any care that he has there. Identified Needs:   Medical Bills          Social Work Plan:   Mail patient out  Patient Financial Assistance and Medicaid Application.     Next Steps: Mail patient out Patient Financial Assistance and Medicaid Applications    Method of Communication With Provider (if appropriate): None      Goals Addressed    None

## 2022-03-24 NOTE — CARE COORDINATION
Ambulatory Care Coordination Note  3/24/2022  CM Risk Score: 9  Charlson 10 Year Mortality Risk Score: 98%     ACC: Elaine Warner, RN    Summary Note:     Sinai Julien tells me that he did complete his visit with Dr Pop Arias  He tells me that his nuclear stress did not come out as expected so he will have a heart cath in the near future. He did follow up with Dr Abimbola Gonzalez and he feels that he has his respiratory issues under control  He is using his CPAP on a regular basis  He does check his SaO2 at least daily and he says that if does run between 95%-98%  He adds that the fluid results came back negative for cancer from the thoracentesis. We spoke at length about the financial restraints related to all of his appointments and procedures  I will reach out to our social workers to have them help with a plan and provide some direction    I have reviewed SDOH, general, COPD and education assessment. Goals established. Sinai Julien is very knowledgeable regarding his current treatment plan. PLAN:  Sinai Julien will complete all scheduled appointments and procedures   Sinai Julien will monitor his symptoms for any changes or worsening  Sinai Julien will review the zone tool sheet to become familiar with the information  I will reach out to our social workers regarding financial assistance for this patient. Ambulatory Care Coordination Assessment    Care Coordination Protocol  Program Enrollment: Complex Care  Referral from Primary Care Provider: No  Week 1 - Initial Assessment     Do you have all of your prescriptions and are they filled?: Yes  Barriers to medication adherence: None  Are you able to afford your medications?: No  How often do you have trouble taking your medications the way you have been told to take them?: I always take them as prescribed. Do you have Home O2 Therapy?: No      Ability to seek help/take action for Emergent Urgent situations i.e. fire, crime, inclement weather or health crisis. : Independent  Ability to ambulate to restroom: Independent  Ability handle personal hygeine needs (bathing/dressing/grooming): Independent  Ability to manage Medications: Independent  Ability to prepare Food Preparation: Independent  Ability to maintain home (clean home, laundry): Independent  Ability to drive and/or has transportation: Independent  Ability to do shopping: Independent  Ability to manage finances: Independent  Is patient able to live independently?: Yes     Current Housing: Private Residence        Per the Fall Risk Screening, did the patient have 2 or more falls or 1 fall with injury in the past year?: No     Frequent urination at night?: Yes  Do you use rails/bars?: No  Do you have a non-slip tub mat?: Yes     Are you experiencing loss of meaning?: No  Are you experiencing loss of hope and peace?: No     Thinking about your patient's physical health needs, are there any symptoms or problems (risk indicators) you are unsure about that require further investigation?: No identified areas of uncertainly or problems already being investigated   Are the patients physical health problems impacting on their mental well-being?: No identified areas of concern   Are there any problems with your patients lifestyle behaviors (alcohol, drugs, diet, exercise) that are impacting on physical or mental well-being?: Some mild concern of potential negative impact on well-being   Do you have any other concerns about your patients mental well-being?  How would you rate their severity and impact on the patient?: Mild problems - don't interfere with function   How would you rate their home environment in terms of safety and stability (including domestic violence, insecure housing, neighbor harassment)?: Safe, stable, but with some inconsistency   How do daily activities impact on the patient's well-being? (include current or anticipated unemployment, work, caregiving, access to transportation or other): No identified problems or perceived positive benefits   How would you rate their social network (family, work, friends)?: Adequate participation with social networks   How would you rate their financial resources (including ability to afford all required medical care)?: Financially insecure, some resource challenges   How wells does the patient now understand their health and well-being (symptoms, signs or risk factors) and what they need to do to manage their health?: Reasonable to good understanding and already engages in managing health or is willing to undertake better management   How well do you think your patient can engage in healthcare discussions? (Barriers include language, deafness, aphasia, alcohol or drug problems, learning difficulties, concentration): Clear and open communication, no identified barriers   Do other services need to be involved to help this patient?: Other care/services not required at this time   Suggested Interventions and Community Resources   Disease Specific Clinic: In Process   Disease Assocation: In Process   Other Services or Interventions: Pharmacy referral declined (in Clinical Study) Dietician referral declined, Walk in Clinic hours and usage discussed  COPD zone tool education initiated   Senior Services: In Process   Social Work: In Process   Zone Management Tools: In Process         Schedule an appointment with the patient's PCP, Set up/Review Goals, Set up/Review an Education Plan              Prior to Admission medications    Medication Sig Start Date End Date Taking?  Authorizing Provider   aspirin 81 MG EC tablet Take 81 mg by mouth daily    Historical Provider, MD   tiZANidine (ZANAFLEX) 2 MG tablet Take 1 tablet by mouth nightly as needed (muscle spasms) 12/6/21   Marleny Pineda MD   atorvastatin (LIPITOR) 40 MG tablet Take 1 tablet by mouth daily 9/27/21   Adan Cortez,    lisinopril (PRINIVIL;ZESTRIL) 20 MG tablet TAKE 1 TABLET BY MOUTH  DAILY 8/24/21   Marleny Pineda MD   clopidogrel (PLAVIX) 75 MG tablet Take 1 tablet by mouth daily 21   Adan Cortez, DO   Talazoparib Tosylate 0.25 MG CAPS Take 1 capsule by mouth daily Take 2 tablets daily    Historical Provider, MD   Cyanocobalamin (VITAMIN B-12 CR PO) Take 1 tablet by mouth daily     Historical Provider, MD   tamsulosin (FLOMAX) 0.4 MG capsule Take 0.4 mg by mouth 2 times daily  19   Historical Provider, MD   CPAP Machine MISC by Does not apply route Use nightly as directed. Pressure setting 5 cm H2O. Patient taking differently: 1 Device by Does not apply route Use nightly as directed.   Pressure setting 5 cm H2O. 18   Kathy Perez MD   Ascorbic Acid (VITAMIN C PO) Take 500 mg by mouth daily     Historical Provider, MD       Future Appointments   Date Time Provider Sana Acunai   2022  1:00 PM [address] [place] [address] PFT ROOM [address] [place] [address]   2022  2:45 PM Mitzi Marshall MD  [address]   2022  1:30 PM Jose Lozada MD [facility] [place]      and   COPD Assessment    Does the patient understand envrionmental exposure?: Yes  Is the patient able to verbalize Rescue vs. Long Acting medications?: Yes  Does the patient have a nebulizer?: No  Does the patient use a space with inhaled medications?: No            Symptoms:     Symptom course: stable  Breathlessness: exertion  Increase use of rapid acting/rescue inhaled medications?: No  Change in chronic cough?: No/At Baseline  Change in sputum?: No/At Baseline  Sputum characteristics: Clear  Self Monitoring - SaO2: Yes  Baseline SaO2 Readin

## 2022-03-24 NOTE — PROGRESS NOTES
Chief Complaint   Patient presents with    Results     OF STRESS TEST       5-18-21:  Patient is a 78 y.o. male who presents with a chief complaint of Left LEg discomfort, numbness as well as coldness. . Patient is followed on a regular basis by Dr. Berhane Seals MD.  Symptoms started yesterday. Patient with history of coronary disease status post PCI remotely in 2006 at [address] [place] [address] Road to cigar use, hypertension and hyperlipidemia. Also history of UTE on CPAP. Patient is currently undergoing treatment for prostate cancer as well as bone mets. Was noted to have arterial occlusion on left extremity duplex ultrasound in the ER. Left lower 70 venous duplex ultrasound is negative for deep vein thrombosis. 6/18/2021: Patient presents for initial medical evaluation. Patient is followed on a regular basis by Dr. Berhane Seals MD.  Status post hospitalization for claudication. Status post bilateral extremity angiogram/abdominal aorta gram and left SFA/popliteal artery atherectomy PTA with drug-coated balloon and stent. Patient was noted to have a an 80% right common iliac artery stenosis was 100% right SFA. He is mainly in symptomatic in his right lower extremity but does have right hip discomfort with walking at times. Also some tingling in his right foot. He is compliant with the medication. He smokes cigars. 12-30-21; status post right common iliac artery stenting on July 14, 2021. Patient states his symptoms have resolved. He is doing well overall. He is compliant with his medications. Patient with history of left SFA/popliteal artery atherectomy PTA and stenting. Pt denies chest pain, nausea, vomiting, diarrhea, constipation, motor weakness, insomnia, weight loss, syncope, dizziness, lightheadedness, palpitations, PND, orthopnea, or claudication. Patient with history of coronary disease status post PCI remotely in 2006 at [facility]. No significant SOB  EKG with NSR, old anterior wall MI.     3-24-22: s/b abnormal stress test with large fixed Anterior/inferior defects, ejection fraction of 41%  Clinical correlation was advised. Status post CT of the chest showing severe coronary artery calcifications. Patient was seen evaluated by pulmonary, s/p CT of chest with mod to large b/l pleural effusions, severe coronary calcifications. S/p thoracentesis b/l with dr Miguel Ricks. Patient with history of CAD status post PCI remotely in 2006. Patient with history of left SFA/popliteal artery atherectomy PTA and stenting as well as right IGNACIO stenting. . No angina.        Patient Active Problem List   Diagnosis    Hypertension    Prostate cancer ([facility] [address]    Hyperlipidemia    Coronary artery disease involving native coronary artery of native heart without angina pectoris    Old MI (myocardial infarction)    Prediabetes    UTE on CPAP    Primary insomnia    S/P radiation therapy    Drug-induced erectile dysfunction    Family history of colon cancer    History of colon polyps    Polyp of cecum    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)    PAD (peripheral artery disease) (HCC)    Paroxysmal atrial fibrillation (HCC)    Pleural effusion    Abnormal stress test       Past Surgical History:   Procedure Laterality Date    COLONOSCOPY N/A 08/19/2019    COLORECTAL CANCER SCREENING, NOT HIGH RISK performed by Violette Cabrales MD at [address] [place] [address]      X3    THORACENTESIS Right 03/07/2022    870 ml removed by Dr. Miguel Ricks - diagnostics sent    THORACENTESIS Left 03/08/2022    285 ml removed by Dr Miguel Ricks - cytology only sent       Social History     Socioeconomic History    Marital status: Single     Spouse name: Not on file    Number of children: Not on file    Years of education: Not on file    Highest education level: Not on file   Occupational History    Not on file   Tobacco Use    Smoking status: Former Smoker     Packs/day: 2.00 Years: 25.00     Pack years: 50.00     Types: Cigars     Quit date: 1995     Years since quittin.2    Smokeless tobacco: Never Used    Tobacco comment: RARE CIGAR FROM TIME TO TIME   Vaping Use    Vaping Use: Never used   Substance and Sexual Activity    Alcohol use: Yes     Alcohol/week: 4.0 standard drinks     Types: 2 Cans of beer, 2 Shots of liquor per week    Drug use: Not Currently     Types: Marijuana Dolph Nubia)    Sexual activity: Not on file   Other Topics Concern    Not on file   Social History Narrative    Not on file     Social Determinants of Health     Financial Resource Strain: Low Risk     Difficulty of Paying Living Expenses: Not hard at all   Food Insecurity: No Food Insecurity    Worried About [address] in the Last Year: Never true    [address]  [employer] in the Last Year: Never true   Transportation Needs:     Lack of Transportation (Medical): Not on file    Lack of Transportation (Non-Medical):  Not on file   Physical Activity:     Days of Exercise per Week: Not on file    Minutes of Exercise per Session: Not on file   Stress:     Feeling of Stress : Not on file   Social Connections:     Frequency of Communication with Friends and Family: Not on file    Frequency of Social Gatherings with Friends and Family: Not on file    Attends Gnosticism Services: Not on file    Active Member of [address] [place] [address] or Organizations: Not on file    Attends Club or Organization Meetings: Not on file    Marital Status: Not on file   Intimate Partner Violence:     Fear of Current or Ex-Partner: Not on file    Emotionally Abused: Not on file    Physically Abused: Not on file    Sexually Abused: Not on file   Housing Stability:     Unable to Pay for Housing in the Last Year: Not on file    Number of [place] in the Last Year: Not on file    Unstable Housing in the Last Year: Not on file       Family History   Problem Relation Age of Onset    Cancer Mother 76        [address] Father 64        COLON    Colon Cancer Father     Cancer Brother 43        THYROID    Diabetes Maternal Grandmother        Current Outpatient Medications   Medication Sig Dispense Refill    aspirin 81 MG EC tablet Take 81 mg by mouth daily      tiZANidine (ZANAFLEX) 2 MG tablet Take 1 tablet by mouth nightly as needed (muscle spasms) 30 tablet 0    atorvastatin (LIPITOR) 40 MG tablet Take 1 tablet by mouth daily 90 tablet 1    lisinopril (PRINIVIL;ZESTRIL) 20 MG tablet TAKE 1 TABLET BY MOUTH  DAILY 90 tablet 3    clopidogrel (PLAVIX) 75 MG tablet Take 1 tablet by mouth daily 90 tablet 3    Talazoparib Tosylate 0.25 MG CAPS Take 1 capsule by mouth daily Take 2 tablets daily      Cyanocobalamin (VITAMIN B-12 CR PO) Take 1 tablet by mouth daily       tamsulosin (FLOMAX) 0.4 MG capsule Take 0.4 mg by mouth 2 times daily   11    CPAP Machine MISC by Does not apply route Use nightly as directed. Pressure setting 5 cm H2O. (Patient taking differently: 1 Device by Does not apply route Use nightly as directed. Pressure setting 5 cm H2O.) 1 each 0    Ascorbic Acid (VITAMIN C PO) Take 500 mg by mouth daily        Current Facility-Administered Medications   Medication Dose Route Frequency Provider Last Rate Last Admin    cyanocobalamin injection 1,000 mcg  1,000 mcg IntraMUSCular Once [facility] Bhanu, APRN - [place]           Morphine    Review of Systems:  General ROS: negative  Psychological ROS: negative  Hematological and Lymphatic ROS: No history of blood clots or bleeding disorder.    Respiratory ROS: no cough, shortness of breath, or wheezing  Cardiovascular ROS: no chest pain or dyspnea on exertion  Gastrointestinal ROS: no abdominal pain, change in bowel habits, or black or bloody stools  Genito-Urinary ROS: no dysuria, trouble voiding, or hematuria  Musculoskeletal ROS: negative  Neurological ROS: no TIA or stroke symptoms  Dermatological ROS: negative    VITALS:  Blood pressure 124/60, pulse 58, weight 192 lb (87.1 kg). Body mass index is 27.55 kg/m². Physical Examination:  General appearance - alert, well appearing, and in no distress  Mental status - alert, oriented to person, place, and time  Neck - Neck is supple, no JVD or carotid bruits. No thyromegaly or adenopathy. Chest - clear to auscultation, no wheezes, rales or rhonchi, symmetric air entry  Heart - normal rate, regular rhythm, normal S1, S2, no murmurs, rubs, clicks or gallops  Abdomen - soft, nontender, nondistended, no masses or organomegaly  Neurological - alert, oriented, normal speech, no focal findings or movement disorder noted  Extremities - peripheral pulses normal, no pedal edema, no clubbing or cyanosis  Skin - normal coloration and turgor, no rashes, no suspicious skin lesions noted          Orders Placed This Encounter   Procedures    EKG 12 Lead    ECHO Complete 2D W Doppler W Color       ASSESSMENT:     Diagnosis Orders   1. Paroxysmal atrial fibrillation (HCC)  EKG 12 Lead   2. Abnormal stress test  LEFT HEART CATH    ECHO Complete 2D W Doppler W Color   3. Primary hypertension     4. Pure hypercholesterolemia     5. Coronary artery disease involving native coronary artery of native heart without angina pectoris     6. Old MI (myocardial infarction)     7. Dyspnea on exertion  ECHO Complete 2D W Doppler W Color         PLAN:         As always, aggressive risk factor modification is strongly recommended. We should adhere to the JNC VIII guidelines for HTN management and the NCEP ATP III guidelines for LDL-C management. Cardiac diet is always recommended with low fat, cholesterol, calories and sodium. Continue medications at current doses. Had a long talk with the patient regarding their symptoms, test results and the different treatment options available which include cardiac cath, alternate imaging modality and medical therapy.  Patient would like to proceed with cardiac catheterization and understands the risks and benefits of the procedure    Check ECHO    Cont with dAPT    Smoking cessation was strongly recommended    Check EKG     Patient was advised and encouraged to check blood pressure at home or at a pharmacy, maintain a logbook, and also call us back if blood pressure are above the target ranges or if it is low. Patient clearly understands and agrees to the instructions.

## 2022-03-24 NOTE — LETTER
[place]  [address]      Dear Jan Ahuja,    I hope this letter finds you doing well! I am sending you Patient Financial Assistance and Medicaid Application. I hope you find the information helpful. Please look them over and let me know if you have any questions or need further assistance. You can contact me at any of the numbers listed below. Sincerely,    SHIKHA Perry  , [facility]  Cell Phone: [phone]  Email: [email or web address]

## 2022-03-26 ENCOUNTER — TELEPHONE (OUTPATIENT)
Dept: OTHER | Facility: CLINIC | Age: 72
End: 2022-03-26

## 2022-03-26 ENCOUNTER — HOSPITAL ENCOUNTER (EMERGENCY)
Age: 72
Discharge: HOME | End: 2022-03-26
Payer: MEDICARE

## 2022-03-26 ENCOUNTER — APPOINTMENT (OUTPATIENT)
Dept: GENERAL RADIOLOGY | Age: 72
End: 2022-03-26
Payer: MEDICARE

## 2022-03-26 VITALS
SYSTOLIC BLOOD PRESSURE: 135 MMHG | HEART RATE: 65 BPM | OXYGEN SATURATION: 100 % | TEMPERATURE: 97.6 F | RESPIRATION RATE: 17 BRPM | DIASTOLIC BLOOD PRESSURE: 66 MMHG

## 2022-03-26 DIAGNOSIS — J90 PLEURAL EFFUSION ON LEFT: ICD-10-CM

## 2022-03-26 DIAGNOSIS — C61 PROSTATE CANCER METASTATIC TO BONE (HCC): Primary | ICD-10-CM

## 2022-03-26 DIAGNOSIS — C79.51 PROSTATE CANCER METASTATIC TO BONE (HCC): Primary | ICD-10-CM

## 2022-03-26 PROCEDURE — 6370000000 HC RX 637 (ALT 250 FOR IP): Performed by: NURSE PRACTITIONER

## 2022-03-26 PROCEDURE — 6360000002 HC RX W HCPCS: Performed by: NURSE PRACTITIONER

## 2022-03-26 PROCEDURE — 96372 THER/PROPH/DIAG INJ SC/IM: CPT

## 2022-03-26 PROCEDURE — 71046 X-RAY EXAM CHEST 2 VIEWS: CPT

## 2022-03-26 PROCEDURE — 99285 EMERGENCY DEPT VISIT HI MDM: CPT

## 2022-03-26 RX ORDER — OXYCODONE HYDROCHLORIDE AND ACETAMINOPHEN 5; 325 MG/1; MG/1
1 TABLET ORAL EVERY 6 HOURS PRN
Qty: 28 TABLET | Refills: 0 | Status: SHIPPED | OUTPATIENT
Start: 2022-03-26 | End: 2022-04-02

## 2022-03-26 RX ORDER — ONDANSETRON 4 MG/1
4 TABLET, ORALLY DISINTEGRATING ORAL ONCE
Status: COMPLETED | OUTPATIENT
Start: 2022-03-26 | End: 2022-03-26

## 2022-03-26 RX ADMIN — ONDANSETRON 4 MG: 4 TABLET, ORALLY DISINTEGRATING ORAL at 14:00

## 2022-03-26 RX ADMIN — HYDROMORPHONE HYDROCHLORIDE 0.5 MG: 1 INJECTION, SOLUTION INTRAMUSCULAR; INTRAVENOUS; SUBCUTANEOUS at 13:59

## 2022-03-26 ASSESSMENT — PAIN DESCRIPTION - LOCATION
LOCATION: BACK

## 2022-03-26 ASSESSMENT — ENCOUNTER SYMPTOMS
COUGH: 0
SINUS PAIN: 0
TROUBLE SWALLOWING: 0
CONSTIPATION: 0
VOMITING: 0
ABDOMINAL PAIN: 0
CHEST TIGHTNESS: 1
SORE THROAT: 0
BACK PAIN: 1
NAUSEA: 0
SHORTNESS OF BREATH: 0
ABDOMINAL DISTENTION: 0
DIARRHEA: 0
SINUS PRESSURE: 0
RHINORRHEA: 0
COLOR CHANGE: 0
WHEEZING: 0

## 2022-03-26 ASSESSMENT — PAIN SCALES - GENERAL
PAINLEVEL_OUTOF10: 4
PAINLEVEL_OUTOF10: 7
PAINLEVEL_OUTOF10: 2

## 2022-03-26 ASSESSMENT — PAIN DESCRIPTION - DESCRIPTORS: DESCRIPTORS: CONSTANT

## 2022-03-26 ASSESSMENT — PAIN - FUNCTIONAL ASSESSMENT: PAIN_FUNCTIONAL_ASSESSMENT: 0-10

## 2022-03-26 ASSESSMENT — PAIN DESCRIPTION - PAIN TYPE
TYPE: CHRONIC PAIN
TYPE: ACUTE PAIN
TYPE: CHRONIC PAIN

## 2022-03-26 NOTE — ED PROVIDER NOTES
[address] [facility] ED  EMERGENCY DEPARTMENT ENCOUNTER      Pt Name: Nataliia Jordan  MRN: [id number]  Arielgfjosette 1950  Date of evaluation: 3/26/2022  Provider: Sonja Oh PA-C    CHIEF COMPLAINT       Chief Complaint   Patient presents with    Back Pain     History of bone cancer         HISTORY OF PRESENT ILLNESS   (Location/Symptom, Timing/Onset,Context/Setting, Quality, Duration, Modifying Factors, Severity)  Note limiting factors. Nataliia Jordan is a 73 y.o. male who presents to the emergency department for complaint of midline back pain and pain with breathing. Patient states that he has known history of bone cancer that has metastasized from prostate cancer. He is currently in clinical trials at the [facility]. He states that the pain has been increasingly worse over the past 4 days. He was recently admitted to the hospital for pneumonia and pleural effusions he had thoracentesis to drain both sides and states that helped significantly. He did have a follow-up with both pulmonology and cardiology within the last few days. He states that his pain was tolerable at that time but now he has pain from the base of his neck all the way down to the lower back. He states the pain is continuous does hurt a little bit more with certain positions and movement but overall is not better or worse with movement or positioning. He states that over the past few hours he has had a pressure type sensation in his lungs but states he does not feel short of breath like he did with the fluid effusions. He states that this feels like bone pain when he takes of breath that radiates up and down the midline of the back and bilaterally to the ribs. Denies any new falls or injuries denies any recent illnesses. He states that the pain is a 4 out of 10 continuous with worsening of certain positions and deep breathing aching deep gnawing pain      Nursing Notes were reviewed.     REVIEW OF SYSTEMS (2-9 systems for level 4, 10 or more for level 5)     Review of Systems   Constitutional: Negative for activity change, appetite change, chills, diaphoresis, fatigue and fever. HENT: Negative for congestion, postnasal drip, rhinorrhea, sinus pressure, sinus pain, sore throat and trouble swallowing. Eyes: Negative for visual disturbance. Respiratory: Positive for chest tightness. Negative for cough, shortness of breath and wheezing. Cardiovascular: Negative for chest pain and palpitations. Gastrointestinal: Negative for abdominal distention, abdominal pain, constipation, diarrhea, nausea and vomiting. Genitourinary: Negative for difficulty urinating, dysuria, flank pain, frequency and urgency. Musculoskeletal: Positive for back pain. Negative for arthralgias, myalgias, neck pain and neck stiffness. Skin: Negative for color change, pallor, rash and wound. Neurological: Negative for dizziness, tremors, seizures, syncope, speech difficulty, weakness, light-headedness, numbness and headaches. Except as noted above the remainder of the review of systems was reviewed and negative.        PAST MEDICAL HISTORY     Past Medical History:   Diagnosis Date    CAD (coronary artery disease)     Family history of colon cancer     Hyperlipidemia     Hypertension     Old MI (myocardial infarction) 02/03/2006    UTE on CPAP     PAD (peripheral artery disease) (HCC)     Prostate cancer [facility])      Past Surgical History:   Procedure Laterality Date    COLONOSCOPY N/A 08/19/2019    COLORECTAL CANCER SCREENING, NOT HIGH RISK performed by Shawn Rodríguez MD at [address] [place] [address]      X3    THORACENTESIS Right 03/07/2022    870 ml removed by Dr. Luisa Alvarado - diagnostics sent    THORACENTESIS Left 03/08/2022    285 ml removed by Dr Luisa Alvarado - cytology only sent     Social History     Socioeconomic History    Marital status: Single     Spouse name: None    Number of children: 2    Years of education: None    Highest education level: Associate degree: occupational, technical, or vocational program   Occupational History    Occupation:     Tobacco Use    Smoking status: Former Smoker     Packs/day: 2.00     Years: 25.00     Pack years: 50.00     Types: Cigars     Quit date: 1995     Years since quittin.2    Smokeless tobacco: Never Used    Tobacco comment: RARE CIGAR FROM TIME TO TIME   Vaping Use    Vaping Use: Never used   Substance and Sexual Activity    Alcohol use: Yes     Alcohol/week: 4.0 standard drinks     Types: 2 Cans of beer, 2 Shots of liquor per week     Comment: Social    Drug use: Not Currently     Types: Marijuana Eudelia Pipe)    Sexual activity: Not Currently   Other Topics Concern    None   Social History Narrative    Single floor home    4 steps to get into the home    Full basement does not go downstairs    Lives with girlfriend and her son    He has two children one in [place] and one in [facility] Hermes Sultana    One Dog     Working smoke detectors and CO2     Social Determinants of Health     Financial Resource Strain: Low Risk     Difficulty of Paying Living Expenses: Not hard at all   Food Insecurity: No Food Insecurity    Worried About [address] in the Last Year: Never true    [address] in the Last Year: Never true   Transportation Needs:     Lack of Transportation (Medical): Not on file    Lack of Transportation (Non-Medical):  Not on file   Physical Activity:     Days of Exercise per Week: Not on file    Minutes of Exercise per Session: Not on file   Stress:     Feeling of Stress : Not on file   Social Connections:     Frequency of Communication with Friends and Family: Not on file    Frequency of Social Gatherings with Friends and Family: Not on file    Attends Hindu Services: Not on file    Active Member of Clubs or Organizations: Not on file    Attends Club or Organization Meetings: Not on file    Marital injury. Normal range of motion. Right shoulder: Normal.      Left shoulder: Normal.      Cervical back: Normal, normal range of motion and neck supple. No rigidity or tenderness. Thoracic back: Tenderness and bony tenderness present. Lumbar back: Tenderness and bony tenderness present. Back:       Right hip: Normal.      Left hip: Normal.   Skin:     General: Skin is warm and dry. Capillary Refill: Capillary refill takes less than 2 seconds. Neurological:      General: No focal deficit present. Mental Status: He is alert and oriented to person, place, and time. Mental status is at baseline. Cranial Nerves: No cranial nerve deficit. Sensory: No sensory deficit. Motor: No weakness. Coordination: Coordination normal.         RESULTS     EKG: All EKG's are interpreted by the Emergency Department Physician who either signs or Co-signsthis chart in the absence of a cardiologist.        RADIOLOGY:   Trung Anderson such as CT, Ultrasound and MRI are read by the radiologist. Plain radiographic images are visualized and preliminarily interpreted by the emergency physician with the below findings:         Interpretation per the Radiologist below, if available at the time ofthis note:    XR CHEST (2 VW)   Final Result   SMALL LEFT PLEURAL EFFUSION            ED BEDSIDE ULTRASOUND:   Performed by ED Physician - none    LABS:  Labs Reviewed - No data to display    All other labs were within normal range or not returned as of this dictation. EMERGENCY DEPARTMENT COURSE and DIFFERENTIAL DIAGNOSIS/MDM:   Vitals:    Vitals:    03/26/22 1317 03/26/22 1509   BP: (!) 157/69 135/66   Pulse: 75 65   Resp: 18 17   Temp: 97.6 °F (36.4 °C)    TempSrc: Oral    SpO2: 100% 100%            MDM      CRITICAL CARE TIME       CONSULTS:  None    PROCEDURES:  Unless otherwise noted below, none     Procedures    FINAL IMPRESSION      1. Prostate cancer metastatic to bone ([facility] [address]    2. Pleural effusion on left          DISPOSITION/PLAN   DISPOSITION        PATIENT REFERRED TO:  Candelario Epstein MD  [phone]    Call in 2 days      Lawyer Resendez, [address]  Jaylene Rosenbaum [address] [facility] [address] [facility]  [phone]    Call in 2 days      [facility], [address] [place] [address] [facility]  [phone]    Call in 1 day      [address] [facility] ED  [address] [facility]  [phone]  Go to   If symptoms worsen    Umberto Mcdonald MD  [address] [facility] [address]  [address] [facility]  [phone]    Call in 2 days        DISCHARGE MEDICATIONS:  New Prescriptions    OXYCODONE-ACETAMINOPHEN (PERCOCET) 5-325 MG PER TABLET    Take 1 tablet by mouth every 6 hours as needed for Pain for up to 7 days.  Intended supply: 7 days Take lowest dose possible to manage pain          (Please notethat portions of this note were completed with a voice recognition program.  Efforts were made to edit the dictations but occasionally words are mis-transcribed.)    Sonja Oh PA-C (electronically signed)  Attending Emergency Physician         Sonja Oh PA-C  03/26/22 3747

## 2022-03-28 ENCOUNTER — TELEPHONE (OUTPATIENT)
Dept: CARDIOLOGY CLINIC | Age: 72
End: 2022-03-28

## 2022-03-28 ENCOUNTER — TELEPHONE (OUTPATIENT)
Dept: OTHER | Facility: CLINIC | Age: 72
End: 2022-03-28

## 2022-03-28 NOTE — TELEPHONE ENCOUNTER
[facility] contacted pt regarding ED follow up appt. Spoke with pt, he stated he needs to schedule his heart cath appt and after that he will call Dr. Brittany Gomes to schedule follow up appt.

## 2022-03-28 NOTE — TELEPHONE ENCOUNTER
Patient is calling in regards to cath ordered 3/24/22. It is marked held.     Did let patient know if Aba Moviv was sent then the insurance can take up to 2 weeks for approval.

## 2022-03-30 NOTE — TELEPHONE ENCOUNTER
I left a message with the patient the case is still in review. He is [facility] and they require 10-14 business days before we will hear about a determination. As soon as we have that auth obtained we will call him back and get him on the schedule. The auth was submitted on 3/25 the case# [id number].

## 2022-03-31 ENCOUNTER — CARE COORDINATION (OUTPATIENT)
Dept: CARE COORDINATION | Age: 72
End: 2022-03-31

## 2022-03-31 ENCOUNTER — TELEPHONE (OUTPATIENT)
Dept: CARDIOLOGY CLINIC | Age: 72
End: 2022-03-31

## 2022-03-31 NOTE — TELEPHONE ENCOUNTER
My staff received a call the patient was upset that they have not been scheduled for the cath procedure. They did let them know it is in review with their insurance. They said that they spoke with customer service and that there was no case started, that it should not take this long. My staff tried to explain and they hung up. I did call back and left a message with the case number and the date the case was started and in review 3/25/2022 at 2:20pm. I have a printed copy of the [employer]. I also had called the provider number on the back of the card and spoke to the auth rep at [facility]. [facility] farms out their cardiology authorizations to [facility]. The rep did look up the case number and did verify that it was still in review. Clinicals were already submitted as well. I did ask them if they could perform a 3 way call with the patient but they would not do that. I did leave an alternate message with the patients wife to alert her of the case number but had to leave a message with them as well. I will be sending them a copy of the authorization printed submission with the number to call to check on the status.

## 2022-03-31 NOTE — CARE COORDINATION
Attempted to contact patient for care coordination follow up regarding plueral effusions and COPD  Unable to reach patient by phone. Message left regarding purpose of call. Number provided and call back requested.

## 2022-03-31 NOTE — TELEPHONE ENCOUNTER
Patient called, I tried to give him the information regarding insurance. He states insurance told him, the office has not submitted (or called) to get authorization.

## 2022-04-04 ENCOUNTER — TELEPHONE (OUTPATIENT)
Dept: CARDIOLOGY CLINIC | Age: 72
End: 2022-04-04

## 2022-04-04 NOTE — TELEPHONE ENCOUNTER
We obtained the cardiac clearance for the patient today and tentatively scheduled him for the procedure on Wednesday 4/6/22. We had to leave a message for him to call back.

## 2022-04-05 NOTE — TELEPHONE ENCOUNTER
I had not heard back from the patient today about proceeding with the cardiac cath for tomorrow with Dr. Devaughn Sosa. I have not been successful in reaching his number so I called the alternate number. The patient was with them and I did confer with him. He did not want to do tomorrow for the cath and we negotiated an alternate day and time for 4/11/22 at [facility]. He is scheduled at 2pm and to arrive at 12pm. All instruction was given re: medication time of arrival check in location length of post procedure recovery and estimate time. He will need a  home and may or may not stay overnight if the provider does do an intervention that is a possibility so that he will be prepared ahead of time. He verbalized understanding.

## 2022-04-07 ENCOUNTER — CARE COORDINATION (OUTPATIENT)
Dept: CARE COORDINATION | Age: 72
End: 2022-04-07

## 2022-04-07 ASSESSMENT — ENCOUNTER SYMPTOMS: DYSPNEA ASSOCIATED WITH: MINIMAL EXERTION

## 2022-04-07 NOTE — CARE COORDINATION
Ambulatory Care Coordination Note  4/7/2022  CM Risk Score: 13  Charlson 10 Year Mortality Risk Score: 98%     ACC: Rachele Gant, RN    Summary Note:     Loco Moran tells me that he is doing about the same. He does sound SOB while speaking with me  He states that this is his normal and it is not any different or any worse. He is checking his SaO2 a couple of times per day and it is ranging between %  Loco Moran does not feel that he has any symptoms related to pleural effusion   He denies fever, chills, or cough  He feels that his appetite is good   He is resting intermittently but he does have a lot of back pain and generalized pain that keeps him awake at night  He discussed obtaining a medical marijuana card but he cannot afford the initial office appointment for clearance. He is working with the [place] for additional financial support. He will be having PFT's tomorrow and Heat cath with echo on Monday    PLAN:  Loco Moran will follow up with financial services to obtain needed help  Loco Moran will complete all scheduled appointments and procedures  Loco Moran will continue to monitor his symptoms for any changes or worsening. If his symptoms worsen he will call myself or the office for recommendations. Care Coordination Interventions    Program Enrollment: Complex Care  Referral from Primary Care Provider: No  Suggested Interventions and Community Resources  Disease Specific Clinic: In Process  Disease Association: In Process  Senior Services: In Process  Social Work: In Process  Zone Management Tools: In Process  Other Services or Interventions: Pharmacy referral declined (in Clinical Study) Dietician referral declined, Walk in Clinic hours and usage discussed  COPD zone tool education initiated         Goals Addressed    None         Prior to Admission medications    Medication Sig Start Date End Date Taking?  Authorizing Provider   aspirin 81 MG EC tablet Take 81 mg by mouth daily    Historical Provider, MD tiZANidine (ZANAFLEX) 2 MG tablet Take 1 tablet by mouth nightly as needed (muscle spasms) 12/6/21   Leora Mendieta MD   atorvastatin (LIPITOR) 40 MG tablet Take 1 tablet by mouth daily 9/27/21   Adan CARRASCO Holiday, DO   lisinopril (PRINIVIL;ZESTRIL) 20 MG tablet TAKE 1 TABLET BY MOUTH  DAILY 8/24/21   Leora Mendieta MD   clopidogrel (PLAVIX) 75 MG tablet Take 1 tablet by mouth daily 6/18/21   Adan LUNA Holiday, DO   Talazoparib Tosylate 0.25 MG CAPS Take 1 capsule by mouth daily Take 2 tablets daily    Historical Provider, MD   Cyanocobalamin (VITAMIN B-12 CR PO) Take 1 tablet by mouth daily     Historical Provider, MD   tamsulosin (FLOMAX) 0.4 MG capsule Take 0.4 mg by mouth 2 times daily  7/6/19   Historical Provider, MD   CPAP Machine MISC by Does not apply route Use nightly as directed. Pressure setting 5 cm H2O. Patient taking differently: 1 Device by Does not apply route Use nightly as directed.   Pressure setting 5 cm H2O. 1/9/18   Chana Franco MD   Ascorbic Acid (VITAMIN C PO) Take 500 mg by mouth daily     Historical Provider, MD       Future Appointments   Date Time Provider Sana Rachel   4/8/2022  1:00 PM [place] PFT ROOM [address] [place] [address]   4/11/2022 12:00 PM Sarabjit Montero ECHO  S. [place]   4/11/2022  2:00 PM [facility] CATH LAB  Barrie Whyte [address]   5/17/2022  2:45 PM Igor Amaya MD [facility]   6/6/2022  1:30 PM Leora Mendieta MD [facility] EMERGENCY [facility] AT [place]

## 2022-04-08 ENCOUNTER — HOSPITAL ENCOUNTER (OUTPATIENT)
Dept: PULMONOLOGY | Age: 72
Discharge: HOME | End: 2022-04-08
Payer: MEDICARE

## 2022-04-08 ENCOUNTER — HOSPITAL ENCOUNTER (OUTPATIENT)
Dept: NON INVASIVE DIAGNOSTICS | Age: 72
Discharge: HOME | End: 2022-04-08
Payer: MEDICARE

## 2022-04-08 DIAGNOSIS — R94.39 ABNORMAL STRESS TEST: ICD-10-CM

## 2022-04-08 DIAGNOSIS — J43.8 OTHER EMPHYSEMA (HCC): ICD-10-CM

## 2022-04-08 DIAGNOSIS — R06.09 DYSPNEA ON EXERTION: ICD-10-CM

## 2022-04-08 LAB
LV EF: 40 %
LVEF MODALITY: NORMAL

## 2022-04-08 PROCEDURE — 94729 DIFFUSING CAPACITY: CPT

## 2022-04-08 PROCEDURE — 94726 PLETHYSMOGRAPHY LUNG VOLUMES: CPT

## 2022-04-08 PROCEDURE — 93306 TTE W/DOPPLER COMPLETE: CPT

## 2022-04-08 PROCEDURE — 94010 BREATHING CAPACITY TEST: CPT

## 2022-04-11 ENCOUNTER — APPOINTMENT (OUTPATIENT)
Dept: NON INVASIVE DIAGNOSTICS | Age: 72
End: 2022-04-11
Payer: MEDICARE

## 2022-04-11 ENCOUNTER — HOSPITAL ENCOUNTER (OUTPATIENT)
Dept: CARDIAC CATH/INVASIVE PROCEDURES | Age: 72
Discharge: HOME | End: 2022-04-11
Attending: INTERNAL MEDICINE | Admitting: INTERNAL MEDICINE
Payer: MEDICARE

## 2022-04-11 VITALS
BODY MASS INDEX: 26.26 KG/M2 | OXYGEN SATURATION: 100 % | HEART RATE: 73 BPM | TEMPERATURE: 96.3 F | SYSTOLIC BLOOD PRESSURE: 135 MMHG | RESPIRATION RATE: 23 BRPM | DIASTOLIC BLOOD PRESSURE: 64 MMHG | WEIGHT: 183 LBS

## 2022-04-11 DIAGNOSIS — R94.39 ABNORMAL STRESS TEST: Primary | ICD-10-CM

## 2022-04-11 LAB
ANION GAP SERPL CALCULATED.3IONS-SCNC: 13 MEQ/L (ref 9–15)
BUN BLDV-MCNC: 19 MG/DL (ref 8–23)
CALCIUM SERPL-MCNC: 9.3 MG/DL (ref 8.5–9.9)
CHLORIDE BLD-SCNC: 101 MEQ/L (ref 95–107)
CHOLESTEROL, TOTAL: 185 MG/DL (ref 0–199)
CO2: 22 MEQ/L (ref 20–31)
CREAT SERPL-MCNC: 0.99 MG/DL (ref 0.7–1.2)
GFR AFRICAN AMERICAN: >60
GFR NON-AFRICAN AMERICAN: >60
GLUCOSE BLD-MCNC: 76 MG/DL (ref 70–99)
HCT VFR BLD CALC: 31.2 % (ref 42–52)
HDLC SERPL-MCNC: 29 MG/DL (ref 40–59)
HEMOGLOBIN: 10.2 G/DL (ref 14–18)
INR BLD: 1.1
LDL CHOLESTEROL CALCULATED: 117 MG/DL (ref 0–129)
LV EF: 40 %
LVEF MODALITY: NORMAL
MCH RBC QN AUTO: 29.4 PG (ref 27–31.3)
MCHC RBC AUTO-ENTMCNC: 32.7 % (ref 33–37)
MCV RBC AUTO: 89.9 FL (ref 80–100)
PDW BLD-RTO: 15.2 % (ref 11.5–14.5)
PERFORMED ON: ABNORMAL
PERFORMED ON: ABNORMAL
PLATELET # BLD: 271 K/UL (ref 130–400)
POC ACTIVATED CLOTTING TIME KAOLIN: 225 SEC (ref 82–152)
POC ACTIVATED CLOTTING TIME KAOLIN: 315 SEC (ref 82–152)
POC SAMPLE TYPE: ABNORMAL
POC SAMPLE TYPE: ABNORMAL
POTASSIUM SERPL-SCNC: 4.1 MEQ/L (ref 3.4–4.9)
PROTHROMBIN TIME: 14.6 SEC (ref 12.3–14.9)
RBC # BLD: 3.47 M/UL (ref 4.7–6.1)
SODIUM BLD-SCNC: 136 MEQ/L (ref 135–144)
TRIGL SERPL-MCNC: 196 MG/DL (ref 0–150)
WBC # BLD: 6.1 K/UL (ref 4.8–10.8)

## 2022-04-11 PROCEDURE — 80048 BASIC METABOLIC PNL TOTAL CA: CPT

## 2022-04-11 PROCEDURE — 93005 ELECTROCARDIOGRAM TRACING: CPT | Performed by: INTERNAL MEDICINE

## 2022-04-11 PROCEDURE — 93458 L HRT ARTERY/VENTRICLE ANGIO: CPT

## 2022-04-11 PROCEDURE — 6370000000 HC RX 637 (ALT 250 FOR IP)

## 2022-04-11 PROCEDURE — 85027 COMPLETE CBC AUTOMATED: CPT

## 2022-04-11 PROCEDURE — C1887 CATHETER, GUIDING: HCPCS

## 2022-04-11 PROCEDURE — 80061 LIPID PANEL: CPT

## 2022-04-11 PROCEDURE — 85610 PROTHROMBIN TIME: CPT

## 2022-04-11 PROCEDURE — C1725 CATH, TRANSLUMIN NON-LASER: HCPCS

## 2022-04-11 PROCEDURE — 85347 COAGULATION TIME ACTIVATED: CPT

## 2022-04-11 PROCEDURE — C1769 GUIDE WIRE: HCPCS

## 2022-04-11 PROCEDURE — C1894 INTRO/SHEATH, NON-LASER: HCPCS

## 2022-04-11 PROCEDURE — 6370000000 HC RX 637 (ALT 250 FOR IP): Performed by: INTERNAL MEDICINE

## 2022-04-11 PROCEDURE — 2580000003 HC RX 258: Performed by: INTERNAL MEDICINE

## 2022-04-11 PROCEDURE — C1874 STENT, COATED/COV W/DEL SYS: HCPCS

## 2022-04-11 PROCEDURE — 6360000002 HC RX W HCPCS

## 2022-04-11 PROCEDURE — 2709999900 HC NON-CHARGEABLE SUPPLY

## 2022-04-11 PROCEDURE — 6360000004 HC RX CONTRAST MEDICATION: Performed by: INTERNAL MEDICINE

## 2022-04-11 PROCEDURE — 93458 L HRT ARTERY/VENTRICLE ANGIO: CPT | Performed by: INTERNAL MEDICINE

## 2022-04-11 RX ORDER — HYDRALAZINE HYDROCHLORIDE 20 MG/ML
10 INJECTION INTRAMUSCULAR; INTRAVENOUS EVERY 10 MIN PRN
Status: DISCONTINUED | OUTPATIENT
Start: 2022-04-11 | End: 2022-04-11 | Stop reason: HOSPADM

## 2022-04-11 RX ORDER — ASPIRIN 81 MG/1
81 TABLET ORAL ONCE
Status: COMPLETED | OUTPATIENT
Start: 2022-04-11 | End: 2022-04-11

## 2022-04-11 RX ORDER — SODIUM CHLORIDE 0.9 % (FLUSH) 0.9 %
5-40 SYRINGE (ML) INJECTION EVERY 12 HOURS SCHEDULED
Status: DISCONTINUED | OUTPATIENT
Start: 2022-04-11 | End: 2022-04-11 | Stop reason: HOSPADM

## 2022-04-11 RX ORDER — SODIUM CHLORIDE 9 MG/ML
25 INJECTION, SOLUTION INTRAVENOUS PRN
Status: DISCONTINUED | OUTPATIENT
Start: 2022-04-11 | End: 2022-04-11 | Stop reason: HOSPADM

## 2022-04-11 RX ORDER — SODIUM CHLORIDE 9 MG/ML
INJECTION, SOLUTION INTRAVENOUS CONTINUOUS
Status: DISCONTINUED | OUTPATIENT
Start: 2022-04-11 | End: 2022-04-11 | Stop reason: HOSPADM

## 2022-04-11 RX ORDER — PREDNISONE 50 MG/1
50 TABLET ORAL ONCE
Status: DISCONTINUED | OUTPATIENT
Start: 2022-04-11 | End: 2022-04-11 | Stop reason: HOSPADM

## 2022-04-11 RX ORDER — ACETAMINOPHEN 325 MG/1
650 TABLET ORAL EVERY 4 HOURS PRN
Status: DISCONTINUED | OUTPATIENT
Start: 2022-04-11 | End: 2022-04-11 | Stop reason: HOSPADM

## 2022-04-11 RX ORDER — LABETALOL HYDROCHLORIDE 5 MG/ML
10 INJECTION, SOLUTION INTRAVENOUS EVERY 30 MIN PRN
Status: DISCONTINUED | OUTPATIENT
Start: 2022-04-11 | End: 2022-04-11 | Stop reason: HOSPADM

## 2022-04-11 RX ORDER — SODIUM CHLORIDE 0.9 % (FLUSH) 0.9 %
5-40 SYRINGE (ML) INJECTION PRN
Status: DISCONTINUED | OUTPATIENT
Start: 2022-04-11 | End: 2022-04-11 | Stop reason: HOSPADM

## 2022-04-11 RX ORDER — CLOPIDOGREL BISULFATE 75 MG/1
75 TABLET ORAL ONCE
Status: COMPLETED | OUTPATIENT
Start: 2022-04-11 | End: 2022-04-11

## 2022-04-11 RX ORDER — ONDANSETRON 2 MG/ML
4 INJECTION INTRAMUSCULAR; INTRAVENOUS EVERY 6 HOURS PRN
Status: DISCONTINUED | OUTPATIENT
Start: 2022-04-11 | End: 2022-04-11 | Stop reason: HOSPADM

## 2022-04-11 RX ORDER — NITROGLYCERIN 0.4 MG/1
0.4 TABLET SUBLINGUAL EVERY 5 MIN PRN
Status: DISCONTINUED | OUTPATIENT
Start: 2022-04-11 | End: 2022-04-11 | Stop reason: HOSPADM

## 2022-04-11 RX ORDER — DIPHENHYDRAMINE HCL 25 MG
50 TABLET ORAL ONCE
Status: DISCONTINUED | OUTPATIENT
Start: 2022-04-11 | End: 2022-04-11 | Stop reason: HOSPADM

## 2022-04-11 RX ORDER — LORAZEPAM 0.5 MG/1
0.5 TABLET ORAL
Status: DISCONTINUED | OUTPATIENT
Start: 2022-04-11 | End: 2022-04-11 | Stop reason: HOSPADM

## 2022-04-11 RX ORDER — MIDAZOLAM HYDROCHLORIDE 2 MG/2ML
2 INJECTION, SOLUTION INTRAMUSCULAR; INTRAVENOUS
Status: DISCONTINUED | OUTPATIENT
Start: 2022-04-11 | End: 2022-04-11 | Stop reason: HOSPADM

## 2022-04-11 RX ORDER — OXYCODONE HYDROCHLORIDE AND ACETAMINOPHEN 5; 325 MG/1; MG/1
1 TABLET ORAL EVERY 6 HOURS PRN
Status: DISCONTINUED | OUTPATIENT
Start: 2022-04-11 | End: 2022-04-11 | Stop reason: HOSPADM

## 2022-04-11 RX ORDER — FENTANYL CITRATE 50 UG/ML
25 INJECTION, SOLUTION INTRAMUSCULAR; INTRAVENOUS
Status: DISCONTINUED | OUTPATIENT
Start: 2022-04-11 | End: 2022-04-11 | Stop reason: HOSPADM

## 2022-04-11 RX ADMIN — CLOPIDOGREL BISULFATE 75 MG: 75 TABLET ORAL at 12:50

## 2022-04-11 RX ADMIN — SODIUM CHLORIDE: 9 INJECTION, SOLUTION INTRAVENOUS at 12:49

## 2022-04-11 RX ADMIN — SODIUM CHLORIDE: 9 INJECTION, SOLUTION INTRAVENOUS at 15:24

## 2022-04-11 RX ADMIN — OXYCODONE AND ACETAMINOPHEN 1 TABLET: 5; 325 TABLET ORAL at 15:39

## 2022-04-11 RX ADMIN — ASPIRIN 81 MG: 81 TABLET, COATED ORAL at 12:50

## 2022-04-11 RX ADMIN — IOPAMIDOL 144 ML: 612 INJECTION, SOLUTION INTRAVENOUS at 15:00

## 2022-04-11 ASSESSMENT — PAIN SCALES - GENERAL: PAINLEVEL_OUTOF10: 2

## 2022-04-11 NOTE — BRIEF OP NOTE
Section of Cardiology  Adult Brief Cardiac Cath Procedure Note        Procedure(s):  LHC, b/l coronary angio    Pre-operative Diagnosis:  Abn stress test    H&P Status: Completed and reviewed. Post-operative Diagnosis:      LV EF of 55%  LM normal   LAD_) mid LAD ISR of 70-80%. Distal LAD 80%  CX mild disease  % prox/    Findings:  See full report    Complications:  none    Primary Proceduralist:   Dr.Wes Cortez DO    A: unable to cross ballon into LAD    Plan   Re attempt in near future from groin access/7R sheath.        Full procedure note to follow

## 2022-04-11 NOTE — PROGRESS NOTES
Arrived to pre/post cath from home, alert and oriented. Vital signs obtained. Consent for procedure signed. Prepping pt for procedure.

## 2022-04-11 NOTE — PROGRESS NOTES
1215 Pt arrives to pre post cath. Alert and oriented. Consent signed. Vitals obtained and stable. Labs drawn and sent. Prepping patient for the procedure. 1400 Pt taken to cath lab for procedure. 1 Pt returns from cath lab to pre/post cath. Received report from [facility]. R radil vasc band in place. No signs of bleeding or hematoma. Dr Harinder Yang comes to speak with the patient. Pt c/o chronic back pain, worsening since procedure. Orders received for percocet as patient was taking before at home. 1600 Beginning to release air from Vasc band as ordered. No signs of bleeding or hematoma. 1630 Discharge instructions given to the patient with demonstration of understanding. 1700 Radial vasc band discontinued. Wrist remains stable. No bleeding or hematoma. Loel Malling and transparent dressing applied. 5 Pt discharged home with girlfriend.

## 2022-04-12 LAB
EKG ATRIAL RATE: 63 BPM
EKG P AXIS: 45 DEGREES
EKG P-R INTERVAL: 212 MS
EKG Q-T INTERVAL: 462 MS
EKG QRS DURATION: 100 MS
EKG QTC CALCULATION (BAZETT): 472 MS
EKG R AXIS: -24 DEGREES
EKG T AXIS: 13 DEGREES
EKG VENTRICULAR RATE: 63 BPM

## 2022-04-12 PROCEDURE — 94729 DIFFUSING CAPACITY: CPT | Performed by: INTERNAL MEDICINE

## 2022-04-12 PROCEDURE — 94010 BREATHING CAPACITY TEST: CPT | Performed by: INTERNAL MEDICINE

## 2022-04-12 PROCEDURE — 94726 PLETHYSMOGRAPHY LUNG VOLUMES: CPT | Performed by: INTERNAL MEDICINE

## 2022-04-12 NOTE — PROCEDURES
[address]                      [facility], [address] [facility]                               PULMONARY FUNCTION    PATIENT NAME: Emir Fournier                  :        1950  MED REC NO:   [id number]                            ROOM:  ACCOUNT NO:   [de-identified]                           ADMIT DATE: 2022  PROVIDER:     Abdiel Carbajal MD    DATE OF PROCEDURE:  2022    REQUESTING PROVIDER:  Timmy Cortes MD    INTERPRETING PROVIDER:  Conchita Hammans. Harry Mckay MD    REASON FOR STUDY:  Shortness of breath. INTERPRETATION:  FVC is 3.99, 89% of predicted; FEV1 is 2.99, 90% of  predicted; FEV1/FVC is 74.8%, FEF 25-75% is 2.38, 94% of predicted. Lung volume study shows residual volume is 1.28, 52% of predicted; TLC  is 5.14, 73% of predicted; RV to TLC ratio is 24.86, 70% of predicted;  diffusion capacity is 11.72, 48% of predicted; airway resistance is  0.85, 58% of predicted. SUMMARY:  Normal spirometry. Static lung volume study suggests mild  restrictive pulmonary disease. Diffusion capacity is only mildly  impaired with corrected for lung volume. Airway resistance is low. Clinical correlation is requested.         Frederic Cantu MD    D: 2022 23:01:43       T: 2022 3:47:35     [email or web address]  Job#: [id number]     Doc#: [id number]    CC:

## 2022-04-14 ENCOUNTER — CARE COORDINATION (OUTPATIENT)
Dept: CARE COORDINATION | Age: 72
End: 2022-04-14

## 2022-04-14 NOTE — CARE COORDINATION
Attempted to contact patient for care coordination follow up regarding COPD and recent cardiac catherization   Unable to reach patient by phone. Message left regarding purpose of call. Number provided and call back requested.

## 2022-04-20 ENCOUNTER — CARE COORDINATION (OUTPATIENT)
Dept: CARE COORDINATION | Age: 72
End: 2022-04-20

## 2022-04-20 NOTE — CARE COORDINATION
Ambulatory Care Coordination Note  4/20/2022  CM Risk Score: 13  Charlson 10 Year Mortality Risk Score: 100%     ACC: Alexys Lin, RN    Summary Note:     Johan Summers returned my call from last week  He tells me that he has completed PFT's and his heart cath  He feels that everything in general came out good  He will go back to the cath next Monday  He is concerned as he tells me that his back pain is getting worse and it is very difficult for him to lay flat due to the bone pain. He adds that he is trying to get a medical marijuana card but the expense is very high  He adds that the study group his is in does not provide any pain medication at all. I will reach out to Dr Joanie Omalley to obtain his input and direction. He will have repeat bone and CT scans along with blood work. He adds that it is getting difficult to drive and sit in one place for extended periods of time due to the pain. He has reached out to the [place] to discuss hospice care when it is needed and he has been approved for hospice  He is waiting on additional information on how to access when the time comes for this part of his care. He denies any issues with chest pain, SOB related to his lungs or heart, edema or dizziness. PLAN:  Johan Summers will follow up with all scheduled appointments and procedures  He will call me with any changes or worsening of symptoms  I will reach out to the cancer center for additional support services related to his prostate cancer   I will update Dr Joanie Omalley regarding pain and handicap placard needs. Care Coordination Interventions    Program Enrollment: Complex Care  Referral from Primary Care Provider: No  Suggested Interventions and Community Resources  Disease Specific Clinic: In Process  Disease Association: In Process  Senior Services: In Process  Social Work: In Process  Zone Management Tools:  In Process  Other Services or Interventions: Pharmacy referral declined (in Clinical Study) Dietician referral declined, Walk in Clinic hours and usage discussed  COPD zone tool education initiated         Goals Addressed    None         Prior to Admission medications    Medication Sig Start Date End Date Taking? Authorizing Provider   aspirin 81 MG EC tablet Take 81 mg by mouth daily    Historical Provider, MD   tiZANidine (ZANAFLEX) 2 MG tablet Take 1 tablet by mouth nightly as needed (muscle spasms) 12/6/21   Seema Dwyer MD   atorvastatin (LIPITOR) 40 MG tablet Take 1 tablet by mouth daily 9/27/21   Adan LUNA Holiday, DO   lisinopril (PRINIVIL;ZESTRIL) 20 MG tablet TAKE 1 TABLET BY MOUTH  DAILY 8/24/21   Seema Dwyer MD   clopidogrel (PLAVIX) 75 MG tablet Take 1 tablet by mouth daily 6/18/21   Adan LUNA Holiday, DO   Talazoparib Tosylate 0.25 MG CAPS Take 1 capsule by mouth daily Take 2 tablets daily    Historical Provider, MD   Cyanocobalamin (VITAMIN B-12 CR PO) Take 1 tablet by mouth daily     Historical Provider, MD   tamsulosin (FLOMAX) 0.4 MG capsule Take 0.4 mg by mouth 2 times daily  7/6/19   Historical Provider, MD   CPAP Machine MISC by Does not apply route Use nightly as directed. Pressure setting 5 cm H2O. Patient taking differently: 1 Device by Does not apply route Use nightly as directed.   Pressure setting 5 cm H2O. 1/9/18   Mily Hansen MD   Ascorbic Acid (VITAMIN C PO) Take 500 mg by mouth daily     Historical Provider, MD       Future Appointments   Date Time Provider Sana Ramos   4/25/2022 10:30 AM [facility] CATH LAB [facility] [address] [facility]   5/17/2022  2:45 PM Sirena Oleary MD [address]   6/6/2022  1:30 PM Seema Dwyer MD [facility] EMERGENCY [facility] AT [place]

## 2022-04-21 NOTE — CARE COORDINATION
I reached out to Angel Cantu to discuss our earlier discussion regarding pain medications and exclusion from the clinical trial  He says that he was told that this is part of the trial    I did reach out to THE [facility] for his study   We spoke at length about the current trial  She tells me that he does have regular bone scans and the last 2 scans have not shown any obvious progression of the bone cancer. She adds that it is not a requirement that they not be prescribed pain medications  Nor does it exclude them from the study. However, Dr. Angus Gdooy the prostate cancer urologist \"does not like to prescribe pain medications\"  She adds that Dr Angus Godoy will prescribe pain medication but Angel Cantu needs to be seen as a scheduled appointment in his office to discuss. Lois Dan states that so far, Angel Cantu has declined office visits with Dr Angus Godoy. I reached out to Angel Cantu to discuss my conversation with Lois Dan. He tells me that he will hold off on pain medications until he completes his follow up with the clinical trial group on Wednesday.

## 2022-04-25 ENCOUNTER — HOSPITAL ENCOUNTER (OUTPATIENT)
Dept: CARDIAC CATH/INVASIVE PROCEDURES | Age: 72
Discharge: HOME | End: 2022-04-25
Attending: INTERNAL MEDICINE | Admitting: INTERNAL MEDICINE
Payer: MEDICARE

## 2022-04-25 ENCOUNTER — CARE COORDINATION (OUTPATIENT)
Dept: CARE COORDINATION | Age: 72
End: 2022-04-25

## 2022-04-25 VITALS
DIASTOLIC BLOOD PRESSURE: 54 MMHG | OXYGEN SATURATION: 97 % | TEMPERATURE: 98.5 F | HEART RATE: 70 BPM | RESPIRATION RATE: 23 BRPM | WEIGHT: 180 LBS | SYSTOLIC BLOOD PRESSURE: 100 MMHG | BODY MASS INDEX: 25.83 KG/M2

## 2022-04-25 DIAGNOSIS — R94.39 ABNORMAL STRESS TEST: ICD-10-CM

## 2022-04-25 PROCEDURE — C1874 STENT, COATED/COV W/DEL SYS: HCPCS

## 2022-04-25 PROCEDURE — C1725 CATH, TRANSLUMIN NON-LASER: HCPCS

## 2022-04-25 PROCEDURE — 2709999900 HC NON-CHARGEABLE SUPPLY

## 2022-04-25 PROCEDURE — 2500000003 HC RX 250 WO HCPCS

## 2022-04-25 PROCEDURE — 6360000002 HC RX W HCPCS

## 2022-04-25 PROCEDURE — C9600 PERC DRUG-EL COR STENT SING: HCPCS

## 2022-04-25 PROCEDURE — 99152 MOD SED SAME PHYS/QHP 5/>YRS: CPT | Performed by: INTERNAL MEDICINE

## 2022-04-25 PROCEDURE — 92928 PRQ TCAT PLMT NTRAC ST 1 LES: CPT | Performed by: INTERNAL MEDICINE

## 2022-04-25 PROCEDURE — 85347 COAGULATION TIME ACTIVATED: CPT

## 2022-04-25 PROCEDURE — C1887 CATHETER, GUIDING: HCPCS

## 2022-04-25 PROCEDURE — 6370000000 HC RX 637 (ALT 250 FOR IP)

## 2022-04-25 PROCEDURE — 2580000003 HC RX 258: Performed by: INTERNAL MEDICINE

## 2022-04-25 PROCEDURE — C1894 INTRO/SHEATH, NON-LASER: HCPCS

## 2022-04-25 PROCEDURE — 99152 MOD SED SAME PHYS/QHP 5/>YRS: CPT

## 2022-04-25 PROCEDURE — C1769 GUIDE WIRE: HCPCS

## 2022-04-25 PROCEDURE — 6370000000 HC RX 637 (ALT 250 FOR IP): Performed by: INTERNAL MEDICINE

## 2022-04-25 PROCEDURE — 6360000004 HC RX CONTRAST MEDICATION: Performed by: INTERNAL MEDICINE

## 2022-04-25 PROCEDURE — C1760 CLOSURE DEV, VASC: HCPCS

## 2022-04-25 PROCEDURE — 99153 MOD SED SAME PHYS/QHP EA: CPT

## 2022-04-25 RX ORDER — SODIUM CHLORIDE 0.9 % (FLUSH) 0.9 %
5-40 SYRINGE (ML) INJECTION EVERY 12 HOURS SCHEDULED
Status: DISCONTINUED | OUTPATIENT
Start: 2022-04-25 | End: 2022-04-25 | Stop reason: HOSPADM

## 2022-04-25 RX ORDER — ACETAMINOPHEN 325 MG/1
650 TABLET ORAL EVERY 4 HOURS PRN
Status: DISCONTINUED | OUTPATIENT
Start: 2022-04-25 | End: 2022-04-25 | Stop reason: HOSPADM

## 2022-04-25 RX ORDER — ASPIRIN 81 MG/1
81 TABLET, CHEWABLE ORAL DAILY
Status: DISCONTINUED | OUTPATIENT
Start: 2022-04-25 | End: 2022-04-25 | Stop reason: HOSPADM

## 2022-04-25 RX ORDER — FENTANYL CITRATE 50 UG/ML
25 INJECTION, SOLUTION INTRAMUSCULAR; INTRAVENOUS
Status: DISCONTINUED | OUTPATIENT
Start: 2022-04-25 | End: 2022-04-25 | Stop reason: HOSPADM

## 2022-04-25 RX ORDER — MIDAZOLAM HYDROCHLORIDE 2 MG/2ML
2 INJECTION, SOLUTION INTRAMUSCULAR; INTRAVENOUS
Status: DISCONTINUED | OUTPATIENT
Start: 2022-04-25 | End: 2022-04-25 | Stop reason: HOSPADM

## 2022-04-25 RX ORDER — SODIUM CHLORIDE 9 MG/ML
INJECTION, SOLUTION INTRAVENOUS CONTINUOUS
Status: DISCONTINUED | OUTPATIENT
Start: 2022-04-25 | End: 2022-04-25 | Stop reason: HOSPADM

## 2022-04-25 RX ORDER — SODIUM CHLORIDE 9 MG/ML
INJECTION, SOLUTION INTRAVENOUS PRN
Status: DISCONTINUED | OUTPATIENT
Start: 2022-04-25 | End: 2022-04-25 | Stop reason: HOSPADM

## 2022-04-25 RX ORDER — SODIUM CHLORIDE 0.9 % (FLUSH) 0.9 %
5-40 SYRINGE (ML) INJECTION PRN
Status: DISCONTINUED | OUTPATIENT
Start: 2022-04-25 | End: 2022-04-25 | Stop reason: HOSPADM

## 2022-04-25 RX ORDER — MORPHINE SULFATE 2 MG/ML
2 INJECTION, SOLUTION INTRAMUSCULAR; INTRAVENOUS
Status: DISCONTINUED | OUTPATIENT
Start: 2022-04-25 | End: 2022-04-25 | Stop reason: HOSPADM

## 2022-04-25 RX ORDER — HYDRALAZINE HYDROCHLORIDE 20 MG/ML
10 INJECTION INTRAMUSCULAR; INTRAVENOUS EVERY 10 MIN PRN
Status: DISCONTINUED | OUTPATIENT
Start: 2022-04-25 | End: 2022-04-25 | Stop reason: HOSPADM

## 2022-04-25 RX ORDER — CLOPIDOGREL BISULFATE 75 MG/1
75 TABLET ORAL DAILY
Status: DISCONTINUED | OUTPATIENT
Start: 2022-04-25 | End: 2022-04-25 | Stop reason: HOSPADM

## 2022-04-25 RX ORDER — LABETALOL HYDROCHLORIDE 5 MG/ML
10 INJECTION, SOLUTION INTRAVENOUS EVERY 30 MIN PRN
Status: DISCONTINUED | OUTPATIENT
Start: 2022-04-25 | End: 2022-04-25 | Stop reason: HOSPADM

## 2022-04-25 RX ADMIN — SODIUM CHLORIDE: 9 INJECTION, SOLUTION INTRAVENOUS at 09:43

## 2022-04-25 RX ADMIN — ASPIRIN 81 MG 81 MG: 81 TABLET ORAL at 09:43

## 2022-04-25 RX ADMIN — IOPAMIDOL 100 ML: 612 INJECTION, SOLUTION INTRAVENOUS at 11:52

## 2022-04-25 RX ADMIN — SODIUM CHLORIDE, PRESERVATIVE FREE 10 ML: 5 INJECTION INTRAVENOUS at 09:49

## 2022-04-25 RX ADMIN — CLOPIDOGREL BISULFATE 75 MG: 75 TABLET ORAL at 09:43

## 2022-04-25 NOTE — PROGRESS NOTES
CHAPO cervantes remains stable. Pt.'s friend Thu Serrano calls in, updated. [phone] Discharge instructions given to the patient with a demonstration of understanding. [phone] Pt discharged home with friend.

## 2022-04-25 NOTE — PROGRESS NOTES
Returns to pre/post cath. R groin Vascade in place with transparent dressing. Groin is soft. No signs of bleeding or hematoma noted. Vitals are stable. Pt resting with eyes closed. No distress. No complaints. Will monitor.

## 2022-04-25 NOTE — PROGRESS NOTES
Pt arrived to pre/post cath. Alert and oriented. Consent signed. Vitals obtained and stable. Prepping patient for procedure. 1015 Pt. Taken to cath lab.

## 2022-04-27 LAB
PERFORMED ON: ABNORMAL
PERFORMED ON: ABNORMAL
PERFORMED ON: NORMAL
POC ACTIVATED CLOTTING TIME KAOLIN: 220 SEC (ref 82–152)
POC ACTIVATED CLOTTING TIME KAOLIN: 231 SEC (ref 82–152)
POC SAMPLE TYPE: ABNORMAL
POC SAMPLE TYPE: ABNORMAL
POC SAMPLE TYPE: NORMAL

## 2022-05-02 ENCOUNTER — CARE COORDINATION (OUTPATIENT)
Dept: CARE COORDINATION | Age: 72
End: 2022-05-02

## 2022-05-02 NOTE — CARE COORDINATION
Telephone call to patient. Left voicemail of reason for call with request for return phone call. Call back number was provided.

## 2022-05-05 RX ORDER — CLOPIDOGREL BISULFATE 75 MG/1
75 TABLET ORAL DAILY
Qty: 90 TABLET | Refills: 3 | Status: SHIPPED | OUTPATIENT
Start: 2022-05-05

## 2022-05-05 NOTE — TELEPHONE ENCOUNTER
Please approve or deny this refill request. The order is pended. Thank you.     LOV 3/24/2022    Next Visit Date:  Future Appointments   Date Time Provider Sana Ramos   5/17/2022  2:45 PM Nova Knight MD [facility]   6/6/2022  1:30 PM Jennefer Meckel, MD [facility]

## 2022-05-10 ENCOUNTER — CARE COORDINATION (OUTPATIENT)
Dept: CARE COORDINATION | Age: 72
End: 2022-05-10

## 2022-05-10 NOTE — LETTER
[address]      Dear Callum Rivas,    I hope this letter finds you doing well! I am sending you Medicaid Application. I hope you find the information helpful. Please look them over and let me know if you have any questions or need further assistance. You can contact me at any of the numbers listed below. Sincerely,    SHIKHA Jj  , [facility]  Cell Phone: [phone]  Email: [email or web address]

## 2022-05-10 NOTE — LETTER
Ofelia Victoria [address]      Dear Sharda Alexander,    I hope this letter finds you doing well! I am sending you Medicaid Application. I hope you find the information helpful. Please look them over and let me know if you have any questions or need further assistance. You can contact me at any of the numbers listed below. Sincerely,    SHIKHA Aldridge  , [facility]  Cell Phone: [phone]  Email: [email or web address]

## 2022-05-10 NOTE — CARE COORDINATION
Telephone call with patient who indicated that he filled out Pram[employer] and was approved for [employer]. He spoke of plan to start going to [facility]. Discussed having him to check with  at [facility] regarding financial assistance program.  Discussed Medicaid and he asked that this writer send him out another application. He cannot locate Medicaid Application. Discussed plan to mail him out a new Medicaid Application.

## 2022-05-12 ENCOUNTER — CARE COORDINATION (OUTPATIENT)
Dept: CARE COORDINATION | Age: 72
End: 2022-05-12

## 2022-05-12 NOTE — CARE COORDINATION
Ambulatory Care Coordination Note  5/12/2022  CM Risk Score: 13  Charlson 10 Year Mortality Risk Score: 100%     ACC: Jaspal Stephen RN    Summary Note:    Kevon Messer tells me that things are about the same. He has had a follow up with the oncology urologist in [place]  He will no longer participate in the trial as the \"medication is no longer working. \"  He will be in the \"exit phase: of the trial meaning they will continue to monitor labs and x rays. He has had a follow up with the [facility] as well and they feel his best option would be to [facility] [place] [facility] [place]. He will begin going to the cancer center in [facility]  He has his first appointment next Thursday  He does continue to have intermittent issues with his breathing  He is unable to lay flat in bed. He adds that he is not having much of an appetite  He is trying to drink protein drinks but has difficulty drinking too much as he feels full. He says that his weight today is down to 173 pounds. PLAN:  Kevon Messer will follow up with the cancer center next week  Kevon Messer will monitor his symptoms for any changes or worsening,  Kevon Messer will try to eat 3-5 small meals throughout the day. Care Coordination Interventions    Program Enrollment: Complex Care  Referral from Primary Care Provider: No  Suggested Interventions and Community Resources  Disease Specific Clinic: In Process  Disease Association: In Process  Senior Services: In Process  Social Work: In Process  Zone Management Tools: In Process  Other Services or Interventions: Pharmacy referral declined (in Clinical Study) Dietician referral declined, Walk in Clinic hours and usage discussed  COPD zone tool education initiated         Goals Addressed    None         Prior to Admission medications    Medication Sig Start Date End Date Taking?  Authorizing Provider   clopidogrel (PLAVIX) 75 MG tablet TAKE 1 TABLET BY MOUTH  DAILY 5/5/22   JACQUELYN Villegas - [place]   Handicap Placard MISC by Does not apply route Exp 5 years 4/21/22   Blanquita Mandel MD   aspirin 81 MG EC tablet Take 81 mg by mouth daily    Historical Provider, MD   tiZANidine (ZANAFLEX) 2 MG tablet Take 1 tablet by mouth nightly as needed (muscle spasms)  Patient not taking: Reported on 4/25/2022 12/6/21   Blanquita Mandel MD   atorvastatin (LIPITOR) 40 MG tablet Take 1 tablet by mouth daily 9/27/21   Adan CARRASCO Holiday, DO   lisinopril (PRINIVIL;ZESTRIL) 20 MG tablet TAKE 1 TABLET BY MOUTH  DAILY 8/24/21   Blanquita Mandel MD   Talazoparib Tosylate 0.25 MG CAPS Take 1 capsule by mouth daily Take 2 tablets daily    Historical Provider, MD   Cyanocobalamin (VITAMIN B-12 CR PO) Take 1 tablet by mouth daily     Historical Provider, MD   tamsulosin (FLOMAX) 0.4 MG capsule Take 0.4 mg by mouth 2 times daily  7/6/19   Historical Provider, MD   CPAP Machine MISC by Does not apply route Use nightly as directed. Pressure setting 5 cm H2O. Patient taking differently: 1 Device by Does not apply route Use nightly as directed.   Pressure setting 5 cm H2O. 1/9/18   Jazzy Swarzt MD   Ascorbic Acid (VITAMIN C PO) Take 500 mg by mouth daily     Historical Provider, MD       Future Appointments   Date Time Provider Sana Ramos   5/17/2022  2:45 PM Josselin Winn MD [facility]   6/6/2022  1:30 PM Blanquita Mandel MD [facility] AT [place]

## 2022-05-16 ENCOUNTER — OFFICE VISIT (OUTPATIENT)
Dept: FAMILY MEDICINE CLINIC | Age: 72
End: 2022-05-16
Payer: MEDICARE

## 2022-05-16 ENCOUNTER — TELEPHONE (OUTPATIENT)
Dept: CARDIOLOGY CLINIC | Age: 72
End: 2022-05-16

## 2022-05-16 VITALS
OXYGEN SATURATION: 99 % | WEIGHT: 177.6 LBS | BODY MASS INDEX: 25.43 KG/M2 | DIASTOLIC BLOOD PRESSURE: 50 MMHG | TEMPERATURE: 98.2 F | HEART RATE: 81 BPM | SYSTOLIC BLOOD PRESSURE: 100 MMHG | HEIGHT: 70 IN

## 2022-05-16 DIAGNOSIS — R63.4 WEIGHT LOSS: Primary | ICD-10-CM

## 2022-05-16 DIAGNOSIS — I95.9 HYPOTENSION, UNSPECIFIED HYPOTENSION TYPE: ICD-10-CM

## 2022-05-16 DIAGNOSIS — R63.0 DECREASED APPETITE: ICD-10-CM

## 2022-05-16 DIAGNOSIS — C41.9 MALIGNANT NEOPLASM OF BONE, UNSPECIFIED LOCATION (HCC): ICD-10-CM

## 2022-05-16 DIAGNOSIS — Z92.3 S/P RADIATION THERAPY: ICD-10-CM

## 2022-05-16 PROCEDURE — G8427 DOCREV CUR MEDS BY ELIG CLIN: HCPCS | Performed by: STUDENT IN AN ORGANIZED HEALTH CARE EDUCATION/TRAINING PROGRAM

## 2022-05-16 PROCEDURE — G8417 CALC BMI ABV UP PARAM F/U: HCPCS | Performed by: STUDENT IN AN ORGANIZED HEALTH CARE EDUCATION/TRAINING PROGRAM

## 2022-05-16 PROCEDURE — 1036F TOBACCO NON-USER: CPT | Performed by: STUDENT IN AN ORGANIZED HEALTH CARE EDUCATION/TRAINING PROGRAM

## 2022-05-16 PROCEDURE — 3017F COLORECTAL CA SCREEN DOC REV: CPT | Performed by: STUDENT IN AN ORGANIZED HEALTH CARE EDUCATION/TRAINING PROGRAM

## 2022-05-16 PROCEDURE — 4040F PNEUMOC VAC/ADMIN/RCVD: CPT | Performed by: STUDENT IN AN ORGANIZED HEALTH CARE EDUCATION/TRAINING PROGRAM

## 2022-05-16 PROCEDURE — 1123F ACP DISCUSS/DSCN MKR DOCD: CPT | Performed by: STUDENT IN AN ORGANIZED HEALTH CARE EDUCATION/TRAINING PROGRAM

## 2022-05-16 PROCEDURE — 99214 OFFICE O/P EST MOD 30 MIN: CPT | Performed by: STUDENT IN AN ORGANIZED HEALTH CARE EDUCATION/TRAINING PROGRAM

## 2022-05-16 RX ORDER — TRAMADOL HYDROCHLORIDE 50 MG/1
50 TABLET ORAL EVERY 8 HOURS PRN
Qty: 15 TABLET | Refills: 0 | Status: SHIPPED | OUTPATIENT
Start: 2022-05-16 | End: 2022-07-26

## 2022-05-16 ASSESSMENT — PATIENT HEALTH QUESTIONNAIRE - PHQ9
SUM OF ALL RESPONSES TO PHQ9 QUESTIONS 1 & 2: 0
SUM OF ALL RESPONSES TO PHQ QUESTIONS 1-9: 0
1. LITTLE INTEREST OR PLEASURE IN DOING THINGS: 0
SUM OF ALL RESPONSES TO PHQ QUESTIONS 1-9: 0
2. FEELING DOWN, DEPRESSED OR HOPELESS: 0
SUM OF ALL RESPONSES TO PHQ QUESTIONS 1-9: 0
SUM OF ALL RESPONSES TO PHQ QUESTIONS 1-9: 0

## 2022-05-16 ASSESSMENT — ENCOUNTER SYMPTOMS
SHORTNESS OF BREATH: 0
VOMITING: 0
SORE THROAT: 0
SINUS PRESSURE: 0
ABDOMINAL PAIN: 0
COUGH: 0

## 2022-05-16 NOTE — PROGRESS NOTES
2022    Luis Elliott (:  1950) is a 73 y.o. male, here for evaluation of the following medical concerns:  Chief Complaint   Patient presents with    Weight Loss    Anorexia     x3 months     HPI  Weight loss  Decreased appetite, stomach feels full all time    Per note with Maurizio Simpson:   Had a follow up with the oncology urologist in [place]  No longer participate in the trial as the \"medication is no longer working. \"  He will be in the \"exit phase: of the trial meaning they will continue to monitor labs and x rays. He has had a follow up with the  [facility] as well and they feel his best option would be to go to the [address] [place] [address] in [address] [place] [address] Avenue does not want to do this. He will begin going to the cancer center in [facility]  He has his first appointment Thursday    He is not having much of an appetite  He is trying to drink protein drinks but has difficulty drinking too much as he feels full. He says that his weight today is down to 173 pounds. Review of Systems   Constitutional: Positive for appetite change, fatigue and unexpected weight change. Negative for chills and fever. HENT: Negative for congestion, sinus pressure and sore throat. Respiratory: Negative for cough and shortness of breath. Cardiovascular: Negative for chest pain and palpitations. Gastrointestinal: Negative for abdominal pain and vomiting. Musculoskeletal: Negative for arthralgias and myalgias. Skin: Negative for rash and wound. Neurological: Negative for speech difficulty and light-headedness. Psychiatric/Behavioral: Negative for suicidal ideas. The patient is not nervous/anxious. Prior to Visit Medications    Medication Sig Taking? Authorizing Provider   traMADol (ULTRAM) 50 MG tablet Take 1 tablet by mouth every 8 hours as needed for Pain for up to 5 days. Intended supply: 5 days.  Take lowest dose possible to manage pain Yes Cal Mazariegos, DO   clopidogrel (PLAVIX) 75 MG tablet TAKE 1 TABLET BY MOUTH DAILY Yes Diasuellen Pal, APRN - CNP   Handicap Placard MISC by Does not apply route Exp 5 years Yes Gisela Breaux MD   aspirin 81 MG EC tablet Take 81 mg by mouth daily Yes Historical Provider, MD   tiZANidine (ZANAFLEX) 2 MG tablet Take 1 tablet by mouth nightly as needed (muscle spasms) Yes Gisela Beraux MD   atorvastatin (LIPITOR) 40 MG tablet Take 1 tablet by mouth daily Yes Adan CARRASCO Holiday, DO   lisinopril (PRINIVIL;ZESTRIL) 20 MG tablet TAKE 1 TABLET BY MOUTH  DAILY Yes Gisela Breaux MD   Talazoparib Tosylate 0.25 MG CAPS Take 1 capsule by mouth daily Take 2 tablets daily Yes Historical Provider, MD   Cyanocobalamin (VITAMIN B-12 CR PO) Take 1 tablet by mouth daily  Yes Historical Provider, MD   tamsulosin (FLOMAX) 0.4 MG capsule Take 0.4 mg by mouth 2 times daily  Yes Historical Provider, MD   CPAP Machine MISC by Does not apply route Use nightly as directed. Pressure setting 5 cm H2O. Patient taking differently: 1 Device by Does not apply route Use nightly as directed. Pressure setting 5 cm H2O. Yes Rocky Sanchez MD   Ascorbic Acid (VITAMIN C PO) Take 500 mg by mouth daily  Yes Historical Provider, MD        There are no discontinued medications.     Allergies   Allergen Reactions    Morphine Swelling and Rash       Past Medical History:   Diagnosis Date    CAD (coronary artery disease)     Family history of colon cancer     Hyperlipidemia     Hypertension     Old MI (myocardial infarction) 02/03/2006    UTE on CPAP     PAD (peripheral artery disease) (HCC)     Prostate cancer [facility])        Past Surgical History:   Procedure Laterality Date    COLONOSCOPY N/A 08/19/2019    COLORECTAL CANCER SCREENING, NOT HIGH RISK performed by Bucky Larson MD at [address] [place] [address]      X3    THORACENTESIS Right 03/07/2022    870 ml removed by Dr. Justa Boone - diagnostics sent    THORACENTESIS Left 03/08/2022    285 ml removed by Dr Justa Boone - cytology only sent       Social History     Socioeconomic History    Marital status: Single     Spouse name: Not on file    Number of children: 2    Years of education: Not on file    Highest education level: Associate degree: occupational, technical, or vocational program   Occupational History    Occupation:     Tobacco Use    Smoking status: Former Smoker     Packs/day: 2.00     Years: 25.00     Pack years: 50.00     Types: Cigars     Quit date: 1995     Years since quittin.3    Smokeless tobacco: Never Used    Tobacco comment: RARE CIGAR FROM TIME TO TIME   Vaping Use    Vaping Use: Never used   Substance and Sexual Activity    Alcohol use: Yes     Alcohol/week: 4.0 standard drinks     Types: 2 Cans of beer, 2 Shots of liquor per week     Comment: Social    Drug use: Not Currently     Types: Marijuana Blanca Awkward)    Sexual activity: Not Currently   Other Topics Concern    Not on file   Social History Narrative    Single floor home    4 steps to get into the home    Full basement does not go downstairs    Lives with girlfriend and her son    He has two children one in [place] and one in [place]    One Dog     Working smoke detectors and CO2     Social Determinants of Health     Financial Resource Strain: Low Risk     Difficulty of Paying Living Expenses: Not hard at all   Food Insecurity: No Food Insecurity    Worried About [address] in the Last Year: Never true    [address] in the Last Year: Never true   Transportation Needs:     Lack of Transportation (Medical): Not on file    Lack of Transportation (Non-Medical):  Not on file   Physical Activity:     Days of Exercise per Week: Not on file    Minutes of Exercise per Session: Not on file   Stress:     Feeling of Stress : Not on file   Social Connections:     Frequency of Communication with Friends and Family: Not on file    Frequency of Social Gatherings with Friends and Family: Not on file    Attends Scientology Services: Not on file   Bola Bustos Member of Clubs or Organizations: Not on file    Attends Club or Organization Meetings: Not on file    Marital Status: Not on file   Intimate Partner Violence:     Fear of Current or Ex-Partner: Not on file    Emotionally Abused: Not on file    Physically Abused: Not on file    Sexually Abused: Not on file   Housing Stability:     Unable to Pay for Housing in the Last Year: Not on file    Number of [place] in the Last Year: Not on file    Unstable Housing in the Last Year: Not on file        Family History   Problem Relation Age of Onset    Cancer Mother 76        BONE    Cancer Father 64        COLON    Colon Cancer Father     Cancer Brother 43        THYROID    Diabetes Maternal Grandmother        Vitals:    05/16/22 1141 05/16/22 1149   BP: (!) 108/50 (!) 100/50   Site: Right Upper Arm Left Upper Arm   Position: Sitting Sitting   Cuff Size: Medium Adult Medium Adult   Pulse: 81    Temp: 98.2 °F (36.8 °C)    SpO2: 99%    Weight: 177 lb 9.6 oz (80.6 kg)    Height: 5' 10\" (1.778 m)        Estimated body mass index is 25.48 kg/m² as calculated from the following:    Height as of this encounter: 5' 10\" (1.778 m). Weight as of this encounter: 177 lb 9.6 oz (80.6 kg). No results for input(s): WBC, RBC, HGB, HCT, MCV, MCH, MCHC, RDW, PLT, MPV in the last 72 hours. No results for input(s): NA, K, CL, CO2, BUN, CREATININE, GLUCOSE, CALCIUM, PROT, LABALBU, BILITOT, ALKPHOS, AST, ALT in the last 72 hours. Lab Results   Component Value Date    LABA1C 5.8 12/16/2020       No results found. Physical Exam  Constitutional:       General: He is not in acute distress. Appearance: Normal appearance. He is ill-appearing. HENT:      Head: Normocephalic and atraumatic. Eyes:      Extraocular Movements: Extraocular movements intact. Conjunctiva/sclera: Conjunctivae normal.   Musculoskeletal:         General: No deformity. Normal range of motion. Skin:     Findings: No lesion or rash. Neurological:      General: No focal deficit present. Mental Status: He is alert. Mental status is at baseline. Psychiatric:         Mood and Affect: Mood normal.         Behavior: Behavior normal.         Thought Content: Thought content normal.         ASSESSMENT/PLAN:  1. Weight loss  Patient with history of bone cancer  Appointment in 3 days with [facility] cancer specialist to discuss further options for treatment  Endorsing decreased appetite over the last several months  Forces himself to eat however can only eat small quantities  We can try to call cancer center to see if he can start an appetite stimulant prior to his appointment  Otherwise discussed with patient that he should talk to oncologist about appetite stimulant    2. Decreased appetite  Recommended protein shakes and small meals throughout the day    3. Malignant neoplasm of bone, unspecified location [facility])  Short course of tramadol due to bone pain    - traMADol (ULTRAM) 50 MG tablet; Take 1 tablet by mouth every 8 hours as needed for Pain for up to 5 days. Intended supply: 5 days. Take lowest dose possible to manage pain  Dispense: 15 tablet; Refill: 0    4. S/P radiation therapy    - traMADol (ULTRAM) 50 MG tablet; Take 1 tablet by mouth every 8 hours as needed for Pain for up to 5 days. Intended supply: 5 days. Take lowest dose possible to manage pain  Dispense: 15 tablet; Refill: 0    5.  Hypotension, unspecified hypotension type  Low blood pressure today as well as 2 weeks ago when he was in the hospital  Patient does have history of coronary stenting and follows with cardiology  Discussed with patient that he should call his cardiologist, Dr. Pepe Romero to discuss lower dose of lisinopril or discontinuing medication  Denied lightheadedness or dizziness    There are no discontinued medications.    ---------------------------------------------------------------------  Side effects, adverse effects of the medication prescribed today, as well as treatment plan/ rationale and result expectations have been discussed with the patient who expresses understanding and desires to proceed. Close follow up to evaluate treatment results and for coordination of care. I have reviewed the patient's medical history in detail and updated the computerized patient record. As always, patient is advised that if symptoms worsen in any way they will proceed to the nearest emergency room. --------------------------------------------------------------------    Return if symptoms worsen or fail to improve. An  electronic signature was used to authenticate this note.     --Ezio Jeffery DO on 5/16/2022 at 12:35 PM

## 2022-05-17 ENCOUNTER — CARE COORDINATION (OUTPATIENT)
Dept: CARE COORDINATION | Age: 72
End: 2022-05-17

## 2022-05-17 NOTE — CARE COORDINATION
Telephone call with patient. He did receive Medicaid Application and is in process of completing. He spoke of his upcoming appointment with [facility] this week. Encouraged patient to contact Darek Servin at  [facility].

## 2022-05-23 ENCOUNTER — SOCIAL WORK (OUTPATIENT)
Dept: RADIATION ONCOLOGY | Age: 72
End: 2022-05-23

## 2022-05-23 ENCOUNTER — CARE COORDINATION (OUTPATIENT)
Dept: CARE COORDINATION | Age: 72
End: 2022-05-23

## 2022-05-23 NOTE — CARE COORDINATION
Ambulatory Care Coordination Note  5/23/2022  CM Risk Score: 13  Charlson 10 Year Mortality Risk Score: 100%     ACC: Sushma Mcghee RN    Summary Note:     Rosa Carlisle returned my call   He tells me that he met with Dr Malahti Kothari   He will have his first chemo on Thursday at 8 am  He did discuss the need for obtaining transportation to and from treatment  I have reached out to our specialist Jesus Mccann to see if she is able to get this scheduled  I have made him aware that I will follow up with him once I hear back from [facility]. Lab Results     None          Care Coordination Interventions    Program Enrollment: Complex Care  Referral from Primary Care Provider: No  Suggested Interventions and Community Resources  Disease Specific Clinic: In Process  Disease Association: In Process  Senior Services: In Process  Social Work: In Process  Zone Management Tools: In Process  Other Services or Interventions: Pharmacy referral declined (in Clinical Study) Dietician referral declined, Walk in Clinic hours and usage discussed  COPD zone tool education initiated         Goals Addressed    None         Prior to Admission medications    Medication Sig Start Date End Date Taking?  Authorizing Provider   clopidogrel (PLAVIX) 75 MG tablet TAKE 1 TABLET BY MOUTH  DAILY 5/5/22   Neelam Pal, APRN - CNP   Handicap Placard MISC by Does not apply route Exp 5 years 4/21/22   Katheryn Marcano MD   aspirin 81 MG EC tablet Take 81 mg by mouth daily    Historical Provider, MD   tiZANidine (ZANAFLEX) 2 MG tablet Take 1 tablet by mouth nightly as needed (muscle spasms) 12/6/21   Katheryn Marcano MD   atorvastatin (LIPITOR) 40 MG tablet Take 1 tablet by mouth daily 9/27/21   Adan Cortez DO   lisinopril (PRINIVIL;ZESTRIL) 20 MG tablet TAKE 1 TABLET BY MOUTH  DAILY 8/24/21   Katheryn Marcano MD   Talazoparib Tosylate 0.25 MG CAPS Take 1 capsule by mouth daily Take 2 tablets daily    Historical Provider, MD   Cyanocobalamin (VITAMIN B-12 CR PO) Take 1 tablet by mouth daily     Historical Provider, MD   tamsulosin (FLOMAX) 0.4 MG capsule Take 0.4 mg by mouth 2 times daily  7/6/19   Historical Provider, MD   CPAP Machine MISC by Does not apply route Use nightly as directed. Pressure setting 5 cm H2O. Patient taking differently: 1 Device by Does not apply route Use nightly as directed. Pressure setting 5 cm H2O. 1/9/18   Roseanne Campbell MD   Ascorbic Acid (VITAMIN C PO) Take 500 mg by mouth daily     Historical Provider, MD       Future Appointments   Date Time Provider Sana Ramos   6/6/2022  1:30 PM Edwin Mark MD [facility] [place]       .

## 2022-05-23 NOTE — PROGRESS NOTES
SW received phone call from Pt (transferred from [facility]) requesting information resources re: transportation. Pt states has an 8am kusum't for chemotherapy on Thursday, 5/26/22. He states only has one car at home and does not want \"to tie it up for a two hour visit\" at the cancer center. Pt did not believe he had transportation assistance via his Medicare/AARP insurance; SW provided phone contact for [employer], which he stated would be a good option for him and he agreed to make the [facility] phone call at  [phone]. Upon end of this phone contact, SW noted (in [address]) that Pt had a care coordination team and placed phone call to [employer]. MATTY Pop stated will contact Pt with additional transportation resources. No further needs at this time.

## 2022-05-23 NOTE — CARE COORDINATION
Ambulatory Care Coordination Note  5/23/2022  CM Risk Score: 13  Charlson 10 Year Mortality Risk Score: 100%     ACC: Xiomy Rebolledo, MATTY    Summary Note:     I received a call from [address]  She tells me that she spoke with the oncology department after Shruthi Lindo had a follow up appointment today.s  She says that Shruthi Lindo is in need of transportation and she did provide him with information from [address] [place] [address] Dr. I made Palma Rubin aware that we do provide transportation to and from [facility] appointments with notice of need. I explained that this is through taxi vouchers with [employer]. I then reached out to Shruthi Lindo leaving him a message regarding assistance to and from appointments  Call back requested    Lab Results     None          Care Coordination Interventions    Program Enrollment: Complex Care  Referral from Primary Care Provider: No  Suggested Interventions and Community Resources  Disease Specific Clinic: In Process  Disease Association: In Process  Senior Services: In Process  Social Work: In Process  Zone Management Tools: In Process  Other Services or Interventions: Pharmacy referral declined (in Clinical Study) Dietician referral declined, Walk in Clinic hours and usage discussed  COPD zone tool education initiated         Goals Addressed    None         Prior to Admission medications    Medication Sig Start Date End Date Taking?  Authorizing Provider   clopidogrel (PLAVIX) 75 MG tablet TAKE 1 TABLET BY MOUTH  DAILY 5/5/22   JACQUELYN He - [place]   Handicap Placard MISC by Does not apply route Exp 5 years 4/21/22   Demetrius Lilly MD   aspirin 81 MG EC tablet Take 81 mg by mouth daily    Historical Provider, MD   tiZANidine (ZANAFLEX) 2 MG tablet Take 1 tablet by mouth nightly as needed (muscle spasms) 12/6/21   Demetrius Lilly MD   atorvastatin (LIPITOR) 40 MG tablet Take 1 tablet by mouth daily 9/27/21   Adan Cortez,    lisinopril (PRINIVIL;ZESTRIL) 20 MG tablet TAKE 1 TABLET BY MOUTH  DAILY 8/24/21   Brenad Cleary MD   Talazoparib Tosylate 0.25 MG CAPS Take 1 capsule by mouth daily Take 2 tablets daily    Historical Provider, MD   Cyanocobalamin (VITAMIN B-12 CR PO) Take 1 tablet by mouth daily     Historical Provider, MD   tamsulosin (FLOMAX) 0.4 MG capsule Take 0.4 mg by mouth 2 times daily  7/6/19   Historical Provider, MD   CPAP Machine MISC by Does not apply route Use nightly as directed. Pressure setting 5 cm H2O. Patient taking differently: 1 Device by Does not apply route Use nightly as directed.   Pressure setting 5 cm H2O. 1/9/18   Love Orozco MD   Ascorbic Acid (VITAMIN C PO) Take 500 mg by mouth daily     Historical Provider, MD       Future Appointments   Date Time Provider Sana Rachel   6/6/2022  1:30 PM Brenda Cleary MD [facility] EMERGENCY [facility] AT [place]

## 2022-05-24 ENCOUNTER — CARE COORDINATION (OUTPATIENT)
Dept: CARE COORDINATION | Age: 72
End: 2022-05-24

## 2022-05-24 NOTE — CARE COORDINATION
Telephone call to [facility]. Representative indicated that patient would have a 250 dollar fee for emergency and nonemergency transportation benefit.

## 2022-05-24 NOTE — CARE COORDINATION
Telephone call with patient. Relayed results of phone call with [facility]. Also discussed that taxi voucher has been set up for this Thursday transportation to [facility]. Explained that he would have 715  time. Provided patient with contact information for [employer]. Patient asking for another Medicaid Application for his significant other/Laurie Vu be mailed to their home.

## 2022-05-24 NOTE — CARE COORDINATION
Telephone call with patient. He explained that he needs transportation to upcoming appointment at [facility]. He has an early morning appointment and does want to tie up the one car that he has. Discussed plan to contact [facility] regarding transportation benefit. Patient provided contact information for [facility]. Discussed also that he has completed Medicaid application and is in the process of returning. He also indicated that he is 75% assistance with [employer].

## 2022-05-24 NOTE — CARE COORDINATION
Telephone call to [employer] to check on availability of transportation at 800 o'clock appointment. They do early morning transportation. They have a taxi cab voucher in place for patient for Thursday appointment at [address] [place] [address] they have a 715  time for patient.

## 2022-05-25 ENCOUNTER — CARE COORDINATION (OUTPATIENT)
Dept: CARE COORDINATION | Age: 72
End: 2022-05-25

## 2022-05-25 NOTE — CARE COORDINATION
Patient left message asking to have taxi service canceled for tomorrow   He has not heard back from the infusion center regarding chemo time    [employer] notified that taxi service is not needed tomorrow.

## 2022-05-25 NOTE — CARE COORDINATION
Ambulatory Care Coordination Note  5/25/2022  CM Risk Score: 13  Charlson 10 Year Mortality Risk Score: 100%     ACC: Malgorzata Trent, RN    Summary Note:     Jorge L Breana called stating that he is very upset that he needs to continue to complete follow up  He states that he had an appointment with the nurse practioner and she was very good. However, he is unable to go to the cancer center for his chemo as they do not accept financial   He adds that he will receive his chemo at the infusion center in [facility]  However, he had to call both the cancer center and the infusion center for follow up related to chemo orders and medications  He states that the infusion center still does not have orders  He is fearful that this will create delays in his treatment  He is supposed to receive chemo treatment once every three weeks for six treatments. I have explained that I will follow up to ensure that the orders have been faxed over to the infusion center    I spoke with Iona Win at the infusion center and she says that they do have the orders for his chemo  They are waiting on prior authorization for his chemo from his insurance company. I spoke with Jorge L Toussaint to update him on the current state  I have also explained that he may or may not receive chemo today   He verbalizes understanding  He wants to wait until he hears back about his chemo schedule before cancelling the taxi. Lab Results     None          Care Coordination Interventions    Program Enrollment: Complex Care  Referral from Primary Care Provider: No  Suggested Interventions and Community Resources  Disease Specific Clinic: In Process  Disease Association: In Process  Senior Services: In Process  Social Work: In Process  Zone Management Tools:  In Process  Other Services or Interventions: Pharmacy referral declined (in Clinical Study) Dietician referral declined, Walk in Clinic hours and usage discussed  COPD zone tool education initiated         Goals Addressed None         Prior to Admission medications    Medication Sig Start Date End Date Taking? Authorizing Provider   clopidogrel (PLAVIX) 75 MG tablet TAKE 1 TABLET BY MOUTH  DAILY 5/5/22   Ashly Pal, JACQUELYN - CNP   Handicap Placard MISC by Does not apply route Exp 5 years 4/21/22   William Pace MD   aspirin 81 MG EC tablet Take 81 mg by mouth daily    Historical Provider, MD   tiZANidine (ZANAFLEX) 2 MG tablet Take 1 tablet by mouth nightly as needed (muscle spasms) 12/6/21   William Pace MD   atorvastatin (LIPITOR) 40 MG tablet Take 1 tablet by mouth daily 9/27/21   Adan Cortez DO   lisinopril (PRINIVIL;ZESTRIL) 20 MG tablet TAKE 1 TABLET BY MOUTH  DAILY 8/24/21   William Pace MD   Talazoparib Tosylate 0.25 MG CAPS Take 1 capsule by mouth daily Take 2 tablets daily    Historical Provider, MD   Cyanocobalamin (VITAMIN B-12 CR PO) Take 1 tablet by mouth daily     Historical Provider, MD   tamsulosin (FLOMAX) 0.4 MG capsule Take 0.4 mg by mouth 2 times daily  7/6/19   Historical Provider, MD   CPAP Machine MISC by Does not apply route Use nightly as directed. Pressure setting 5 cm H2O. Patient taking differently: 1 Device by Does not apply route Use nightly as directed.   Pressure setting 5 cm H2O. 1/9/18   Kat Corona MD   Ascorbic Acid (VITAMIN C PO) Take 500 mg by mouth daily     Historical Provider, MD       Future Appointments   Date Time Provider Sana Ramos   6/6/2022  1:30 PM William Pace MD [facility] AT [place]

## 2022-05-26 ENCOUNTER — APPOINTMENT (OUTPATIENT)
Dept: GENERAL RADIOLOGY | Age: 72
DRG: 315 | End: 2022-05-26
Payer: MEDICAID

## 2022-05-26 ENCOUNTER — HOSPITAL ENCOUNTER (INPATIENT)
Age: 72
LOS: 7 days | Discharge: NURSING, REHAB OR LONG-TERM CARE | DRG: 315 | End: 2022-06-03
Attending: INTERNAL MEDICINE | Admitting: INTERNAL MEDICINE
Payer: MEDICAID

## 2022-05-26 ENCOUNTER — CARE COORDINATION (OUTPATIENT)
Dept: CARE COORDINATION | Age: 72
End: 2022-05-26

## 2022-05-26 DIAGNOSIS — C41.9 MALIGNANT NEOPLASM OF BONE, UNSPECIFIED LOCATION (HCC): ICD-10-CM

## 2022-05-26 DIAGNOSIS — G89.3 CANCER ASSOCIATED PAIN: ICD-10-CM

## 2022-05-26 DIAGNOSIS — I70.209 ACUTE OCCLUSION OF ARTERY OF LOWER EXTREMITY (HCC): ICD-10-CM

## 2022-05-26 DIAGNOSIS — Z86.010 HISTORY OF COLON POLYPS: ICD-10-CM

## 2022-05-26 DIAGNOSIS — D64.9 ANEMIA, UNSPECIFIED TYPE: Primary | ICD-10-CM

## 2022-05-26 DIAGNOSIS — J90 BILATERAL PLEURAL EFFUSION: ICD-10-CM

## 2022-05-26 DIAGNOSIS — I73.9 PAD (PERIPHERAL ARTERY DISEASE) (HCC): ICD-10-CM

## 2022-05-26 DIAGNOSIS — R42 LIGHTHEADEDNESS: ICD-10-CM

## 2022-05-26 LAB
ALBUMIN SERPL-MCNC: 3.5 G/DL (ref 3.5–4.6)
ALP BLD-CCNC: 251 U/L (ref 35–104)
ALT SERPL-CCNC: <5 U/L (ref 0–41)
ANION GAP SERPL CALCULATED.3IONS-SCNC: 13 MEQ/L (ref 9–15)
ANISOCYTOSIS: ABNORMAL
AST SERPL-CCNC: 13 U/L (ref 0–40)
BACTERIA: NEGATIVE /HPF
BANDED NEUTROPHILS RELATIVE PERCENT: 4 %
BASOPHILS ABSOLUTE: 0 K/UL (ref 0–0.2)
BASOPHILS RELATIVE PERCENT: 0.2 %
BILIRUB SERPL-MCNC: 0.6 MG/DL (ref 0.2–0.7)
BILIRUBIN URINE: NEGATIVE
BLOOD, URINE: NEGATIVE
BUN BLDV-MCNC: 16 MG/DL (ref 8–23)
CALCIUM SERPL-MCNC: 8.8 MG/DL (ref 8.5–9.9)
CHLORIDE BLD-SCNC: 104 MEQ/L (ref 95–107)
CLARITY: CLEAR
CO2: 21 MEQ/L (ref 20–31)
COLOR: YELLOW
CREAT SERPL-MCNC: 0.88 MG/DL (ref 0.7–1.2)
EOSINOPHILS ABSOLUTE: 0 K/UL (ref 0–0.7)
EOSINOPHILS RELATIVE PERCENT: 1.2 %
EPITHELIAL CELLS, UA: NORMAL /HPF (ref 0–5)
GFR AFRICAN AMERICAN: >60
GFR NON-AFRICAN AMERICAN: >60
GLOBULIN: 2.7 G/DL (ref 2.3–3.5)
GLUCOSE BLD-MCNC: 114 MG/DL (ref 70–99)
GLUCOSE URINE: NEGATIVE MG/DL
HCT VFR BLD CALC: 25 % (ref 42–52)
HEMOGLOBIN: 8 G/DL (ref 14–18)
HYALINE CASTS: NORMAL /HPF (ref 0–5)
INFLUENZA A BY PCR: NEGATIVE
INFLUENZA B BY PCR: NEGATIVE
KETONES, URINE: ABNORMAL MG/DL
LACTIC ACID: 1.4 MMOL/L (ref 0.5–2.2)
LEUKOCYTE ESTERASE, URINE: ABNORMAL
LYMPHOCYTES ABSOLUTE: 1.5 K/UL (ref 1–4.8)
LYMPHOCYTES RELATIVE PERCENT: 23 %
MAGNESIUM: 2.2 MG/DL (ref 1.7–2.4)
MCH RBC QN AUTO: 29.4 PG (ref 27–31.3)
MCHC RBC AUTO-ENTMCNC: 32.1 % (ref 33–37)
MCV RBC AUTO: 91.7 FL (ref 80–100)
MONOCYTES ABSOLUTE: 0.2 K/UL (ref 0.2–0.8)
MONOCYTES RELATIVE PERCENT: 2.8 %
NEUTROPHILS ABSOLUTE: 5 K/UL (ref 1.4–6.5)
NEUTROPHILS RELATIVE PERCENT: 71 %
NITRITE, URINE: NEGATIVE
NUCLEATED RED BLOOD CELLS: 2 /100 WBC
PDW BLD-RTO: 20.8 % (ref 11.5–14.5)
PH UA: 5 (ref 5–9)
PLATELET # BLD: 162 K/UL (ref 130–400)
PLATELET SLIDE REVIEW: NORMAL
POIKILOCYTES: ABNORMAL
POTASSIUM SERPL-SCNC: 4 MEQ/L (ref 3.4–4.9)
PRO-BNP: 5586 PG/ML
PROCALCITONIN: 0.11 NG/ML (ref 0–0.15)
PROTEIN UA: NEGATIVE MG/DL
RBC # BLD: 2.72 M/UL (ref 4.7–6.1)
RBC UA: NORMAL /HPF (ref 0–5)
SARS-COV-2, NAAT: NOT DETECTED
SMUDGE CELLS: 3.8
SODIUM BLD-SCNC: 138 MEQ/L (ref 135–144)
SPECIFIC GRAVITY UA: 1.02 (ref 1–1.03)
TOTAL PROTEIN: 6.2 G/DL (ref 6.3–8)
TROPONIN: <0.01 NG/ML (ref 0–0.01)
URINE REFLEX TO CULTURE: ABNORMAL
UROBILINOGEN, URINE: 0.2 E.U./DL
WBC # BLD: 6.6 K/UL (ref 4.8–10.8)
WBC UA: NORMAL /HPF (ref 0–5)

## 2022-05-26 PROCEDURE — 80053 COMPREHEN METABOLIC PANEL: CPT

## 2022-05-26 PROCEDURE — 93005 ELECTROCARDIOGRAM TRACING: CPT

## 2022-05-26 PROCEDURE — 83735 ASSAY OF MAGNESIUM: CPT

## 2022-05-26 PROCEDURE — P9016 RBC LEUKOCYTES REDUCED: HCPCS

## 2022-05-26 PROCEDURE — 86900 BLOOD TYPING SEROLOGIC ABO: CPT

## 2022-05-26 PROCEDURE — 36415 COLL VENOUS BLD VENIPUNCTURE: CPT

## 2022-05-26 PROCEDURE — 87040 BLOOD CULTURE FOR BACTERIA: CPT

## 2022-05-26 PROCEDURE — 84145 PROCALCITONIN (PCT): CPT

## 2022-05-26 PROCEDURE — 86850 RBC ANTIBODY SCREEN: CPT

## 2022-05-26 PROCEDURE — 83880 ASSAY OF NATRIURETIC PEPTIDE: CPT

## 2022-05-26 PROCEDURE — 85025 COMPLETE CBC W/AUTO DIFF WBC: CPT

## 2022-05-26 PROCEDURE — 6360000002 HC RX W HCPCS

## 2022-05-26 PROCEDURE — 87635 SARS-COV-2 COVID-19 AMP PRB: CPT

## 2022-05-26 PROCEDURE — 2580000003 HC RX 258

## 2022-05-26 PROCEDURE — 86923 COMPATIBILITY TEST ELECTRIC: CPT

## 2022-05-26 PROCEDURE — 93005 ELECTROCARDIOGRAM TRACING: CPT | Performed by: EMERGENCY MEDICINE

## 2022-05-26 PROCEDURE — 71045 X-RAY EXAM CHEST 1 VIEW: CPT

## 2022-05-26 PROCEDURE — 99285 EMERGENCY DEPT VISIT HI MDM: CPT

## 2022-05-26 PROCEDURE — 96376 TX/PRO/DX INJ SAME DRUG ADON: CPT

## 2022-05-26 PROCEDURE — 83605 ASSAY OF LACTIC ACID: CPT

## 2022-05-26 PROCEDURE — 81001 URINALYSIS AUTO W/SCOPE: CPT

## 2022-05-26 PROCEDURE — 96374 THER/PROPH/DIAG INJ IV PUSH: CPT

## 2022-05-26 PROCEDURE — 96375 TX/PRO/DX INJ NEW DRUG ADDON: CPT

## 2022-05-26 PROCEDURE — 84484 ASSAY OF TROPONIN QUANT: CPT

## 2022-05-26 PROCEDURE — 87502 INFLUENZA DNA AMP PROBE: CPT

## 2022-05-26 PROCEDURE — 86901 BLOOD TYPING SEROLOGIC RH(D): CPT

## 2022-05-26 RX ORDER — ONDANSETRON 2 MG/ML
4 INJECTION INTRAMUSCULAR; INTRAVENOUS ONCE
Status: COMPLETED | OUTPATIENT
Start: 2022-05-26 | End: 2022-05-26

## 2022-05-26 RX ORDER — 0.9 % SODIUM CHLORIDE 0.9 %
1000 INTRAVENOUS SOLUTION INTRAVENOUS ONCE
Status: COMPLETED | OUTPATIENT
Start: 2022-05-26 | End: 2022-05-26

## 2022-05-26 RX ORDER — FENTANYL CITRATE 50 UG/ML
25 INJECTION, SOLUTION INTRAMUSCULAR; INTRAVENOUS ONCE
Status: COMPLETED | OUTPATIENT
Start: 2022-05-27 | End: 2022-05-27

## 2022-05-26 RX ORDER — FENTANYL CITRATE 50 UG/ML
25 INJECTION, SOLUTION INTRAMUSCULAR; INTRAVENOUS ONCE
Status: COMPLETED | OUTPATIENT
Start: 2022-05-26 | End: 2022-05-26

## 2022-05-26 RX ADMIN — FENTANYL CITRATE 25 MCG: 50 INJECTION, SOLUTION INTRAMUSCULAR; INTRAVENOUS at 20:20

## 2022-05-26 RX ADMIN — ONDANSETRON 4 MG: 2 INJECTION INTRAMUSCULAR; INTRAVENOUS at 20:19

## 2022-05-26 RX ADMIN — SODIUM CHLORIDE 1000 ML: 9 INJECTION, SOLUTION INTRAVENOUS at 20:19

## 2022-05-26 ASSESSMENT — PAIN DESCRIPTION - LOCATION
LOCATION: SHOULDER
LOCATION: BACK
LOCATION: SHOULDER
LOCATION: BACK

## 2022-05-26 ASSESSMENT — ENCOUNTER SYMPTOMS
ABDOMINAL PAIN: 0
CONSTIPATION: 0
CHEST TIGHTNESS: 0
SHORTNESS OF BREATH: 0
RECTAL PAIN: 0
BLOOD IN STOOL: 0
DIARRHEA: 0
NAUSEA: 0
COUGH: 0
ANAL BLEEDING: 0
BACK PAIN: 1
WHEEZING: 0
VOMITING: 0
PHOTOPHOBIA: 0

## 2022-05-26 ASSESSMENT — PAIN SCALES - GENERAL
PAINLEVEL_OUTOF10: 2
PAINLEVEL_OUTOF10: 2
PAINLEVEL_OUTOF10: 8
PAINLEVEL_OUTOF10: 8

## 2022-05-26 ASSESSMENT — PAIN DESCRIPTION - FREQUENCY: FREQUENCY: CONTINUOUS

## 2022-05-26 ASSESSMENT — PAIN DESCRIPTION - DESCRIPTORS
DESCRIPTORS: ACHING
DESCRIPTORS: ACHING

## 2022-05-26 ASSESSMENT — PAIN DESCRIPTION - ORIENTATION
ORIENTATION: LEFT
ORIENTATION: LEFT

## 2022-05-26 ASSESSMENT — PAIN - FUNCTIONAL ASSESSMENT
PAIN_FUNCTIONAL_ASSESSMENT: 0-10

## 2022-05-26 ASSESSMENT — PAIN DESCRIPTION - PAIN TYPE: TYPE: CHRONIC PAIN

## 2022-05-26 NOTE — ED PROVIDER NOTES
[facility] ED  eMERGENCY dEPARTMENT eNCOUnter      Pt Name: Dominique Mendoza  MRN: [id number]  Arielgfjosette 1950  Date of evaluation: 5/26/2022  Provider: YAQUELIN Aguirre        HISTORY OF PRESENT ILLNESS    Dominique Mendoza is a 73 y.o. male per chart review has ah/o CAD, HTN, HLD, PAD, UTE, malignancy. Patient presents to the emergency department for a several week history of dizziness. Patient also with history of hypotension in this timeframe. He did just stop his lisinopril a few days ago but does not feel it is helping his symptoms. Patient presents today as he had an episode of near syncope. States he was outside smoking medical marijuana that he uses for his cancer pain, states he began to feel lightheaded, stated he knew at that time he needed to get in the house, states he did not make it in the house but did lower himself to the ground. He did not fall pass out or lose consciousness. Triage report is dizziness however patient is denying all vertiginous or room spinning sensations, states more of a lightheadedness. Again this is been ongoing. Patient also reports decreased appetite over the past several weeks. Is scheduled to see a nutritionist tomorrow. No fevers, chills, abdominal pain, nausea, vomiting, weakness, numbness, loss of bowel bladder control, urinary complaints, cough congestion flulike symptoms, chest pain, shortness of breath. Patient does have chronic back pain due to cancer states not worse today. Also shoulder pain due to cancer not worse today. Is not currently receiving treatment for his cancer is scheduled to start CTX soon. REVIEW OF SYSTEMS       Review of Systems   Constitutional: Positive for appetite change and fatigue. Negative for chills and fever. HENT: Negative for congestion. Eyes: Negative for photophobia and visual disturbance. Respiratory: Negative for cough, chest tightness, shortness of breath and wheezing.     Cardiovascular: Negative for chest pain. Gastrointestinal: Negative for abdominal pain, anal bleeding, blood in stool, constipation, diarrhea, nausea, rectal pain and vomiting. Genitourinary: Negative for difficulty urinating, dysuria, flank pain, frequency, hematuria and urgency. Musculoskeletal: Positive for back pain (chronic unchanged). Negative for gait problem, myalgias, neck pain and neck stiffness. Skin: Negative for wound. Neurological: Positive for weakness (generalized) and light-headedness. Negative for dizziness, tremors, seizures, syncope, facial asymmetry, speech difficulty, numbness and headaches. Psychiatric/Behavioral: Negative for confusion. Except as noted above the remainder of the review of systems was reviewed and negative. PAST MEDICAL HISTORY     Past Medical History:   Diagnosis Date    CAD (coronary artery disease)     Family history of colon cancer     Hyperlipidemia     Hypertension     Old MI (myocardial infarction) 02/03/2006    UTE on CPAP     PAD (peripheral artery disease) (HCC)     Prostate cancer ([facility] [address]          SURGICAL HISTORY       Past Surgical History:   Procedure Laterality Date    COLONOSCOPY N/A 08/19/2019    COLORECTAL CANCER SCREENING, NOT HIGH RISK performed by Nettie Garcia MD at [address] [place] [address]      X3    THORACENTESIS Right 03/07/2022    870 ml removed by Dr. Bryant Rodgers - diagnostics sent    THORACENTESIS Left 03/08/2022    285 ml removed by Dr Bryant Rodgers - cytology only sent         CURRENT MEDICATIONS       Previous Medications    ASCORBIC ACID (VITAMIN C PO)    Take 500 mg by mouth daily     ASPIRIN 81 MG EC TABLET    Take 81 mg by mouth daily    ATORVASTATIN (LIPITOR) 40 MG TABLET    Take 1 tablet by mouth daily    CLOPIDOGREL (PLAVIX) 75 MG TABLET    TAKE 1 TABLET BY MOUTH  DAILY    CPAP MACHINE MISC    by Does not apply route Use nightly as directed. Pressure setting 5 cm H2O.     CYANOCOBALAMIN (VITAMIN B-12 CR PO)    Take 1 tablet by mouth daily     HANDICAP PLACARD MISC    by Does not apply route Exp 5 years    LISINOPRIL (PRINIVIL;ZESTRIL) 20 MG TABLET    TAKE 1 TABLET BY MOUTH  DAILY    TALAZOPARIB TOSYLATE 0.25 MG CAPS    Take 1 capsule by mouth daily Take 2 tablets daily    TAMSULOSIN (FLOMAX) 0.4 MG CAPSULE    Take 0.4 mg by mouth 2 times daily     TIZANIDINE (ZANAFLEX) 2 MG TABLET    Take 1 tablet by mouth nightly as needed (muscle spasms)       ALLERGIES     Morphine    FAMILY HISTORY       Family History   Problem Relation Age of Onset    Cancer Mother 76        BONE    Cancer Father 64        COLON    Colon Cancer Father     Cancer Brother 43        THYROID    Diabetes Maternal Grandmother           SOCIAL HISTORY       Social History     Socioeconomic History    Marital status: Single     Spouse name: None    Number of children: 2    Years of education: None    Highest education level: Associate degree: occupational, technical, or vocational program   Occupational History    Occupation:     Tobacco Use    Smoking status: Former Smoker     Packs/day: 2.00     Years: 25.00     Pack years: 50.00     Types: Cigars     Quit date: 1995     Years since quittin.4    Smokeless tobacco: Never Used    Tobacco comment: RARE CIGAR FROM TIME TO TIME   Vaping Use    Vaping Use: Never used   Substance and Sexual Activity    Alcohol use:  Yes     Alcohol/week: 4.0 standard drinks     Types: 2 Cans of beer, 2 Shots of liquor per week     Comment: Social    Drug use: Not Currently     Types: Marijuana Fronie Christina)    Sexual activity: Not Currently   Other Topics Concern    None   Social History Narrative    Single floor home    4 steps to get into the home    Full basement does not go downstairs    Lives with girlfriend and her son    He has two children one in [place] and one in [place]    One Dog     Working smoke detectors and CO2     Social Determinants of Health     Financial Resource Strain:     Difficulty of Paying Living Expenses: Not on file   Food Insecurity:     Worried About Running Out of Food in the Last Year: Not on file    Eloy of Food in the Last Year: Not on file   Transportation Needs:     Lack of Transportation (Medical): Not on file    Lack of Transportation (Non-Medical): Not on file   Physical Activity:     Days of Exercise per Week: Not on file    Minutes of Exercise per Session: Not on file   Stress:     Feeling of Stress : Not on file   Social Connections:     Frequency of Communication with Friends and Family: Not on file    Frequency of Social Gatherings with Friends and Family: Not on file    Attends Taoism Services: Not on file    Active Member of [address] [place] [address] [employer] or Organizations: Not on file    Attends Club or Organization Meetings: Not on file    Marital Status: Not on file   Intimate Partner Violence:     Fear of Current or Ex-Partner: Not on file    Emotionally Abused: Not on file    Physically Abused: Not on file    Sexually Abused: Not on file   Housing Stability:     Unable to Pay for Housing in the Last Year: Not on file    Number of [place] in the Last Year: Not on file    Unstable Housing in the Last Year: Not on file         PHYSICAL EXAM        ED Triage Vitals [05/26/22 1929]   BP Temp Temp Source Heart Rate Resp SpO2 Height Weight   (!) 94/46 98 °F (36.7 °C) Oral 66 18 99 % 5' 10\" (1.778 m) 172 lb (78 kg)       Physical Exam  Constitutional:       General: He is not in acute distress. Appearance: Normal appearance. He is ill-appearing (chronically). He is not toxic-appearing or diaphoretic. HENT:      Head: Normocephalic and atraumatic. Right Ear: External ear normal.      Left Ear: External ear normal.      Nose: Nose normal. No congestion or rhinorrhea. Mouth/Throat:      Mouth: Mucous membranes are moist.      Pharynx: Oropharynx is clear. Eyes:      Extraocular Movements: Extraocular movements intact. Conjunctiva/sclera: Conjunctivae normal.      Pupils: Pupils are equal, round, and reactive to light. Cardiovascular:      Rate and Rhythm: Normal rate and regular rhythm. Pulmonary:      Effort: Pulmonary effort is normal. No respiratory distress. Breath sounds: Normal breath sounds. No wheezing, rhonchi or rales. Abdominal:      General: Bowel sounds are normal. There is no distension. Palpations: Abdomen is soft. Tenderness: There is no abdominal tenderness. There is no guarding. Genitourinary:     Rectum: Normal. Guaiac result negative. Musculoskeletal:         General: No deformity or signs of injury. Normal range of motion. Cervical back: Normal range of motion. No rigidity or tenderness. Right lower leg: No edema. Left lower leg: No edema. Skin:     General: Skin is warm. Capillary Refill: Capillary refill takes less than 2 seconds. Coloration: Skin is pale. Findings: No bruising, erythema, lesion or rash. Neurological:      General: No focal deficit present. Mental Status: He is alert and oriented to person, place, and time. Sensory: No sensory deficit. Motor: No weakness. Coordination: Coordination normal.      Gait: Gait normal.   Psychiatric:         Mood and Affect: Mood normal.         Behavior: Behavior normal.         Thought Content:  Thought content normal.         Judgment: Judgment normal.           LABS:  Labs Reviewed   COMPREHENSIVE METABOLIC PANEL - Abnormal; Notable for the following components:       Result Value    Glucose 114 (*)     Total Protein 6.2 (*)     Alkaline Phosphatase 251 (*)     All other components within normal limits   CBC WITH AUTO DIFFERENTIAL - Abnormal; Notable for the following components:    RBC 2.72 (*)     Hemoglobin 8.0 (*)     Hematocrit 25.0 (*)     MCHC 32.1 (*)     RDW 20.8 (*)     All other components within normal limits   URINALYSIS WITH REFLEX TO CULTURE - Abnormal; Notable for the following components:    Ketones, Urine TRACE (*)     Leukocyte Esterase, Urine TRACE (*)     All other components within normal limits   COVID-19, RAPID   RAPID INFLUENZA A/B ANTIGENS   CULTURE, BLOOD 1   CULTURE, BLOOD 2   MAGNESIUM   TROPONIN   LACTIC ACID   PROCALCITONIN   BRAIN NATRIURETIC PEPTIDE   MICROSCOPIC URINALYSIS   TYPE AND SCREEN     EKG NSR HR 83 sinus rhythm 1st degree AVB no acute st elevations no significant changes from prior no axis deviation    MDM:   Vitals:    Vitals:    05/26/22 2100 05/26/22 2145 05/26/22 2200 05/26/22 2230   BP: (!) 104/49 (!) 94/54 (!) 103/49 (!) 99/49   Pulse: 64 68 64 70   Resp: 20 17 14 18   Temp:       TempSrc:       SpO2: 98% 99% 98% 99%   Weight:       Height:           73 y.o. male per chart review has ah/o CAD, HTN, HLD, PAD, UTE, bone malignancy. Patient presents to the emergency department for a several week history of dizziness. Patient also with history of hypotension in this timeframe. Patient presents today as he had an episode of near syncope. States he was outside smoking medical marijuana that he uses for his cancer pain, states he began to feel lightheaded, stated he knew at that time he needed to get in the house, states he did not make it in the house but did lower himself to the ground. Denies history of similar reaction to medical marijuana. He did not fall pass out or lose consciousness. Afebrile, VSS, softer BPs in the ED reportedly his baseline. States while in ED he is lightheaded. Work-up remarkable for hemoglobin 8, hematocrit 25.0, most recently about 1 month ago his baseline was around 10-11, has been trending down slowly but more significant drop today. Denying any source of bleeding, guaiac negative in ED. May be due to bone marrow suppression. CXR with bilateral pleural effusions left greater than right as well as known hilar LAD. Patient without any dyspnea or respiratory complaints laying flat in his bed 99% on room air. History of thoracentesis for prior pleural effusions. Also history of diastolic dysfunction CHF EF 40%. No clinical evidence of overload. Decision to admit for monitoring of patient's new and symptomatic anemia. Dr Matilde Squires accepting. CRITICAL CARE TIME   Total CriticalCare time was 0 minutes, excluding separately reportable procedures. There was a high probability of clinically significant/life threatening deterioration in the patient's condition which required my urgent intervention. PROCEDURES:  Unlessotherwise noted below, none      Procedures      FINAL IMPRESSION      1. Anemia, unspecified type    2. Malignant neoplasm of bone, unspecified location ([address]    3. Lightheadedness    4.  Bilateral pleural effusion          DISPOSITION/PLAN   DISPOSITION Decision To Admit 05/26/2022 09:44:15 PM          YAQUELIN Limon (electronically signed)  Attending Emergency Physician          Ginny Limon  05/27/22 0021

## 2022-05-26 NOTE — CARE COORDINATION
(PRINIVIL;ZESTRIL) 20 MG tablet TAKE 1 TABLET BY MOUTH  DAILY 8/24/21   Evangelista Campbell MD   Talazoparib Tosylate 0.25 MG CAPS Take 1 capsule by mouth daily Take 2 tablets daily    Historical Provider, MD   Cyanocobalamin (VITAMIN B-12 CR PO) Take 1 tablet by mouth daily     Historical Provider, MD   tamsulosin (FLOMAX) 0.4 MG capsule Take 0.4 mg by mouth 2 times daily  7/6/19   Historical Provider, MD   CPAP Machine MISC by Does not apply route Use nightly as directed. Pressure setting 5 cm H2O. Patient taking differently: 1 Device by Does not apply route Use nightly as directed.   Pressure setting 5 cm H2O. 1/9/18   Lisbet Casas MD   Ascorbic Acid (VITAMIN C PO) Take 500 mg by mouth daily     Historical Provider, MD       Future Appointments   Date Time Provider Sana Ramos   6/6/2022  1:30 PM Evangelista Campbell MD [facility] [place]

## 2022-05-27 PROBLEM — D64.9 ANEMIA: Status: ACTIVE | Noted: 2022-05-27

## 2022-05-27 LAB
ABO/RH: NORMAL
ALBUMIN SERPL-MCNC: 3 G/DL (ref 3.5–4.6)
ALP BLD-CCNC: 233 U/L (ref 35–104)
ALT SERPL-CCNC: <5 U/L (ref 0–41)
ANION GAP SERPL CALCULATED.3IONS-SCNC: 13 MEQ/L (ref 9–15)
ANTIBODY SCREEN: NORMAL
AST SERPL-CCNC: 10 U/L (ref 0–40)
BASOPHILS ABSOLUTE: 0 K/UL (ref 0–0.2)
BASOPHILS RELATIVE PERCENT: 0.5 %
BILIRUB SERPL-MCNC: 0.5 MG/DL (ref 0.2–0.7)
BUN BLDV-MCNC: 15 MG/DL (ref 8–23)
CALCIUM SERPL-MCNC: 8.5 MG/DL (ref 8.5–9.9)
CHLORIDE BLD-SCNC: 106 MEQ/L (ref 95–107)
CO2: 19 MEQ/L (ref 20–31)
CREAT SERPL-MCNC: 0.86 MG/DL (ref 0.7–1.2)
EKG ATRIAL RATE: 61 BPM
EKG P AXIS: 47 DEGREES
EKG P-R INTERVAL: 204 MS
EKG Q-T INTERVAL: 518 MS
EKG QRS DURATION: 122 MS
EKG QTC CALCULATION (BAZETT): 521 MS
EKG R AXIS: -14 DEGREES
EKG T AXIS: -3 DEGREES
EKG VENTRICULAR RATE: 61 BPM
EOSINOPHILS ABSOLUTE: 0 K/UL (ref 0–0.7)
EOSINOPHILS RELATIVE PERCENT: 0.9 %
FOLATE: 5.7 NG/ML
GFR AFRICAN AMERICAN: >60
GFR NON-AFRICAN AMERICAN: >60
GLOBULIN: 2.6 G/DL (ref 2.3–3.5)
GLUCOSE BLD-MCNC: 97 MG/DL (ref 70–99)
HCT VFR BLD CALC: 21.9 % (ref 42–52)
HEMOGLOBIN: 7.2 G/DL (ref 14–18)
IRON SATURATION: 47 % (ref 20–55)
IRON: 108 UG/DL (ref 59–158)
LYMPHOCYTES ABSOLUTE: 1.4 K/UL (ref 1–4.8)
LYMPHOCYTES RELATIVE PERCENT: 26.1 %
MCH RBC QN AUTO: 29.9 PG (ref 27–31.3)
MCHC RBC AUTO-ENTMCNC: 33.1 % (ref 33–37)
MCV RBC AUTO: 90.5 FL (ref 80–100)
MONOCYTES ABSOLUTE: 0.3 K/UL (ref 0.2–0.8)
MONOCYTES RELATIVE PERCENT: 5.4 %
NEUTROPHILS ABSOLUTE: 3.5 K/UL (ref 1.4–6.5)
NEUTROPHILS RELATIVE PERCENT: 67.1 %
PDW BLD-RTO: 20.8 % (ref 11.5–14.5)
PLATELET # BLD: 126 K/UL (ref 130–400)
POTASSIUM REFLEX MAGNESIUM: 4.1 MEQ/L (ref 3.4–4.9)
RBC # BLD: 2.42 M/UL (ref 4.7–6.1)
SODIUM BLD-SCNC: 138 MEQ/L (ref 135–144)
TOTAL IRON BINDING CAPACITY: 229 UG/DL (ref 250–450)
TOTAL PROTEIN: 5.6 G/DL (ref 6.3–8)
UNSATURATED IRON BINDING CAPACITY: 121 UG/DL (ref 112–347)
VITAMIN B-12: >2000 PG/ML (ref 232–1245)
WBC # BLD: 5.2 K/UL (ref 4.8–10.8)

## 2022-05-27 PROCEDURE — 36415 COLL VENOUS BLD VENIPUNCTURE: CPT

## 2022-05-27 PROCEDURE — 6370000000 HC RX 637 (ALT 250 FOR IP): Performed by: INTERNAL MEDICINE

## 2022-05-27 PROCEDURE — 83540 ASSAY OF IRON: CPT

## 2022-05-27 PROCEDURE — 83550 IRON BINDING TEST: CPT

## 2022-05-27 PROCEDURE — 1210000000 HC MED SURG R&B

## 2022-05-27 PROCEDURE — 99497 ADVNCD CARE PLAN 30 MIN: CPT | Performed by: FAMILY MEDICINE

## 2022-05-27 PROCEDURE — 80053 COMPREHEN METABOLIC PANEL: CPT

## 2022-05-27 PROCEDURE — 6360000002 HC RX W HCPCS

## 2022-05-27 PROCEDURE — 82607 VITAMIN B-12: CPT

## 2022-05-27 PROCEDURE — 85025 COMPLETE CBC W/AUTO DIFF WBC: CPT

## 2022-05-27 PROCEDURE — 99222 1ST HOSP IP/OBS MODERATE 55: CPT | Performed by: FAMILY MEDICINE

## 2022-05-27 PROCEDURE — 6360000002 HC RX W HCPCS: Performed by: INTERNAL MEDICINE

## 2022-05-27 PROCEDURE — 82746 ASSAY OF FOLIC ACID SERUM: CPT

## 2022-05-27 PROCEDURE — 2580000003 HC RX 258: Performed by: INTERNAL MEDICINE

## 2022-05-27 PROCEDURE — 6370000000 HC RX 637 (ALT 250 FOR IP): Performed by: FAMILY MEDICINE

## 2022-05-27 RX ORDER — ASPIRIN 81 MG/1
81 TABLET ORAL DAILY
Status: DISCONTINUED | OUTPATIENT
Start: 2022-05-27 | End: 2022-06-03 | Stop reason: HOSPADM

## 2022-05-27 RX ORDER — SODIUM CHLORIDE 9 MG/ML
25 INJECTION, SOLUTION INTRAVENOUS PRN
Status: DISCONTINUED | OUTPATIENT
Start: 2022-05-27 | End: 2022-06-03 | Stop reason: HOSPADM

## 2022-05-27 RX ORDER — ACETAMINOPHEN 325 MG/1
650 TABLET ORAL SEE ADMIN INSTRUCTIONS
Status: DISCONTINUED | OUTPATIENT
Start: 2022-05-27 | End: 2022-06-03 | Stop reason: HOSPADM

## 2022-05-27 RX ORDER — TRAMADOL HYDROCHLORIDE 50 MG/1
50 TABLET ORAL EVERY 6 HOURS PRN
Status: ON HOLD | COMMUNITY
End: 2022-06-02 | Stop reason: HOSPADM

## 2022-05-27 RX ORDER — ONDANSETRON 4 MG/1
4 TABLET, ORALLY DISINTEGRATING ORAL EVERY 8 HOURS PRN
Status: DISCONTINUED | OUTPATIENT
Start: 2022-05-27 | End: 2022-06-03 | Stop reason: HOSPADM

## 2022-05-27 RX ORDER — ACETAMINOPHEN 650 MG/1
650 SUPPOSITORY RECTAL EVERY 6 HOURS PRN
Status: DISCONTINUED | OUTPATIENT
Start: 2022-05-27 | End: 2022-06-02

## 2022-05-27 RX ORDER — SODIUM CHLORIDE 0.9 % (FLUSH) 0.9 %
5-40 SYRINGE (ML) INJECTION PRN
Status: DISCONTINUED | OUTPATIENT
Start: 2022-05-27 | End: 2022-06-03 | Stop reason: HOSPADM

## 2022-05-27 RX ORDER — ENOXAPARIN SODIUM 100 MG/ML
40 INJECTION SUBCUTANEOUS DAILY
Status: DISCONTINUED | OUTPATIENT
Start: 2022-05-27 | End: 2022-06-03 | Stop reason: HOSPADM

## 2022-05-27 RX ORDER — DIPHENHYDRAMINE HCL 25 MG
25 TABLET ORAL SEE ADMIN INSTRUCTIONS
Status: DISCONTINUED | OUTPATIENT
Start: 2022-05-27 | End: 2022-06-03 | Stop reason: HOSPADM

## 2022-05-27 RX ORDER — HYDROCODONE BITARTRATE AND ACETAMINOPHEN 7.5; 325 MG/1; MG/1
1 TABLET ORAL EVERY 6 HOURS PRN
Qty: 12 TABLET | Refills: 0 | Status: SHIPPED | OUTPATIENT
Start: 2022-05-28 | End: 2022-06-02 | Stop reason: HOSPADM

## 2022-05-27 RX ORDER — ONDANSETRON 2 MG/ML
4 INJECTION INTRAMUSCULAR; INTRAVENOUS EVERY 6 HOURS PRN
Status: DISCONTINUED | OUTPATIENT
Start: 2022-05-27 | End: 2022-06-03 | Stop reason: HOSPADM

## 2022-05-27 RX ORDER — SODIUM CHLORIDE 0.9 % (FLUSH) 0.9 %
5-40 SYRINGE (ML) INJECTION EVERY 12 HOURS SCHEDULED
Status: DISCONTINUED | OUTPATIENT
Start: 2022-05-27 | End: 2022-06-03 | Stop reason: HOSPADM

## 2022-05-27 RX ORDER — SODIUM CHLORIDE 9 MG/ML
INJECTION, SOLUTION INTRAVENOUS PRN
Status: DISCONTINUED | OUTPATIENT
Start: 2022-05-27 | End: 2022-06-03 | Stop reason: HOSPADM

## 2022-05-27 RX ORDER — ACETAMINOPHEN 325 MG/1
650 TABLET ORAL EVERY 6 HOURS PRN
Status: DISCONTINUED | OUTPATIENT
Start: 2022-05-27 | End: 2022-06-02

## 2022-05-27 RX ORDER — CLOPIDOGREL BISULFATE 75 MG/1
75 TABLET ORAL DAILY
Status: DISCONTINUED | OUTPATIENT
Start: 2022-05-27 | End: 2022-06-03 | Stop reason: HOSPADM

## 2022-05-27 RX ORDER — HYDROCODONE BITARTRATE AND ACETAMINOPHEN 7.5; 325 MG/1; MG/1
1 TABLET ORAL EVERY 6 HOURS PRN
Status: DISCONTINUED | OUTPATIENT
Start: 2022-05-27 | End: 2022-06-01

## 2022-05-27 RX ORDER — TRAMADOL HYDROCHLORIDE 50 MG/1
50 TABLET ORAL EVERY 6 HOURS PRN
Status: DISCONTINUED | OUTPATIENT
Start: 2022-05-27 | End: 2022-05-27

## 2022-05-27 RX ORDER — ATORVASTATIN CALCIUM 40 MG/1
40 TABLET, FILM COATED ORAL DAILY
Status: DISCONTINUED | OUTPATIENT
Start: 2022-05-27 | End: 2022-06-03 | Stop reason: HOSPADM

## 2022-05-27 RX ORDER — POLYETHYLENE GLYCOL 3350 17 G/17G
17 POWDER, FOR SOLUTION ORAL DAILY PRN
Status: DISCONTINUED | OUTPATIENT
Start: 2022-05-27 | End: 2022-06-03 | Stop reason: HOSPADM

## 2022-05-27 RX ADMIN — HYDROMORPHONE HYDROCHLORIDE 0.5 MG: 1 INJECTION, SOLUTION INTRAMUSCULAR; INTRAVENOUS; SUBCUTANEOUS at 14:44

## 2022-05-27 RX ADMIN — FENTANYL CITRATE 25 MCG: 50 INJECTION, SOLUTION INTRAMUSCULAR; INTRAVENOUS at 00:02

## 2022-05-27 RX ADMIN — HYDROMORPHONE HYDROCHLORIDE 0.5 MG: 1 INJECTION, SOLUTION INTRAMUSCULAR; INTRAVENOUS; SUBCUTANEOUS at 20:13

## 2022-05-27 RX ADMIN — ATORVASTATIN CALCIUM 40 MG: 40 TABLET, FILM COATED ORAL at 09:57

## 2022-05-27 RX ADMIN — Medication 10 ML: at 21:25

## 2022-05-27 RX ADMIN — TRAMADOL HYDROCHLORIDE 50 MG: 50 TABLET ORAL at 12:23

## 2022-05-27 RX ADMIN — ASPIRIN 81 MG: 81 TABLET, COATED ORAL at 09:57

## 2022-05-27 RX ADMIN — HYDROMORPHONE HYDROCHLORIDE 0.5 MG: 1 INJECTION, SOLUTION INTRAMUSCULAR; INTRAVENOUS; SUBCUTANEOUS at 23:45

## 2022-05-27 RX ADMIN — ENOXAPARIN SODIUM 40 MG: 100 INJECTION SUBCUTANEOUS at 09:58

## 2022-05-27 RX ADMIN — Medication 10 ML: at 09:58

## 2022-05-27 RX ADMIN — CLOPIDOGREL BISULFATE 75 MG: 75 TABLET ORAL at 09:57

## 2022-05-27 RX ADMIN — HYDROCODONE BITARTRATE AND ACETAMINOPHEN 1 TABLET: 7.5; 325 TABLET ORAL at 18:27

## 2022-05-27 RX ADMIN — HYDROMORPHONE HYDROCHLORIDE 0.5 MG: 1 INJECTION, SOLUTION INTRAMUSCULAR; INTRAVENOUS; SUBCUTANEOUS at 02:12

## 2022-05-27 ASSESSMENT — PAIN DESCRIPTION - PAIN TYPE: TYPE: CHRONIC PAIN

## 2022-05-27 ASSESSMENT — PAIN SCALES - GENERAL
PAINLEVEL_OUTOF10: 1
PAINLEVEL_OUTOF10: 7
PAINLEVEL_OUTOF10: 5
PAINLEVEL_OUTOF10: 8
PAINLEVEL_OUTOF10: 5
PAINLEVEL_OUTOF10: 10
PAINLEVEL_OUTOF10: 7
PAINLEVEL_OUTOF10: 8

## 2022-05-27 ASSESSMENT — ENCOUNTER SYMPTOMS
NAUSEA: 0
BACK PAIN: 1
COUGH: 0
SHORTNESS OF BREATH: 1
DIARRHEA: 0
EYES NEGATIVE: 1
CONSTIPATION: 0
VOMITING: 0
WHEEZING: 0
ABDOMINAL DISTENTION: 0

## 2022-05-27 ASSESSMENT — PAIN DESCRIPTION - LOCATION
LOCATION: BACK

## 2022-05-27 ASSESSMENT — PAIN DESCRIPTION - DESCRIPTORS
DESCRIPTORS: ACHING
DESCRIPTORS: ACHING

## 2022-05-27 ASSESSMENT — PAIN DESCRIPTION - ORIENTATION
ORIENTATION: LEFT
ORIENTATION: LEFT

## 2022-05-27 ASSESSMENT — PAIN DESCRIPTION - FREQUENCY: FREQUENCY: CONTINUOUS

## 2022-05-27 NOTE — CARE COORDINATION
[facility] Case Management Initial Discharge Assessment    Met with Patient to discuss discharge plan. PCP: Ronen Wagner MD             Date of Last Visit: 2 months    VA Patient: Yes        VA Notified: yes - Keturah Simon    If no PCP, list provided? N/A    Discharge Planning    Living Arrangements: independently at home    Who do you live with? Fiorella Noe Fos partner    Who helps you with your care:  self or friend    If lives at home:     Do you have any barriers navigating in your home? no    Patient can perform ADL? Yes    Current Services (outpatient and in home) :  None    Dialysis: No    Is transportation available to get to your appointments? Yes    DME Equipment:  [employer]    Respiratory equipment: CPAP without O2    Respiratory provider:  yes - [address]:  yes - [facility] [place]    Consult with Medication Assistance Program?  No      Patient agreeable to [facility]? N/A    Patient agreeable to SNF/Rehab? N/A    Other discharge needs identified? Other TBD    Does Patient Have a High-Risk for Readmission Diagnosis (CHF, PN, MI, COPD)? No      Initial Discharge Plan? (Note: please see concurrent daily documentation for any updates after initial note). HOME    Readmission Risk              Risk of Unplanned Readmission:  19         Electronically signed by Esequiel Short.  MATTY Gould on 5/27/2022 at 12:34 PM

## 2022-05-27 NOTE — PROGRESS NOTES
1000: Assessment complete. VSS on room air. Patient alert, oriented, calm, and cooperative. Morning med pass complete-pt tolerated well. Safety maintained, call light within reach. No complaints. 1223: Tramadol given per [facility] for c/o 5/10 pain in left back area. Pt states pain increases with movement. 1444: Dilaudid administered per [facility] for left back pain. 1557: Orthostatic BP's filed. Please see flow sheet. 1828: Norco given for pain per MAR as Tramadol was discontinued.          Electronically signed by Ambrose Martínez RN on 5/27/22 at 1:42 PM EDT

## 2022-05-27 NOTE — CARE COORDINATION
[facility] [place] Case Management Initial Discharge Assessment    Met with Patient to discuss discharge plan. PCP: Ronen Wagner MD                                Date of Last Visit: 3/15/22    VA Patient: Yes          VA Notified: yes - LSW spoke with Fatimah on 5/27 @11:52AM. Documents faxed to [phone]    If no PCP, list provided? N/A    Discharge Planning    Living Arrangements: independently at home    Who do you live with? Patient said he lives with his partner, Fiorella Noe. Who helps you with your care:  self    If lives at home:     Do you have any barriers navigating in your home? no    Patient can perform ADL? Yes    Current Services (outpatient and in home) :  None    Dialysis: No    Is transportation available to get to your appointments? Yes-Patient said he only drives short distances. He said if it is a longer distance, Fiorella Noe will drive him or he uses [employer]. DME Equipment:  no    Respiratory equipment: CPAP without O2    Respiratory provider:  yes - Medical Services     Pharmacy:  yes - [facility] in [address] [place] [address] with Medication Assistance Program?  No      Patient agreeable to [address]? Yes, Company TBD    Patient agreeable to SNF/Rehab? Declined    Other discharge needs identified? N/A    Does Patient Have a High-Risk for Readmission Diagnosis (CHF, PN, MI, COPD)? Yes    Initial Discharge Plan? (Note: please see concurrent daily documentation for any updates after initial note). Patient was alert and oriented in conversation. He reported he lives with Fiorella Noe, his significant other. Patient said he is interested in [address] but would like to see how he is doing when closer to discharge before making that decision. Patient denied need for SNF and plans to return home.      Readmission Risk              Risk of Unplanned Readmission:  19         Electronically signed by Kayy Gore LCSW on 5/27/2022 at 11:45 AM

## 2022-05-27 NOTE — PROGRESS NOTES
1740: Ange from [address] in [facility] called questioning patient's Rx for Tramadol, Norco, and Fentanyl patched. Ange was advised that at this time, pt is not being discharged. Genesis Babin is asking for staff to please give the pharmacy a call back at [phone] whenever patient is discharged home to review if any of these medications needs to be discontinued as there is a combination of controlled substances.      Electronically signed by Rivka Rascon RN on 5/27/22 at 5:41 PM EDT

## 2022-05-27 NOTE — ED NOTES
Pt stable, resting in bed, 0 distress, 0 sob, a&ox4, will monitor.      Bianka Sifuentes RN  05/26/22 2958

## 2022-05-27 NOTE — FLOWSHEET NOTE
0115 pt arrived to floor via WC. Pt ambulated to bed from [facility] and tolerated well. Pt did state \" Im always light headed\"  VSS. Pt c/o 10(10) back pain that radiates to chest. Pt requested ice packs and this RN provided. Dr. Ruth Guan at bedside. Admission complete home medications have been reviewed with the pt. Bed alarm has been set due to light headedness and near syncope.    0200 Pt received 0.5mg of Dilaudid for 10(10) back pain. 0230 pt appears to be resting comfortably. 0500 pt still sleeping at this time.

## 2022-05-27 NOTE — CARE COORDINATION
Luis Carlos from [address] [facility] called and we spoke about this pt. I gave him an update on pt's clinical status including prostate cancer with bone mets, lung cancer, and anemia. Pt was to start his chemotherapy this week. SENT CLINICAL TO HIM -[phone] AND [facility] PHONE -[phone].

## 2022-05-27 NOTE — ED NOTES
RECTAL EXAM DONE BY LINDA JAMISON, OCCULT STOOL NEGATIVE PER PA. PT STABLE, RESTING IN BED, A&OX4. 0 DISTRESS.      Luda Nguyen RN  05/26/22 3154

## 2022-05-27 NOTE — CONSULTS
Palliative Care Consult Note  Patient: Sandra Mario  Gender: male  YOB: 1950  Unit/Bed: J317/U548-90  CodeStatus: Full Code  Inpatient Treatment Team: Treatment Team: Attending Provider: Gerald Glez MD; Consulting Physician: Nasario Rubinstein, MD; Registered Nurse: Shannon Forman RN; Utilization Reviewer: Korey Monroy, MATTY; Registered Nurse: Ester Church, RN; Registered Nurse: Raffaele Barrientos RN  Admit Date:  5/26/2022    Chief Complaint: Anemia, Malignant neoplasm of bone, lightheadedness    History of Presenting Illness:      Sandra Mario is a 73 y.o. male on hospital day 0 with a history of Anemia, Malignant neoplasm of bone, lightheadedness. Has a PMH of CAD, HTN, HLD, PAD, UTE, Bone cancer. Presented to the emergency department after several weeks dizziness and an episode of near syncope. Was outside smoking medical marijuana when episode of near syncope occurred. Patient has chronic back pain due to cancer though at baseline. Admit labs showed hgb 8.0 and chest x-ray showed \"BILATERAL LOWER LUNG ATELECTASIS/PNEUMONIA WITH LEFT PLEURAL EFFUSION\". Was admitted for further eval.       Today patient presents as A+Ox3. Having painj at 10/10 per patient. Using ultram PO and dilaudid IV for breakthrough with relief from only dilaudid. Patient also reports decreased appetite over the past several weeks 5Lb weigh loss noted on records. At home Patient is on ultram PRN for his bone cancer pain and uses medical marijuana. Is scheduled to start CTX soon on outpatient bases. Wishes to continue full oncologic treatment. GOC and ACP as below    Review of Systems:       Review of Systems   Constitutional: Positive for fatigue and unexpected weight change. Negative for appetite change and fever. HENT: Negative. Eyes: Negative. Respiratory: Positive for shortness of breath. Negative for cough and wheezing. Cardiovascular: Negative.     Gastrointestinal: Negative for abdominal distention, constipation, diarrhea, nausea and vomiting. Endocrine: Negative. Genitourinary: Negative. Musculoskeletal: Positive for arthralgias, back pain and myalgias. Skin: Negative. Neurological: Positive for weakness. Negative for dizziness and syncope. Psychiatric/Behavioral: Negative for confusion, sleep disturbance and suicidal ideas. The patient is not nervous/anxious. Physical Examination:       BP (!) 142/58   Pulse 95   Temp 97.4 °F (36.3 °C) (Oral)   Resp 18   Ht 5' 10\" (1.778 m)   Wt 172 lb (78 kg)   SpO2 100%   BMI 24.68 kg/m²    Physical Exam  Vitals reviewed. Constitutional:       Appearance: He is ill-appearing. HENT:      Head: Normocephalic and atraumatic. Eyes:      Pupils: Pupils are equal, round, and reactive to light. Cardiovascular:      Rate and Rhythm: Normal rate. Pulmonary:      Effort: Pulmonary effort is normal.      Breath sounds: Normal breath sounds. Abdominal:      General: Bowel sounds are normal.      Palpations: Abdomen is soft. Musculoskeletal:      Cervical back: Normal range of motion. Skin:     General: Skin is warm and dry. Neurological:      Mental Status: He is alert and oriented to person, place, and time. Motor: Weakness present. Gait: Gait abnormal.   Psychiatric:         Behavior: Behavior normal.         Allergies:       Allergies   Allergen Reactions    Morphine Swelling and Rash       Medications:      Current Facility-Administered Medications   Medication Dose Route Frequency Provider Last Rate Last Admin    aspirin EC tablet 81 mg  81 mg Oral Daily Ana Chicas MD        atorvastatin (LIPITOR) tablet 40 mg  40 mg Oral Daily Ana Chicas MD        clopidogrel (PLAVIX) tablet 75 mg  75 mg Oral Daily Ana Chicas MD        traMADol Lance Contreras) tablet 50 mg  50 mg Oral Q6H PRN Ana Chicas MD        sodium chloride flush 0.9 % injection 5-40 mL  5-40 mL IntraVENous 2 times per day Ana Chicas MD  sodium chloride flush 0.9 % injection 5-40 mL  5-40 mL IntraVENous PRN Rosenda Boston MD        0.9 % sodium chloride infusion  25 mL IntraVENous PRN Rosenda Boston MD        enoxaparin (LOVENOX) injection 40 mg  40 mg SubCUTAneous Daily Rosenda Boston MD        ondansetron (ZOFRAN-ODT) disintegrating tablet 4 mg  4 mg Oral Q8H PRN Rosenda Boston MD        Or    ondansetron [facility]) injection 4 mg  4 mg IntraVENous Q6H PRN Rosenda Boston MD        polyethylene glycol (GLYCOLAX) packet 17 g  17 g Oral Daily PRN Rosenda Boston MD        acetaminophen (TYLENOL) tablet 650 mg  650 mg Oral Q6H PRN Rosenda Boston MD        Or    acetaminophen (TYLENOL) suppository 650 mg  650 mg Rectal Q6H PRN Rosenda Boston MD        HYDROmorphone (DILAUDID) injection 0.5 mg  0.5 mg IntraVENous Q4H PRN Rosenda Boston MD   0.5 mg at 05/27/22 0212       History:       PMHx:  Past Medical History:   Diagnosis Date    CAD (coronary artery disease)     Family history of colon cancer     Hyperlipidemia     Hypertension     Old MI (myocardial infarction) 02/03/2006    UTE on CPAP     PAD (peripheral artery disease) (HCC)     Prostate cancer (HCC)        PSHx:  Past Surgical History:   Procedure Laterality Date    COLONOSCOPY N/A 08/19/2019    COLORECTAL CANCER SCREENING, NOT HIGH RISK performed by Marva Garza MD at [address] [place] [address]      X3    THORACENTESIS Right 03/07/2022    870 ml removed by Dr. Dixie Miller - diagnostics sent    THORACENTESIS Left 03/08/2022    285 ml removed by Dr Dixie Miller - cytology only sent       Social Hx:  Social History     Socioeconomic History    Marital status: Single     Spouse name: None    Number of children: 2    Years of education: None    Highest education level: Associate degree: occupational, technical, or vocational program   Occupational History    Occupation:     Tobacco Use    Smoking status: Former Smoker     Packs/day: 2.00 Years: 25.00     Pack years: 50.00     Types: Cigars     Quit date: 1995     Years since quittin.4    Smokeless tobacco: Never Used    Tobacco comment: RARE CIGAR FROM TIME TO TIME   Vaping Use    Vaping Use: Never used   Substance and Sexual Activity    Alcohol use: Yes     Alcohol/week: 4.0 standard drinks     Types: 2 Cans of beer, 2 Shots of liquor per week     Comment: Social    Drug use: Not Currently     Types: Marijuana Heribertorios Colon)    Sexual activity: Not Currently   Other Topics Concern    None   Social History Narrative    Single floor home    4 steps to get into the home    Full basement does not go downstairs    Lives with girlfriend and her son    He has two children one in [place] and one in [place]    One Dog     Working smoke detectors and CO2     Social Determinants of Health     Financial Resource Strain:     Difficulty of Paying Living Expenses: Not on file   Food Insecurity:     Worried About [address] in the Last Year: Not on file    [address] in the Last Year: Not on file   Transportation Needs:     Lack of Transportation (Medical): Not on file    Lack of Transportation (Non-Medical):  Not on file   Physical Activity:     Days of Exercise per Week: Not on file    Minutes of Exercise per Session: Not on file   Stress:     Feeling of Stress : Not on file   Social Connections:     Frequency of Communication with Friends and Family: Not on file    Frequency of Social Gatherings with Friends and Family: Not on file    Attends Restorationist Services: Not on file    Active Member of Clubs or Organizations: Not on file    Attends Club or Organization Meetings: Not on file    Marital Status: Not on file   Intimate Partner Violence:     Fear of Current or Ex-Partner: Not on file    Emotionally Abused: Not on file    Physically Abused: Not on file    Sexually Abused: Not on file   Housing Stability:     Unable to Pay for Housing in the Last Year: Not on file    Number of Places Lived in the Last Year: Not on file    Unstable Housing in the Last Year: Not on file       Family Hx:  Family History   Problem Relation Age of Onset    Cancer Mother 76        BONE    Cancer Father 64        COLON    Colon Cancer Father     Cancer Brother 43        THYROID    Diabetes Maternal Grandmother        LABS: Reviewed     CBC:  Lab Results   Component Value Date    WBC 5.2 05/27/2022    RBC 2.42 05/27/2022    HGB 7.2 05/27/2022    HCT 21.9 05/27/2022    MCV 90.5 05/27/2022    MCH 29.9 05/27/2022    MCHC 33.1 05/27/2022    RDW 20.8 05/27/2022     05/27/2022     CBC with Differential:   Lab Results   Component Value Date    WBC 5.2 05/27/2022    RBC 2.42 05/27/2022    HGB 7.2 05/27/2022    HCT 21.9 05/27/2022     05/27/2022    MCV 90.5 05/27/2022    MCH 29.9 05/27/2022    MCHC 33.1 05/27/2022    RDW 20.8 05/27/2022    NRBC 2 05/26/2022    BANDSPCT 4 05/26/2022    LYMPHOPCT 26.1 05/27/2022    MONOPCT 5.4 05/27/2022    BASOPCT 0.5 05/27/2022    MONOSABS 0.3 05/27/2022    LYMPHSABS 1.4 05/27/2022    EOSABS 0.0 05/27/2022    BASOSABS 0.0 05/27/2022     CMP:    Lab Results   Component Value Date     05/27/2022    K 4.1 05/27/2022     05/27/2022    CO2 19 05/27/2022    BUN 15 05/27/2022    CREATININE 0.86 05/27/2022    GFRAA >60.0 05/27/2022    LABGLOM >60.0 05/27/2022    GLUCOSE 97 05/27/2022    PROT 5.6 05/27/2022    LABALBU 3.0 05/27/2022    CALCIUM 8.5 05/27/2022    BILITOT 0.5 05/27/2022    ALKPHOS 233 05/27/2022    AST 10 05/27/2022    ALT <5 05/27/2022     BMP:    Lab Results   Component Value Date     05/27/2022    K 4.1 05/27/2022     05/27/2022    CO2 19 05/27/2022    BUN 15 05/27/2022    LABALBU 3.0 05/27/2022    CREATININE 0.86 05/27/2022    CALCIUM 8.5 05/27/2022    GFRAA >60.0 05/27/2022    LABGLOM >60.0 05/27/2022    GLUCOSE 97 05/27/2022     TSH:   Lab Results   Component Value Date    TSH 1.700 05/18/2021     Urinalysis:   Lab Results Component Value Date    NITRU Negative 05/26/2022    WBCUA 0-2 05/26/2022    BACTERIA Negative 05/26/2022    RBCUA 0-2 05/26/2022    BLOODU Negative 05/26/2022    SPECGRAV 1.022 05/26/2022    GLUCOSEU Negative 05/26/2022     Albumin:   Lab Results   Component Value Date    LABALBU 3.0 (L) 05/27/2022     Weight Trends  Wt Readings from Last 10 Encounters:   05/26/22 172 lb (78 kg)   05/16/22 177 lb 9.6 oz (80.6 kg)   04/25/22 180 lb (81.6 kg)   04/11/22 183 lb (83 kg)   03/24/22 192 lb (87.1 kg)   03/16/22 187 lb 6.4 oz (85 kg)   03/05/22 195 lb (88.5 kg)   03/03/22 190 lb (86.2 kg)   12/30/21 199 lb (90.3 kg)   12/06/21 200 lb 9.6 oz (91 kg)          FUNCTIONAL ADL´S:     Independent: [ x ] Eating, [x   ] Dressing, [   ] Transferring, [   ] Toileting, [  x ] Bathing, [  x ] Continence  Dependent   : [  ] Eating, [   ] Dressing, [   ] Transferring, [   ] Dontrell Hinders, [   ] Bathing, [   ] Continence  W. assistant : [  ] Eating, [   ] Dressing, [  x ] Transferring, [  x ] Toileting, [   ] Bathing, [   ] Continence    Radiology: Reviewed      XR CHEST PORTABLE    Result Date: 5/27/2022  EXAMINATION: XR CHEST PORTABLE CLINICAL HISTORY: FATIGUE COMPARISONS: MARCH 26, 2022 FINDINGS: Osseous structures are intact. Cardiopericardial silhouette is normal. Pulmonary vasculature is normal. Ill-defined area increased opacity right lower lung. Blunting left costophrenic angle with increased opacity left lung base. BILATERAL LOWER LUNG ATELECTASIS/PNEUMONIA WITH LEFT PLEURAL EFFUSION         Assessment and plan:  1. Encounter for Palliative care  - Patient having pain crisis at time of eval. Will DC ultram as ineffective and start on norco 7.5mg PO Q6hrs PRN given dilaudid use. Also will remain having PRN dilaudid for breakthrough pain. Has referalll for palliative care outpatient.  Per Dr Lydia Orozco request 3 day script sent to pharmacy for CO Everywhere. If needs any further adjustments over the weekend please reach out to  Perez via cell. Patient is hospice appropriate though chooses to have full aggressive oncologic treatment. If opts to not have treatment is canidate for hospice       -Advance Care Planning (Greater that 17 Minutes)  Discussed goals of care with patient. Explained in extensive detail nuances between full code, DNR CCA and DNR CC. Patient has made the decision to be Full Code      -Goals of Care Discussion:  Disease process and goals of treatment were discussed in basic terms. Geo's goal is to optimize available comfort care measures. We discussed the palliative care philosophy in light of those goals. We discussed all care options contingent on treatment response and QOL. Much active listening, presence, and emotional support were given.      -Patient is currently being treated for multiple medical conditions by other providers      Electronically signed by JACQUELYN Lizarraga CNP on 5/27/2022 at 9:22 AM

## 2022-05-27 NOTE — ED NOTES
Pt on call light for pain. HOB elevated, another pillow placed behind back.       Mikhail Allen RN  05/26/22 4801

## 2022-05-27 NOTE — H&P
Hospitalist Group   History and Physical      CHIEF COMPLAINT: Dizziness    History of Present Illness:  73 y.o. male with a history of CAD status post stents recently, hypertension, hyperlipidemia, PAD, bone malignancy presents with dizziness. Patient has been having dizziness for the past week that has been progressively worsening. Patient was smoking marijuana when his dizziness worsened and he had to lower himself down to the floor before passing out. He denied passing out completely that he was about to. He describes his dizziness as lightheadedness and not spinning sensation. He denied any associated symptoms. Patient is on fentanyl and Ultram for his bone cancer pain control. He also is on blood pressure medication but his blood pressure has been running low. Therefore, he decided to stop it. He has not been eating or drinking much fluids. REVIEW OF SYSTEMS:  no fevers, chills, cp, sob, n/v, ha, vision/hearing changes, wt changes, hot/cold flashes, other open skin lesions, diarrhea, constipation, dysuria/hematuria unless noted in HPI. Complete ROS performed with the patient and is otherwise negative.       PMH:  Past Medical History:   Diagnosis Date    CAD (coronary artery disease)     Family history of colon cancer     Hyperlipidemia     Hypertension     Old MI (myocardial infarction) 02/03/2006    UTE on CPAP     PAD (peripheral artery disease) (HCC)     Prostate cancer [facility])        Surgical History:  Past Surgical History:   Procedure Laterality Date    COLONOSCOPY N/A 08/19/2019    COLORECTAL CANCER SCREENING, NOT HIGH RISK performed by Benitez Sen MD at [address] [place] [address]      X3    THORACENTESIS Right 03/07/2022    870 ml removed by Dr. Clemente Caputo - diagnostics sent    THORACENTESIS Left 03/08/2022    285 ml removed by Dr Clemente Caputo - cytology only sent       Medications Prior to Admission:    Prior to Admission medications    Medication Sig Start Date End Date Taking? Authorizing Provider   traMADol (ULTRAM) 50 MG tablet Take 50 mg by mouth every 6 hours as needed for Pain. Yes Historical Provider, MD   clopidogrel (PLAVIX) 75 MG tablet TAKE 1 TABLET BY MOUTH  DAILY 5/5/22   Lul Stubbs APRN - CNP   Handicap Placard MISC by Does not apply route Exp 5 years 4/21/22   Kaylie Ramirez MD   aspirin 81 MG EC tablet Take 81 mg by mouth daily    Historical Provider, MD   tiZANidine (ZANAFLEX) 2 MG tablet Take 1 tablet by mouth nightly as needed (muscle spasms)  Patient not taking: Reported on 5/27/2022 12/6/21   Kaylie Ramirez MD   atorvastatin (LIPITOR) 40 MG tablet Take 1 tablet by mouth daily 9/27/21   Adan CARRASCO Holiday, DO   lisinopril (PRINIVIL;ZESTRIL) 20 MG tablet TAKE 1 TABLET BY MOUTH  DAILY  Patient not taking: Reported on 5/27/2022 8/24/21   Kaylie Ramirez MD   Talazoparib Tosylate 0.25 MG CAPS Take 1 capsule by mouth daily Take 2 tablets daily    Historical Provider, MD   Cyanocobalamin (VITAMIN B-12 CR PO) Take 1 tablet by mouth daily     Historical Provider, MD   tamsulosin (FLOMAX) 0.4 MG capsule Take 0.4 mg by mouth daily  7/6/19   Historical Provider, MD   CPAP Machine MISC by Does not apply route Use nightly as directed. Pressure setting 5 cm H2O. Patient taking differently: 1 Device by Does not apply route Use nightly as directed. Pressure setting 5 cm H2O. 1/9/18   Oliverio Whitaker MD   Ascorbic Acid (VITAMIN C PO) Take 500 mg by mouth daily     Historical Provider, MD       Allergies:    Morphine    Social History:    reports that he quit smoking about 27 years ago. His smoking use included cigars. He has a 50.00 pack-year smoking history. He has never used smokeless tobacco. He reports current alcohol use of about 4.0 standard drinks of alcohol per week. He reports previous drug use. Drug: Marijuana Marianna Ing).     Family History:       Problem Relation Age of Onset    Cancer Mother 76        BONE    Cancer Father 64        COLON    Colon Cancer Father     Cancer Brother 43        THYROID    Diabetes Maternal Grandmother        PHYSICAL EXAM:  Vitals:  BP (!) 142/58   Pulse 95   Temp 97.4 °F (36.3 °C) (Oral)   Resp 16   Ht 5' 10\" (1.778 m)   Wt 172 lb (78 kg)   SpO2 100%   BMI 24.68 kg/m²   General Appearance: alert and oriented to person, place and time, well developed and well- nourished, in no acute distress  Skin: warm and dry, no rash or erythema  Head: normocephalic and atraumatic  Eyes: pupils equal, round, and reactive to light, extraocular eye movements intact, conjunctivae normal  ENT: tympanic membrane, external ear and ear canal normal bilaterally, nose without deformity, nasal mucosa and turbinates normal without polyps  Neck: supple and non-tender without mass, no thyromegaly or thyroid nodules, no cervical lymphadenopathy  Pulmonary/Chest: clear to auscultation bilaterally- no wheezes   Cardiovascular: normal rate, regular rhythm, normal S1 and S2, no murmurs   Abdomen: soft, non-tender, non-distended, normal bowel sounds, no masses or organomegaly  Extremities: no cyanosis, clubbing    Musculoskeletal: normal range of motion, no joint swelling, deformity or tenderness to palpation over his shoulders and back  Neurologic: reflexes normal and symmetric, no cranial nerve deficit        LABS:  Recent Labs     05/26/22 2000      K 4.0      CO2 21   BUN 16   CREATININE 0.88   GLUCOSE 114*   CALCIUM 8.8       Recent Labs     05/26/22 2000   WBC 6.6   RBC 2.72*   HGB 8.0*   HCT 25.0*   MCV 91.7   MCH 29.4   MCHC 32.1*   RDW 20.8*          No results for input(s): POCGLU in the last 72 hours.     CBC with Differential:    Lab Results   Component Value Date    WBC 6.6 05/26/2022    RBC 2.72 05/26/2022    HGB 8.0 05/26/2022    HCT 25.0 05/26/2022     05/26/2022    MCV 91.7 05/26/2022    MCH 29.4 05/26/2022    MCHC 32.1 05/26/2022    RDW 20.8 05/26/2022    NRBC 2 05/26/2022    BANDSPCT 4 05/26/2022 LYMPHOPCT 23.0 05/26/2022    MONOPCT 2.8 05/26/2022    BASOPCT 0.2 05/26/2022    MONOSABS 0.2 05/26/2022    LYMPHSABS 1.5 05/26/2022    EOSABS 0.0 05/26/2022    BASOSABS 0.0 05/26/2022     CMP:    Lab Results   Component Value Date     05/26/2022    K 4.0 05/26/2022    K 3.9 03/07/2022     05/26/2022    CO2 21 05/26/2022    BUN 16 05/26/2022    CREATININE 0.88 05/26/2022    GFRAA >60.0 05/26/2022    LABGLOM >60.0 05/26/2022    GLUCOSE 114 05/26/2022    PROT 6.2 05/26/2022    LABALBU 3.5 05/26/2022    CALCIUM 8.8 05/26/2022    BILITOT 0.6 05/26/2022    ALKPHOS 251 05/26/2022    AST 13 05/26/2022    ALT <5 05/26/2022       Radiology: No results found. EKG: Normal sinus rhythm    ASSESSMENT/ PLAN[de-identified]      Principal Problem:    Anemia  Resolved Problems:    * No resolved hospital problems. *      1. Near syncope   Dizziness for a week and almost passed out today   Likely multifactorial   He was found to be hypotensive on presentation but improved on fluids   He is currently off his blood pressure medication    Patient was using marijuana before his near syncopal episode   Sedative and opioids can also contribute to his dizziness   Also found to have anemia which is likely the the main factor   Monitor under telemetry    Check orthostatic   Transfuse if needed  2. Normocytic anemia   Hemoglobin 8.0 with normal MCV   No source of bleed   Hemoglobin last year was 15 and 2 months ago it was 12   Has been gradually decreasing   Will check iron, B12, folate   Monitor H&H closely and transfuse if needed   Patient did give consent for transfusion   Hematology/oncology consulted  3. Bone cancer   Oncology consulted   Palliative consult for pain management    Code Status: Full  DVT prophylaxis: Lovenox        Electronically signed by Michael Reyes MD on 5/27/2022 at 2:00 AM      NOTE: This report was transcribed using voice recognition software.  Every effort was made to ensure accuracy; however, inadvertent computerized transcription errors may be present.

## 2022-05-27 NOTE — PROGRESS NOTES
Comprehensive Nutrition Assessment    Type and Reason for Visit:  Initial,Positive Nutrition Screen    Nutrition Recommendations/Plan:   1. Continue General diet    2. Start high calorie ONS TID     Malnutrition Assessment:  Malnutrition Status: At risk for malnutrition (Comment) (05/27/22 1394)    Context:  Chronic Illness     Findings of the 6 clinical characteristics of malnutrition:  Energy Intake:  75% or less estimated energy requirements for 1 month or longer  Weight Loss:  No significant weight loss (7% over 3 months)     Body Fat Loss:  No significant body fat loss     Muscle Mass Loss:  No significant muscle mass loss    Fluid Accumulation:  No significant fluid accumulation     Strength:  Not Performed    Nutrition Assessment:    Pt is at risk for malnutrition due to inadequate po intake. Pt reports poor appetite past several weeks with early satiety and pain. Will start a nutrition supplements and monitor for improvements in po intake    Nutrition Related Findings:    ah/o CAD, HTN, HLD, PAD, UTE, malignancy (bone cancer). labs/meds reviewed, BM pta, no edema noted. Reported poor appetite for the past several weeks due to pain and early satiety Wound Type: None       Current Nutrition Intake & Therapies:    Average Meal Intake: 26-50%  Average Supplements Intake: None Ordered  ADULT DIET; Regular  ADULT ORAL NUTRITION SUPPLEMENT; Breakfast, Lunch, Dinner; Standard High Calorie/High Protein Oral Supplement    Anthropometric Measures:  Height: 5' 10\" (177.8 cm)  Ideal Body Weight (IBW): 166 lbs (75 kg)    Admission Body Weight: 172 lb (78 kg) (stated)  Current Body Weight: 174 lb (78.9 kg) (5/27),   IBW.  Weight Source: Bed Scale  Current BMI (kg/m2): 25  Usual Body Weight: 187 lb (84.8 kg) (3/16/22-office visit, 204 lb (6/18/21-office visit))  % Weight Change (Calculated): -7                    BMI Categories: Normal Weight (BMI 22.0 to 24.9) age over 72    Estimated Daily Nutrient Needs:

## 2022-05-27 NOTE — PLAN OF CARE
Nutrition Problem #1: Inadequate oral intake  Intervention: Food and/or Nutrient Delivery: Continue Current Diet,Start Oral Nutrition Supplement (Continue General diet  Start high calorie ONS TID)

## 2022-05-28 ENCOUNTER — APPOINTMENT (OUTPATIENT)
Dept: MRI IMAGING | Age: 72
DRG: 315 | End: 2022-05-28
Payer: MEDICAID

## 2022-05-28 LAB
ALBUMIN SERPL-MCNC: 3.8 G/DL (ref 3.5–4.6)
ALP BLD-CCNC: 321 U/L (ref 35–104)
ALT SERPL-CCNC: <5 U/L (ref 0–41)
ANION GAP SERPL CALCULATED.3IONS-SCNC: 19 MEQ/L (ref 9–15)
APTT: 35.4 SEC (ref 24.4–36.8)
AST SERPL-CCNC: 25 U/L (ref 0–40)
BILIRUB SERPL-MCNC: 1.1 MG/DL (ref 0.2–0.7)
BLOOD BANK DISPENSE STATUS: NORMAL
BLOOD BANK PRODUCT CODE: NORMAL
BPU ID: NORMAL
BUN BLDV-MCNC: 17 MG/DL (ref 8–23)
CALCIUM SERPL-MCNC: 9.3 MG/DL (ref 8.5–9.9)
CHLORIDE BLD-SCNC: 102 MEQ/L (ref 95–107)
CO2: 17 MEQ/L (ref 20–31)
CREAT SERPL-MCNC: 0.92 MG/DL (ref 0.7–1.2)
DESCRIPTION BLOOD BANK: NORMAL
GFR AFRICAN AMERICAN: >60
GFR NON-AFRICAN AMERICAN: >60
GLOBULIN: 3.2 G/DL (ref 2.3–3.5)
GLUCOSE BLD-MCNC: 124 MG/DL (ref 70–99)
HCT VFR BLD CALC: 27.7 % (ref 42–52)
HEMOGLOBIN: 9.1 G/DL (ref 14–18)
INR BLD: 1.3
MCH RBC QN AUTO: 29.4 PG (ref 27–31.3)
MCHC RBC AUTO-ENTMCNC: 32.7 % (ref 33–37)
MCV RBC AUTO: 90 FL (ref 80–100)
PDW BLD-RTO: 20.4 % (ref 11.5–14.5)
PLATELET # BLD: 148 K/UL (ref 130–400)
POTASSIUM REFLEX MAGNESIUM: 4.1 MEQ/L (ref 3.4–4.9)
PROTHROMBIN TIME: 16 SEC (ref 12.3–14.9)
RBC # BLD: 3.08 M/UL (ref 4.7–6.1)
SODIUM BLD-SCNC: 138 MEQ/L (ref 135–144)
TOTAL PROTEIN: 7 G/DL (ref 6.3–8)
WBC # BLD: 6.7 K/UL (ref 4.8–10.8)

## 2022-05-28 PROCEDURE — 6360000002 HC RX W HCPCS: Performed by: INTERNAL MEDICINE

## 2022-05-28 PROCEDURE — 36430 TRANSFUSION BLD/BLD COMPNT: CPT

## 2022-05-28 PROCEDURE — A9577 INJ MULTIHANCE: HCPCS | Performed by: INTERNAL MEDICINE

## 2022-05-28 PROCEDURE — 85730 THROMBOPLASTIN TIME PARTIAL: CPT

## 2022-05-28 PROCEDURE — 80053 COMPREHEN METABOLIC PANEL: CPT

## 2022-05-28 PROCEDURE — 99222 1ST HOSP IP/OBS MODERATE 55: CPT | Performed by: NEUROLOGICAL SURGERY

## 2022-05-28 PROCEDURE — 6360000004 HC RX CONTRAST MEDICATION: Performed by: INTERNAL MEDICINE

## 2022-05-28 PROCEDURE — 72158 MRI LUMBAR SPINE W/O & W/DYE: CPT

## 2022-05-28 PROCEDURE — 97162 PT EVAL MOD COMPLEX 30 MIN: CPT

## 2022-05-28 PROCEDURE — 6370000000 HC RX 637 (ALT 250 FOR IP): Performed by: FAMILY MEDICINE

## 2022-05-28 PROCEDURE — 1210000000 HC MED SURG R&B

## 2022-05-28 PROCEDURE — 6370000000 HC RX 637 (ALT 250 FOR IP): Performed by: INTERNAL MEDICINE

## 2022-05-28 PROCEDURE — 72157 MRI CHEST SPINE W/O & W/DYE: CPT

## 2022-05-28 PROCEDURE — 85027 COMPLETE CBC AUTOMATED: CPT

## 2022-05-28 PROCEDURE — 85610 PROTHROMBIN TIME: CPT

## 2022-05-28 PROCEDURE — 2580000003 HC RX 258: Performed by: INTERNAL MEDICINE

## 2022-05-28 PROCEDURE — 36415 COLL VENOUS BLD VENIPUNCTURE: CPT

## 2022-05-28 RX ORDER — SODIUM CHLORIDE 0.9 % (FLUSH) 0.9 %
10 SYRINGE (ML) INJECTION PRN
Status: DISCONTINUED | OUTPATIENT
Start: 2022-05-28 | End: 2022-06-03 | Stop reason: HOSPADM

## 2022-05-28 RX ORDER — PHYTONADIONE 10 MG/ML
10 INJECTION, EMULSION INTRAMUSCULAR; INTRAVENOUS; SUBCUTANEOUS ONCE
Status: DISCONTINUED | OUTPATIENT
Start: 2022-05-28 | End: 2022-06-03 | Stop reason: HOSPADM

## 2022-05-28 RX ORDER — SODIUM CHLORIDE 9 MG/ML
INJECTION, SOLUTION INTRAVENOUS CONTINUOUS
Status: DISCONTINUED | OUTPATIENT
Start: 2022-05-28 | End: 2022-05-28

## 2022-05-28 RX ORDER — DEXAMETHASONE SODIUM PHOSPHATE 4 MG/ML
10 INJECTION, SOLUTION INTRA-ARTICULAR; INTRALESIONAL; INTRAMUSCULAR; INTRAVENOUS; SOFT TISSUE ONCE
Status: COMPLETED | OUTPATIENT
Start: 2022-05-28 | End: 2022-05-28

## 2022-05-28 RX ORDER — DEXAMETHASONE SODIUM PHOSPHATE 4 MG/ML
4 INJECTION, SOLUTION INTRA-ARTICULAR; INTRALESIONAL; INTRAMUSCULAR; INTRAVENOUS; SOFT TISSUE EVERY 6 HOURS
Status: DISCONTINUED | OUTPATIENT
Start: 2022-05-28 | End: 2022-06-03 | Stop reason: HOSPADM

## 2022-05-28 RX ADMIN — HYDROCODONE BITARTRATE AND ACETAMINOPHEN 1 TABLET: 7.5; 325 TABLET ORAL at 08:06

## 2022-05-28 RX ADMIN — HYDROMORPHONE HYDROCHLORIDE 0.5 MG: 1 INJECTION, SOLUTION INTRAMUSCULAR; INTRAVENOUS; SUBCUTANEOUS at 17:02

## 2022-05-28 RX ADMIN — HYDROMORPHONE HYDROCHLORIDE 0.5 MG: 1 INJECTION, SOLUTION INTRAMUSCULAR; INTRAVENOUS; SUBCUTANEOUS at 10:31

## 2022-05-28 RX ADMIN — HYDROCODONE BITARTRATE AND ACETAMINOPHEN 1 TABLET: 7.5; 325 TABLET ORAL at 13:56

## 2022-05-28 RX ADMIN — CLOPIDOGREL BISULFATE 75 MG: 75 TABLET ORAL at 08:06

## 2022-05-28 RX ADMIN — HYDROMORPHONE HYDROCHLORIDE 0.5 MG: 1 INJECTION, SOLUTION INTRAMUSCULAR; INTRAVENOUS; SUBCUTANEOUS at 21:29

## 2022-05-28 RX ADMIN — Medication 10 ML: at 21:33

## 2022-05-28 RX ADMIN — HYDROCODONE BITARTRATE AND ACETAMINOPHEN 1 TABLET: 7.5; 325 TABLET ORAL at 20:40

## 2022-05-28 RX ADMIN — DEXAMETHASONE SODIUM PHOSPHATE 4 MG: 4 INJECTION, SOLUTION INTRA-ARTICULAR; INTRALESIONAL; INTRAMUSCULAR; INTRAVENOUS; SOFT TISSUE at 22:32

## 2022-05-28 RX ADMIN — DEXAMETHASONE SODIUM PHOSPHATE 10 MG: 4 INJECTION, SOLUTION INTRAMUSCULAR; INTRAVENOUS at 16:39

## 2022-05-28 RX ADMIN — ENOXAPARIN SODIUM 40 MG: 100 INJECTION SUBCUTANEOUS at 08:06

## 2022-05-28 RX ADMIN — ASPIRIN 81 MG: 81 TABLET, COATED ORAL at 08:06

## 2022-05-28 RX ADMIN — Medication 10 ML: at 08:06

## 2022-05-28 RX ADMIN — HYDROMORPHONE HYDROCHLORIDE 0.5 MG: 1 INJECTION, SOLUTION INTRAMUSCULAR; INTRAVENOUS; SUBCUTANEOUS at 06:18

## 2022-05-28 RX ADMIN — HYDROCODONE BITARTRATE AND ACETAMINOPHEN 1 TABLET: 7.5; 325 TABLET ORAL at 01:46

## 2022-05-28 RX ADMIN — ATORVASTATIN CALCIUM 40 MG: 40 TABLET, FILM COATED ORAL at 08:06

## 2022-05-28 RX ADMIN — POLYETHYLENE GLYCOL 3350 17 G: 17 POWDER, FOR SOLUTION ORAL at 08:16

## 2022-05-28 RX ADMIN — ONDANSETRON 4 MG: 2 INJECTION INTRAMUSCULAR; INTRAVENOUS at 08:06

## 2022-05-28 RX ADMIN — GADOBENATE DIMEGLUMINE 20 ML: 529 INJECTION, SOLUTION INTRAVENOUS at 15:15

## 2022-05-28 ASSESSMENT — PAIN SCALES - GENERAL
PAINLEVEL_OUTOF10: 3
PAINLEVEL_OUTOF10: 2
PAINLEVEL_OUTOF10: 10
PAINLEVEL_OUTOF10: 7
PAINLEVEL_OUTOF10: 3
PAINLEVEL_OUTOF10: 7
PAINLEVEL_OUTOF10: 5
PAINLEVEL_OUTOF10: 7
PAINLEVEL_OUTOF10: 1

## 2022-05-28 ASSESSMENT — PAIN DESCRIPTION - LOCATION
LOCATION: BACK
LOCATION: BACK

## 2022-05-28 ASSESSMENT — PAIN - FUNCTIONAL ASSESSMENT
PAIN_FUNCTIONAL_ASSESSMENT: PREVENTS OR INTERFERES SOME ACTIVE ACTIVITIES AND ADLS
PAIN_FUNCTIONAL_ASSESSMENT: PREVENTS OR INTERFERES WITH MANY ACTIVE NOT PASSIVE ACTIVITIES

## 2022-05-28 ASSESSMENT — PAIN DESCRIPTION - DESCRIPTORS
DESCRIPTORS: THROBBING
DESCRIPTORS: THROBBING

## 2022-05-28 ASSESSMENT — PAIN DESCRIPTION - ORIENTATION: ORIENTATION: MID

## 2022-05-28 NOTE — PROGRESS NOTES
MRI findings of thoracic spine noted.   I spoke with Dr. Tata Chowdhury, he is reviewing images, and is also conferring with oncology

## 2022-05-28 NOTE — PROGRESS NOTES
Physical Therapy Med Surg Initial Assessment  Facility/Department: [address] [place] [address] UNIT  [facility]       NAME: Kaylyn Dunaway  : 1950 (73 y.o.)  MRN: [id number]  CODE STATUS: Full Code    Date of Service: 2022    Patient Diagnosis(es): Lightheadedness [R42]  Anemia [D64.9]  Bilateral pleural effusion [J90]  Anemia, unspecified type [D64.9]  Malignant neoplasm of bone, unspecified location [facility]) [C41.9]   Chief Complaint   Patient presents with    Dizziness     pt c/o dizziness, near syncope tonight     Patient Active Problem List    Diagnosis Date Noted    [facility]ox[facility]     PAD (peripheral artery disease) ([facility]     Anemia 2022    Other emphysema ([facility]     Abnormal stress test 2022    Pleural effusion 2022    Acute occlusion of artery of lower extremity ([facility] 2021    History of colon polyps     Polyp of cecum     Family history of colon cancer     S/P radiation therapy 2019    Drug-induced erectile dysfunction 2019    Primary insomnia 2018    UTE on CPAP 2018    Prediabetes 2016    Hypertension     Prostate cancer ([facility]     Hyperlipidemia     Coronary artery disease involving native coronary artery of native heart without angina pectoris 2006    Old MI (myocardial infarction) 2006        Past Medical History:   Diagnosis Date    CAD (coronary artery disease)     Family history of colon cancer     Hyperlipidemia     Hypertension     Old MI (myocardial infarction) 2006    UTE on CPAP     PAD (peripheral artery disease) (HCC)     Prostate cancer ([facility]      Past Surgical History:   Procedure Laterality Date    COLONOSCOPY N/A 2019    COLORECTAL CANCER SCREENING, NOT HIGH RISK performed by Hilda Mcarthur MD at [address] [place] [address]      X3    THORACENTESIS Right 2022    870 ml removed by Dr. Alyssa Apley - diagnostics sent    THORACENTESIS Left 03/08/2022    285 ml removed by Dr Yanira Aguilera - cytology only sent       Chart Reviewed: Yes  Family / Caregiver Present: No  General Comment  Comments: Pt resting in bed - agreeable to PT evaluation    Restrictions:  Restrictions/Precautions: Fall Risk     SUBJECTIVE:   Subjective: \"I can get up and show you. \"    Pain  Pain: Pt at 2/10 at rest (mid back pain), and 6/10 (mid back pain) with movement. Improves to 2/10 mid back pain after return to supine. Prior Level of Function:  Social/Functional History  Lives With: Significant other  Type of Home: House  Home Layout: One level  Home Access: Stairs to enter with rails  Entrance Stairs - Number of Steps: 3  Entrance Stairs - Rails: Right  Bathroom Shower/Tub: Walk-in shower  Bathroom Equipment: Grab bars in shower,Hand-held shower  Home Equipment: [employer], rolling  ADL Assistance: Independent  Homemaking Assistance:  (SO completes)  Ambulation Assistance: Independent (without AD)  Transfer Assistance: Independent    OBJECTIVE:   Vision  Vision:  (not formally assessed)  Hearing: Within functional limits    Cognition:  Overall Orientation Status: Within Functional Limits  Follows Commands: Within Functional Limits  Overall Cognitive Status: WFL    Observation/Palpation  Observation: No acute distress noted. Pt pleasant and motivated. ROM:  RLE PROM: WFL  LLE PROM: WFL    Strength:  Strength RLE  Strength RLE: WFL  Strength LLE  Strength LLE: WFL    Neuro:  Balance  Sitting - Static: Good  Sitting - Dynamic: Good  Standing - Static: Fair  Standing - Dynamic: Fair;Poor  Comments: LOB static standing EC;  Able to reach 6\" OBOS with CGA                      Tone: Normal    Sensation: Impaired (New numbness/tingling B LEs)    Bed mobility  Rolling to Right: Supervision  Supine to Sit: Supervision  Sit to Supine: Supervision  Bed Mobility Comments: Increased time to complete    Transfers  Sit to Stand: Stand by assistance;Contact guard assistance  Stand to sit: Stand by assistance  Bed to Chair: Contact guard assistance  Comment: Increased time to complete. Intermittent steadying provided for safety. Ambulation  Surface: level tile  Device: Rolling Walker  Assistance: Contact guard assistance;Minimal assistance  Quality of Gait: Steadying assist provided throughout. Pt with significant ataxic LE placement, path, and pattern. Frequently crossing LEs and lateral balance checks. Distance: 15ft X 2    Stairs/Curb  Stairs?: No (safety concerns)              Activity Tolerance  Activity Tolerance: Patient tolerated treatment well    Patient Education  Education Given To: Patient  Education Provided: Role of Therapy;Plan of Care  Education Method: Demonstration;Verbal  Education Outcome: Verbalized understanding       ASSESSMENT:   Body Structures, Functions, Activity Limitations Requiring Skilled Therapeutic Intervention: Decreased functional mobility ; Decreased ADL status; Decreased endurance;Decreased strength;Decreased sensation;Decreased balance;Decreased coordination;Decreased safe awareness;Decreased vision/visual deficit  Decision Making: Medium Complexity  History: High  Exam: Med  Clinical Presentation: Med    Therapy Prognosis: Good  Barriers to Learning: none         DISCHARGE RECOMMENDATIONS:  Discharge Recommendations: Continue to assess pending progress,Patient would benefit from continued therapy after discharge    Assessment: Continued PT indicated to progress mobility and facilitate DC at highest level of indep and safety. Concerns for pt returning home at [facility] d/t increased risk of falls in the setting of metastatic bone CA. Rec therapy stay prior to DC home.   Requires PT Follow-Up: Yes      PLAN OF CARE:  Plan  Plan: 1 time a day 3-6 times a week  Current Treatment Recommendations: Strengthening,ROM,Balance training,Functional mobility training,Transfer training,Endurance training,Gait training,Stair training,Neuromuscular re-education,Home exercise program,Safety education & training,Patient/Caregiver education & training,Equipment evaluation, education, & procurement,Positioning,Modalities    Safety Devices  Type of Devices: All fall risk precautions in place    Goals:  Long Term Goals  Long term goal 1: Pt to complete bed mobility indep  Long term goal 2: Pt to complete transfers with indep  Long term goal 3: Pt to ambulate 50-150ft with LRD and indep  Long term goal 4: Pt to complete 3 steps with HR and indep  Long term goal 5: Pt to complete TUG within 60.0HMA    [facility] (6 CLICK) BASIC MOBILITY  AM-PAC Inpatient Mobility Raw Score : 18     Therapy Time:   Individual   Time In 1308   Time Out 1327   Minutes [address] [place] [address], 05/28/22 at 2:38 PM         Definitions for assistance levels  Independent = pt does not require any physical supervision or assistance from another person for activity completion. Device may be needed.   Stand by assistance = pt requires verbal cues or instructions from another person, close to but not touching, to perform the activity  Minimal assistance= pt performs 75% or more of the activity; assistance is required to complete the activity  Moderate assistance= pt performs 50% of the activity; assistance is required to complete the activity  Maximal assistance = pt performs 25% of the activity; assistance is required to complete the activity  Dependent = pt requires total physical assistance to accomplish the task

## 2022-05-28 NOTE — CONSULTS
Hematology/Oncology Consult  Encounter Date: 2022 10:12 PM    Mr. Ofelia Ibarra is a 73 y.o. male  : 1950  MRN: [id number]  [facility]  Requesting Provider: Dr. Nancy Rivera    Reason for request: prostate CA with bone mets. Symptomatic anemia      CONSULTANT: Isabel Anglin MD    HPI:  The pt was initially diagnosed with prostate cancer in 2008; Prospect score 7  (4+3) which was tx with Radiation tx. He developed recurrence in  which was tx with androgen blockade. Mark Russo He developed further disease progression in  on bone scan for which he was entered in clinical trial  and received Xtandi and PARP inhibitor. Pain was controlled with Fentanyl patch and prn Tramadol. He has also been using medical marijuana for control of nausea. PSA increased to 500 range in 2022; Testosterone was 10. He had worsening fatigue and dizziness upon standing. No gross bleeding. Pt was brought to the ER where he was noted to have worsening anemia so he was admitted.     Patient Active Problem List   Diagnosis    Hypertension    Prostate [facility]    Hyperlipidemia    Coronary artery disease involving native coronary artery of native heart without angina pectoris    Old MI (myocardial infarction)    Prediabetes    UTE on CPAP    Primary insomnia    S/P radiation therapy    Drug-induced erectile dysfunction    Family history of colon cancer    History of colon polyps    Polyp of cecum    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)    PAD (peripheral artery disease) (HCC)    Paroxysmal atrial fibrillation (HCC)    Pleural effusion    Abnormal stress test    Other emphysema ([address]    Anemia     Past Medical History:   Diagnosis Date    CAD (coronary artery disease)     Family history of colon cancer     Hyperlipidemia     Hypertension     Old MI (myocardial infarction) 2006    UTE on CPAP     PAD (peripheral artery disease) (HCC)     Prostate cancer (HCC)      Past Surgical History:   Procedure Laterality Date    COLONOSCOPY N/A 2019    COLORECTAL CANCER SCREENING, NOT HIGH RISK performed by Rajni Cavazos MD at [address]      X3    THORACENTESIS Right 2022    870 ml removed by Dr. Darwin Cesar - diagnostics sent    THORACENTESIS Left 2022    285 ml removed by Dr Darwin Cesar - cytology only sent     Family History   Problem Relation Age of Onset    Cancer Mother 76        BONE    Cancer Father 64        COLON    Colon Cancer Father     Cancer Brother 43        THYROID    Diabetes Maternal Grandmother      Social History     Socioeconomic History    Marital status: Single     Spouse name: Not on file    Number of children: 2    Years of education: Not on file    Highest education level: Associate degree: occupational, technical, or vocational program   Occupational History    Occupation:     Tobacco Use    Smoking status: Former Smoker     Packs/day: 2.00     Years: 25.00     Pack years: 50.00     Types: Cigars     Quit date: 1995     Years since quittin.4    Smokeless tobacco: Never Used    Tobacco comment: RARE CIGAR FROM TIME TO TIME   Vaping Use    Vaping Use: Never used   Substance and Sexual Activity    Alcohol use:  Yes     Alcohol/week: 4.0 standard drinks     Types: 2 Cans of beer, 2 Shots of liquor per week     Comment: Social    Drug use: Not Currently     Types: Marijuana Veldon Breath)    Sexual activity: Not Currently   Other Topics Concern    Not on file   Social History Narrative    Single floor home    4 steps to get into the home    Full basement does not go downstairs    Lives with girlfriend and her son    He has two children one in [facility] and one in [place]    One Dog     Working smoke detectors and CO2     Social Determinants of Health     Financial Resource Strain:     Difficulty of Paying Living Expenses: Not on file   Food Insecurity:     Worried About Running Out of Food in the Last Year: Not on file    Ran Out of Food in the Last Year: Not on file   Transportation Needs:     Lack of Transportation (Medical): Not on file    Lack of Transportation (Non-Medical):  Not on file   Physical Activity:     Days of Exercise per Week: Not on file    Minutes of Exercise per Session: Not on file   Stress:     Feeling of Stress : Not on file   Social Connections:     Frequency of Communication with Friends and Family: Not on file    Frequency of Social Gatherings with Friends and Family: Not on file    Attends Buddhist Services: Not on file    Active Member of [address] [place] [address] [employer] or Organizations: Not on file    Attends Club or Organization Meetings: Not on file    Marital Status: Not on file   Intimate Partner Violence:     Fear of Current or Ex-Partner: Not on file    Emotionally Abused: Not on file    Physically Abused: Not on file    Sexually Abused: Not on file   Housing Stability:     Unable to Pay for Housing in the Last Year: Not on file    Number of [place] in the Last Year: Not on file    Unstable Housing in the Last Year: Not on file          Current Facility-Administered Medications   Medication Dose Route Frequency Provider Last Rate Last Admin    aspirin EC tablet 81 mg  81 mg Oral Daily Vibha Owens MD   81 mg at 05/27/22 0957    atorvastatin (LIPITOR) tablet 40 mg  40 mg Oral Daily Vibha Owens MD   40 mg at 05/27/22 0957    clopidogrel (PLAVIX) tablet 75 mg  75 mg Oral Daily Vibha Owens MD   75 mg at 05/27/22 0957    sodium chloride flush 0.9 % injection 5-40 mL  5-40 mL IntraVENous 2 times per day Vibha Owens MD   10 mL at 05/27/22 2125    sodium chloride flush 0.9 % injection 5-40 mL  5-40 mL IntraVENous PRN Vibha Owens MD        0.9 % sodium chloride infusion  25 mL IntraVENous PRN Vibha Owens MD        enoxaparin (LOVENOX) injection 40 mg  40 mg SubCUTAneous Daily Vibha Owens MD   40 mg at 05/27/22 0958    ondansetron (ZOFRAN-ODT) disintegrating tablet 4 mg  4 mg Oral Q8H PRN Beronica Chavez MD        Or    [facility]dansetron [facility] injection 4 mg  4 mg IntraVENous Q6H PRN Beronica Chavez MD        polyethylene glycol (GLYCOLAX) packet 17 g  17 g Oral Daily PRN Beronica Chavez MD        acetaminophen (TYLENOL) tablet 650 mg  650 mg Oral Q6H PRN Beronica Chavez MD        Or    acetaminophen (TYLENOL) suppository 650 mg  650 mg Rectal Q6H PRN Beronica Chavez MD        HYDROmorphone (DILAUDID) injection 0.5 mg  0.5 mg IntraVENous Q4H PRN Beronica Chavez MD   0.5 mg at 05/27/22 2013    HYDROcodone-acetaminophen (NORCO) 7.5-325 MG per tablet 1 tablet  1 tablet Oral Q6H PRN Phyllistine JACQUELYN Hernandez - CNP   1 tablet at 05/27/22 1827     Outpatient Medications Marked as Taking for the 5/26/22 encounter [facility] Encounter)   Medication Sig Dispense Refill    traMADol (ULTRAM) 50 MG tablet Take 50 mg by mouth every 6 hours as needed for Pain.  [START ON 5/28/2022] HYDROcodone-acetaminophen (NORCO) 7.5-325 MG per tablet Take 1 tablet by mouth every 6 hours as needed for Pain for up to 3 days. 12 tablet 0     Allergies   Allergen Reactions    Morphine Swelling and Rash         ROS:  Unremarkable except for symptoms mentioned in HPI. PHYSICAL EXAMINATION:   VITAL SIGNS: /60   Pulse 74   Temp 97.9 °F (36.6 °C) (Oral)   Resp 18   Ht 5' 10\" (1.778 m)   Wt 174 lb (78.9 kg)   SpO2 99%   BMI 24.97 kg/m²         GENERAL: In no acute distress, well- nourished, well- developed, alert and oriented to person place and time. SKIN: Warm and dry, without jaundice, ecchymoses, or petechiae. HEENT: Normocephalic, sclera anicteric, oral mucosa moist without lesion or exudate in the visible oral cavity or oropharynx, Noepistaxis  NODES: No palpable adenopathy in the neck Levels I-V, bilateral supraclavicular fossae, axillary regions. LUNGS: Good inspiratory effort, no accessory muscle use, clear bilaterally, no focal wheeze, rales or rhonchi. CARDIAC: Normal HS; regular rhythm; Regular rate and rhythm,  ABDOMINAL:  soft, non-tender, no mass or organomegaly. MUSKL: + mild tenderness over mid-thoracic spine; no tenderness over ribs  EXTREMITIES:No pedal edema or calf tenderness  NEUROLOGIC: Gait not tested . No grossly apparent focal deficits. PSYCH: cooperative;  Pt has appropriate behavior and mood    LAB RESULTS:  Recent Results (from the past 24 hour(s))   Comprehensive Metabolic Panel w/ Reflex to MG    Collection Time: 05/27/22  6:58 AM   Result Value Ref Range    Sodium 138 135 - 144 mEq/L    Potassium reflex Magnesium 4.1 3.4 - 4.9 mEq/L    Chloride 106 95 - 107 mEq/L    CO2 19 (L) 20 - 31 mEq/L    Anion Gap 13 9 - 15 mEq/L    Glucose 97 70 - 99 mg/dL    BUN 15 8 - 23 mg/dL    CREATININE 0.86 0.70 - 1.20 mg/dL    GFR Non-African American >60.0 >60    GFR  >60.0 >60    Calcium 8.5 8.5 - 9.9 mg/dL    Total Protein 5.6 (L) 6.3 - 8.0 g/dL    Albumin 3.0 (L) 3.5 - 4.6 g/dL    Total Bilirubin 0.5 0.2 - 0.7 mg/dL    Alkaline Phosphatase 233 (H) 35 - 104 U/L    ALT <5 0 - 41 U/L    AST 10 0 - 40 U/L    Globulin 2.6 2.3 - 3.5 g/dL   CBC auto differential    Collection Time: 05/27/22  6:58 AM   Result Value Ref Range    WBC 5.2 4.8 - 10.8 K/uL    RBC 2.42 (L) 4.70 - 6.10 M/uL    Hemoglobin 7.2 (L) 14.0 - 18.0 g/dL    Hematocrit 21.9 (L) 42.0 - 52.0 %    MCV 90.5 80.0 - 100.0 fL    MCH 29.9 27.0 - 31.3 pg    MCHC 33.1 33.0 - 37.0 %    RDW 20.8 (H) 11.5 - 14.5 %    Platelets 648 (L) 365 - 400 K/uL    Neutrophils % 67.1 %    Lymphocytes % 26.1 %    Monocytes % 5.4 %    Eosinophils % 0.9 %    Basophils % 0.5 %    Neutrophils Absolute 3.5 1.4 - 6.5 K/uL    Lymphocytes Absolute 1.4 1.0 - 4.8 K/uL    Monocytes Absolute 0.3 0.2 - 0.8 K/uL    Eosinophils Absolute 0.0 0.0 - 0.7 K/uL    Basophils Absolute 0.0 0.0 - 0.2 K/uL   Iron and TIBC    Collection Time: 05/27/22  6:58 AM   Result Value Ref Range    Iron 108 59 - 158 ug/dL    TIBC 229 (L) 250 - 450 ug/dL    Iron Saturation 47 20 - 55 %    UIBC 121 112 - 347 ug/dL   Vitamin B12 & Folate    Collection Time: 05/27/22  6:58 AM   Result Value Ref Range    Vitamin B-12 >2000 (H) 232 - 1245 pg/mL    Folate 5.7 >4.8 ng/mL     Recent Labs     05/26/22 2000 05/26/22 2000 05/27/22  0658   COLORU Yellow  --   --    PHUR 5.0  --   --    WBCUA 0-2  --   --    RBCUA 0-2  --   --    BACTERIA Negative  --   --    CLARITYU Clear  --   --    SPECGRAV 1.022  --   --    LEUKOCYTESUR TRACE*  --   --    UROBILINOGEN 0.2  --   --    BILIRUBINUR Negative  --   --    BLOODU Negative  --   --    GLUCOSE 114*   < > 97    < > = values in this interval not displayed. RADIOLOGY RESULTS:  XR CHEST PORTABLE    Result Date: 5/27/2022  EXAMINATION: XR CHEST PORTABLE CLINICAL HISTORY: FATIGUE COMPARISONS: MARCH 26, 2022 FINDINGS: Osseous structures are intact. Cardiopericardial silhouette is normal. Pulmonary vasculature is normal. Ill-defined area increased opacity right lower lung. Blunting left costophrenic angle with increased opacity left lung base. BILATERAL LOWER LUNG ATELECTASIS/PNEUMONIA WITH LEFT PLEURAL EFFUSION      ASSESSMENT AND PLAN  1. The pt has metastatic prostate CA with recent progression of his malignancy with worsening anemia and bone mets. The pt will be transfused with 1 unit RBC transfusion tonight. Consider transfer to [address] [place] [address]  for rehab. Refer to Radiation Oncologist for RT to symptomatic thoracic spine met. Pt will also be considered for IV Zometa to try to decrease skeletal -related events (bone pain and possible pathologic fracture)  Pain meds will be managed by pt's hospitalist during this admission. Pt will be given  IV chemotx with Taxotere as an out -pt to be started after discharge from [facility].     Thank you, Dr. Alba Montanez , for this consultatioin      Electronically signed by Yvette Vega MD on 5/27/2022 at 10:12 PM

## 2022-05-28 NOTE — PROGRESS NOTES
Patient assessed and charted on by this RN. VSS. A&Ox4. Pain a 3 out of 10 on movement. Given Norco as ordered for pain. Patient stated has not had a bowel movement in about a week. Given Glycolax and Zofran for nausea. Fall precautions are in place. Call light within reach . Will continue to monitor. 0 - Dr. Aakash Knight notified of MRI and asked for radiology number. [address] [place] [address] Neurosurgery consulted. Dr. Almonte Puls aware. Patient now on bedrest and NPO.

## 2022-05-28 NOTE — PROGRESS NOTES
Hospitalist Progress Note      PCP: Nighat Skelton MD    Date of Admission: 5/26/2022    Subjective:  MRI completed. NSGY consulted. On bedrest and made NPO. Reports pain is well controlled. On bedrest. Denies CP, SOB, N/V/D. Medications:  Reviewed    Infusion Medications    sodium chloride      sodium chloride       Scheduled Medications    dexamethasone  4 mg IntraVENous Q6H    aspirin  81 mg Oral Daily    atorvastatin  40 mg Oral Daily    clopidogrel  75 mg Oral Daily    sodium chloride flush  5-40 mL IntraVENous 2 times per day    [Held by provider] enoxaparin  40 mg SubCUTAneous Daily    acetaminophen  650 mg Oral See Admin Instructions    diphenhydrAMINE  25 mg Oral See Admin Instructions     PRN Meds: sodium chloride flush, HYDROmorphone, sodium chloride flush, sodium chloride, ondansetron **OR** ondansetron, polyethylene glycol, acetaminophen **OR** acetaminophen, HYDROcodone-acetaminophen, sodium chloride      Intake/Output Summary (Last 24 hours) at 5/28/2022 1652  Last data filed at 5/28/2022 1545  Gross per 24 hour   Intake 480 ml   Output 675 ml   Net -195 ml       Exam:    BP (!) 122/58   Pulse 77   Temp 97.5 °F (36.4 °C) (Oral)   Resp 18   Ht 5' 10\" (1.778 m)   Wt 174 lb (78.9 kg)   SpO2 97%   BMI 24.97 kg/m²     General appearance: No apparent distress, appears stated age and cooperative. HEENT: Pupils equal, round, and reactive to light. Conjunctivae/corneas clear. Neck: Supple, with full range of motion. No jugular venous distention. Trachea midline. Respiratory:  Normal respiratory effort. Clear to auscultation, bilaterally without Rales/Wheezes/Rhonchi. Cardiovascular: Regular rate and rhythm with normal S1/S2 without murmurs, rubs or gallops. Abdomen: Soft, non-tender, non-distended with normal bowel sounds. Musculoskeletal: No clubbing, cyanosis or edema bilaterally. Full range of motion without deformity.   Skin: Skin color, texture, turgor normal.  No rashes or lesions. Neuro: Non Focal. Symetrical motor and tone. Nl Comprehension, Alert,awake and oriented. NL CN. Symetrical tone and reflexes. Psychiatric: Alert and oriented, thought content appropriate, normal insight  Capillary Refill: Brisk,< 3 seconds   Peripheral Pulses: +2 palpable, equal bilaterally       Labs:   Recent Labs     05/26/22 2000 05/27/22  0658 05/28/22  1042   WBC 6.6 5.2 6.7   HGB 8.0* 7.2* 9.1*   HCT 25.0* 21.9* 27.7*    126* 148     Recent Labs     05/26/22 2000 05/27/22  0658 05/28/22  0552    138 138   K 4.0 4.1 4.1    106 102   CO2 21 19* 17*   BUN 16 15 17   CREATININE 0.88 0.86 0.92   CALCIUM 8.8 8.5 9.3     Recent Labs     05/26/22 2000 05/27/22  0658 05/28/22  0552   AST 13 10 25   ALT <5 <5 <5   BILITOT 0.6 0.5 1.1*   ALKPHOS 251* 233* 321*     No results for input(s): INR in the last 72 hours. Recent Labs     05/26/22 2000   TROPONINI <0.010       Urinalysis:      Lab Results   Component Value Date    NITRU Negative 05/26/2022    WBCUA 0-2 05/26/2022    BACTERIA Negative 05/26/2022    RBCUA 0-2 05/26/2022    BLOODU Negative 05/26/2022    SPECGRAV 1.022 05/26/2022    GLUCOSEU Negative 05/26/2022       Radiology:  MRI THORACIC SPINE W WO CONTRAST   Final Result      Thoracic spine: 1. Extensive osseous metastatic disease with soft tissue component at left greater than right T4-T5 with infiltration of the spinal canal and cord compression at this level. 2.No abnormal cord signal, parenchymal or leptomeningeal enhancement. Lumbar spine: 1. Extensive osseous metastatic disease. 2.No abnormal cord signal, parenchymal edema and enhancement. 3.Multilevel degenerative change without high-grade canal stenosis or neural foraminal narrowing. COMMUNICATION:  Communicated with: Byron Alonzo RN on 5/28/2022 at 3:48 PM.        MRI Lumbar Spine W WO Contrast   Final Result      Thoracic spine: 1. Extensive osseous metastatic disease with soft tissue component at left greater than right T4-T5 with infiltration of the spinal canal and cord compression at this level. 2.No abnormal cord signal, parenchymal or leptomeningeal enhancement. Lumbar spine: 1. Extensive osseous metastatic disease. 2.No abnormal cord signal, parenchymal edema and enhancement. 3.Multilevel degenerative change without high-grade canal stenosis or neural foraminal narrowing. COMMUNICATION:  Communicated with: Jocelyn Browne RN on 5/28/2022 at 3:48 PM.        XR CHEST PORTABLE   Final Result   BILATERAL LOWER LUNG ATELECTASIS/PNEUMONIA WITH LEFT PLEURAL EFFUSION        Assessment/Plan:    Active Hospital Problems    Diagnosis Date Noted    Anemia [D64.9] 05/27/2022     Priority: Medium     Metastatic prostate CA: Management per hem/onC. Has progression with worsening anemia and bone mets. MRI completed. NSGY consulted given findings (spinal cord compression T4-5). ASA, plavix and enoxaparin placed on hold pending surgical plan. Continue dexamethasone. Seen by palliative care. Update: per NSGY patient not candidate for surgery at this time and can resume diet and anticoagulation. Near syncope: No further episodes. BP stable. Orthostatics negative. Normocytic anemia: seen by hem/onc. S/p 1 unit PRBC. H/H improved. Additional work up or/and treatment plan may be added today or then after based on clinical progression. I am managing a portion of pt care. Some medical issues are handled by other specialists. Additional work up and treatment should be done in out pt setting by pt PCP and other out pt providers. In addition to examining and evaluating pt, I spent additional time explaining care, normal and abnormal findings, and treatment plan. All of pt questions were answered. Counseling, diet and education were  provided. Case will be discussed with nursing staff when appropriate. Family will be updated if and when appropriate.       Diet: Diet NPO    Code Status: Full Code    PT/OT Eval     Electronically signed by JACQUELYN Elizabeth NP on 5/28/2022 at 4:52 PM

## 2022-05-28 NOTE — CONSULTS
Patient Name: Nikkie Lezama  Admit Date: 2022  7:27 PM  MR #: [id number]  : 1950    Attending Physician: Sinai Singh MD  Reason for consult:     History of Presenting Illness and Review of Systems     Nikkie Lezama is a 73 y.o. male on hospital day 1 . History Of Present Illness: Admitted for progressive fatigue dizziness lightheadedness. History metastatic prostate cancer initially diagnosed 2008 with radiation treatment.  recurrence treated with androgen blockade.  further disease progression entered into the clinical trial.  Pain controlled with fentanyl patches tramadol some medical marijuana for nausea. PSA is high at 500 May 2022 testosterone is 10. Increasing fatigue dizziness worsening anemia hemoglobin 8.0 received transfusions. Recent cardiac stents on chronic anticoagulation aspirin and Plavix. Remains with bladder bowel control good strength sensation upper and lower extremities. On examination patient had tenderness in the back leading to MRI studies of the spine.       History:      Past Medical History:   Diagnosis Date    CAD (coronary artery disease)     Family history of colon cancer     Hyperlipidemia     Hypertension     Old MI (myocardial infarction) 2006    UTE on CPAP     PAD (peripheral artery disease) (HCC)     Prostate cancer [facility])      Past Surgical History:   Procedure Laterality Date    COLONOSCOPY N/A 2019    COLORECTAL CANCER SCREENING, NOT HIGH RISK performed by Bola Bills MD at [facility]      X3    THORACENTESIS Right 2022    870 ml removed by Dr. Jhoana Del Cid - diagnostics sent    THORACENTESIS Left 2022    285 ml removed by Dr Jhoana Del Cid - cytology only sent       Family History  Family History   Problem Relation Age of Onset    Cancer Mother 76        BONE    Cancer Father 64        COLON    Colon Cancer Father     Cancer Brother 43        THYROID    Diabetes Maternal Grandmother      [] Unable to obtain due to ventilated and/ or neurologic status    Social History     Socioeconomic History    Marital status: Single     Spouse name: Not on file    Number of children: 2    Years of education: Not on file    Highest education level: Associate degree: occupational, technical, or vocational program   Occupational History    Occupation:     Tobacco Use    Smoking status: Former Smoker     Packs/day: 2.00     Years: 25.00     Pack years: 50.00     Types: Cigars     Quit date: 1995     Years since quittin.4    Smokeless tobacco: Never Used    Tobacco comment: RARE CIGAR FROM TIME TO TIME   Vaping Use    Vaping Use: Never used   Substance and Sexual Activity    Alcohol use: Yes     Alcohol/week: 4.0 standard drinks     Types: 2 Cans of beer, 2 Shots of liquor per week     Comment: Social    Drug use: Not Currently     Types: Marijuana Dauna Other)    Sexual activity: Not Currently   Other Topics Concern    Not on file   Social History Narrative    Single floor home    4 steps to get into the home    Full basement does not go downstairs    Lives with girlfriend and her son    He has two children one in [place] and one in [place]    One Dog     Working smoke detectors and CO2     Social Determinants of Health     Financial Resource Strain:     Difficulty of Paying Living Expenses: Not on file   Food Insecurity:     Worried About [address] in the Last Year: Not on file    [address] in the Last Year: Not on file   Transportation Needs:     Lack of Transportation (Medical): Not on file    Lack of Transportation (Non-Medical):  Not on file   Physical Activity:     Days of Exercise per Week: Not on file    Minutes of Exercise per Session: Not on file   Stress:     Feeling of Stress : Not on file   Social Connections:     Frequency of Communication with Friends and Family: Not on file    Frequency of Social Gatherings with Friends and Family: Not on file    Attends Anglican Services: Not on file    Active Member of Clubs or Organizations: Not on file    Attends Club or Organization Meetings: Not on file    Marital Status: Not on file   Intimate Partner Violence:     Fear of Current or Ex-Partner: Not on file    Emotionally Abused: Not on file    Physically Abused: Not on file    Sexually Abused: Not on file   Housing Stability:     Unable to Pay for Housing in the Last Year: Not on file    Number of [place] in the Last Year: Not on file    Unstable Housing in the Last Year: Not on file      [] Unable to obtain due to ventilated and/ or neurologic status    Review of Systems    Home Medications:      Medications Prior to Admission: traMADol (ULTRAM) 50 MG tablet, Take 50 mg by mouth every 6 hours as needed for Pain. clopidogrel (PLAVIX) 75 MG tablet, TAKE 1 TABLET BY MOUTH  DAILY  Handicap Placard MISC, by Does not apply route Exp 5 years  aspirin 81 MG EC tablet, Take 81 mg by mouth daily  tiZANidine (ZANAFLEX) 2 MG tablet, Take 1 tablet by mouth nightly as needed (muscle spasms) (Patient not taking: Reported on 5/27/2022)  atorvastatin (LIPITOR) 40 MG tablet, Take 1 tablet by mouth daily  lisinopril (PRINIVIL;ZESTRIL) 20 MG tablet, TAKE 1 TABLET BY MOUTH  DAILY (Patient not taking: Reported on 5/27/2022)  Talazoparib Tosylate 0.25 MG CAPS, Take 1 capsule by mouth daily Take 2 tablets daily  Cyanocobalamin (VITAMIN B-12 CR PO), Take 1 tablet by mouth daily   tamsulosin (FLOMAX) 0.4 MG capsule, Take 0.4 mg by mouth daily   CPAP Machine MISC, by Does not apply route Use nightly as directed. Pressure setting 5 cm H2O. (Patient taking differently: 1 Device by Does not apply route Use nightly as directed.  Pressure setting 5 cm H2O.)  Ascorbic Acid (VITAMIN C PO), Take 500 mg by mouth daily     Current Hospital Medications:     Scheduled Meds:   dexamethasone  4 mg IntraVENous Q6H    aspirin  81 mg Oral Daily    atorvastatin 40 mg Oral Daily    clopidogrel  75 mg Oral Daily    sodium chloride flush  5-40 mL IntraVENous 2 times per day    [Held by provider] enoxaparin  40 mg SubCUTAneous Daily    acetaminophen  650 mg Oral See Admin Instructions    diphenhydrAMINE  25 mg Oral See Admin Instructions     Continuous Infusions:   sodium chloride      sodium chloride       PRN Meds:.sodium chloride flush, HYDROmorphone, sodium chloride flush, sodium chloride, ondansetron **OR** ondansetron, polyethylene glycol, acetaminophen **OR** acetaminophen, HYDROcodone-acetaminophen, sodium chloride  .  sodium chloride      sodium chloride          Allergies: Allergies   Allergen Reactions    Morphine Swelling and Rash          REVIEW OF SYSTEMS    (2-9 systems for level 4, 10 or more for level 5)     Constitutional: Fatigue and weakness   HENT: Negative for congestion, rhinorrhea, sore throat. Eyes: Negative for photophobia, blurred vision, diplopia, redness, visual change. Respiratory: Negative for cough and shortness of breath. Cardiovascular: Negative for chest pain and palpitations. Gastrointestinal: Negative for abdominal pain, diarrhea, nausea, vomiting. Genitourinary: Negative for dysuria, flank pain. Musculoskeletal: Negative for joint pains, stiffness, swelling, decreased range of motion. Significant back pain. Integumentary (skin and /or breast):  Negative for rash, ulcer, mass, pruritus. Neurological: dizziness, light-headedness, headaches. Endocrine: Negative for weight changes, sweating, heat intolerance, cold intolerance. Hematologic/Lymphatic: Negative for bleeding, anemia, easy bleeding, bruising. Allergic/Immunologic: Negative for swelling, itching, sneezing, anaphylaxis. Psychiatric/Behavioral: Negative for behavioral problems, confusion, hallucinations, mood changes. All other systems reviewed and are negative.     Except as noted above the remainder of the review of systems was reviewed and negative. Physical Exam     Physical Exam:  Vitals and notes reviewed. Constitutional:  See above height, weight, pulse rate, and blood pressure. BMI      Appearance: Normal appearance. Head:      Normocephalic and atraumatic. Ears, Nose, Mouth, and Throat:      Normal shape, no discharge, deglutition intact  Eyes:      Extraocular Movements: Extraocular movements intact. Pupils: Pupils are equal, round, and reactive to light. Cardiovascular:      Pulses: Normal radialis pulses. Heart sounds: Normal heart sounds, regular rate, no rubs or murmurs. Respiratory:      lungs decreased breath sounds in the bases   abdomen:        Soft to palpation. Bowel sounds present. Genitourinary:      Normal for sex  Musculoskeletal: Normal volitional strength in the upper and lower extremities. Gait intact with minimal assistance or standby assistance. 4/5 weakness both lower extremities. Objective tingling into both lower extremities diffusely. Sensory level T6       General: Normal range of motion. Extremities well developed and symmetric. Muscle tone normal with no spasticity or fasciculations. Skin:     General: Skin is warm and dry. Capillary Refill: Capillary refill less than 2 seconds. Neurological:      Mental Status: Alert and oriented to person, place, and time. Cranial nerves individually tested 2 through 12 normal  Hematologic/Lymphatic/Immunologic:      Negative for lymphadenopathy, hematomas. Psychiatric:         Mood and Affect: Mood normal. Appropriate affect    I have reviewed all laboratory studies, reports, data, and pertinent images.     Objective Findings:     Vitals:   Vitals:    05/28/22 0148 05/28/22 0230 05/28/22 0727 05/28/22 1541   BP: (!) 150/62 (!) 152/64 (!) 140/61 (!) 122/58   Pulse: 68 72 82 77   Resp: 18 16 18 18   Temp: 98.1 °F (36.7 °C) 98.2 °F (36.8 °C) 97.7 °F (36.5 °C) 97.5 °F (36.4 °C)   TempSrc: Oral Oral Oral Oral   SpO2: 98% 96% 100% 97% Weight:       Height:            Laboratory, Microbiology, Pathology, Radiology, Cardiology, Medications and Transcriptions reviewed  Scheduled Meds:   dexamethasone  4 mg IntraVENous Q6H    aspirin  81 mg Oral Daily    atorvastatin  40 mg Oral Daily    clopidogrel  75 mg Oral Daily    sodium chloride flush  5-40 mL IntraVENous 2 times per day    [Held by provider] enoxaparin  40 mg SubCUTAneous Daily    acetaminophen  650 mg Oral See Admin Instructions    diphenhydrAMINE  25 mg Oral See Admin Instructions     Continuous Infusions:   sodium chloride      sodium chloride         Recent Labs     05/26/22 2000 05/27/22 0658 05/28/22  1042   WBC 6.6 5.2 6.7   HGB 8.0* 7.2* 9.1*   HCT 25.0* 21.9* 27.7*   MCV 91.7 90.5 90.0    126* 148     Recent Labs     05/26/22 2000 05/27/22 0658 05/28/22  0552    138 138   K 4.0 4.1 4.1    106 102   CO2 21 19* 17*   BUN 16 15 17   CREATININE 0.88 0.86 0.92     Recent Labs     05/26/22 2000 05/27/22  0658 05/28/22  0552   AST 13 10 25   ALT <5 <5 <5   BILITOT 0.6 0.5 1.1*   ALKPHOS 251* 233* 321*     No results for input(s): LIPASE, AMYLASE in the last 72 hours. Recent Labs     05/26/22 2000 05/27/22 0658 05/28/22  0552 05/28/22  1628   PROT 6.2* 5.6* 7.0  --    INR  --   --   --  1.3     MRI THORACIC SPINE W WO CONTRAST    Result Date: 5/28/2022  MRI THORACIC SPINE W WO CONTRAST, MRI LUMBAR SPINE W WO CONTRAST HISTORY:   back pain  r/o spinal cord compression; pt has prostate cancer with bone mets  - TECHNIQUE: MR  thoracic and lumbar spine routine protocols without and with contrast. MR Contrast:  MultiHance Contrast Dose:  20 cc Route of Administration:  IV COMPARISON: None. RESULT: THORACIC SPINE: Counting reference:  Lumbosacral junction. For the purposes of this report, L4-5 is considered the level of the iliac crest and there are 5 lumbar-type vertebrae. Anatomic Variants: None. Alignment:  Thoracic kyphosis is maintained.  Vertebral body heights and disc spaces are within normal limits. Bone marrow signal/CORD: Diffuse heterogeneous marrow replacement secondary to osseous metastasis. At T4, there is enhancing expansile soft tissue mass involving the posterior left rib, left pedicle and lamina, right pedicle and proximal lamina with infiltration of the spinal canal. There is resultant rightward cord displacement and compression. No cord signal abnormality at this level. No suspicious parenchymal or leptomeningeal enhancement. Thoracic soft tissues: The thoracic paraspinal soft tissues are within normal limits. Thoracic canal and foramina:  Severe canal stenosis and cord compression at T4-T5 secondary to an infiltrative soft tissue mass otherwise, no high-grade canal stenosis or neural foraminal narrowing. LUMBAR SPINE: Counting reference:  Lumbosacral junction. For the purposes of this report, L4-5 is considered the level of the iliac crest and there are 5 lumbar-type vertebrae. Anatomic Variants: None. Alignment:  Lumbar lordosis is maintained. Vertebral body heights and disc spaces are within normal limits. Bone marrow signal/fracture:  Diffuse heterogeneous marrow replacement, compatible with metastatic disease. No evidence of prior fracture. Conus: The conus terminates at L1 and is within normal limits of morphology and signal. Normal course and caliber of the descending cauda equina nerve roots. No evidence of abnormal intradural enhancement following contrast administration. Lumbar soft tissues: The lumbar paraspinal soft tissues are within normal limits. T12-L1:  Canal and foramina are patent. L1-L2:  Canal and foramina are patent. L2-L3:  Circumferential disc bulge and mild facet degenerative changes without canal stenosis. Neural foramen are patent. L3-L4:  Disc bulge slightly asymmetric to the left, ligamentum flavum hypertrophy and facet degenerative changes with mild canal stenosis. Mild bilateral neural foraminal narrowing. L4-L5:  Facet degenerative changes and small disc bulge flattening the ventral thecal sac. No significant canal stenosis. Mild bilateral neural foraminal narrowing. L5-S1:  Disc bulge asymmetric to the right, facet degenerative change and ligamentum flavum hypertrophy with flattening ventral thecal sac. Right-sided subarticular recess narrowing. Moderate right and mild left neural foraminal narrowing. Sacrum and iliac wings:  Extensive heterogeneous marrow replacement of the sacroiliac loops, secondary to metastatic disease. Thoracic spine: 1. Extensive osseous metastatic disease with soft tissue component at left greater than right T4-T5 with infiltration of the spinal canal and cord compression at this level. 2.No abnormal cord signal, parenchymal or leptomeningeal enhancement. Lumbar spine: 1. Extensive osseous metastatic disease. 2.No abnormal cord signal, parenchymal edema and enhancement. 3.Multilevel degenerative change without high-grade canal stenosis or neural foraminal narrowing. COMMUNICATION:  Communicated with: Kayy Luna RN on 5/28/2022 at 3:48 PM.      MRI Lumbar Spine W WO Contrast    Result Date: 5/28/2022  MRI THORACIC SPINE W WO CONTRAST, MRI LUMBAR SPINE W WO CONTRAST HISTORY:   back pain  r/o spinal cord compression; pt has prostate cancer with bone mets  - TECHNIQUE: MR  thoracic and lumbar spine routine protocols without and with contrast. MR Contrast:  MultiHance Contrast Dose:  20 cc Route of Administration:  IV COMPARISON: None. RESULT: THORACIC SPINE: Counting reference:  Lumbosacral junction. For the purposes of this report, L4-5 is considered the level of the iliac crest and there are 5 lumbar-type vertebrae. Anatomic Variants: None. Alignment:  Thoracic kyphosis is maintained. Vertebral body heights and disc spaces are within normal limits. Bone marrow signal/CORD: Diffuse heterogeneous marrow replacement secondary to osseous metastasis.  At T4, there is enhancing expansile soft tissue mass involving the posterior left rib, left pedicle and lamina, right pedicle and proximal lamina with infiltration of the spinal canal. There is resultant rightward cord displacement and compression. No cord signal abnormality at this level. No suspicious parenchymal or leptomeningeal enhancement. Thoracic soft tissues: The thoracic paraspinal soft tissues are within normal limits. Thoracic canal and foramina:  Severe canal stenosis and cord compression at T4-T5 secondary to an infiltrative soft tissue mass otherwise, no high-grade canal stenosis or neural foraminal narrowing. LUMBAR SPINE: Counting reference:  Lumbosacral junction. For the purposes of this report, L4-5 is considered the level of the iliac crest and there are 5 lumbar-type vertebrae. Anatomic Variants: None. Alignment:  Lumbar lordosis is maintained. Vertebral body heights and disc spaces are within normal limits. Bone marrow signal/fracture:  Diffuse heterogeneous marrow replacement, compatible with metastatic disease. No evidence of prior fracture. Conus: The conus terminates at L1 and is within normal limits of morphology and signal. Normal course and caliber of the descending cauda equina nerve roots. No evidence of abnormal intradural enhancement following contrast administration. Lumbar soft tissues: The lumbar paraspinal soft tissues are within normal limits. T12-L1:  Canal and foramina are patent. L1-L2:  Canal and foramina are patent. L2-L3:  Circumferential disc bulge and mild facet degenerative changes without canal stenosis. Neural foramen are patent. L3-L4:  Disc bulge slightly asymmetric to the left, ligamentum flavum hypertrophy and facet degenerative changes with mild canal stenosis. Mild bilateral neural foraminal narrowing. L4-L5:  Facet degenerative changes and small disc bulge flattening the ventral thecal sac. No significant canal stenosis. Mild bilateral neural foraminal narrowing.  L5-S1:  Disc bulge asymmetric to the right, facet degenerative change and ligamentum flavum hypertrophy with flattening ventral thecal sac. Right-sided subarticular recess narrowing. Moderate right and mild left neural foraminal narrowing. Sacrum and iliac wings:  Extensive heterogeneous marrow replacement of the sacroiliac loops, secondary to metastatic disease. Thoracic spine: 1. Extensive osseous metastatic disease with soft tissue component at left greater than right T4-T5 with infiltration of the spinal canal and cord compression at this level. 2.No abnormal cord signal, parenchymal or leptomeningeal enhancement. Lumbar spine: 1. Extensive osseous metastatic disease. 2.No abnormal cord signal, parenchymal edema and enhancement. 3.Multilevel degenerative change without high-grade canal stenosis or neural foraminal narrowing. COMMUNICATION:  Communicated with: Sabina Garg RN on 5/28/2022 at 3:48 PM.        XR CHEST PORTABLE    Result Date: 5/27/2022  EXAMINATION: XR CHEST PORTABLE CLINICAL HISTORY: FATIGUE COMPARISONS: MARCH 26, 2022 FINDINGS: Osseous structures are intact. Cardiopericardial silhouette is normal. Pulmonary vasculature is normal. Ill-defined area increased opacity right lower lung. Blunting left costophrenic angle with increased opacity left lung base. BILATERAL LOWER LUNG ATELECTASIS/PNEUMONIA WITH LEFT PLEURAL EFFUSION       Impression:     Metastatic prostate cancer to the spine with multiple metastases. Spinal cord compression T4-T5. On chronic anticoagulation of aspirin and Plavix to treat recent 4 25 2022  coronary artery stents. Patient indicates he has had 14 stents in the past.  5 cardiac stents 3 stents left lower extremity 6 stents right lower extremity.     Hypertension hyperlipidemia coronary artery disease without angina obstructive sleep apnea on CPAP Coronary artery disease peripheral vascular disease paroxysmal atrial fibrillation anemia      Plan:     advise starting as soon as possible radiation therapy T4-5 for the large central and left sided bony metastases with impending spinal cord compression. Comments:   Chronic anticoagulation aspirin Plavix with his extensive disease anemia and remains neurologically intact does not meet criteria for surgical treatment at this time. Long discussion with the patient regarding his history. He wanted to repeat his entire history since 2008 and had some questions all of which were answered. We agreed that he should undergo radiation therapy see how he reacts. Clearly understands spine surgery has high risk because of his multiple cardiac stents and peripheral vascular disease and necessity to stop his anticoagulation for procedures. He has a significant other female friend that lives with him. Patient needs standby assist and or walker for support. Weakness in lower extremities with tingling minimal.  He is severe back pain, anemia and sometimes his medical marijuana also attributes to weakness and increase his falls danger.       Electronically signed by Aaron Michael MD on 5/28/2022 at 5:05 PM

## 2022-05-28 NOTE — PROGRESS NOTES
5/28/22    From: [facility]    Admit: ANEMIA, BIALT PLEURAL EFFUSIONS     PMH: ASA-CPAP, HLD, CAD, PAD, PROSTATE CANCER WITH BONE METS, LUNG CANCER    Anticipated Discharge Disposition: [facility] EVENTUALLY    Patient Mobility or PT/OT ordered:  Consults:  PALL CARE,, HEM/ONC    Covid result &/or vacc status: VACC X3    Barriers to Discharge: HGB 8. O - 7.1  BILAT PLEURAL EFFUSIONS VS PNA  5/28: REACHED OUT TO [facility] PER PT CHOICE AND [facility] FOR ACCEPTANCE.       Assessments: CMI DONE, [facility] NOTIFIED

## 2022-05-29 LAB
ALBUMIN SERPL-MCNC: 3.2 G/DL (ref 3.5–4.6)
ALP BLD-CCNC: 282 U/L (ref 35–104)
ALT SERPL-CCNC: <5 U/L (ref 0–41)
ANION GAP SERPL CALCULATED.3IONS-SCNC: 12 MEQ/L (ref 9–15)
AST SERPL-CCNC: 14 U/L (ref 0–40)
BILIRUB SERPL-MCNC: 0.5 MG/DL (ref 0.2–0.7)
BUN BLDV-MCNC: 18 MG/DL (ref 8–23)
CALCIUM SERPL-MCNC: 8.7 MG/DL (ref 8.5–9.9)
CHLORIDE BLD-SCNC: 107 MEQ/L (ref 95–107)
CO2: 19 MEQ/L (ref 20–31)
CREAT SERPL-MCNC: 0.82 MG/DL (ref 0.7–1.2)
GFR AFRICAN AMERICAN: >60
GFR NON-AFRICAN AMERICAN: >60
GLOBULIN: 2.5 G/DL (ref 2.3–3.5)
GLUCOSE BLD-MCNC: 151 MG/DL (ref 70–99)
HCT VFR BLD CALC: 24.9 % (ref 42–52)
HEMOGLOBIN: 8.2 G/DL (ref 14–18)
INR BLD: 1.2
MCH RBC QN AUTO: 29.9 PG (ref 27–31.3)
MCHC RBC AUTO-ENTMCNC: 32.8 % (ref 33–37)
MCV RBC AUTO: 91.2 FL (ref 80–100)
PDW BLD-RTO: 20 % (ref 11.5–14.5)
PLATELET # BLD: 123 K/UL (ref 130–400)
POTASSIUM REFLEX MAGNESIUM: 4.3 MEQ/L (ref 3.4–4.9)
PROTHROMBIN TIME: 15.3 SEC (ref 12.3–14.9)
RBC # BLD: 2.73 M/UL (ref 4.7–6.1)
SODIUM BLD-SCNC: 138 MEQ/L (ref 135–144)
TOTAL PROTEIN: 5.7 G/DL (ref 6.3–8)
WBC # BLD: 5.5 K/UL (ref 4.8–10.8)

## 2022-05-29 PROCEDURE — 6370000000 HC RX 637 (ALT 250 FOR IP): Performed by: FAMILY MEDICINE

## 2022-05-29 PROCEDURE — 2580000003 HC RX 258: Performed by: INTERNAL MEDICINE

## 2022-05-29 PROCEDURE — 6360000002 HC RX W HCPCS: Performed by: INTERNAL MEDICINE

## 2022-05-29 PROCEDURE — 1210000000 HC MED SURG R&B

## 2022-05-29 PROCEDURE — 6370000000 HC RX 637 (ALT 250 FOR IP): Performed by: INTERNAL MEDICINE

## 2022-05-29 PROCEDURE — 93005 ELECTROCARDIOGRAM TRACING: CPT | Performed by: INTERNAL MEDICINE

## 2022-05-29 PROCEDURE — 85027 COMPLETE CBC AUTOMATED: CPT

## 2022-05-29 PROCEDURE — 80053 COMPREHEN METABOLIC PANEL: CPT

## 2022-05-29 PROCEDURE — 99221 1ST HOSP IP/OBS SF/LOW 40: CPT | Performed by: INTERNAL MEDICINE

## 2022-05-29 PROCEDURE — 85610 PROTHROMBIN TIME: CPT

## 2022-05-29 PROCEDURE — 36415 COLL VENOUS BLD VENIPUNCTURE: CPT

## 2022-05-29 RX ORDER — DOCUSATE SODIUM 100 MG/1
100 CAPSULE, LIQUID FILLED ORAL DAILY
Status: DISCONTINUED | OUTPATIENT
Start: 2022-05-29 | End: 2022-06-03 | Stop reason: HOSPADM

## 2022-05-29 RX ORDER — LOSARTAN POTASSIUM 25 MG/1
25 TABLET ORAL DAILY
Status: DISCONTINUED | OUTPATIENT
Start: 2022-05-29 | End: 2022-06-03 | Stop reason: HOSPADM

## 2022-05-29 RX ORDER — METOPROLOL SUCCINATE 25 MG/1
25 TABLET, EXTENDED RELEASE ORAL DAILY
Status: DISCONTINUED | OUTPATIENT
Start: 2022-05-29 | End: 2022-06-03 | Stop reason: HOSPADM

## 2022-05-29 RX ORDER — BISACODYL 10 MG
10 SUPPOSITORY, RECTAL RECTAL ONCE
Status: COMPLETED | OUTPATIENT
Start: 2022-05-29 | End: 2022-05-29

## 2022-05-29 RX ADMIN — ASPIRIN 81 MG: 81 TABLET, COATED ORAL at 08:54

## 2022-05-29 RX ADMIN — DOCUSATE SODIUM 100 MG: 100 CAPSULE, LIQUID FILLED ORAL at 10:26

## 2022-05-29 RX ADMIN — Medication 10 ML: at 21:05

## 2022-05-29 RX ADMIN — HYDROCODONE BITARTRATE AND ACETAMINOPHEN 1 TABLET: 7.5; 325 TABLET ORAL at 10:27

## 2022-05-29 RX ADMIN — SODIUM CHLORIDE, PRESERVATIVE FREE 10 ML: 5 INJECTION INTRAVENOUS at 01:34

## 2022-05-29 RX ADMIN — HYDROMORPHONE HYDROCHLORIDE 0.5 MG: 1 INJECTION, SOLUTION INTRAMUSCULAR; INTRAVENOUS; SUBCUTANEOUS at 13:01

## 2022-05-29 RX ADMIN — HYDROCODONE BITARTRATE AND ACETAMINOPHEN 1 TABLET: 7.5; 325 TABLET ORAL at 04:24

## 2022-05-29 RX ADMIN — DEXAMETHASONE SODIUM PHOSPHATE 4 MG: 4 INJECTION, SOLUTION INTRA-ARTICULAR; INTRALESIONAL; INTRAMUSCULAR; INTRAVENOUS; SOFT TISSUE at 04:27

## 2022-05-29 RX ADMIN — HYDROMORPHONE HYDROCHLORIDE 0.5 MG: 1 INJECTION, SOLUTION INTRAMUSCULAR; INTRAVENOUS; SUBCUTANEOUS at 01:29

## 2022-05-29 RX ADMIN — LOSARTAN POTASSIUM 25 MG: 25 TABLET, FILM COATED ORAL at 13:01

## 2022-05-29 RX ADMIN — METOPROLOL SUCCINATE 25 MG: 25 TABLET, EXTENDED RELEASE ORAL at 13:01

## 2022-05-29 RX ADMIN — HYDROMORPHONE HYDROCHLORIDE 0.5 MG: 1 INJECTION, SOLUTION INTRAMUSCULAR; INTRAVENOUS; SUBCUTANEOUS at 08:55

## 2022-05-29 RX ADMIN — HYDROMORPHONE HYDROCHLORIDE 0.5 MG: 1 INJECTION, SOLUTION INTRAMUSCULAR; INTRAVENOUS; SUBCUTANEOUS at 18:18

## 2022-05-29 RX ADMIN — HYDROCODONE BITARTRATE AND ACETAMINOPHEN 1 TABLET: 7.5; 325 TABLET ORAL at 22:44

## 2022-05-29 RX ADMIN — CLOPIDOGREL BISULFATE 75 MG: 75 TABLET ORAL at 08:54

## 2022-05-29 RX ADMIN — DEXAMETHASONE SODIUM PHOSPHATE 4 MG: 4 INJECTION, SOLUTION INTRA-ARTICULAR; INTRALESIONAL; INTRAMUSCULAR; INTRAVENOUS; SOFT TISSUE at 16:35

## 2022-05-29 RX ADMIN — POLYETHYLENE GLYCOL 3350 17 G: 17 POWDER, FOR SOLUTION ORAL at 08:54

## 2022-05-29 RX ADMIN — Medication 10 ML: at 07:45

## 2022-05-29 RX ADMIN — DEXAMETHASONE SODIUM PHOSPHATE 4 MG: 4 INJECTION, SOLUTION INTRA-ARTICULAR; INTRALESIONAL; INTRAMUSCULAR; INTRAVENOUS; SOFT TISSUE at 22:41

## 2022-05-29 RX ADMIN — SODIUM CHLORIDE, PRESERVATIVE FREE 10 ML: 5 INJECTION INTRAVENOUS at 04:28

## 2022-05-29 RX ADMIN — DEXAMETHASONE SODIUM PHOSPHATE 4 MG: 4 INJECTION, SOLUTION INTRA-ARTICULAR; INTRALESIONAL; INTRAMUSCULAR; INTRAVENOUS; SOFT TISSUE at 10:26

## 2022-05-29 RX ADMIN — ATORVASTATIN CALCIUM 40 MG: 40 TABLET, FILM COATED ORAL at 08:54

## 2022-05-29 RX ADMIN — HYDROMORPHONE HYDROCHLORIDE 0.5 MG: 1 INJECTION, SOLUTION INTRAMUSCULAR; INTRAVENOUS; SUBCUTANEOUS at 21:16

## 2022-05-29 RX ADMIN — SODIUM CHLORIDE, PRESERVATIVE FREE 10 ML: 5 INJECTION INTRAVENOUS at 22:42

## 2022-05-29 RX ADMIN — ENOXAPARIN SODIUM 40 MG: 100 INJECTION SUBCUTANEOUS at 08:54

## 2022-05-29 RX ADMIN — BISACODYL 10 MG: 10 SUPPOSITORY RECTAL at 16:35

## 2022-05-29 RX ADMIN — HYDROCODONE BITARTRATE AND ACETAMINOPHEN 1 TABLET: 7.5; 325 TABLET ORAL at 16:35

## 2022-05-29 ASSESSMENT — PAIN DESCRIPTION - LOCATION
LOCATION: BACK

## 2022-05-29 ASSESSMENT — PAIN DESCRIPTION - DESCRIPTORS
DESCRIPTORS: DISCOMFORT;THROBBING
DESCRIPTORS: SPASM
DESCRIPTORS: THROBBING;ACHING

## 2022-05-29 ASSESSMENT — PAIN - FUNCTIONAL ASSESSMENT
PAIN_FUNCTIONAL_ASSESSMENT: ACTIVITIES ARE NOT PREVENTED
PAIN_FUNCTIONAL_ASSESSMENT: ACTIVITIES ARE NOT PREVENTED
PAIN_FUNCTIONAL_ASSESSMENT: PREVENTS OR INTERFERES SOME ACTIVE ACTIVITIES AND ADLS
PAIN_FUNCTIONAL_ASSESSMENT: ACTIVITIES ARE NOT PREVENTED

## 2022-05-29 ASSESSMENT — PAIN DESCRIPTION - ORIENTATION
ORIENTATION: RIGHT;LEFT;MID
ORIENTATION: RIGHT;LEFT;LOWER;MID;OUTER
ORIENTATION: MID;LOWER

## 2022-05-29 ASSESSMENT — PAIN SCALES - GENERAL
PAINLEVEL_OUTOF10: 7
PAINLEVEL_OUTOF10: 5
PAINLEVEL_OUTOF10: 5
PAINLEVEL_OUTOF10: 4
PAINLEVEL_OUTOF10: 3
PAINLEVEL_OUTOF10: 0
PAINLEVEL_OUTOF10: 4

## 2022-05-29 NOTE — PROGRESS NOTES
Patient assessed and charted on by this RN. VSS. A&Ox4. Denies any pain currently at this time. Fall precautions are in place. Call light within reach. Will continue to monitor.

## 2022-05-29 NOTE — PROGRESS NOTES
Hematology/Oncology  Attending Progress Note        CHIEF COMPLAINT/HPI:  The pt has improvement in his back pain and noted improvement in leg strength when moving legs after starting steroid . No urinary incontinence. REVIEW OF SYSTEMS:    Unremarkable except for symptoms mentioned in HPI.     Current Inpatient Medications:    Current Facility-Administered Medications: sodium chloride flush 0.9 % injection 10 mL, 10 mL, IntraVENous, PRN  dexamethasone (DECADRON) injection 4 mg, 4 mg, IntraVENous, Q6H  HYDROmorphone (DILAUDID) injection 0.5 mg, 0.5 mg, IntraVENous, Q4H PRN  phytonadione ADULT (VITAMIN K) inj 10 mg/mL, 10 mg, SubCUTAneous, Once  aspirin EC tablet 81 mg, 81 mg, Oral, Daily  atorvastatin (LIPITOR) tablet 40 mg, 40 mg, Oral, Daily  clopidogrel (PLAVIX) tablet 75 mg, 75 mg, Oral, Daily  sodium chloride flush 0.9 % injection 5-40 mL, 5-40 mL, IntraVENous, 2 times per day  sodium chloride flush 0.9 % injection 5-40 mL, 5-40 mL, IntraVENous, PRN  0.9 % sodium chloride infusion, 25 mL, IntraVENous, PRN  enoxaparin (LOVENOX) injection 40 mg, 40 mg, SubCUTAneous, Daily  ondansetron (ZOFRAN-ODT) disintegrating tablet 4 mg, 4 mg, Oral, Q8H PRN **OR** ondansetron (ZOFRAN) injection 4 mg, 4 mg, IntraVENous, Q6H PRN  polyethylene glycol (GLYCOLAX) packet 17 g, 17 g, Oral, Daily PRN  acetaminophen (TYLENOL) tablet 650 mg, 650 mg, Oral, Q6H PRN **OR** acetaminophen (TYLENOL) suppository 650 mg, 650 mg, Rectal, Q6H PRN  HYDROcodone-acetaminophen (NORCO) 7.5-325 MG per tablet 1 tablet, 1 tablet, Oral, Q6H PRN  0.9 % sodium chloride infusion, , IntraVENous, PRN  acetaminophen (TYLENOL) tablet 650 mg, 650 mg, Oral, See Admin Instructions  diphenhydrAMINE (BENADRYL) tablet 25 mg, 25 mg, Oral, See Admin Instructions    PHYSICAL EXAM:    VITALS:  BP (!) 122/58   Pulse 77   Temp 97.5 °F (36.4 °C) (Oral)   Resp 18   Ht 5' 10\" (1.778 m)   Wt 174 lb (78.9 kg)   SpO2 97%   BMI 24.97 kg/m²   INTAKE/OUTPUT: Intake/Output Summary (Last 24 hours) at 5/28/2022 2004  Last data filed at 5/28/2022 1545  Gross per 24 hour   Intake --   Output 675 ml   Net -675 ml     CONSTITUTIONAL:  awake, alert, cooperative, no apparent distress, and appears stated age; able to walk better with a walker  EYES: pink conj, anicteric sclerae  ENT:  Normocephalic, oral pharynx with moist mucus membranes, tonsils without erythema or exudates, No epistaxis  NECK:  Supple, symmetrical, trachea midline, no adenopathy,   LUNGS:  No increased work of breathing, good air exchange, clear to auscultation bilaterally, no crackles or wheezing  CARDIOVASCULAR:  Normal HS; regular rate and rhythm  ABDOMEN: soft, non-distended, non-tender,no masses palpated,no hepatosplenomegaly  MUSCULOSKELETAL:  There is no pedal edema; no calf tenderness  NEUROLOGIC:  Awake, alert, oriented to name, place and time. Cranial nerves II-XII are grossly intact. Motor is 4+to5 out of 5 bilaterally.  No sensory deficit  SKIN:  no bruising or bleeding and no rashes    DATA:      PT/INR:  No results found for: PTINR  PTT:    Lab Results   Component Value Date    APTT 35.4 05/28/2022     CMP:    Lab Results   Component Value Date     05/28/2022    K 4.1 05/28/2022     05/28/2022    CO2 17 05/28/2022    BUN 17 05/28/2022    PROT 7.0 05/28/2022     Magnesium:    Lab Results   Component Value Date    MG 2.2 05/26/2022     Phosphorus:  No components found for: PO4  Calcium:  No results found for: CA  CBC:    Lab Results   Component Value Date    WBC 6.7 05/28/2022    RBC 3.08 05/28/2022    HGB 9.1 05/28/2022    HCT 27.7 05/28/2022    MCV 90.0 05/28/2022    RDW 20.4 05/28/2022     05/28/2022     DIFF:    Lab Results   Component Value Date    MCV 90.0 05/28/2022    RDW 20.4 05/28/2022      LDH:  No results found for: LDH  Uric Acid:  No components found for: URIC    CBC with Differential:    Lab Results   Component Value Date    WBC 6.7 05/28/2022    RBC 3.08 05/28/2022    HGB 9.1 05/28/2022    HCT 27.7 05/28/2022     05/28/2022    MCV 90.0 05/28/2022    MCH 29.4 05/28/2022    MCHC 32.7 05/28/2022    RDW 20.4 05/28/2022    NRBC 2 05/26/2022    BANDSPCT 4 05/26/2022    LYMPHOPCT 26.1 05/27/2022    MONOPCT 5.4 05/27/2022    BASOPCT 0.5 05/27/2022    MONOSABS 0.3 05/27/2022    LYMPHSABS 1.4 05/27/2022    EOSABS 0.0 05/27/2022    BASOSABS 0.0 05/27/2022     CMP:    Lab Results   Component Value Date     05/28/2022    K 4.1 05/28/2022     05/28/2022    CO2 17 05/28/2022    BUN 17 05/28/2022    CREATININE 0.92 05/28/2022    GFRAA >60.0 05/28/2022    LABGLOM >60.0 05/28/2022    GLUCOSE 124 05/28/2022    PROT 7.0 05/28/2022    LABALBU 3.8 05/28/2022    CALCIUM 9.3 05/28/2022    BILITOT 1.1 05/28/2022    ALKPHOS 321 05/28/2022    AST 25 05/28/2022    ALT <5 05/28/2022     LDH:  No results found for: LDH  Warfarin PT/INR:  No components found for: PTPATWAR, PTINRWAR  PTT:    Lab Results   Component Value Date    APTT 35.4 05/28/2022   [APTT}  VITAMIN B12: No components found for: B12  FOLATE:    Lab Results   Component Value Date    FOLATE 5.7 05/27/2022     IRON:    Lab Results   Component Value Date    IRON 108 05/27/2022     Iron Saturation:  No components found for: PERCENTFE  TIBC:    Lab Results   Component Value Date    TIBC 229 05/27/2022     FERRITIN:  No results found for: FERRITIN  Fibrinogen Level:  No components found for: FIB  24 Hour Urine for Protein:  No components found for: Carolann Sandhu UTV3  PSA:   Lab Results   Component Value Date    .60 05/06/2022             RADIOLOGY RESULTS:  MRI THORACIC SPINE W WO CONTRAST    Result Date: 5/28/2022  MRI THORACIC SPINE W WO CONTRAST, MRI LUMBAR SPINE W WO CONTRAST HISTORY:   back pain  r/o spinal cord compression; pt has prostate cancer with bone mets  - TECHNIQUE: MR  thoracic and lumbar spine routine protocols without and with contrast. MR Contrast:  MultiHance Contrast Dose:  20 cc Route of Administration:  IV COMPARISON: None. RESULT: THORACIC SPINE: Counting reference:  Lumbosacral junction. For the purposes of this report, L4-5 is considered the level of the iliac crest and there are 5 lumbar-type vertebrae. Anatomic Variants: None. Alignment:  Thoracic kyphosis is maintained. Vertebral body heights and disc spaces are within normal limits. Bone marrow signal/CORD: Diffuse heterogeneous marrow replacement secondary to osseous metastasis. At T4, there is enhancing expansile soft tissue mass involving the posterior left rib, left pedicle and lamina, right pedicle and proximal lamina with infiltration of the spinal canal. There is resultant rightward cord displacement and compression. No cord signal abnormality at this level. No suspicious parenchymal or leptomeningeal enhancement. Thoracic soft tissues: The thoracic paraspinal soft tissues are within normal limits. Thoracic canal and foramina:  Severe canal stenosis and cord compression at T4-T5 secondary to an infiltrative soft tissue mass otherwise, no high-grade canal stenosis or neural foraminal narrowing. LUMBAR SPINE: Counting reference:  Lumbosacral junction. For the purposes of this report, L4-5 is considered the level of the iliac crest and there are 5 lumbar-type vertebrae. Anatomic Variants: None. Alignment:  Lumbar lordosis is maintained. Vertebral body heights and disc spaces are within normal limits. Bone marrow signal/fracture:  Diffuse heterogeneous marrow replacement, compatible with metastatic disease. No evidence of prior fracture. Conus: The conus terminates at L1 and is within normal limits of morphology and signal. Normal course and caliber of the descending cauda equina nerve roots. No evidence of abnormal intradural enhancement following contrast administration. Lumbar soft tissues: The lumbar paraspinal soft tissues are within normal limits. T12-L1:  Canal and foramina are patent.  L1-L2:  Canal and foramina are patent. L2-L3:  Circumferential disc bulge and mild facet degenerative changes without canal stenosis. Neural foramen are patent. L3-L4:  Disc bulge slightly asymmetric to the left, ligamentum flavum hypertrophy and facet degenerative changes with mild canal stenosis. Mild bilateral neural foraminal narrowing. L4-L5:  Facet degenerative changes and small disc bulge flattening the ventral thecal sac. No significant canal stenosis. Mild bilateral neural foraminal narrowing. L5-S1:  Disc bulge asymmetric to the right, facet degenerative change and ligamentum flavum hypertrophy with flattening ventral thecal sac. Right-sided subarticular recess narrowing. Moderate right and mild left neural foraminal narrowing. Sacrum and iliac wings:  Extensive heterogeneous marrow replacement of the sacroiliac loops, secondary to metastatic disease. Thoracic spine: 1. Extensive osseous metastatic disease with soft tissue component at left greater than right T4-T5 with infiltration of the spinal canal and cord compression at this level. 2.No abnormal cord signal, parenchymal or leptomeningeal enhancement. Lumbar spine: 1. Extensive osseous metastatic disease. 2.No abnormal cord signal, parenchymal edema and enhancement. 3.Multilevel degenerative change without high-grade canal stenosis or neural foraminal narrowing. COMMUNICATION:  Communicated with: Carey Thompson RN on 5/28/2022 at 3:48 PM.      MRI Lumbar Spine W WO Contrast    Result Date: 5/28/2022  MRI THORACIC SPINE W WO CONTRAST, MRI LUMBAR SPINE W WO CONTRAST HISTORY:   back pain  r/o spinal cord compression; pt has prostate cancer with bone mets  - TECHNIQUE: MR  thoracic and lumbar spine routine protocols without and with contrast. MR Contrast:  MultiHance Contrast Dose:  20 cc Route of Administration:  IV COMPARISON: None. RESULT: THORACIC SPINE: Counting reference:  Lumbosacral junction.   For the purposes of this report, L4-5 is considered the level of the iliac crest and there are 5 lumbar-type vertebrae. Anatomic Variants: None. Alignment:  Thoracic kyphosis is maintained. Vertebral body heights and disc spaces are within normal limits. Bone marrow signal/CORD: Diffuse heterogeneous marrow replacement secondary to osseous metastasis. At T4, there is enhancing expansile soft tissue mass involving the posterior left rib, left pedicle and lamina, right pedicle and proximal lamina with infiltration of the spinal canal. There is resultant rightward cord displacement and compression. No cord signal abnormality at this level. No suspicious parenchymal or leptomeningeal enhancement. Thoracic soft tissues: The thoracic paraspinal soft tissues are within normal limits. Thoracic canal and foramina:  Severe canal stenosis and cord compression at T4-T5 secondary to an infiltrative soft tissue mass otherwise, no high-grade canal stenosis or neural foraminal narrowing. LUMBAR SPINE: Counting reference:  Lumbosacral junction. For the purposes of this report, L4-5 is considered the level of the iliac crest and there are 5 lumbar-type vertebrae. Anatomic Variants: None. Alignment:  Lumbar lordosis is maintained. Vertebral body heights and disc spaces are within normal limits. Bone marrow signal/fracture:  Diffuse heterogeneous marrow replacement, compatible with metastatic disease. No evidence of prior fracture. Conus: The conus terminates at L1 and is within normal limits of morphology and signal. Normal course and caliber of the descending cauda equina nerve roots. No evidence of abnormal intradural enhancement following contrast administration. Lumbar soft tissues: The lumbar paraspinal soft tissues are within normal limits. T12-L1:  Canal and foramina are patent. L1-L2:  Canal and foramina are patent. L2-L3:  Circumferential disc bulge and mild facet degenerative changes without canal stenosis. Neural foramen are patent.  L3-L4:  Disc bulge slightly asymmetric to the left, ligamentum flavum hypertrophy and facet degenerative changes with mild canal stenosis. Mild bilateral neural foraminal narrowing. L4-L5:  Facet degenerative changes and small disc bulge flattening the ventral thecal sac. No significant canal stenosis. Mild bilateral neural foraminal narrowing. L5-S1:  Disc bulge asymmetric to the right, facet degenerative change and ligamentum flavum hypertrophy with flattening ventral thecal sac. Right-sided subarticular recess narrowing. Moderate right and mild left neural foraminal narrowing. Sacrum and iliac wings:  Extensive heterogeneous marrow replacement of the sacroiliac loops, secondary to metastatic disease. Thoracic spine: 1. Extensive osseous metastatic disease with soft tissue component at left greater than right T4-T5 with infiltration of the spinal canal and cord compression at this level. 2.No abnormal cord signal, parenchymal or leptomeningeal enhancement. Lumbar spine: 1. Extensive osseous metastatic disease. 2.No abnormal cord signal, parenchymal edema and enhancement. 3.Multilevel degenerative change without high-grade canal stenosis or neural foraminal narrowing. COMMUNICATION:  Communicated with: Byron Alonzo RN on 5/28/2022 at 3:48 PM.      XR CHEST PORTABLE    Result Date: 5/27/2022  EXAMINATION: XR CHEST PORTABLE CLINICAL HISTORY: FATIGUE COMPARISONS: MARCH 26, 2022 FINDINGS: Osseous structures are intact. Cardiopericardial silhouette is normal. Pulmonary vasculature is normal. Ill-defined area increased opacity right lower lung. Blunting left costophrenic angle with increased opacity left lung base. BILATERAL LOWER LUNG ATELECTASIS/PNEUMONIA WITH LEFT PLEURAL EFFUSION    ASSESSMENT AND PLAN:      1. Spinal cord compression at T4. Pt with good leg strength which improved after starting steroid tx. . Pt has been referred to Radiation Oncologist Dr. Andrea Vance who is out of town now but will be able to see the pt early Tues 5/31 .  The pt was offered transfer to [facility] or  to start RT sooner but the pt refuses the transfer at this time . He said he may reconsider if he has neurologic deterioration. I emphasized the importance of him informing his nurse and  me if he develops incontinence or leg weakness. Note: pt is taking Plavix and is therefore not a candidate for emergency surgery.   2. Prostate CA with bone mets- hold off on Taxotere chemotx until after RT is completed      Electronically signed by Ede Larsen MD on 5/28/22 at 8:04 PM EDT

## 2022-05-29 NOTE — CONSULTS
Cardiology Consult Note  Patient: Meir Granados  Unit/Bed: I670/A846-76  YOB: 1950  MRN: [id number]  Acct: [de-identified]   Admitting Diagnosis: Lightheadedness [R42]  Anemia [D64.9]  Bilateral pleural effusion [J90]  Anemia, unspecified type [D64.9]  Malignant neoplasm of bone, unspecified location [facility]) [C41.9]  Date:  5/26/2022  Hospital Day: 2      Chief Complaint:  Dizziness    Reason of this consult  Risk ratification prior to undergoing back surgery    History of Present Illness:  72-year-old male who follows up in clinic with Dr. Cecile Merrill has history of CAD status post PCI of mid LAD AMARILIS times 2 to 3 x 18 mm Chace resolute and 2.5 x 15 mm Chace resolute on 4/25/2022, PAD status post angioplasties in the past  Hypertension, Hyperlipidemia, Prostate cancer,   Patient admitted to the hospital for dizziness  Cardiology consulted for restratification prior to undergoing back surgery    Patient was found with prostate cancer metastasis to the spine.   MRI consistent with spinal cord compression T4-T5  EKG normal sinus rhythm without signs of ischemia  TTE 4/8/2022 EF 40%    Allergies   Allergen Reactions    Morphine Swelling and Rash       Current Facility-Administered Medications   Medication Dose Route Frequency Provider Last Rate Last Admin    docusate sodium (COLACE) capsule 100 mg  100 mg Oral Daily [facility] DO   100 mg at 05/29/22 1026    sodium chloride flush 0.9 % injection 10 mL  10 mL IntraVENous PRN Dedra Greco MD   10 mL at 05/29/22 0428    dexamethasone (DECADRON) injection 4 mg  4 mg IntraVENous Q6H Dedra Greco MD   4 mg at 05/29/22 1026    HYDROmorphone (DILAUDID) injection 0.5 mg  0.5 mg IntraVENous Q4H PRN She Latham MD   0.5 mg at 05/29/22 0855    phytonadione ADULT (VITAMIN K) inj 10 mg/mL  10 mg SubCUTAneous Once Dedra Greco MD        aspirin EC tablet 81 mg  81 mg Oral Daily Rodger Ormond, MD   81 mg at 05/29/22 0854    atorvastatin (LIPITOR) tablet performed by Aquiles Vega MD at [facility]      X3    THORACENTESIS Right 2022    870 ml removed by Dr. Dimitri Marquez - diagnostics sent    THORACENTESIS Left 2022    285 ml removed by Dr Dimitri Marquez - cytology only sent       Social Hx:  Social History     Socioeconomic History    Marital status: Single     Spouse name: None    Number of children: 2    Years of education: None    Highest education level: Associate degree: occupational, technical, or vocational program   Occupational History    Occupation:     Tobacco Use    Smoking status: Former Smoker     Packs/day: 2.00     Years: 25.00     Pack years: 50.00     Types: Cigars     Quit date: 1995     Years since quittin.4    Smokeless tobacco: Never Used    Tobacco comment: RARE CIGAR FROM TIME TO TIME   Vaping Use    Vaping Use: Never used   Substance and Sexual Activity    Alcohol use: Yes     Alcohol/week: 4.0 standard drinks     Types: 2 Cans of beer, 2 Shots of liquor per week     Comment: Social    Drug use: Not Currently     Types: Marijuana Thania Lauren)    Sexual activity: Not Currently   Other Topics Concern    None   Social History Narrative    Single floor home    4 steps to get into the home    Full basement does not go downstairs    Lives with girlfriend and her son    He has two children one in [place] and one in [place]    One Dog     Working smoke detectors and CO2     Social Determinants of Health     Financial Resource Strain:     Difficulty of Paying Living Expenses: Not on file   Food Insecurity:     Worried About [address] in the Last Year: Not on file    [address] in the Last Year: Not on file   Transportation Needs:     Lack of Transportation (Medical): Not on file    Lack of Transportation (Non-Medical):  Not on file   Physical Activity:     Days of Exercise per Week: Not on file    Minutes of Exercise per Session: Not on file   Stress:     Feeling of Stress : Not on file   Social Connections:     Frequency of Communication with Friends and Family: Not on file    Frequency of Social Gatherings with Friends and Family: Not on file    Attends Yazidism Services: Not on file    Active Member of Clubs or Organizations: Not on file    Attends Club or Organization Meetings: Not on file    Marital Status: Not on file   Intimate Partner Violence:     Fear of Current or Ex-Partner: Not on file    Emotionally Abused: Not on file    Physically Abused: Not on file    Sexually Abused: Not on file   Housing Stability:     Unable to Pay for Housing in the Last Year: Not on file    Number of Places Lived in the Last Year: Not on file    Unstable Housing in the Last Year: Not on file       Family Hx:  Family History   Problem Relation Age of Onset    Cancer Mother 76        BONE    Cancer Father 64        COLON    Colon Cancer Father     Cancer Brother 43        THYROID    Diabetes Maternal Grandmother        Review of Systems:   Review of Systems      Physical Examination:    BP (!) 118/58   Pulse 63   Temp 97.9 °F (36.6 °C) (Oral)   Resp 18   Ht 5' 10\" (1.778 m)   Wt 174 lb (78.9 kg)   SpO2 98%   BMI 24.97 kg/m²    Physical Exam    LABS:  CBC:  Lab Results   Component Value Date    WBC 5.5 05/29/2022    RBC 2.73 05/29/2022    HGB 8.2 05/29/2022    HCT 24.9 05/29/2022    MCV 91.2 05/29/2022    MCH 29.9 05/29/2022    MCHC 32.8 05/29/2022    RDW 20.0 05/29/2022     05/29/2022     CBC with Differential:   Lab Results   Component Value Date    WBC 5.5 05/29/2022    RBC 2.73 05/29/2022    HGB 8.2 05/29/2022    HCT 24.9 05/29/2022     05/29/2022    MCV 91.2 05/29/2022    MCH 29.9 05/29/2022    MCHC 32.8 05/29/2022    RDW 20.0 05/29/2022    NRBC 2 05/26/2022    BANDSPCT 4 05/26/2022    LYMPHOPCT 26.1 05/27/2022    MONOPCT 5.4 05/27/2022    BASOPCT 0.5 05/27/2022    MONOSABS 0.3 05/27/2022    LYMPHSABS 1.4 05/27/2022    EOSABS 0.0 05/27/2022    BASOSABS 0.0 05/27/2022     CMP:    Lab Results   Component Value Date     05/29/2022    K 4.3 05/29/2022     05/29/2022    CO2 19 05/29/2022    BUN 18 05/29/2022    CREATININE 0.82 05/29/2022    GFRAA >60.0 05/29/2022    LABGLOM >60.0 05/29/2022    GLUCOSE 151 05/29/2022    PROT 5.7 05/29/2022    LABALBU 3.2 05/29/2022    CALCIUM 8.7 05/29/2022    BILITOT 0.5 05/29/2022    ALKPHOS 282 05/29/2022    AST 14 05/29/2022    ALT <5 05/29/2022     BMP:    Lab Results   Component Value Date     05/29/2022    K 4.3 05/29/2022     05/29/2022    CO2 19 05/29/2022    BUN 18 05/29/2022    LABALBU 3.2 05/29/2022    CREATININE 0.82 05/29/2022    CALCIUM 8.7 05/29/2022    GFRAA >60.0 05/29/2022    LABGLOM >60.0 05/29/2022    GLUCOSE 151 05/29/2022     Magnesium:    Lab Results   Component Value Date    MG 2.2 05/26/2022     Troponin:    Lab Results   Component Value Date    TROPONINI <0.010 05/26/2022       EKG: Sinus rhythm without signs of ischemia      Assessment:    Active Hospital Problems    Diagnosis Date Noted    Anemia [D64.9] 05/27/2022     Priority: Medium     Risk stratification prior to undergoing surgery  Near syncope. Currently no arrhythmias on telemetry since admission. Patient is known to have no major valvular disease. EKG at admission no major arrhythmias   CAD status post PCI of mid LAD AMARILIS times 2 to 3 x 18 mm Chace resolute and 2.5 x 15 mm Maxwell resolute on 4/25/2022  PAD status post angioplasties in the past  Hypertension  Hyperlipidemia  Prostate cancer with mets to the spinal cord    Plan:  Continue telemetry  Continue aspirin and high intensity statin for secondary prevention of CAD  Restratification prior to undergoing surgery. Currently patient does not have signs of ischemia, by EKG or by patient's history. Patient is not in heart failure. Not known valvular disease. No arrhythmias since admission although EKG at admission showed .   Although

## 2022-05-29 NOTE — PROGRESS NOTES
5/29/22    From: HOME WITH PARTNER [facility]    Admit: ANEMIA, BIALT PLEURAL EFFUSIONS     PMH: ASA-CPAP, HLD, CAD, PAD, PROSTATE CANCER WITH BONE METS, LUNG CANCER    Anticipated Discharge Disposition: REFUSING TRANSFER TO  OR [facility] PER DR FORD RECOMMENDATION. 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 Patient Mobility or PT/OT ordered:  Consults:  PALL CARE,, HEM/ONC, JOE PER [facility]    Covid result &/or vacc status: VACC X3    Barriers to Discharge: HGB 8. O - 7.1  BILAT PLEURAL EFFUSIONS VS PNA  5/28: REACHED OUT TO [facility] PER PT CHOICE AND [facility] FOR ACCEPTANCE.       Assessments: CMI DONE, [facility] NOTIFIED

## 2022-05-29 NOTE — PROGRESS NOTES
Department of Internal Medicine  General Internal Medicine  Attending Progress Note      SUBJECTIVE:  Pt seen and examined. States weakness much improved. Still weak with ambulation, but states he feels better.  Pt wants to return home at discharge    OBJECTIVE      Medications    Current Facility-Administered Medications: docusate sodium (COLACE) capsule 100 mg, 100 mg, Oral, Daily  metoprolol succinate (TOPROL XL) extended release tablet 25 mg, 25 mg, Oral, Daily  losartan (COZAAR) tablet 25 mg, 25 mg, Oral, Daily  sodium chloride flush 0.9 % injection 10 mL, 10 mL, IntraVENous, PRN  dexamethasone (DECADRON) injection 4 mg, 4 mg, IntraVENous, Q6H  HYDROmorphone (DILAUDID) injection 0.5 mg, 0.5 mg, IntraVENous, Q4H PRN  phytonadione ADULT (VITAMIN K) inj 10 mg/mL, 10 mg, SubCUTAneous, Once  aspirin EC tablet 81 mg, 81 mg, Oral, Daily  atorvastatin (LIPITOR) tablet 40 mg, 40 mg, Oral, Daily  clopidogrel (PLAVIX) tablet 75 mg, 75 mg, Oral, Daily  sodium chloride flush 0.9 % injection 5-40 mL, 5-40 mL, IntraVENous, 2 times per day  sodium chloride flush 0.9 % injection 5-40 mL, 5-40 mL, IntraVENous, PRN  0.9 % sodium chloride infusion, 25 mL, IntraVENous, PRN  enoxaparin (LOVENOX) injection 40 mg, 40 mg, SubCUTAneous, Daily  ondansetron (ZOFRAN-ODT) disintegrating tablet 4 mg, 4 mg, Oral, Q8H PRN **OR** ondansetron (ZOFRAN) injection 4 mg, 4 mg, IntraVENous, Q6H PRN  polyethylene glycol (GLYCOLAX) packet 17 g, 17 g, Oral, Daily PRN  acetaminophen (TYLENOL) tablet 650 mg, 650 mg, Oral, Q6H PRN **OR** acetaminophen (TYLENOL) suppository 650 mg, 650 mg, Rectal, Q6H PRN  HYDROcodone-acetaminophen (NORCO) 7.5-325 MG per tablet 1 tablet, 1 tablet, Oral, Q6H PRN  0.9 % sodium chloride infusion, , IntraVENous, PRN  acetaminophen (TYLENOL) tablet 650 mg, 650 mg, Oral, See Admin Instructions  diphenhydrAMINE (BENADRYL) tablet 25 mg, 25 mg, Oral, See Admin Instructions  Physical    VITALS:  BP (!) 118/58   Pulse 63   Temp 97.9 °F (36.6 °C) (Oral)   Resp 18   Ht 5' 10\" (1.778 m)   Wt 174 lb (78.9 kg)   SpO2 98%   BMI 24.97 kg/m²   Constitutional: Awake and alert in no acute distress. Lying in bed comfortably  Head: Normocephalic, atraumatic  Eyes: EOMI, PERRLA  ENT: moist mucous membranes  Neck: neck supple, trachea midline  Lungs: Good inspiratory effort, no wheeze, no rhonchi, no rales  Heart: RRR, normal S1 and S2  GI: Soft, non-distended, non tender, no guarding, no rebound, +BS  MSK: no edema noted  Skin: warm, dry  Psych: appropriate affect     Data    CBC:   Lab Results   Component Value Date    WBC 5.5 05/29/2022    RBC 2.73 05/29/2022    HGB 8.2 05/29/2022    HCT 24.9 05/29/2022    MCV 91.2 05/29/2022    MCH 29.9 05/29/2022    MCHC 32.8 05/29/2022    RDW 20.0 05/29/2022     05/29/2022     CMP:    Lab Results   Component Value Date     05/29/2022    K 4.3 05/29/2022     05/29/2022    CO2 19 05/29/2022    BUN 18 05/29/2022    CREATININE 0.82 05/29/2022    GFRAA >60.0 05/29/2022    LABGLOM >60.0 05/29/2022    GLUCOSE 151 05/29/2022    PROT 5.7 05/29/2022    LABALBU 3.2 05/29/2022    CALCIUM 8.7 05/29/2022    BILITOT 0.5 05/29/2022    ALKPHOS 282 05/29/2022    AST 14 05/29/2022    ALT <5 05/29/2022       ASSESSMENT AND PLAN      # Metastatic prostate cancer to bone with T4/T5 cord compression  - NSGY consulted - non surgical due to being on plavix for CAD w/stents  - started on decadron  - palliative care consulted  - rad/onc planning on radiation treatment Tuesday     # Near syncope - resolved  - monitor    # Anemia  - oncology consulted  - sp 1U blood  - continue to monitor    Disposition: Plan is for radiation treatment Tuesday. PT/OT. Pt wants to go home pending improvement after radiation but pre-cert for [facility] started. Palliative care, NSGY, oncology consulted.        Ruthie Shirley DO  Internal Medicine   Hospitalist    >35 minutes in total care time

## 2022-05-30 PROBLEM — C79.51: Status: ACTIVE | Noted: 2022-05-30

## 2022-05-30 LAB
ALBUMIN SERPL-MCNC: 3.6 G/DL (ref 3.5–4.6)
ALP BLD-CCNC: 306 U/L (ref 35–104)
ALT SERPL-CCNC: 6 U/L (ref 0–41)
ANION GAP SERPL CALCULATED.3IONS-SCNC: 17 MEQ/L (ref 9–15)
AST SERPL-CCNC: 26 U/L (ref 0–40)
BILIRUB SERPL-MCNC: 0.4 MG/DL (ref 0.2–0.7)
BUN BLDV-MCNC: 23 MG/DL (ref 8–23)
CALCIUM SERPL-MCNC: 8.9 MG/DL (ref 8.5–9.9)
CHLORIDE BLD-SCNC: 102 MEQ/L (ref 95–107)
CO2: 18 MEQ/L (ref 20–31)
CREAT SERPL-MCNC: 0.73 MG/DL (ref 0.7–1.2)
GFR AFRICAN AMERICAN: >60
GFR NON-AFRICAN AMERICAN: >60
GLOBULIN: 2.7 G/DL (ref 2.3–3.5)
GLUCOSE BLD-MCNC: 123 MG/DL (ref 70–99)
HCT VFR BLD CALC: 26.6 % (ref 42–52)
HEMOGLOBIN: 8.8 G/DL (ref 14–18)
MCH RBC QN AUTO: 30.1 PG (ref 27–31.3)
MCHC RBC AUTO-ENTMCNC: 33.2 % (ref 33–37)
MCV RBC AUTO: 90.6 FL (ref 80–100)
PDW BLD-RTO: 20.8 % (ref 11.5–14.5)
PLATELET # BLD: 133 K/UL (ref 130–400)
POTASSIUM REFLEX MAGNESIUM: 4.7 MEQ/L (ref 3.4–4.9)
RBC # BLD: 2.93 M/UL (ref 4.7–6.1)
SODIUM BLD-SCNC: 137 MEQ/L (ref 135–144)
TOTAL PROTEIN: 6.3 G/DL (ref 6.3–8)
WBC # BLD: 7.6 K/UL (ref 4.8–10.8)

## 2022-05-30 PROCEDURE — 6370000000 HC RX 637 (ALT 250 FOR IP): Performed by: INTERNAL MEDICINE

## 2022-05-30 PROCEDURE — 6360000002 HC RX W HCPCS: Performed by: INTERNAL MEDICINE

## 2022-05-30 PROCEDURE — 2580000003 HC RX 258: Performed by: INTERNAL MEDICINE

## 2022-05-30 PROCEDURE — 97116 GAIT TRAINING THERAPY: CPT

## 2022-05-30 PROCEDURE — 80053 COMPREHEN METABOLIC PANEL: CPT

## 2022-05-30 PROCEDURE — 97166 OT EVAL MOD COMPLEX 45 MIN: CPT

## 2022-05-30 PROCEDURE — 99233 SBSQ HOSP IP/OBS HIGH 50: CPT | Performed by: INTERNAL MEDICINE

## 2022-05-30 PROCEDURE — 36415 COLL VENOUS BLD VENIPUNCTURE: CPT

## 2022-05-30 PROCEDURE — 6370000000 HC RX 637 (ALT 250 FOR IP): Performed by: FAMILY MEDICINE

## 2022-05-30 PROCEDURE — 85027 COMPLETE CBC AUTOMATED: CPT

## 2022-05-30 PROCEDURE — 97535 SELF CARE MNGMENT TRAINING: CPT

## 2022-05-30 PROCEDURE — 1210000000 HC MED SURG R&B

## 2022-05-30 RX ADMIN — DEXAMETHASONE SODIUM PHOSPHATE 4 MG: 4 INJECTION, SOLUTION INTRA-ARTICULAR; INTRALESIONAL; INTRAMUSCULAR; INTRAVENOUS; SOFT TISSUE at 04:40

## 2022-05-30 RX ADMIN — ENOXAPARIN SODIUM 40 MG: 100 INJECTION SUBCUTANEOUS at 09:42

## 2022-05-30 RX ADMIN — ASPIRIN 81 MG: 81 TABLET, COATED ORAL at 09:42

## 2022-05-30 RX ADMIN — HYDROCODONE BITARTRATE AND ACETAMINOPHEN 1 TABLET: 7.5; 325 TABLET ORAL at 10:43

## 2022-05-30 RX ADMIN — SODIUM CHLORIDE, PRESERVATIVE FREE 10 ML: 5 INJECTION INTRAVENOUS at 01:40

## 2022-05-30 RX ADMIN — Medication 10 ML: at 09:43

## 2022-05-30 RX ADMIN — ATORVASTATIN CALCIUM 40 MG: 40 TABLET, FILM COATED ORAL at 09:42

## 2022-05-30 RX ADMIN — SODIUM CHLORIDE, PRESERVATIVE FREE 10 ML: 5 INJECTION INTRAVENOUS at 04:44

## 2022-05-30 RX ADMIN — HYDROMORPHONE HYDROCHLORIDE 0.5 MG: 1 INJECTION, SOLUTION INTRAMUSCULAR; INTRAVENOUS; SUBCUTANEOUS at 20:00

## 2022-05-30 RX ADMIN — CLOPIDOGREL BISULFATE 75 MG: 75 TABLET ORAL at 09:42

## 2022-05-30 RX ADMIN — HYDROMORPHONE HYDROCHLORIDE 0.5 MG: 1 INJECTION, SOLUTION INTRAMUSCULAR; INTRAVENOUS; SUBCUTANEOUS at 06:45

## 2022-05-30 RX ADMIN — Medication 10 ML: at 22:35

## 2022-05-30 RX ADMIN — HYDROCODONE BITARTRATE AND ACETAMINOPHEN 1 TABLET: 7.5; 325 TABLET ORAL at 22:38

## 2022-05-30 RX ADMIN — DOCUSATE SODIUM 100 MG: 100 CAPSULE, LIQUID FILLED ORAL at 09:41

## 2022-05-30 RX ADMIN — DEXAMETHASONE SODIUM PHOSPHATE 4 MG: 4 INJECTION, SOLUTION INTRA-ARTICULAR; INTRALESIONAL; INTRAMUSCULAR; INTRAVENOUS; SOFT TISSUE at 09:42

## 2022-05-30 RX ADMIN — HYDROCODONE BITARTRATE AND ACETAMINOPHEN 1 TABLET: 7.5; 325 TABLET ORAL at 04:40

## 2022-05-30 RX ADMIN — HYDROMORPHONE HYDROCHLORIDE 0.5 MG: 1 INJECTION, SOLUTION INTRAMUSCULAR; INTRAVENOUS; SUBCUTANEOUS at 12:29

## 2022-05-30 RX ADMIN — SODIUM CHLORIDE, PRESERVATIVE FREE 10 ML: 5 INJECTION INTRAVENOUS at 19:34

## 2022-05-30 RX ADMIN — METOPROLOL SUCCINATE 25 MG: 25 TABLET, EXTENDED RELEASE ORAL at 09:42

## 2022-05-30 RX ADMIN — HYDROCODONE BITARTRATE AND ACETAMINOPHEN 1 TABLET: 7.5; 325 TABLET ORAL at 16:49

## 2022-05-30 RX ADMIN — DEXAMETHASONE SODIUM PHOSPHATE 4 MG: 4 INJECTION, SOLUTION INTRA-ARTICULAR; INTRALESIONAL; INTRAMUSCULAR; INTRAVENOUS; SOFT TISSUE at 16:49

## 2022-05-30 RX ADMIN — LOSARTAN POTASSIUM 25 MG: 25 TABLET, FILM COATED ORAL at 09:42

## 2022-05-30 RX ADMIN — DEXAMETHASONE SODIUM PHOSPHATE 4 MG: 4 INJECTION, SOLUTION INTRA-ARTICULAR; INTRALESIONAL; INTRAMUSCULAR; INTRAVENOUS; SOFT TISSUE at 22:34

## 2022-05-30 ASSESSMENT — PAIN DESCRIPTION - ORIENTATION
ORIENTATION: MID;LOWER
ORIENTATION: RIGHT;LEFT;MID;LOWER
ORIENTATION: MID

## 2022-05-30 ASSESSMENT — PAIN SCALES - GENERAL
PAINLEVEL_OUTOF10: 4
PAINLEVEL_OUTOF10: 4
PAINLEVEL_OUTOF10: 6
PAINLEVEL_OUTOF10: 7

## 2022-05-30 ASSESSMENT — PAIN DESCRIPTION - LOCATION
LOCATION: BACK
LOCATION: BACK;OTHER (COMMENT)
LOCATION: BACK
LOCATION: BACK

## 2022-05-30 ASSESSMENT — PAIN - FUNCTIONAL ASSESSMENT
PAIN_FUNCTIONAL_ASSESSMENT: PREVENTS OR INTERFERES SOME ACTIVE ACTIVITIES AND ADLS

## 2022-05-30 ASSESSMENT — ENCOUNTER SYMPTOMS
CONSTIPATION: 0
COLOR CHANGE: 0
NAUSEA: 0
DIARRHEA: 0
WHEEZING: 0
COUGH: 0
SHORTNESS OF BREATH: 0
VOMITING: 0
ABDOMINAL PAIN: 0
EYE REDNESS: 0
BACK PAIN: 1
CHEST TIGHTNESS: 0
RHINORRHEA: 0
APNEA: 0

## 2022-05-30 ASSESSMENT — PAIN DESCRIPTION - DESCRIPTORS
DESCRIPTORS: SPASM
DESCRIPTORS: THROBBING
DESCRIPTORS: ACHING;SPASM
DESCRIPTORS: ACHING;THROBBING

## 2022-05-30 NOTE — PROGRESS NOTES
Cardiology Consult Note  Patient: Sheron Reynoso  Unit/Bed: B896/H384-27  YOB: 1950  MRN: [id number]  Acct: [de-identified]   Admitting Diagnosis: Lightheadedness [R42]  Anemia [D64.9]  Bilateral pleural effusion [J90]  Anemia, unspecified type [D64.9]  Malignant neoplasm of bone, unspecified location [facility]) [C41.9]  Date:  5/26/2022  Hospital Day: 3      Chief Complaint:  Dizziness    Reason of this consult  Risk ratification prior to undergoing back surgery    History of Present Illness:  77-year-old male who follows up in clinic with Dr. Turner Michel has history of CAD status post PCI of mid LAD AMARILIS times 2 to 3 x 18 mm Chace resolute and 2.5 x 15 mm [place] resolute on 4/25/2022, PAD status post angioplasties in the past  Hypertension, Hyperlipidemia, Prostate cancer,   Patient admitted to the hospital for dizziness  Cardiology consulted for restratification prior to undergoing back surgery    Patient was found with prostate cancer metastasis to the spine.   MRI consistent with spinal cord compression T4-T5  EKG normal sinus rhythm without signs of ischemia  TTE 4/8/2022 EF 40%    Allergies   Allergen Reactions    Morphine Swelling and Rash       Current Facility-Administered Medications   Medication Dose Route Frequency Provider Last Rate Last Admin    docusate sodium (COLACE) capsule 100 mg  100 mg Oral Daily [place] Dikes, DO   100 mg at 05/30/22 0941    metoprolol succinate (TOPROL XL) extended release tablet 25 mg  25 mg Oral Daily Alex Velarde MD   25 mg at 05/30/22 5681    losartan (COZAAR) tablet 25 mg  25 mg Oral Daily Alex Velarde MD   25 mg at 05/30/22 7625    sodium chloride flush 0.9 % injection 10 mL  10 mL IntraVENous PRN Jay Forrest MD   10 mL at 05/30/22 0444    dexamethasone (DECADRON) injection 4 mg  4 mg IntraVENous Q6H Jay Forrest MD   4 mg at 05/30/22 5820    HYDROmorphone (DILAUDID) injection 0.5 mg  0.5 mg IntraVENous Q4H PRN Adan Valdivia MD   0.5 mg at 05/30/22 1616    phytonadione ADULT (VITAMIN K) inj 10 mg/mL  10 mg SubCUTAneous Once Charline Gannon MD        aspirin EC tablet 81 mg  81 mg Oral Daily Quirino Gates MD   81 mg at 05/30/22 0942    atorvastatin (LIPITOR) tablet 40 mg  40 mg Oral Daily Quirino Gates MD   40 mg at 05/30/22 7434    clopidogrel (PLAVIX) tablet 75 mg  75 mg Oral Daily Quirino Gates MD   75 mg at 05/30/22 2855    sodium chloride flush 0.9 % injection 5-40 mL  5-40 mL IntraVENous 2 times per day Quirino Gates MD   10 mL at 05/30/22 0943    sodium chloride flush 0.9 % injection 5-40 mL  5-40 mL IntraVENous PRN Quirino Gates MD        0.9 % sodium chloride infusion  25 mL IntraVENous PRN Quirino Gates MD        enoxaparin (LOVENOX) injection 40 mg  40 mg SubCUTAneous Daily Quirino Gates MD   40 mg at 05/30/22 0942    ondansetron (ZOFRAN-ODT) disintegrating tablet 4 mg  4 mg Oral Q8H PRN Quirino Gates MD        Or    ondansetron [facility]) injection 4 mg  4 mg IntraVENous Q6H PRN Quirino Gates MD   4 mg at 05/28/22 0806    polyethylene glycol (GLYCOLAX) packet 17 g  17 g Oral Daily PRN Quirino Gates MD   17 g at 05/29/22 0854    acetaminophen (TYLENOL) tablet 650 mg  650 mg Oral Q6H PRN Quirino Gates MD        Or    acetaminophen (TYLENOL) suppository 650 mg  650 mg Rectal Q6H PRN Quirino Gates MD        HYDROcodone-acetaminophen (NORCO) 7.5-325 MG per tablet 1 tablet  1 tablet Oral Q6H PRN JACQUELYN Mulligan - CNP   1 tablet at 05/30/22 1043    0.9 % sodium chloride infusion   IntraVENous PRN Charline Gannon MD        acetaminophen (TYLENOL) tablet 650 mg  650 mg Oral See Admin Instructions Charline Gannon MD        diphenhydrAMINE (BENADRYL) tablet 25 mg  25 mg Oral See Admin Instructions Charline Gannon MD           PMHx:  Past Medical History:   Diagnosis Date    CAD (coronary artery disease)     Family history of colon cancer     Hyperlipidemia     Hypertension     Old MI (myocardial infarction) 02/03/2006    UTE on CPAP     PAD (peripheral artery disease) (HCC)     Prostate cancer [facility])        PSHx:  Past Surgical History:   Procedure Laterality Date    COLONOSCOPY N/A 2019    COLORECTAL CANCER SCREENING, NOT HIGH RISK performed by Jerry Santamaria MD at [facility]      X3    THORACENTESIS Right 2022    870 ml removed by Dr. Hemant Dunn - diagnostics sent    THORACENTESIS Left 2022    285 ml removed by Dr Hemant Dunn - cytology only sent       Social Hx:  Social History     Socioeconomic History    Marital status: Single     Spouse name: None    Number of children: 2    Years of education: None    Highest education level: Associate degree: occupational, technical, or vocational program   Occupational History    Occupation:     Tobacco Use    Smoking status: Former Smoker     Packs/day: 2.00     Years: 25.00     Pack years: 50.00     Types: Cigars     Quit date: 1995     Years since quittin.4    Smokeless tobacco: Never Used    Tobacco comment: RARE CIGAR FROM TIME TO TIME   Vaping Use    Vaping Use: Never used   Substance and Sexual Activity    Alcohol use:  Yes     Alcohol/week: 4.0 standard drinks     Types: 2 Cans of beer, 2 Shots of liquor per week     Comment: Social    Drug use: Not Currently     Types: Marijuana Blanca Awkward)    Sexual activity: Not Currently   Other Topics Concern    None   Social History Narrative    Single floor home    4 steps to get into the home    Full basement does not go downstairs    Lives with girlfriend and her son    He has two children one in [place] and one in [place]    One Dog     Working smoke detectors and CO2     Social Determinants of Health     Financial Resource Strain:     Difficulty of Paying Living Expenses: Not on file   Food Insecurity:     Worried About [address] in the Last Year: Not on file    Eloy of Food in the Last Year: Not on file   Transportation Needs:     Lack of Transportation (Medical): Not on file    Lack of Transportation (Non-Medical): Not on file   Physical Activity:     Days of Exercise per Week: Not on file    Minutes of Exercise per Session: Not on file   Stress:     Feeling of Stress : Not on file   Social Connections:     Frequency of Communication with Friends and Family: Not on file    Frequency of Social Gatherings with Friends and Family: Not on file    Attends Spiritism Services: Not on file    Active Member of [address] [place] [address] [employer] or Organizations: Not on file    Attends Club or Organization Meetings: Not on file    Marital Status: Not on file   Intimate Partner Violence:     Fear of Current or Ex-Partner: Not on file    Emotionally Abused: Not on file    Physically Abused: Not on file    Sexually Abused: Not on file   Housing Stability:     Unable to Pay for Housing in the Last Year: Not on file    Number of [place] in the Last Year: Not on file    Unstable Housing in the Last Year: Not on file       Family Hx:  Family History   Problem Relation Age of Onset    Cancer Mother 76        BONE    Cancer Father 64        COLON    Colon Cancer Father     Cancer Brother 43        THYROID    Diabetes Maternal Grandmother        Review of Systems:   Review of Systems   Constitutional: Negative for activity change, chills, diaphoresis and fever. HENT: Negative for congestion, ear pain, nosebleeds and rhinorrhea. Eyes: Negative for redness and visual disturbance. Respiratory: Negative for apnea, cough, chest tightness, shortness of breath and wheezing. Cardiovascular: Negative for chest pain, palpitations and leg swelling. Gastrointestinal: Negative for abdominal pain, constipation, diarrhea, nausea and vomiting. Genitourinary: Negative for difficulty urinating and dysuria. Musculoskeletal: Positive for arthralgias and back pain. Negative for joint swelling. Skin: Negative for color change, rash and wound.    Neurological: Negative for dizziness, syncope, weakness, numbness and headaches. Psychiatric/Behavioral: Negative. Physical Examination:    BP (!) 141/65   Pulse 66   Temp 97.9 °F (36.6 °C)   Resp 16   Ht 5' 10\" (1.778 m)   Wt 175 lb 8 oz (79.6 kg)   SpO2 99%   BMI 25.18 kg/m²    Physical Exam  Vitals and nursing note reviewed. HENT:      Head: Normocephalic and atraumatic. Mouth/Throat:      Mouth: Mucous membranes are moist.      Pharynx: Oropharynx is clear. Eyes:      Extraocular Movements: Extraocular movements intact. Conjunctiva/sclera: Conjunctivae normal.      Pupils: Pupils are equal, round, and reactive to light. Cardiovascular:      Rate and Rhythm: Normal rate and regular rhythm. Pulses: Normal pulses. Heart sounds: Normal heart sounds. Pulmonary:      Effort: Pulmonary effort is normal.      Breath sounds: Normal breath sounds. Abdominal:      General: Abdomen is flat. Bowel sounds are normal.      Palpations: Abdomen is soft. Musculoskeletal:         General: Normal range of motion. Cervical back: Normal range of motion and neck supple. Skin:     General: Skin is warm. Neurological:      Mental Status: He is alert and oriented to person, place, and time.    Psychiatric:         Mood and Affect: Mood normal.         LABS:  CBC:  Lab Results   Component Value Date    WBC 7.6 05/30/2022    RBC 2.93 05/30/2022    HGB 8.8 05/30/2022    HCT 26.6 05/30/2022    MCV 90.6 05/30/2022    MCH 30.1 05/30/2022    MCHC 33.2 05/30/2022    RDW 20.8 05/30/2022     05/30/2022     CBC with Differential:   Lab Results   Component Value Date    WBC 7.6 05/30/2022    RBC 2.93 05/30/2022    HGB 8.8 05/30/2022    HCT 26.6 05/30/2022     05/30/2022    MCV 90.6 05/30/2022    MCH 30.1 05/30/2022    MCHC 33.2 05/30/2022    RDW 20.8 05/30/2022    NRBC 2 05/26/2022    BANDSPCT 4 05/26/2022    LYMPHOPCT 26.1 05/27/2022    MONOPCT 5.4 05/27/2022    BASOPCT 0.5 05/27/2022    MONOSABS 0.3 05/27/2022    LYMPHSABS 1.4 05/27/2022    EOSABS 0.0 05/27/2022    BASOSABS 0.0 05/27/2022     CMP:    Lab Results   Component Value Date     05/30/2022    K 4.7 05/30/2022     05/30/2022    CO2 18 05/30/2022    BUN 23 05/30/2022    CREATININE 0.73 05/30/2022    GFRAA >60.0 05/30/2022    LABGLOM >60.0 05/30/2022    GLUCOSE 123 05/30/2022    PROT 6.3 05/30/2022    LABALBU 3.6 05/30/2022    CALCIUM 8.9 05/30/2022    BILITOT 0.4 05/30/2022    ALKPHOS 306 05/30/2022    AST 26 05/30/2022    ALT 6 05/30/2022     BMP:    Lab Results   Component Value Date     05/30/2022    K 4.7 05/30/2022     05/30/2022    CO2 18 05/30/2022    BUN 23 05/30/2022    LABALBU 3.6 05/30/2022    CREATININE 0.73 05/30/2022    CALCIUM 8.9 05/30/2022    GFRAA >60.0 05/30/2022    LABGLOM >60.0 05/30/2022    GLUCOSE 123 05/30/2022     Magnesium:    Lab Results   Component Value Date    MG 2.2 05/26/2022     Troponin:    Lab Results   Component Value Date    TROPONINI <0.010 05/26/2022       EKG: Sinus rhythm without signs of ischemia      Assessment:    Active Hospital Problems    Diagnosis Date Noted    PAD [facility]peripheral artery disease) ([facility] [place] [I73.9]      Priority: High    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 Currently no arrhythmias on telemetry since admission. Patient is known to have no major valvular disease. EKG at admission no major arrhythmias   CAD status post PCI of mid LAD AMARILIS times 2 to 3 x 18 mm Chace resolute and 2.5 x 15 mm [place] resolute on 4/25/2022  PAD status post angioplasties in the past  Hypertension  Hyperlipidemia  Prostate cancer with mets to the spinal cord    Plan:  Continue telemetry  Continue aspirin and high intensity statin for secondary prevention of CAD  Restratification prior to undergoing surgery. Currently patient does not have signs of ischemia, by EKG or by patient's history. Patient is not in heart failure. Not known valvular disease. No arrhythmias since admission although EKG at admission showed . Although patient does not have any factors to prohibit his back surgery, given that this is not an urgent procedure I would recommend holding surgery for at least 6 months given that patient had PCI a month ago. Unless surgery is deemed urgent in that case urgency of surgery would take over the risk of in-stent thrombosis from recent PCI. Continue Plavix ideally for 1 year, okay to stop in 6 months if surgery is planned  Start metoprolol 25 mg daily  Start losartan 25 mg daily  Please avoid QTC prolonging agents  Please keep potassium between 4 and 5 magnesium above 2  Please keep hemoglobin above 8  Cardiac wise patient is stable, I will sign off.   Please reconsult if further questions arise  I will make arrangements for patient to see Dr. Gagan Velazquez in clinic in 3 to 4 weeks after discharge         Electronically signed by Theo Mota MD on 5/30/2022 at 11:02 AM

## 2022-05-30 NOTE — PROGRESS NOTES
Hematology/Oncology  Attending Progress Note        CHIEF COMPLAINT/HPI:  Back pain is under control with current pain med; same strength in both legs    REVIEW OF SYSTEMS:    Unremarkable except for symptoms mentioned in HPI.     Current Inpatient Medications:    Current Facility-Administered Medications: docusate sodium (COLACE) capsule 100 mg, 100 mg, Oral, Daily  metoprolol succinate (TOPROL XL) extended release tablet 25 mg, 25 mg, Oral, Daily  losartan (COZAAR) tablet 25 mg, 25 mg, Oral, Daily  sodium chloride flush 0.9 % injection 10 mL, 10 mL, IntraVENous, PRN  dexamethasone (DECADRON) injection 4 mg, 4 mg, IntraVENous, Q6H  HYDROmorphone (DILAUDID) injection 0.5 mg, 0.5 mg, IntraVENous, Q4H PRN  phytonadione ADULT (VITAMIN K) inj 10 mg/mL, 10 mg, SubCUTAneous, Once  aspirin EC tablet 81 mg, 81 mg, Oral, Daily  atorvastatin (LIPITOR) tablet 40 mg, 40 mg, Oral, Daily  clopidogrel (PLAVIX) tablet 75 mg, 75 mg, Oral, Daily  sodium chloride flush 0.9 % injection 5-40 mL, 5-40 mL, IntraVENous, 2 times per day  sodium chloride flush 0.9 % injection 5-40 mL, 5-40 mL, IntraVENous, PRN  0.9 % sodium chloride infusion, 25 mL, IntraVENous, PRN  enoxaparin (LOVENOX) injection 40 mg, 40 mg, SubCUTAneous, Daily  ondansetron (ZOFRAN-ODT) disintegrating tablet 4 mg, 4 mg, Oral, Q8H PRN **OR** ondansetron (ZOFRAN) injection 4 mg, 4 mg, IntraVENous, Q6H PRN  polyethylene glycol (GLYCOLAX) packet 17 g, 17 g, Oral, Daily PRN  acetaminophen (TYLENOL) tablet 650 mg, 650 mg, Oral, Q6H PRN **OR** acetaminophen (TYLENOL) suppository 650 mg, 650 mg, Rectal, Q6H PRN  HYDROcodone-acetaminophen (NORCO) 7.5-325 MG per tablet 1 tablet, 1 tablet, Oral, Q6H PRN  0.9 % sodium chloride infusion, , IntraVENous, PRN  acetaminophen (TYLENOL) tablet 650 mg, 650 mg, Oral, See Admin Instructions  diphenhydrAMINE (BENADRYL) tablet 25 mg, 25 mg, Oral, See Admin Instructions    PHYSICAL EXAM:    VITALS:  /63   Pulse 59   Temp 97.7 °F (36.5 °C) (Oral)   Resp 18   Ht 5' 10\" (1.778 m)   Wt 175 lb 8 oz (79.6 kg)   SpO2 98%   BMI 25.18 kg/m²   INTAKE/OUTPUT:    Intake/Output Summary (Last 24 hours) at 5/30/2022 1541  Last data filed at 5/30/2022 0447  Gross per 24 hour   Intake 480 ml   Output 450 ml   Net 30 ml     CONSTITUTIONAL:  awake, alert, cooperative, no apparent distress, and appears stated age  ENT:  Normocephalic,  oral pharynx with moist mucus membranes, tonsils without erythema or exudates, no epistaxis  NECK:  Supple, symmetrical, trachea midline, no adenopathy,  LUNGS:  No increased work of breathing, good air exchange, clear to auscultation bilaterally, no crackles or wheezing  CARDIOVASCULAR:  Normal HS; regular rate and rhythm,  ABDOMEN: soft, non-distended, non-tender,no masses palpated,no hepatosplenomegaly  MUSCULOSKELETAL:  There is no pedal edema; no calf tenderness  NEUROLOGIC:  Awake, alert, oriented to name, place and time. Motor is 4+ to5 out of 5 bilaterally.  Non-focal  SKIN:  no bruising or bleeding and no rashes    DATA:      PT/INR:  No results found for: PTINR  PTT:    Lab Results   Component Value Date    APTT 35.4 05/28/2022     CMP:    Lab Results   Component Value Date     05/30/2022    K 4.7 05/30/2022     05/30/2022    CO2 18 05/30/2022    BUN 23 05/30/2022    PROT 6.3 05/30/2022     Magnesium:    Lab Results   Component Value Date    MG 2.2 05/26/2022     Phosphorus:  No components found for: PO4  Calcium:  No results found for: CA  CBC:    Lab Results   Component Value Date    WBC 7.6 05/30/2022    RBC 2.93 05/30/2022    HGB 8.8 05/30/2022    HCT 26.6 05/30/2022    MCV 90.6 05/30/2022    RDW 20.8 05/30/2022     05/30/2022     DIFF:    Lab Results   Component Value Date    MCV 90.6 05/30/2022    RDW 20.8 05/30/2022      LDH:  No results found for: LDH  Uric Acid:  No components found for: URIC    CBC with Differential:    Lab Results   Component Value Date    WBC 7.6 05/30/2022    RBC 2.93 05/30/2022    HGB 8.8 05/30/2022    HCT 26.6 05/30/2022     05/30/2022    MCV 90.6 05/30/2022    MCH 30.1 05/30/2022    MCHC 33.2 05/30/2022    RDW 20.8 05/30/2022    NRBC 2 05/26/2022    BANDSPCT 4 05/26/2022    LYMPHOPCT 26.1 05/27/2022    MONOPCT 5.4 05/27/2022    BASOPCT 0.5 05/27/2022    MONOSABS 0.3 05/27/2022    LYMPHSABS 1.4 05/27/2022    EOSABS 0.0 05/27/2022    BASOSABS 0.0 05/27/2022     CMP:    Lab Results   Component Value Date     05/30/2022    K 4.7 05/30/2022     05/30/2022    CO2 18 05/30/2022    BUN 23 05/30/2022    CREATININE 0.73 05/30/2022    GFRAA >60.0 05/30/2022    LABGLOM >60.0 05/30/2022    GLUCOSE 123 05/30/2022    PROT 6.3 05/30/2022    LABALBU 3.6 05/30/2022    CALCIUM 8.9 05/30/2022    BILITOT 0.4 05/30/2022    ALKPHOS 306 05/30/2022    AST 26 05/30/2022    ALT 6 05/30/2022     LDH:  No results found for: LDH  Warfarin PT/INR:  No components found for: PTPATWAR, PTINRWAR  PTT:    Lab Results   Component Value Date    APTT 35.4 05/28/2022   [APTT}  VITAMIN B12: No components found for: B12  FOLATE:    Lab Results   Component Value Date    FOLATE 5.7 05/27/2022     IRON:    Lab Results   Component Value Date    IRON 108 05/27/2022     Iron Saturation:  No components found for: PERCENTFE  TIBC:    Lab Results   Component Value Date    TIBC 229 05/27/2022     FERRITIN:  No results found for: FERRITIN  Fibrinogen Level:  No components found for: FIB  24 Hour Urine for Protein:  No components found for: DAVID VillalobosV3  PSA:   Lab Results   Component Value Date    .60 05/06/2022       RADIOLOGY RESULTS:  MRI THORACIC SPINE W WO CONTRAST    Result Date: 5/28/2022  MRI THORACIC SPINE W WO CONTRAST, MRI LUMBAR SPINE W WO CONTRAST HISTORY:   back pain  r/o spinal cord compression; pt has prostate cancer with bone mets  - TECHNIQUE: MR  thoracic and lumbar spine routine protocols without and with contrast. MR Contrast:  MultiHance Contrast Dose:  20 cc Route of Administration:  IV COMPARISON: None. RESULT: THORACIC SPINE: Counting reference:  Lumbosacral junction. For the purposes of this report, L4-5 is considered the level of the iliac crest and there are 5 lumbar-type vertebrae. Anatomic Variants: None. Alignment:  Thoracic kyphosis is maintained. Vertebral body heights and disc spaces are within normal limits. Bone marrow signal/CORD: Diffuse heterogeneous marrow replacement secondary to osseous metastasis. At T4, there is enhancing expansile soft tissue mass involving the posterior left rib, left pedicle and lamina, right pedicle and proximal lamina with infiltration of the spinal canal. There is resultant rightward cord displacement and compression. No cord signal abnormality at this level. No suspicious parenchymal or leptomeningeal enhancement. Thoracic soft tissues: The thoracic paraspinal soft tissues are within normal limits. Thoracic canal and foramina:  Severe canal stenosis and cord compression at T4-T5 secondary to an infiltrative soft tissue mass otherwise, no high-grade canal stenosis or neural foraminal narrowing. LUMBAR SPINE: Counting reference:  Lumbosacral junction. For the purposes of this report, L4-5 is considered the level of the iliac crest and there are 5 lumbar-type vertebrae. Anatomic Variants: None. Alignment:  Lumbar lordosis is maintained. Vertebral body heights and disc spaces are within normal limits. Bone marrow signal/fracture:  Diffuse heterogeneous marrow replacement, compatible with metastatic disease. No evidence of prior fracture. Conus: The conus terminates at L1 and is within normal limits of morphology and signal. Normal course and caliber of the descending cauda equina nerve roots. No evidence of abnormal intradural enhancement following contrast administration. Lumbar soft tissues: The lumbar paraspinal soft tissues are within normal limits. T12-L1:  Canal and foramina are patent.  L1-L2:  Canal and foramina are patent. L2-L3:  Circumferential disc bulge and mild facet degenerative changes without canal stenosis. Neural foramen are patent. L3-L4:  Disc bulge slightly asymmetric to the left, ligamentum flavum hypertrophy and facet degenerative changes with mild canal stenosis. Mild bilateral neural foraminal narrowing. L4-L5:  Facet degenerative changes and small disc bulge flattening the ventral thecal sac. No significant canal stenosis. Mild bilateral neural foraminal narrowing. L5-S1:  Disc bulge asymmetric to the right, facet degenerative change and ligamentum flavum hypertrophy with flattening ventral thecal sac. Right-sided subarticular recess narrowing. Moderate right and mild left neural foraminal narrowing. Sacrum and iliac wings:  Extensive heterogeneous marrow replacement of the sacroiliac loops, secondary to metastatic disease. Thoracic spine: 1. Extensive osseous metastatic disease with soft tissue component at left greater than right T4-T5 with infiltration of the spinal canal and cord compression at this level. 2.No abnormal cord signal, parenchymal or leptomeningeal enhancement. Lumbar spine: 1. Extensive osseous metastatic disease. 2.No abnormal cord signal, parenchymal edema and enhancement. 3.Multilevel degenerative change without high-grade canal stenosis or neural foraminal narrowing. COMMUNICATION:  Communicated with: Brigitte Breen RN on 5/28/2022 at 3:48 PM.      MRI Lumbar Spine W WO Contrast    Result Date: 5/28/2022  MRI THORACIC SPINE W WO CONTRAST, MRI LUMBAR SPINE W WO CONTRAST HISTORY:   back pain  r/o spinal cord compression; pt has prostate cancer with bone mets  - TECHNIQUE: MR  thoracic and lumbar spine routine protocols without and with contrast. MR Contrast:  MultiHance Contrast Dose:  20 cc Route of Administration:  IV COMPARISON: None. RESULT: THORACIC SPINE: Counting reference:  Lumbosacral junction.   For the purposes of this report, L4-5 is considered the level of the iliac crest and there are 5 lumbar-type vertebrae. Anatomic Variants: None. Alignment:  Thoracic kyphosis is maintained. Vertebral body heights and disc spaces are within normal limits. Bone marrow signal/CORD: Diffuse heterogeneous marrow replacement secondary to osseous metastasis. At T4, there is enhancing expansile soft tissue mass involving the posterior left rib, left pedicle and lamina, right pedicle and proximal lamina with infiltration of the spinal canal. There is resultant rightward cord displacement and compression. No cord signal abnormality at this level. No suspicious parenchymal or leptomeningeal enhancement. Thoracic soft tissues: The thoracic paraspinal soft tissues are within normal limits. Thoracic canal and foramina:  Severe canal stenosis and cord compression at T4-T5 secondary to an infiltrative soft tissue mass otherwise, no high-grade canal stenosis or neural foraminal narrowing. LUMBAR SPINE: Counting reference:  Lumbosacral junction. For the purposes of this report, L4-5 is considered the level of the iliac crest and there are 5 lumbar-type vertebrae. Anatomic Variants: None. Alignment:  Lumbar lordosis is maintained. Vertebral body heights and disc spaces are within normal limits. Bone marrow signal/fracture:  Diffuse heterogeneous marrow replacement, compatible with metastatic disease. No evidence of prior fracture. Conus: The conus terminates at L1 and is within normal limits of morphology and signal. Normal course and caliber of the descending cauda equina nerve roots. No evidence of abnormal intradural enhancement following contrast administration. Lumbar soft tissues: The lumbar paraspinal soft tissues are within normal limits. T12-L1:  Canal and foramina are patent. L1-L2:  Canal and foramina are patent. L2-L3:  Circumferential disc bulge and mild facet degenerative changes without canal stenosis. Neural foramen are patent.  L3-L4:  Disc bulge slightly asymmetric to the left, ligamentum flavum hypertrophy and facet degenerative changes with mild canal stenosis. Mild bilateral neural foraminal narrowing. L4-L5:  Facet degenerative changes and small disc bulge flattening the ventral thecal sac. No significant canal stenosis. Mild bilateral neural foraminal narrowing. L5-S1:  Disc bulge asymmetric to the right, facet degenerative change and ligamentum flavum hypertrophy with flattening ventral thecal sac. Right-sided subarticular recess narrowing. Moderate right and mild left neural foraminal narrowing. Sacrum and iliac wings:  Extensive heterogeneous marrow replacement of the sacroiliac loops, secondary to metastatic disease. Thoracic spine: 1. Extensive osseous metastatic disease with soft tissue component at left greater than right T4-T5 with infiltration of the spinal canal and cord compression at this level. 2.No abnormal cord signal, parenchymal or leptomeningeal enhancement. Lumbar spine: 1. Extensive osseous metastatic disease. 2.No abnormal cord signal, parenchymal edema and enhancement. 3.Multilevel degenerative change without high-grade canal stenosis or neural foraminal narrowing. COMMUNICATION:  Communicated with: Elva Timmons RN on 5/28/2022 at 3:48 PM.      XR CHEST PORTABLE    Result Date: 5/27/2022  EXAMINATION: XR CHEST PORTABLE CLINICAL HISTORY: FATIGUE COMPARISONS: MARCH 26, 2022 FINDINGS: Osseous structures are intact. Cardiopericardial silhouette is normal. Pulmonary vasculature is normal. Ill-defined area increased opacity right lower lung. Blunting left costophrenic angle with increased opacity left lung base. BILATERAL LOWER LUNG ATELECTASIS/PNEUMONIA WITH LEFT PLEURAL EFFUSION      ASSESSMENT AND PLAN:      1.. Spinal cord compression T4-T5--neurologically stable;  --cont steroid tx  --pt will start RT to T-spine on 5/31     2.  Metastatic prostateCA with b progression of bone mets;  --pt will start Taxotere chemotx after completion of RT to spine      Electronically signed by Rah Cobos MD on 5/30/22 at 3:41 PM EDT

## 2022-05-30 NOTE — PLAN OF CARE
See OT evaluation for all goals and OT POC.  Electronically signed by Yee Mccallum OTR/L on 5/30/2022 at 4:08 PM

## 2022-05-30 NOTE — PROGRESS NOTES
Shift assessment complete. Patient A&Ox4. VSS. He complains of back pain 5/10. Medication given per [facility]. He is on fall precautions due to BLE weakness, no numbness or tingling present. Patient denies other needs at this time. Call light in reach.

## 2022-05-30 NOTE — PROGRESS NOTES
[facility] OCCUPATIONAL THERAPY EVALUATION - ACUTE     NAME: Mily Hunt  : 1950 (73 y.o.)  MRN: [id number]  CODE STATUS: Full Code  [id number]    Date of Service: 2022    Patient Diagnosis(es): Lightheadedness [R42]  Anemia [D64.9]  Bilateral pleural effusion [J90]  Anemia, unspecified type [D64.9]  Malignant neoplasm of bone, unspecified location [facility]) [C41.9]   Patient Active Problem List    Diagnosis Date Noted    [facility]oxysmal atrial fibrillation [facility]     PAD (peripheral artery disease) ([address]     Secondary malignant neoplasm of thoracic vertebral column ([address] 2022    Anemia 2022    Other emphysema ([facility]     Abnormal stress test 2022    Pleural effusion 2022    Acute occlusion of artery of lower extremity ([address] 2021    History of colon polyps     Polyp of cecum     Family history of colon cancer     S/P radiation therapy 2019    Drug-induced erectile dysfunction 2019    Primary insomnia 2018    UTE on CPAP 2018    Prediabetes 2016    Hypertension     Prostate cancer ([address]     Hyperlipidemia     Coronary artery disease involving native coronary artery of native heart without angina pectoris 2006    Old MI (myocardial infarction) 2006        Past Medical History:   Diagnosis Date    CAD (coronary artery disease)     Family history of colon cancer     Hyperlipidemia     Hypertension     Old MI (myocardial infarction) 2006    UTE on CPAP     PAD (peripheral artery disease) (HCC)     Prostate cancer ([address]      Past Surgical History:   Procedure Laterality Date    COLONOSCOPY N/A 2019    COLORECTAL CANCER SCREENING, NOT HIGH RISK performed by Monty Pandya MD at [address] [place] [address]      X3    THORACENTESIS Right 2022    870 ml removed by Dr. Rebecca Barry - diagnostics sent    THORACENTESIS Left 2022    285 ml removed by Dr Rebecca Barry - cytology only sent        Restrictions  Restrictions/Precautions: Fall Risk      Safety Devices: Safety Devices  Type of Devices: All fall risk precautions in place     Patient's date of birth confirmed: Yes    General:  Chart Reviewed: Yes  Patient assessed for rehabilitation services?: Yes    Subjective  Subjective: \"I just feel lethargic today\"       Pain at start of treatment: Yes: 7/10    Pain at end of treatment: Yes: 7/10    Location: Mid back  Nursing notified: Declined  RN:   Intervention: None    Prior Level of Function:  Social/Functional History  Lives With: Significant other  Type of Home: House  Home Layout: One level  Home Access: Stairs to enter with rails  Entrance Stairs - Number of Steps: 3- friends are building a ramp  Entrance Stairs - Rails: Right  Bathroom Shower/Tub: Walk-in shower  Bathroom Equipment: Grab bars in shower,Hand-held shower  Home Equipment: Izell Sarks, rolling  ADL Assistance: Independent  Homemaking Assistance:  (SO completes)  Ambulation Assistance: Independent (without AD)  Transfer Assistance: Independent    OBJECTIVE:     Orientation Status:  Orientation  Overall Orientation Status: Within Functional Limits    Observation:  Observation/Palpation  Posture: Fair  Observation: No acute distress noted. Pt pleasant and motivated. Cognition Status:  Cognition  Overall Cognitive Status: WFL    Perception Status:  Perception  Overall Perceptual Status: WFL    Vision and Hearing Status:  Vision  Vision Exceptions: Wears glasses for reading  Hearing  Hearing: Within functional limits   Vision - Basic Assessment  Prior Vision: Wears glasses all the time  Visual History: No significant visual history  Patient Visual Report: No visual complaint reported. Visual Field Cut: No  Oculo Motor Control:  WNL    GROSS ASSESSMENT AROM/PROM:  AROM: Generally decreased, functional  PROM: Generally decreased, functional    ROM:   LUE AROM (degrees)  LUE AROM : WFL  Left Hand AROM (degrees)  Left Hand AROM: WFL  RUE AROM (degrees)  RUE AROM : WFL  Right Hand AROM (degrees)  Right Hand AROM: WFL    UE STRENGTH:  Strength: Generally decreased, functional    UE COORDINATION:  Coordination: Generally decreased, functional    UE TONE:  Tone: Normal    UE SENSATION:  Sensation: Impaired (BLE numbness/tingling)    Hand Dominance:  Hand Dominance  Hand Dominance: Right    ADL Status:  ADL  Feeding: Independent  Grooming: Setup  UE Bathing: Setup  LE Bathing: Minimal assistance  UE Dressing: Setup  LE Dressing: Moderate assistance  Toileting: Moderate assistance  Additional Comments: Simulated ADLs as above. Pt. limited d/t decreased balance, demonstrates decreased overall standing tolerance. Difficulty managing underwear for toileting, however with balance assist was able. Toilet Transfers  Toilet - Technique: Stand step  Equipment Used: Standard bedside commode  Toilet Transfer: Minimal assistance  Toilet Transfers Comments: Min A to step pivot to [facility], BUE support on bed rail and BSC    Functional Mobility:  Patient ambulated pivot only to [facility] d/t fatigue with Handheld assist at [address] level. Did not ambulate further; ambulated earlier with PT and reports feeling very fatigued.     Bed Mobility  Bed mobility  Rolling to Right: Supervision  Supine to Sit: Supervision  Sit to Supine: Supervision  Bed Mobility Comments: Increased time to complete    Seated and Standing Balance:  Balance  Sitting: Impaired  Sitting - Static: Fair (occasional)  Sitting - Dynamic: Fair (occasional)  Standing: Impaired  Standing - Static: Fair  Standing - Dynamic: Poor    Functional Endurance:  Activity Tolerance  Activity Tolerance: Patient Tolerated treatment well    D/C Recommendations:  OT D/C RECOMMENDATIONS  REQUIRES OT FOLLOW-UP: Yes    Equipment Recommendations:  OT Equipment Recommendations  Other: Continue to assess    OT Education:   Patient Education  Education Given To: Patient  Education Provided: Role of Therapy;Plan of Care  Education Method: Verbal  Barriers to Learning: None  Education Outcome: Verbalized understanding    OT Follow Up:  OT D/C RECOMMENDATIONS  REQUIRES OT FOLLOW-UP: Yes      Assessment/Discharge Disposition:  Assessment: Pt is a 73year old man from home with s/o who presents to [facility] with the above deficits which impact his ability to perform ADLs and IADLs. Pt. limited d/t fatigue and weakness. Pt. would benefit from continued OT to maximize independence and safety with ADL tasks.   Performance deficits / Impairments: Decreased functional mobility ,Decreased ADL status,Decreased balance,Decreased endurance,Decreased high-level IADLs,Decreased safe awareness,Decreased strength  Prognosis: Good  Discharge Recommendations: Continue to assess pending progress  Decision Making: Medium Complexity  History: Pt's medical history is moderately complex  Exam: Pt. has 7 performance deficits  Assistance / Modification: Pt. requires min-mod A    AMPAC (Six Click) Self care Score   How much help for putting on and taking off regular lower body clothing?: A Lot  How much help for Bathing?: A Lot  How much help for Toileting?: Total  How much help for putting on and taking off regular upper body clothing?: A Little  How much help for taking care of personal grooming?: A Little  How much help for eating meals?: A Little  AM-[facility] Inpatient Daily Activity Raw Score: 14  AM-PAC Inpatient ADL T-Scale Score : 33.39  ADL Inpatient CMS 0-100% Score: 59.67    Therapy key for assistance levels -   Independent/Mod I = Pt. is able to perform task with no assistance but may require a device   Stand by assistance = Pt. does not perform task at an independent level but does not need physical assistance, requires verbal cues  Minimal, Moderate, Maximal Assistance = Pt. requires physical assistance (25%, 50%, 75% assist from helper) for task but is able to actively participate in task   Dependent = Pt. requires total assistance with task and is not able to actively participate with task completion     Plan:  Plan  Times per Week: 1-4x  Plan Weeks: Length of acute stay  Current Treatment Recommendations: Strengthening,Balance training,Functional mobility training,Endurance training,Patient/Caregiver education & training,Equipment evaluation, education, & procurement,Self-Care / ADL,Home management training,Safety education & training    Goals:   Patient will:    - Improve functional endurance to tolerate/complete 30 mins of ADL's  - Be Mod I in UB ADLs   - Be SBA in LB ADLs  - Be SBA in ADL transfers without LOB  - Be SBA in toileting tasks  - Improve B UE strength and endurance to 4/5 in order to participate in self-care activities as projected. - Access appropriate D/C site with as few architectural barriers as possible. - Sequence self-care tasks with no verbal cues for technique    Patient Goal: Patient goals : \"I want to get home\"     Discussed and agreed upon: Yes Comments:       Therapy Time:   Individual   Time In 1511   Time Out 1530   Minutes 19     Eval: 19 minutes     Electronically signed by:     ELISE Flynn,   5/30/2022, 4:07 PM

## 2022-05-30 NOTE — PROGRESS NOTES
Physical Therapy Med Surg Daily Treatment Note  Facility/Department: [address] [place] [address] UNIT  Room: [place] [address]       NAME: Leslie Sanchez  : 1950 (73 y.o.)  MRN: [id number]  CODE STATUS: Full Code    Date of Service: 2022    Patient Diagnosis(es): Lightheadedness [R42]  Anemia [D64.9]  Bilateral pleural effusion [J90]  Anemia, unspecified type [D64.9]  Malignant neoplasm of bone, unspecified location [facility]) [C41.9]   Chief Complaint   Patient presents with    Dizziness     pt c/o dizziness, near syncope tonight     Patient Active Problem List    Diagnosis Date Noted    [facility]oxysmal [facility]     PAD (peripheral artery disease) ([facility]     Anemia 2022    Other emphysema ([facility]     Abnormal stress test 2022    Pleural effusion 2022    Acute occlusion of artery of lower extremity ([facility] 2021    History of colon polyps     Polyp of cecum     Family history of colon cancer     S/P radiation therapy 2019    Drug-induced erectile dysfunction 2019    Primary insomnia 2018    UTE on CPAP 2018    Prediabetes 2016    Hypertension     Prostate cancer ([facility]     Hyperlipidemia     Coronary artery disease involving native coronary artery of native heart without angina pectoris 2006    Old MI (myocardial infarction) 2006        Past Medical History:   Diagnosis Date    CAD (coronary artery disease)     Family history of colon cancer     Hyperlipidemia     Hypertension     Old MI (myocardial infarction) 2006    UTE on CPAP     PAD (peripheral artery disease) (HCC)     Prostate cancer ([facility]        Chart Reviewed: Yes  Family / Caregiver Present: No  General Comment  Comments: Pt resting in bed - agreeable to PT tx    Restrictions:  Restrictions/Precautions: Fall Risk    SUBJECTIVE:   Subjective: Pt reporting no pain at arrival however, c/o weakness in B LE's and fatigue, especially after recieving Norco and Dilaudid medication. Pt states \"My legs get tingling from the tumor between my L4 and L5.\"    Pain  No pain     OBJECTIVE:      Bed mobility  Rolling to Right: Supervision  Supine to Sit: Supervision  Sit to Supine: Supervision  Bed Mobility Comments: Increased time to complete    Transfers  Sit to Stand: Stand by assistance;Contact guard assistance  Stand to sit: Stand by assistance  Comment: Increased time to complete. Intermittent steadying provided for safety and cuing for proper hand placement during transfer    Ambulation  Surface: level tile  Device: Rolling Walker  Assistance: Contact guard assistance;Minimal assistance  Quality of Gait: Cont'd steadying assist provided throughout. Pt with significant ataxic LE placement, path, and pattern. Frequently crossing LEs and lateral balance checks. Distance: 20ft X 2      ASSESSMENT   Body Structures, Functions, Activity Limitations Requiring Skilled Therapeutic Intervention: Decreased functional mobility ; Decreased ADL status; Decreased endurance;Decreased strength;Decreased sensation;Decreased balance;Decreased coordination;Decreased safe awareness;Decreased vision/visual deficit  Assessment: Pt very pleasant and motivated to complete PT tx despite c/o weakness, fatigue and \"tingling\" sensation in BLE's. Pt cont's to display significant axatia gait w/ constant cues needed to inc RAYSHAWN as pt frequently adducting LE's at HS. Pt does well with resteadying himself at [employer] however,  CGA/min A needed to amb safely.  Pt BTB upon request to rest.     Discharge Recommendations:  Continue to assess pending progress,Patient would benefit from continued therapy after discharge     Goals  Long Term Goals  Long term goal 1: Pt to complete bed mobility indep  Long term goal 2: Pt to complete transfers with indep  Long term goal 3: Pt to ambulate 50-150ft with LRD and indep  Long term goal 4: Pt to complete 3 steps with HR and indep  Long term goal 5: Pt to complete TUG within 13.5sec    PLAN    Plan: 1 time a day 3-6 times a week  Safety Devices  Type of Devices: All fall risk precautions in place     AMPAC (6 CLICK) BASIC MOBILITY  AM-PAC Inpatient Mobility Raw Score : 18    Therapy Time   Individual   Time In 0810   Time Out 0835   Minutes 25   BM/trans- 25  [facility] [place], 05/30/22 at 8:45 AM     Definitions for assistance levels  Independent = pt does not require any physical supervision or assistance from another person for activity completion. Device may be needed.   Stand by assistance = pt requires verbal cues or instructions from another person, close to but not touching, to perform the activity  Minimal assistance= pt performs 75% or more of the activity; assistance is required to complete the activity  Moderate assistance= pt performs 50% of the activity; assistance is required to complete the activity  Maximal assistance = pt performs 25% of the activity; assistance is required to complete the activity  Dependent = pt requires total physical assistance to accomplish the task

## 2022-05-30 NOTE — PROGRESS NOTES
Hematology/Oncology  Attending Progress Note        CHIEF COMPLAINT/HPI:   Pt had a large BM after enema  bec of constipation x several days. He had transient generalized weakness after straining for several minutes during his BM. Bilateral leg strength was maintained. REVIEW OF SYSTEMS:    Unremarkable except for symptoms mentioned in HPI.     Current Inpatient Medications:    Current Facility-Administered Medications: docusate sodium (COLACE) capsule 100 mg, 100 mg, Oral, Daily  metoprolol succinate (TOPROL XL) extended release tablet 25 mg, 25 mg, Oral, Daily  losartan (COZAAR) tablet 25 mg, 25 mg, Oral, Daily  sodium chloride flush 0.9 % injection 10 mL, 10 mL, IntraVENous, PRN  dexamethasone (DECADRON) injection 4 mg, 4 mg, IntraVENous, Q6H  HYDROmorphone (DILAUDID) injection 0.5 mg, 0.5 mg, IntraVENous, Q4H PRN  phytonadione ADULT (VITAMIN K) inj 10 mg/mL, 10 mg, SubCUTAneous, Once  aspirin EC tablet 81 mg, 81 mg, Oral, Daily  atorvastatin (LIPITOR) tablet 40 mg, 40 mg, Oral, Daily  clopidogrel (PLAVIX) tablet 75 mg, 75 mg, Oral, Daily  sodium chloride flush 0.9 % injection 5-40 mL, 5-40 mL, IntraVENous, 2 times per day  sodium chloride flush 0.9 % injection 5-40 mL, 5-40 mL, IntraVENous, PRN  0.9 % sodium chloride infusion, 25 mL, IntraVENous, PRN  enoxaparin (LOVENOX) injection 40 mg, 40 mg, SubCUTAneous, Daily  ondansetron (ZOFRAN-ODT) disintegrating tablet 4 mg, 4 mg, Oral, Q8H PRN **OR** ondansetron (ZOFRAN) injection 4 mg, 4 mg, IntraVENous, Q6H PRN  polyethylene glycol (GLYCOLAX) packet 17 g, 17 g, Oral, Daily PRN  acetaminophen (TYLENOL) tablet 650 mg, 650 mg, Oral, Q6H PRN **OR** acetaminophen (TYLENOL) suppository 650 mg, 650 mg, Rectal, Q6H PRN  HYDROcodone-acetaminophen (NORCO) 7.5-325 MG per tablet 1 tablet, 1 tablet, Oral, Q6H PRN  0.9 % sodium chloride infusion, , IntraVENous, PRN  acetaminophen (TYLENOL) tablet 650 mg, 650 mg, Oral, See Admin Instructions  diphenhydrAMINE (BENADRYL) tablet 25 mg, 25 mg, Oral, See Admin Instructions    PHYSICAL EXAM:    VITALS:  BP (!) 118/58   Pulse 63   Temp 97.9 °F (36.6 °C) (Oral)   Resp 18   Ht 5' 10\" (1.778 m)   Wt 174 lb (78.9 kg)   SpO2 98%   BMI 24.97 kg/m²   INTAKE/OUTPUT:    Intake/Output Summary (Last 24 hours) at 5/29/2022 2048  Last data filed at 5/29/2022 3261  Gross per 24 hour   Intake 360 ml   Output 600 ml   Net -240 ml     CONSTITUTIONAL:  awake, alert, cooperative, no apparent distress, and appears stated age  ENT:  Normocephalic, oral pharynx with moist mucus membranes, tonsils without erythema or exudates,No epistaxis  NECK:  Supple, symmetrical, trachea midline, no adenopathy,No JVD  LUNGS:  No increased work of breathing, good air exchange, clear to auscultation bilaterally, no crackles or wheezing  CARDIOVASCULAR:  Normal HS; regular rate and rhythm,   ABDOMEN: soft, non-distended, non-tender,no masses palpated,no hepatosplenomegaly  MUSCULOSKELETAL:  There is no pedal edema; no calf tenderness  NEUROLOGIC:  Awake, alert, oriented to name, place and time.   Non-focal: Moving all ext well Motor: Gr 4+to 5 /5 in both legs  SKIN:  no bruising or bleeding and no rashes    DATA:      PT/INR:  No results found for: PTINR  PTT:    Lab Results   Component Value Date    APTT 35.4 05/28/2022     CMP:    Lab Results   Component Value Date     05/29/2022    K 4.3 05/29/2022     05/29/2022    CO2 19 05/29/2022    BUN 18 05/29/2022    PROT 5.7 05/29/2022     Magnesium:    Lab Results   Component Value Date    MG 2.2 05/26/2022     Phosphorus:  No components found for: PO4  Calcium:  No results found for: CA  CBC:    Lab Results   Component Value Date    WBC 5.5 05/29/2022    RBC 2.73 05/29/2022    HGB 8.2 05/29/2022    HCT 24.9 05/29/2022    MCV 91.2 05/29/2022    RDW 20.0 05/29/2022     05/29/2022     DIFF:    Lab Results   Component Value Date    MCV 91.2 05/29/2022    RDW 20.0 05/29/2022      LDH:  No results found for: LDH  Uric Acid:  No components found for: URIC    CBC with Differential:    Lab Results   Component Value Date    WBC 5.5 05/29/2022    RBC 2.73 05/29/2022    HGB 8.2 05/29/2022    HCT 24.9 05/29/2022     05/29/2022    MCV 91.2 05/29/2022    MCH 29.9 05/29/2022    MCHC 32.8 05/29/2022    RDW 20.0 05/29/2022    NRBC 2 05/26/2022    BANDSPCT 4 05/26/2022    LYMPHOPCT 26.1 05/27/2022    MONOPCT 5.4 05/27/2022    BASOPCT 0.5 05/27/2022    MONOSABS 0.3 05/27/2022    LYMPHSABS 1.4 05/27/2022    EOSABS 0.0 05/27/2022    BASOSABS 0.0 05/27/2022     CMP:    Lab Results   Component Value Date     05/29/2022    K 4.3 05/29/2022     05/29/2022    CO2 19 05/29/2022    BUN 18 05/29/2022    CREATININE 0.82 05/29/2022    GFRAA >60.0 05/29/2022    LABGLOM >60.0 05/29/2022    GLUCOSE 151 05/29/2022    PROT 5.7 05/29/2022    LABALBU 3.2 05/29/2022    CALCIUM 8.7 05/29/2022    BILITOT 0.5 05/29/2022    ALKPHOS 282 05/29/2022    AST 14 05/29/2022    ALT <5 05/29/2022     LDH:  No results found for: LDH  Warfarin PT/INR:  No components found for: PTPATWAR, PTINRWAR  PTT:    Lab Results   Component Value Date    APTT 35.4 05/28/2022   [APTT}  VITAMIN B12: No components found for: B12  FOLATE:    Lab Results   Component Value Date    FOLATE 5.7 05/27/2022     IRON:    Lab Results   Component Value Date    IRON 108 05/27/2022     Iron Saturation:  No components found for: PERCENTFE  TIBC:    Lab Results   Component Value Date    TIBC 229 05/27/2022     FERRITIN:  No results found for: FERRITIN  Fibrinogen Level:  No components found for: FIB  24 Hour Urine for Protein:  No components found for: Mai Keith, UTV3  PSA:   Lab Results   Component Value Date    .60 05/06/2022         RADIOLOGY RESULTS:  MRI THORACIC SPINE W WO CONTRAST    Result Date: 5/28/2022  MRI THORACIC SPINE W WO CONTRAST, MRI LUMBAR SPINE W WO CONTRAST HISTORY:   back pain  r/o spinal cord compression; pt has prostate cancer with bone mets  - TECHNIQUE: MR  thoracic and lumbar spine routine protocols without and with contrast. MR Contrast:  MultiHance Contrast Dose:  20 cc Route of Administration:  IV COMPARISON: None. RESULT: THORACIC SPINE: Counting reference:  Lumbosacral junction. For the purposes of this report, L4-5 is considered the level of the iliac crest and there are 5 lumbar-type vertebrae. Anatomic Variants: None. Alignment:  Thoracic kyphosis is maintained. Vertebral body heights and disc spaces are within normal limits. Bone marrow signal/CORD: Diffuse heterogeneous marrow replacement secondary to osseous metastasis. At T4, there is enhancing expansile soft tissue mass involving the posterior left rib, left pedicle and lamina, right pedicle and proximal lamina with infiltration of the spinal canal. There is resultant rightward cord displacement and compression. No cord signal abnormality at this level. No suspicious parenchymal or leptomeningeal enhancement. Thoracic soft tissues: The thoracic paraspinal soft tissues are within normal limits. Thoracic canal and foramina:  Severe canal stenosis and cord compression at T4-T5 secondary to an infiltrative soft tissue mass otherwise, no high-grade canal stenosis or neural foraminal narrowing. LUMBAR SPINE: Counting reference:  Lumbosacral junction. For the purposes of this report, L4-5 is considered the level of the iliac crest and there are 5 lumbar-type vertebrae. Anatomic Variants: None. Alignment:  Lumbar lordosis is maintained. Vertebral body heights and disc spaces are within normal limits. Bone marrow signal/fracture:  Diffuse heterogeneous marrow replacement, compatible with metastatic disease. No evidence of prior fracture. Conus: The conus terminates at L1 and is within normal limits of morphology and signal. Normal course and caliber of the descending cauda equina nerve roots. No evidence of abnormal intradural enhancement following contrast administration.  Lumbar soft tissues: The lumbar paraspinal soft tissues are within normal limits. T12-L1:  Canal and foramina are patent. L1-L2:  Canal and foramina are patent. L2-L3:  Circumferential disc bulge and mild facet degenerative changes without canal stenosis. Neural foramen are patent. L3-L4:  Disc bulge slightly asymmetric to the left, ligamentum flavum hypertrophy and facet degenerative changes with mild canal stenosis. Mild bilateral neural foraminal narrowing. L4-L5:  Facet degenerative changes and small disc bulge flattening the ventral thecal sac. No significant canal stenosis. Mild bilateral neural foraminal narrowing. L5-S1:  Disc bulge asymmetric to the right, facet degenerative change and ligamentum flavum hypertrophy with flattening ventral thecal sac. Right-sided subarticular recess narrowing. Moderate right and mild left neural foraminal narrowing. Sacrum and iliac wings:  Extensive heterogeneous marrow replacement of the sacroiliac loops, secondary to metastatic disease. Thoracic spine: 1. Extensive osseous metastatic disease with soft tissue component at left greater than right T4-T5 with infiltration of the spinal canal and cord compression at this level. 2.No abnormal cord signal, parenchymal or leptomeningeal enhancement. Lumbar spine: 1. Extensive osseous metastatic disease. 2.No abnormal cord signal, parenchymal edema and enhancement. 3.Multilevel degenerative change without high-grade canal stenosis or neural foraminal narrowing. COMMUNICATION:  Communicated with: Donovan Guido RN on 5/28/2022 at 3:48 PM.      MRI Lumbar Spine W WO Contrast    Result Date: 5/28/2022  MRI THORACIC SPINE W WO CONTRAST, MRI LUMBAR SPINE W WO CONTRAST HISTORY:   back pain  r/o spinal cord compression; pt has prostate cancer with bone mets  - TECHNIQUE: MR  thoracic and lumbar spine routine protocols without and with contrast. MR Contrast:  MultiHance Contrast Dose:  20 cc Route of Administration:  IV COMPARISON: None.  RESULT: THORACIC SPINE: Counting reference:  Lumbosacral junction. For the purposes of this report, L4-5 is considered the level of the iliac crest and there are 5 lumbar-type vertebrae. Anatomic Variants: None. Alignment:  Thoracic kyphosis is maintained. Vertebral body heights and disc spaces are within normal limits. Bone marrow signal/CORD: Diffuse heterogeneous marrow replacement secondary to osseous metastasis. At T4, there is enhancing expansile soft tissue mass involving the posterior left rib, left pedicle and lamina, right pedicle and proximal lamina with infiltration of the spinal canal. There is resultant rightward cord displacement and compression. No cord signal abnormality at this level. No suspicious parenchymal or leptomeningeal enhancement. Thoracic soft tissues: The thoracic paraspinal soft tissues are within normal limits. Thoracic canal and foramina:  Severe canal stenosis and cord compression at T4-T5 secondary to an infiltrative soft tissue mass otherwise, no high-grade canal stenosis or neural foraminal narrowing. LUMBAR SPINE: Counting reference:  Lumbosacral junction. For the purposes of this report, L4-5 is considered the level of the iliac crest and there are 5 lumbar-type vertebrae. Anatomic Variants: None. Alignment:  Lumbar lordosis is maintained. Vertebral body heights and disc spaces are within normal limits. Bone marrow signal/fracture:  Diffuse heterogeneous marrow replacement, compatible with metastatic disease. No evidence of prior fracture. Conus: The conus terminates at L1 and is within normal limits of morphology and signal. Normal course and caliber of the descending cauda equina nerve roots. No evidence of abnormal intradural enhancement following contrast administration. Lumbar soft tissues: The lumbar paraspinal soft tissues are within normal limits. T12-L1:  Canal and foramina are patent. L1-L2:  Canal and foramina are patent.  L2-L3:  Circumferential disc bulge and mild facet degenerative changes without canal stenosis. Neural foramen are patent. L3-L4:  Disc bulge slightly asymmetric to the left, ligamentum flavum hypertrophy and facet degenerative changes with mild canal stenosis. Mild bilateral neural foraminal narrowing. L4-L5:  Facet degenerative changes and small disc bulge flattening the ventral thecal sac. No significant canal stenosis. Mild bilateral neural foraminal narrowing. L5-S1:  Disc bulge asymmetric to the right, facet degenerative change and ligamentum flavum hypertrophy with flattening ventral thecal sac. Right-sided subarticular recess narrowing. Moderate right and mild left neural foraminal narrowing. Sacrum and iliac wings:  Extensive heterogeneous marrow replacement of the sacroiliac loops, secondary to metastatic disease. Thoracic spine: 1. Extensive osseous metastatic disease with soft tissue component at left greater than right T4-T5 with infiltration of the spinal canal and cord compression at this level. 2.No abnormal cord signal, parenchymal or leptomeningeal enhancement. Lumbar spine: 1. Extensive osseous metastatic disease. 2.No abnormal cord signal, parenchymal edema and enhancement. 3.Multilevel degenerative change without high-grade canal stenosis or neural foraminal narrowing. COMMUNICATION:  Communicated with: Linette Bautista RN on 5/28/2022 at 3:48 PM.      XR CHEST PORTABLE    Result Date: 5/27/2022  EXAMINATION: XR CHEST PORTABLE CLINICAL HISTORY: FATIGUE COMPARISONS: MARCH 26, 2022 FINDINGS: Osseous structures are intact. Cardiopericardial silhouette is normal. Pulmonary vasculature is normal. Ill-defined area increased opacity right lower lung. Blunting left costophrenic angle with increased opacity left lung base. BILATERAL LOWER LUNG ATELECTASIS/PNEUMONIA WITH LEFT PLEURAL EFFUSION      ASSESSMENT AND PLAN:      1.  Spinal cord compression T4-T5--neurologically stable;  --cont steroid tx  --pt will start RT to T-spine on 5/31    2.  Metastatic prostateCA with b progression of bone mets;  --pt will start Taxotere chemotx after completion of RT to spine      Electronically signed by Jadyn Claudio MD on 5/29/22 at 8:48 PM EDT

## 2022-05-30 NOTE — PROGRESS NOTES
5/30/22    From: HOME WITH PARTNER MS KENN[facility]    Admit: ANEMIA, BIALT PLEURAL EFFUSIONS     PMH: ASA-CPAP, HLD, CAD, PAD, PROSTATE CANCER WITH BONE METS, LUNG CANCER    Anticipated Discharge Disposition: REFUSING TRANSFER TO  OR [facility] PER DR FORD RECOMMENDATION. 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 5/30 MAY JUST WANT TO GO HOME WIT [facility]. Patient Mobility or PT/OT ordered:  Consults:  PALL CARE,, HEM/ONC, JOE PER ESCURO    Covid result &/or vacc status: VACC X3    Barriers to Discharge: HGB 8. O - 7.1  BILAT PLEURAL EFFUSIONS VS PNA  5/28: REACHED OUT TO [facility].  5/30 AS PT IS CHANGING HIS MIND, STILL WAITING TO SEE IF HE WILL BE ACCEPTED AT [facility].       Assessments: CMI DONE, [facility] NOTIFIED

## 2022-05-30 NOTE — PROGRESS NOTES
Department of Internal Medicine  General Internal Medicine  Attending Progress Note      SUBJECTIVE:  Pt resting.  For radiation tomorrow    OBJECTIVE      Medications    Current Facility-Administered Medications: docusate sodium (COLACE) capsule 100 mg, 100 mg, Oral, Daily  metoprolol succinate (TOPROL XL) extended release tablet 25 mg, 25 mg, Oral, Daily  losartan (COZAAR) tablet 25 mg, 25 mg, Oral, Daily  sodium chloride flush 0.9 % injection 10 mL, 10 mL, IntraVENous, PRN  dexamethasone (DECADRON) injection 4 mg, 4 mg, IntraVENous, Q6H  HYDROmorphone (DILAUDID) injection 0.5 mg, 0.5 mg, IntraVENous, Q4H PRN  phytonadione ADULT (VITAMIN K) inj 10 mg/mL, 10 mg, SubCUTAneous, Once  aspirin EC tablet 81 mg, 81 mg, Oral, Daily  atorvastatin (LIPITOR) tablet 40 mg, 40 mg, Oral, Daily  clopidogrel (PLAVIX) tablet 75 mg, 75 mg, Oral, Daily  sodium chloride flush 0.9 % injection 5-40 mL, 5-40 mL, IntraVENous, 2 times per day  sodium chloride flush 0.9 % injection 5-40 mL, 5-40 mL, IntraVENous, PRN  0.9 % sodium chloride infusion, 25 mL, IntraVENous, PRN  enoxaparin (LOVENOX) injection 40 mg, 40 mg, SubCUTAneous, Daily  ondansetron (ZOFRAN-ODT) disintegrating tablet 4 mg, 4 mg, Oral, Q8H PRN **OR** ondansetron (ZOFRAN) injection 4 mg, 4 mg, IntraVENous, Q6H PRN  polyethylene glycol (GLYCOLAX) packet 17 g, 17 g, Oral, Daily PRN  acetaminophen (TYLENOL) tablet 650 mg, 650 mg, Oral, Q6H PRN **OR** acetaminophen (TYLENOL) suppository 650 mg, 650 mg, Rectal, Q6H PRN  HYDROcodone-acetaminophen (NORCO) 7.5-325 MG per tablet 1 tablet, 1 tablet, Oral, Q6H PRN  0.9 % sodium chloride infusion, , IntraVENous, PRN  acetaminophen (TYLENOL) tablet 650 mg, 650 mg, Oral, See Admin Instructions  diphenhydrAMINE (BENADRYL) tablet 25 mg, 25 mg, Oral, See Admin Instructions  Physical    VITALS:  /63   Pulse 59   Temp 97.7 °F (36.5 °C) (Oral)   Resp 18   Ht 5' 10\" (1.778 m)   Wt 175 lb 8 oz (79.6 kg)   SpO2 98%   BMI 25.18 kg/m² Constitutional: Lying in bed comfortably  Head: Normocephalic, atraumatic  ENT: moist mucous membranes  Neck: neck supple, trachea midline  Lungs: non labored  Heart: RRR  GI: non-distended  MSK: no edema noted  Skin: warm, dry       Data    CBC:   Lab Results   Component Value Date    WBC 7.6 05/30/2022    RBC 2.93 05/30/2022    HGB 8.8 05/30/2022    HCT 26.6 05/30/2022    MCV 90.6 05/30/2022    MCH 30.1 05/30/2022    MCHC 33.2 05/30/2022    RDW 20.8 05/30/2022     05/30/2022     CMP:    Lab Results   Component Value Date     05/30/2022    K 4.7 05/30/2022     05/30/2022    CO2 18 05/30/2022    BUN 23 05/30/2022    CREATININE 0.73 05/30/2022    GFRAA >60.0 05/30/2022    LABGLOM >60.0 05/30/2022    GLUCOSE 123 05/30/2022    PROT 6.3 05/30/2022    LABALBU 3.6 05/30/2022    CALCIUM 8.9 05/30/2022    BILITOT 0.4 05/30/2022    ALKPHOS 306 05/30/2022    AST 26 05/30/2022    ALT 6 05/30/2022       ASSESSMENT AND PLAN      # Metastatic prostate cancer to bone with T4/T5 cord compression  - NSGY consulted - non surgical due to being on plavix for CAD w/stents  - started on decadron  - palliative care consulted  - rad/onc planning on radiation treatment Tuesday     # Near syncope - resolved  - monitor    # Anemia  - oncology consulted  - sp 1U blood  - continue to monitor    Disposition: Plan is for radiation treatment Tuesday. PT/OT. Pt wants to go home pending improvement after radiation but pre-cert for [facility] started. Palliative care, NSGY, oncology consulted.        Courtney Copeland DO  Internal Medicine   Hospitalist    >35 minutes in total care time

## 2022-05-30 NOTE — CARE COORDINATION
PT IS OPTIMISTIC. TODAY HE IS THINKING THAT MAYBE HE WILL GO HOME WITH HHC BECAUSE HE FEELS THAT HIS MOBILITY SEEMS STRONGER AND MAYBE HE WON'T NEED SNF. RADIATION TO START ON Tuesday.

## 2022-05-31 ENCOUNTER — CARE COORDINATION (OUTPATIENT)
Dept: CARE COORDINATION | Age: 72
End: 2022-05-31

## 2022-05-31 ENCOUNTER — APPOINTMENT (OUTPATIENT)
Dept: RADIATION ONCOLOGY | Age: 72
End: 2022-05-31
Payer: MEDICARE

## 2022-05-31 ENCOUNTER — APPOINTMENT (OUTPATIENT)
Dept: RADIATION ONCOLOGY | Age: 72
End: 2022-05-31
Payer: MEDICAID

## 2022-05-31 LAB
ALBUMIN SERPL-MCNC: 3.7 G/DL (ref 3.5–4.6)
ALP BLD-CCNC: 574 U/L (ref 35–104)
ALT SERPL-CCNC: 15 U/L (ref 0–41)
ANION GAP SERPL CALCULATED.3IONS-SCNC: 14 MEQ/L (ref 9–15)
AST SERPL-CCNC: 164 U/L (ref 0–40)
BILIRUB SERPL-MCNC: 0.5 MG/DL (ref 0.2–0.7)
BLOOD CULTURE, ROUTINE: NORMAL
BUN BLDV-MCNC: 31 MG/DL (ref 8–23)
CALCIUM SERPL-MCNC: 8.6 MG/DL (ref 8.5–9.9)
CHLORIDE BLD-SCNC: 99 MEQ/L (ref 95–107)
CO2: 20 MEQ/L (ref 20–31)
CREAT SERPL-MCNC: 0.8 MG/DL (ref 0.7–1.2)
CULTURE, BLOOD 2: NORMAL
EKG ATRIAL RATE: 68 BPM
EKG ATRIAL RATE: 83 BPM
EKG P AXIS: 44 DEGREES
EKG P AXIS: 74 DEGREES
EKG P-R INTERVAL: 194 MS
EKG P-R INTERVAL: 230 MS
EKG Q-T INTERVAL: 392 MS
EKG Q-T INTERVAL: 506 MS
EKG QRS DURATION: 102 MS
EKG QRS DURATION: 106 MS
EKG QTC CALCULATION (BAZETT): 460 MS
EKG QTC CALCULATION (BAZETT): 538 MS
EKG R AXIS: -12 DEGREES
EKG R AXIS: -21 DEGREES
EKG T AXIS: -27 DEGREES
EKG T AXIS: -39 DEGREES
EKG VENTRICULAR RATE: 68 BPM
EKG VENTRICULAR RATE: 83 BPM
GFR AFRICAN AMERICAN: >60
GFR NON-AFRICAN AMERICAN: >60
GLOBULIN: 2.6 G/DL (ref 2.3–3.5)
GLUCOSE BLD-MCNC: 126 MG/DL (ref 70–99)
HCT VFR BLD CALC: 28.1 % (ref 42–52)
HEMOGLOBIN: 9.2 G/DL (ref 14–18)
MCH RBC QN AUTO: 30.1 PG (ref 27–31.3)
MCHC RBC AUTO-ENTMCNC: 32.7 % (ref 33–37)
MCV RBC AUTO: 92.2 FL (ref 80–100)
PDW BLD-RTO: 21.6 % (ref 11.5–14.5)
PLATELET # BLD: 129 K/UL (ref 130–400)
POTASSIUM REFLEX MAGNESIUM: 4.7 MEQ/L (ref 3.4–4.9)
RBC # BLD: 3.05 M/UL (ref 4.7–6.1)
SODIUM BLD-SCNC: 133 MEQ/L (ref 135–144)
TOTAL PROTEIN: 6.3 G/DL (ref 6.3–8)
WBC # BLD: 8.3 K/UL (ref 4.8–10.8)

## 2022-05-31 PROCEDURE — 36415 COLL VENOUS BLD VENIPUNCTURE: CPT

## 2022-05-31 PROCEDURE — 77336 RADIATION PHYSICS CONSULT: CPT | Performed by: RADIOLOGY

## 2022-05-31 PROCEDURE — 99212 OFFICE O/P EST SF 10 MIN: CPT | Performed by: RADIOLOGY

## 2022-05-31 PROCEDURE — 6370000000 HC RX 637 (ALT 250 FOR IP): Performed by: FAMILY MEDICINE

## 2022-05-31 PROCEDURE — 93010 ELECTROCARDIOGRAM REPORT: CPT | Performed by: INTERNAL MEDICINE

## 2022-05-31 PROCEDURE — 77417 THER RADIOLOGY PORT IMAGE(S): CPT | Performed by: RADIOLOGY

## 2022-05-31 PROCEDURE — 77334 RADIATION TREATMENT AID(S): CPT | Performed by: RADIOLOGY

## 2022-05-31 PROCEDURE — 77295 3-D RADIOTHERAPY PLAN: CPT | Performed by: RADIOLOGY

## 2022-05-31 PROCEDURE — 77300 RADIATION THERAPY DOSE PLAN: CPT | Performed by: RADIOLOGY

## 2022-05-31 PROCEDURE — 2580000003 HC RX 258: Performed by: INTERNAL MEDICINE

## 2022-05-31 PROCEDURE — 99497 ADVNCD CARE PLAN 30 MIN: CPT | Performed by: RADIOLOGY

## 2022-05-31 PROCEDURE — 6360000002 HC RX W HCPCS: Performed by: INTERNAL MEDICINE

## 2022-05-31 PROCEDURE — 77280 THER RAD SIMULAJ FIELD SMPL: CPT | Performed by: RADIOLOGY

## 2022-05-31 PROCEDURE — 77290 THER RAD SIMULAJ FIELD CPLX: CPT | Performed by: RADIOLOGY

## 2022-05-31 PROCEDURE — 85027 COMPLETE CBC AUTOMATED: CPT

## 2022-05-31 PROCEDURE — 6370000000 HC RX 637 (ALT 250 FOR IP): Performed by: INTERNAL MEDICINE

## 2022-05-31 PROCEDURE — 77412 RADIATION TX DELIVERY LVL 3: CPT | Performed by: RADIOLOGY

## 2022-05-31 PROCEDURE — 80053 COMPREHEN METABOLIC PANEL: CPT

## 2022-05-31 PROCEDURE — 1210000000 HC MED SURG R&B

## 2022-05-31 PROCEDURE — 77387 GUIDANCE FOR RADJ TX DLVR: CPT | Performed by: RADIOLOGY

## 2022-05-31 RX ADMIN — ENOXAPARIN SODIUM 40 MG: 100 INJECTION SUBCUTANEOUS at 09:34

## 2022-05-31 RX ADMIN — CLOPIDOGREL BISULFATE 75 MG: 75 TABLET ORAL at 09:33

## 2022-05-31 RX ADMIN — HYDROCODONE BITARTRATE AND ACETAMINOPHEN 1 TABLET: 7.5; 325 TABLET ORAL at 09:33

## 2022-05-31 RX ADMIN — ATORVASTATIN CALCIUM 40 MG: 40 TABLET, FILM COATED ORAL at 09:33

## 2022-05-31 RX ADMIN — HYDROMORPHONE HYDROCHLORIDE 0.5 MG: 1 INJECTION, SOLUTION INTRAMUSCULAR; INTRAVENOUS; SUBCUTANEOUS at 07:24

## 2022-05-31 RX ADMIN — HYDROCODONE BITARTRATE AND ACETAMINOPHEN 1 TABLET: 7.5; 325 TABLET ORAL at 04:29

## 2022-05-31 RX ADMIN — HYDROMORPHONE HYDROCHLORIDE 0.5 MG: 1 INJECTION, SOLUTION INTRAMUSCULAR; INTRAVENOUS; SUBCUTANEOUS at 16:07

## 2022-05-31 RX ADMIN — ASPIRIN 81 MG: 81 TABLET, COATED ORAL at 09:33

## 2022-05-31 RX ADMIN — Medication 10 ML: at 09:44

## 2022-05-31 RX ADMIN — DEXAMETHASONE SODIUM PHOSPHATE 4 MG: 4 INJECTION, SOLUTION INTRA-ARTICULAR; INTRALESIONAL; INTRAMUSCULAR; INTRAVENOUS; SOFT TISSUE at 22:37

## 2022-05-31 RX ADMIN — METOPROLOL SUCCINATE 25 MG: 25 TABLET, EXTENDED RELEASE ORAL at 09:33

## 2022-05-31 RX ADMIN — DEXAMETHASONE SODIUM PHOSPHATE 4 MG: 4 INJECTION, SOLUTION INTRA-ARTICULAR; INTRALESIONAL; INTRAMUSCULAR; INTRAVENOUS; SOFT TISSUE at 04:27

## 2022-05-31 RX ADMIN — DEXAMETHASONE SODIUM PHOSPHATE 4 MG: 4 INJECTION, SOLUTION INTRA-ARTICULAR; INTRALESIONAL; INTRAMUSCULAR; INTRAVENOUS; SOFT TISSUE at 09:33

## 2022-05-31 RX ADMIN — HYDROCODONE BITARTRATE AND ACETAMINOPHEN 1 TABLET: 7.5; 325 TABLET ORAL at 17:54

## 2022-05-31 RX ADMIN — DEXAMETHASONE SODIUM PHOSPHATE 4 MG: 4 INJECTION, SOLUTION INTRA-ARTICULAR; INTRALESIONAL; INTRAMUSCULAR; INTRAVENOUS; SOFT TISSUE at 16:07

## 2022-05-31 RX ADMIN — LOSARTAN POTASSIUM 25 MG: 25 TABLET, FILM COATED ORAL at 09:33

## 2022-05-31 RX ADMIN — SODIUM CHLORIDE, PRESERVATIVE FREE 10 ML: 5 INJECTION INTRAVENOUS at 04:28

## 2022-05-31 RX ADMIN — HYDROMORPHONE HYDROCHLORIDE 0.5 MG: 1 INJECTION, SOLUTION INTRAMUSCULAR; INTRAVENOUS; SUBCUTANEOUS at 22:39

## 2022-05-31 RX ADMIN — SODIUM CHLORIDE, PRESERVATIVE FREE 10 ML: 5 INJECTION INTRAVENOUS at 00:29

## 2022-05-31 RX ADMIN — HYDROMORPHONE HYDROCHLORIDE 0.5 MG: 1 INJECTION, SOLUTION INTRAMUSCULAR; INTRAVENOUS; SUBCUTANEOUS at 00:28

## 2022-05-31 RX ADMIN — DOCUSATE SODIUM 100 MG: 100 CAPSULE, LIQUID FILLED ORAL at 09:33

## 2022-05-31 RX ADMIN — Medication 10 ML: at 20:17

## 2022-05-31 ASSESSMENT — PAIN SCALES - GENERAL
PAINLEVEL_OUTOF10: 8
PAINLEVEL_OUTOF10: 8
PAINLEVEL_OUTOF10: 7
PAINLEVEL_OUTOF10: 7
PAINLEVEL_OUTOF10: 5
PAINLEVEL_OUTOF10: 8
PAINLEVEL_OUTOF10: 8

## 2022-05-31 ASSESSMENT — PAIN - FUNCTIONAL ASSESSMENT
PAIN_FUNCTIONAL_ASSESSMENT: PREVENTS OR INTERFERES SOME ACTIVE ACTIVITIES AND ADLS
PAIN_FUNCTIONAL_ASSESSMENT: PREVENTS OR INTERFERES SOME ACTIVE ACTIVITIES AND ADLS

## 2022-05-31 ASSESSMENT — PAIN DESCRIPTION - ORIENTATION
ORIENTATION: RIGHT;LEFT
ORIENTATION: RIGHT;LEFT;MID
ORIENTATION: RIGHT;LEFT;MID;LOWER

## 2022-05-31 ASSESSMENT — PAIN DESCRIPTION - DESCRIPTORS
DESCRIPTORS: ACHING
DESCRIPTORS: PRESSURE;THROBBING
DESCRIPTORS: SPASM

## 2022-05-31 ASSESSMENT — PAIN DESCRIPTION - LOCATION
LOCATION: BACK

## 2022-05-31 NOTE — CARE COORDINATION
Ambulatory Care Coordination Note  5/31/2022  CM Risk Score: 9  [place] 10 Year Mortality Risk Score: 100%     ACC: Cabrera Iniguez, MATTY    Summary Note:     Sharda Alexander called to make me aware that he has been in the hospital since last week. He states that his legs have been getting weaker and he has developed numbness and tingling. He adds that he was unable to hold his weight at home and did fall. He was then admitted and states that he has a tumor in his spine. He tells me that he will have radiation for the mass   He will be transported to Radiation Oncology at [facility] address this morning  He adds that he may need to go to a nursing home for a short time  He is inquiring about assist devices at home   I have asked him to speak with the  on the inpatient side to help get things going for home going needs. Lab Results     None          Care Coordination Interventions    Program Enrollment: Complex Care  Referral from Primary Care Provider: No  Suggested Interventions and Community Resources  Disease Specific Clinic: In Process  Disease Association: In Process  Senior Services: In Process  Social Work: In Process  Zone Management Tools: In Process  Other Services or Interventions: Pharmacy referral declined (in Clinical Study) Dietician referral declined, Walk in Clinic hours and usage discussed  COPD zone tool education initiated         Goals Addressed    None         Prior to Admission medications    Medication Sig Start Date End Date Taking? Authorizing Provider   traMADol (ULTRAM) 50 MG tablet Take 50 mg by mouth every 6 hours as needed for Pain. Historical Provider, MD   HYDROcodone-acetaminophen (NORCO) 7.5-325 MG per tablet Take 1 tablet by mouth every 6 hours as needed for Pain for up to 3 days.  5/28/22 5/31/22  JACQUELYN Nunez CNP   clopidogrel (PLAVIX) 75 MG tablet TAKE 1 TABLET BY MOUTH  DAILY 5/5/22   JACQUELYN Anthony CNP   Handicap Placard MIS by Does not apply route Exp 5 years 4/21/22   Aleks Johnson MD   aspirin 81 MG EC tablet Take 81 mg by mouth daily    Historical Provider, MD   tiZANidine (ZANAFLEX) 2 MG tablet Take 1 tablet by mouth nightly as needed (muscle spasms)  Patient not taking: Reported on 5/27/2022 12/6/21   Aleks Johnson MD   atorvastatin (LIPITOR) 40 MG tablet Take 1 tablet by mouth daily 9/27/21   Adan CARRASCO Holmatt, DO   lisinopril (PRINIVIL;ZESTRIL) 20 MG tablet TAKE 1 TABLET BY MOUTH  DAILY  Patient not taking: Reported on 5/27/2022 8/24/21   Aleks Johnson MD   Talazoparib Tosylate 0.25 MG CAPS Take 1 capsule by mouth daily Take 2 tablets daily    Historical Provider, MD   Cyanocobalamin (VITAMIN B-12 CR PO) Take 1 tablet by mouth daily     Historical Provider, MD   tamsulosin (FLOMAX) 0.4 MG capsule Take 0.4 mg by mouth daily  7/6/19   Historical Provider, MD   CPAP Machine MISC by Does not apply route Use nightly as directed. Pressure setting 5 cm H2O. Patient taking differently: 1 Device by Does not apply route Use nightly as directed.   Pressure setting 5 cm H2O. 1/9/18   Luis Jacobs MD   Ascorbic Acid (VITAMIN C PO) Take 500 mg by mouth daily     Historical Provider, MD       Future Appointments   Date Time Provider Sana Ramos   5/31/2022 11:00 AM SCHEDULE, [facility] NURSE [facility] [place] [facility] [place] [facility]\""   5/31/2022 12:30 PM SCHEDULE, [facility] CT SIM [facility] [place] [facility] [place] [facility]\""   6/6/2022  1:30 PM Aleks Johnson MD [facility] EMERGENCY MEDICAL [place] [facility] [place]

## 2022-05-31 NOTE — PROGRESS NOTES
SW met with Pt prior to his radiation oncology consult with Dr. Justine Georges. (This SW had previously spoke to Pt via phone re: transportation issues on 5/23/22). The patient is currently in-pt at [facility]. He is a [address] and connected to [place]". He states a distress level of \"6\"; reporting pain/discomfort and anxiety relating to his desire to return home as soon as able. Pt states is aware that he will meet with \"discharge planner\" re: DME required in the home upon hospital discharge. He states his primary support person is his \"partner\", Sergio Weiss (17 year relationship) as well as his son and dtr-in-law; all who reside in the same household. Pt states his goal is  to return home with assistive devices (walker, etc), if able. He is hopeful that radiation treatment will be recommended to \"shrink the tumor on my spine\" to allow, at least, minimal ambulation. SW assured Pt that Dr. Justine Georges would address these treatment questions/issues. No further SW needs at this time.

## 2022-05-31 NOTE — CARE COORDINATION
MET WITH PATIENT , FREEDOM OF CHOICE OFFERED. PLAN IS DC TO HOME WITH HHC/PT/NURSE/ RAD TX ARRANGED X 10 TX.  [facility] SCHEDULED FOR  DAILY AT 1030 AM. PATIENT REQUESTING RX FOR WALKER AT DC.

## 2022-05-31 NOTE — PROGRESS NOTES
Assessment complete, alert/orientated x4, vitals stable, bsx4, c/o 7/10 pain. Denies numbness/tingling in lower extremities at this time Medications/prn given per mar. Pt incontinent urine, washed/gown/linen changed. Pt has radiation appt at 6. Denies further needs, will continue to monitor, call light within reach.  Electronically signed by Gold Deleon RN on 5/31/2022 at 11:36 AM

## 2022-05-31 NOTE — CONSULTS
days of treatment? Yes    If yes:  Date of Admission: 5/25/22 till present  Hospital: Severe back pain    Additional Comments: [place] in the hospital and will follow post discharge    Alek Navarro 15    1. Patient Scenarios               (Score 1 Point Each)  a. The Patient has  i. A chronic illness with uncontrolled symptoms (e.g. pain, nausea, vomiting, shortness of breath, anorexia)   [x]  ii. Unresolved psychosocial or spiritual issues                                                                                                     []  iii. Frequent visits to the Emergency Department  (>1 x per month or >2 in last 3 months)                                 []  iv. More than one hospital admission in past 90 days                  []   v. Complex care requirements (e.g. functional dependency, complex home support for ventilator/antibiotics/feedings, patient in SNF/LTAC/nursing home) [x]  vi. No advance directives in place                                                                                                                         []  Total score for section # 1-  1     2. Basic Disease Processes             (Score 3 Points Overall)  a. Locally advanced or metastatic cancer           [x]  b. Non-cancer life-limiting illness (COPD, CHF, advanced dementia, stroke)      []                      Total score for section # 2- 3    3. Concomitant Disease Processes            (Score 1 Point Overall)  a. COPD               []   b. Renal Disease              []  c. CHF               []  d. Liver Disease              []  e. Other significant comorbidities (e.g. failure to thrive, feeding intolerance, functional decline)    [x]                      Total score for section # 3- 1    Physician to complete #4, #5, & #6      4. Symptom Assessment             (Score 1 Point Each)  i.  Severe/Uncontrolled Pain (e.g. patients who are opiate naïve and/or only taking OTC medications OR patients prescribed opiate by non-palliative care provider and pain not adequately controlled OR patient on long-term opiates for chronic pain and high risk for tolerance/abuse) []  ii. Anorexia/failure to thrive            [x]  iii. Unintentional weight loss of greater than 10 lbs in 1 month       []  iv. Shortness of breath/increasing O2 needs via supplemental source      []  v. Cognitive impairment            []                     Total score for section # 4- 1      5. Functional Status of Patient  a. ECOG 2              [x] (Score 2 Point)  b. ECOG 3             [] (Score 3 Points)                      Total score for section # 5- 2    6. Goals of Care              (Score 6 Points)  a. If patient wishes to pursue hospice and/or wishes to have modifications in their goals of care that involve stopping active treatment.  []                      Total score for section # 6- 0    TOTAL SCORE > 6 Consult Palliative Care (requires provider orders in Epic)         Total score- 8            HEREDITARY PROSTATE CANCER ASSESSMENT SCREEN  [x] Metastatic Prostate Cancer  [] High/Very high risk prostate cancer  [x] [place] Score of 7 & of the following:   [] 1 Family member with metastatic prostate cancer   [] 1 Family member with ovarian cancer   [] 1 Family member with pancreatic cancer   [] 1 [place]amilly member with breast cancer 48 or younger   [] 2 Family members on the same side of the family with breast or prostate cancer at any age   [] [address]  [] Physician notified of any positive answers  [] Assessment negative

## 2022-05-31 NOTE — PROGRESS NOTES
Department of Internal Medicine  General Internal Medicine  Attending Progress Note      SUBJECTIVE:  Pt off the floor for radiation. Spoke with CM as placement will be difficult as he will require at least 5 days a week of radiation which most SNFs do not have that transportation capability.      OBJECTIVE      Medications    Current Facility-Administered Medications: docusate sodium (COLACE) capsule 100 mg, 100 mg, Oral, Daily  metoprolol succinate (TOPROL XL) extended release tablet 25 mg, 25 mg, Oral, Daily  losartan (COZAAR) tablet 25 mg, 25 mg, Oral, Daily  sodium chloride flush 0.9 % injection 10 mL, 10 mL, IntraVENous, PRN  dexamethasone (DECADRON) injection 4 mg, 4 mg, IntraVENous, Q6H  HYDROmorphone (DILAUDID) injection 0.5 mg, 0.5 mg, IntraVENous, Q4H PRN  phytonadione ADULT (VITAMIN K) inj 10 mg/mL, 10 mg, SubCUTAneous, Once  aspirin EC tablet 81 mg, 81 mg, Oral, Daily  atorvastatin (LIPITOR) tablet 40 mg, 40 mg, Oral, Daily  clopidogrel (PLAVIX) tablet 75 mg, 75 mg, Oral, Daily  sodium chloride flush 0.9 % injection 5-40 mL, 5-40 mL, IntraVENous, 2 times per day  sodium chloride flush 0.9 % injection 5-40 mL, 5-40 mL, IntraVENous, PRN  0.9 % sodium chloride infusion, 25 mL, IntraVENous, PRN  enoxaparin (LOVENOX) injection 40 mg, 40 mg, SubCUTAneous, Daily  ondansetron (ZOFRAN-ODT) disintegrating tablet 4 mg, 4 mg, Oral, Q8H PRN **OR** ondansetron (ZOFRAN) injection 4 mg, 4 mg, IntraVENous, Q6H PRN  polyethylene glycol (GLYCOLAX) packet 17 g, 17 g, Oral, Daily PRN  acetaminophen (TYLENOL) tablet 650 mg, 650 mg, Oral, Q6H PRN **OR** acetaminophen (TYLENOL) suppository 650 mg, 650 mg, Rectal, Q6H PRN  HYDROcodone-acetaminophen (NORCO) 7.5-325 MG per tablet 1 tablet, 1 tablet, Oral, Q6H PRN  0.9 % sodium chloride infusion, , IntraVENous, PRN  acetaminophen (TYLENOL) tablet 650 mg, 650 mg, Oral, See Admin Instructions  diphenhydrAMINE (BENADRYL) tablet 25 mg, 25 mg, Oral, See Admin Instructions  Physical VITALS:  /81   Pulse 64   Temp 97 °F (36.1 °C)   Resp 18   Ht 5' 10\" (1.778 m)   Wt 174 lb (78.9 kg)   SpO2 98%   BMI 24.97 kg/m²   Off the floor despite multiple attempts to round     Data    CBC:   Lab Results   Component Value Date    WBC 8.3 05/31/2022    RBC 3.05 05/31/2022    HGB 9.2 05/31/2022    HCT 28.1 05/31/2022    MCV 92.2 05/31/2022    MCH 30.1 05/31/2022    MCHC 32.7 05/31/2022    RDW 21.6 05/31/2022     05/31/2022     CMP:    Lab Results   Component Value Date     05/31/2022    K 4.7 05/31/2022    CL 99 05/31/2022    CO2 20 05/31/2022    BUN 31 05/31/2022    CREATININE 0.80 05/31/2022    GFRAA >60.0 05/31/2022    LABGLOM >60.0 05/31/2022    GLUCOSE 126 05/31/2022    PROT 6.3 05/31/2022    LABALBU 3.7 05/31/2022    CALCIUM 8.6 05/31/2022    BILITOT 0.5 05/31/2022    ALKPHOS 574 05/31/2022     05/31/2022    ALT 15 05/31/2022       ASSESSMENT AND PLAN      # Metastatic prostate cancer to bone with T4/T5 cord compression  - NSGY consulted - non surgical due to being on plavix for CAD w/stents  - started on decadron  - palliative care consulted  - rad/onc planning on radiation treatment today  - PT/OT - SNF at discharge    # Near syncope - resolved  - monitor    # Anemia  - oncology consulted  - sp 1U blood  - continue to monitor    Disposition: Plan is for radiation treatment today. PT/OT. Pt wants to go home pending improvement after radiation but pre-cert for [facility] started. Discharge will be difficult as most SNF's do not have transportation capabilities to get him to radiation 5 days a week. CM aware. Palliative care, NSGY, oncology consulted.        Brooke Herrera,   Internal Medicine   Hospitalist    >35 minutes in total care time

## 2022-05-31 NOTE — PROGRESS NOTES
Teaching & Instructions - Thoracic Spine  General  Simulation  Initial Treatment  Self-Care Info  Nutrition  Social Service    Site-Specific  Side Effects  Cough Mgmt  Esophagitis/Pharyngitis  Fatigue Mgmt  Pain Mgmt  SOB Mgmt  Skin Care      Educational Handouts  Radiation Therapy & You  Site Specific Instructions  Radiation Oncology Dept Information  CBMS Program    Patient eager to learn, verbalized understanding of verbal education and handouts. RADIATION THERAPY BARRIERS ASSESSMENT      During your CT Simulation, you were placed in the position that you be in for your radiation treatments. You will be will in this position for approximately 10-15 minutes, do you have any concerns about staying in this position for the required time? Patient tolerated well        Do you anticipate that the symptoms of this diagnosis will negatively impact your ability to complete your radiation therapy course? No        While you are on treatment, you will be evaluated by the Radiation Oncologist once a week on Thursdays. The focus of these appointments will be management of side effects. You will be here for a minimum of one hour or more depending on your specific needs. Do you have any concerns about your ability to keep this appointment? Hospital will provide rides to [address] [place] [address] during admission. You will be coming [facility] the [facility] for  10   daily treatments, do you have any concerns about transportation? Do you have any financial concerns about your treatment that you would like to further discuss with social work? Not at this time. Do you have any other concerns or for see any concerns or potential barriers to completing your radiation therapy that you want us to be aware of? Not at this time.

## 2022-05-31 NOTE — PROGRESS NOTES
Patient states he has had 20-30 lb weight loss since January 2022 and decrease in appetite. Dietician consult being requested by nursing.

## 2022-06-01 ENCOUNTER — APPOINTMENT (OUTPATIENT)
Dept: RADIATION ONCOLOGY | Age: 72
End: 2022-06-01
Payer: MEDICAID

## 2022-06-01 ENCOUNTER — CARE COORDINATION (OUTPATIENT)
Dept: CARE COORDINATION | Age: 72
End: 2022-06-01

## 2022-06-01 LAB
ABO/RH: NORMAL
ALBUMIN SERPL-MCNC: 3.4 G/DL (ref 3.5–4.6)
ALP BLD-CCNC: 509 U/L (ref 35–104)
ALT SERPL-CCNC: 16 U/L (ref 0–41)
ANION GAP SERPL CALCULATED.3IONS-SCNC: 14 MEQ/L (ref 9–15)
ANTIBODY SCREEN: NORMAL
AST SERPL-CCNC: 79 U/L (ref 0–40)
BILIRUB SERPL-MCNC: 0.6 MG/DL (ref 0.2–0.7)
BLOOD BANK DISPENSE STATUS: NORMAL
BLOOD BANK PRODUCT CODE: NORMAL
BPU ID: NORMAL
BUN BLDV-MCNC: 32 MG/DL (ref 8–23)
CALCIUM SERPL-MCNC: 8.2 MG/DL (ref 8.5–9.9)
CHLORIDE BLD-SCNC: 100 MEQ/L (ref 95–107)
CO2: 20 MEQ/L (ref 20–31)
CREAT SERPL-MCNC: 0.77 MG/DL (ref 0.7–1.2)
DESCRIPTION BLOOD BANK: NORMAL
EKG ATRIAL RATE: 71 BPM
EKG P AXIS: 50 DEGREES
EKG P-R INTERVAL: 198 MS
EKG Q-T INTERVAL: 502 MS
EKG QRS DURATION: 118 MS
EKG QTC CALCULATION (BAZETT): 545 MS
EKG R AXIS: -21 DEGREES
EKG T AXIS: -59 DEGREES
EKG VENTRICULAR RATE: 71 BPM
GFR AFRICAN AMERICAN: >60
GFR NON-AFRICAN AMERICAN: >60
GLOBULIN: 2.4 G/DL (ref 2.3–3.5)
GLUCOSE BLD-MCNC: 109 MG/DL (ref 70–99)
HCT VFR BLD CALC: 24.7 % (ref 42–52)
HEMOGLOBIN: 7.9 G/DL (ref 14–18)
MCH RBC QN AUTO: 29.7 PG (ref 27–31.3)
MCHC RBC AUTO-ENTMCNC: 32.1 % (ref 33–37)
MCV RBC AUTO: 92.7 FL (ref 80–100)
PDW BLD-RTO: 21.5 % (ref 11.5–14.5)
PLATELET # BLD: 99 K/UL (ref 130–400)
POTASSIUM REFLEX MAGNESIUM: 4.4 MEQ/L (ref 3.4–4.9)
RBC # BLD: 2.67 M/UL (ref 4.7–6.1)
SODIUM BLD-SCNC: 134 MEQ/L (ref 135–144)
TOTAL PROTEIN: 5.8 G/DL (ref 6.3–8)
WBC # BLD: 4.5 K/UL (ref 4.8–10.8)

## 2022-06-01 PROCEDURE — P9016 RBC LEUKOCYTES REDUCED: HCPCS

## 2022-06-01 PROCEDURE — 6370000000 HC RX 637 (ALT 250 FOR IP): Performed by: FAMILY MEDICINE

## 2022-06-01 PROCEDURE — 2580000003 HC RX 258: Performed by: INTERNAL MEDICINE

## 2022-06-01 PROCEDURE — 6360000002 HC RX W HCPCS: Performed by: INTERNAL MEDICINE

## 2022-06-01 PROCEDURE — 1210000000 HC MED SURG R&B

## 2022-06-01 PROCEDURE — 6370000000 HC RX 637 (ALT 250 FOR IP): Performed by: INTERNAL MEDICINE

## 2022-06-01 PROCEDURE — 80053 COMPREHEN METABOLIC PANEL: CPT

## 2022-06-01 PROCEDURE — 86923 COMPATIBILITY TEST ELECTRIC: CPT

## 2022-06-01 PROCEDURE — 85027 COMPLETE CBC AUTOMATED: CPT

## 2022-06-01 PROCEDURE — 86900 BLOOD TYPING SEROLOGIC ABO: CPT

## 2022-06-01 PROCEDURE — 2580000003 HC RX 258

## 2022-06-01 PROCEDURE — 86850 RBC ANTIBODY SCREEN: CPT

## 2022-06-01 PROCEDURE — 36415 COLL VENOUS BLD VENIPUNCTURE: CPT

## 2022-06-01 PROCEDURE — 93010 ELECTROCARDIOGRAM REPORT: CPT | Performed by: INTERNAL MEDICINE

## 2022-06-01 PROCEDURE — 97535 SELF CARE MNGMENT TRAINING: CPT

## 2022-06-01 PROCEDURE — 86901 BLOOD TYPING SEROLOGIC RH(D): CPT

## 2022-06-01 RX ORDER — DOCUSATE SODIUM 100 MG/1
100 CAPSULE, LIQUID FILLED ORAL DAILY
Qty: 30 CAPSULE | Refills: 0 | Status: ON HOLD | OUTPATIENT
Start: 2022-06-02 | End: 2022-06-05

## 2022-06-01 RX ORDER — METOPROLOL SUCCINATE 25 MG/1
25 TABLET, EXTENDED RELEASE ORAL DAILY
Qty: 30 TABLET | Refills: 3 | Status: ON HOLD | OUTPATIENT
Start: 2022-06-02 | End: 2022-06-05

## 2022-06-01 RX ORDER — SODIUM CHLORIDE 9 MG/ML
INJECTION, SOLUTION INTRAVENOUS PRN
Status: DISCONTINUED | OUTPATIENT
Start: 2022-06-01 | End: 2022-06-03 | Stop reason: HOSPADM

## 2022-06-01 RX ORDER — POLYETHYLENE GLYCOL 3350 17 G/17G
17 POWDER, FOR SOLUTION ORAL DAILY PRN
Qty: 527 G | Refills: 1 | Status: SHIPPED | OUTPATIENT
Start: 2022-06-01 | End: 2022-07-01

## 2022-06-01 RX ORDER — LOSARTAN POTASSIUM 25 MG/1
25 TABLET ORAL DAILY
Qty: 30 TABLET | Refills: 3 | Status: ON HOLD | OUTPATIENT
Start: 2022-06-02 | End: 2022-06-05

## 2022-06-01 RX ORDER — TRAMADOL HYDROCHLORIDE 50 MG/1
50 TABLET ORAL EVERY 6 HOURS PRN
Status: DISCONTINUED | OUTPATIENT
Start: 2022-06-01 | End: 2022-06-02

## 2022-06-01 RX ORDER — SODIUM CHLORIDE 9 MG/ML
INJECTION, SOLUTION INTRAVENOUS
Status: COMPLETED
Start: 2022-06-01 | End: 2022-06-01

## 2022-06-01 RX ADMIN — LOSARTAN POTASSIUM 25 MG: 25 TABLET, FILM COATED ORAL at 08:03

## 2022-06-01 RX ADMIN — ATORVASTATIN CALCIUM 40 MG: 40 TABLET, FILM COATED ORAL at 08:04

## 2022-06-01 RX ADMIN — HYDROCODONE BITARTRATE AND ACETAMINOPHEN 1 TABLET: 7.5; 325 TABLET ORAL at 11:07

## 2022-06-01 RX ADMIN — Medication 10 ML: at 21:35

## 2022-06-01 RX ADMIN — ENOXAPARIN SODIUM 40 MG: 100 INJECTION SUBCUTANEOUS at 08:04

## 2022-06-01 RX ADMIN — DEXAMETHASONE SODIUM PHOSPHATE 4 MG: 4 INJECTION, SOLUTION INTRA-ARTICULAR; INTRALESIONAL; INTRAMUSCULAR; INTRAVENOUS; SOFT TISSUE at 04:00

## 2022-06-01 RX ADMIN — Medication 10 ML: at 09:21

## 2022-06-01 RX ADMIN — HYDROMORPHONE HYDROCHLORIDE 0.5 MG: 1 INJECTION, SOLUTION INTRAMUSCULAR; INTRAVENOUS; SUBCUTANEOUS at 12:00

## 2022-06-01 RX ADMIN — METOPROLOL SUCCINATE 25 MG: 25 TABLET, EXTENDED RELEASE ORAL at 08:04

## 2022-06-01 RX ADMIN — DEXAMETHASONE SODIUM PHOSPHATE 4 MG: 4 INJECTION, SOLUTION INTRA-ARTICULAR; INTRALESIONAL; INTRAMUSCULAR; INTRAVENOUS; SOFT TISSUE at 15:45

## 2022-06-01 RX ADMIN — ACETAMINOPHEN 650 MG: 325 TABLET ORAL at 21:35

## 2022-06-01 RX ADMIN — ASPIRIN 81 MG: 81 TABLET, COATED ORAL at 08:04

## 2022-06-01 RX ADMIN — HYDROCODONE BITARTRATE AND ACETAMINOPHEN 1 TABLET: 7.5; 325 TABLET ORAL at 16:50

## 2022-06-01 RX ADMIN — HYDROCODONE BITARTRATE AND ACETAMINOPHEN 1 TABLET: 7.5; 325 TABLET ORAL at 06:04

## 2022-06-01 RX ADMIN — HYDROMORPHONE HYDROCHLORIDE 0.5 MG: 1 INJECTION, SOLUTION INTRAMUSCULAR; INTRAVENOUS; SUBCUTANEOUS at 08:03

## 2022-06-01 RX ADMIN — SODIUM CHLORIDE 500 ML: 9 INJECTION, SOLUTION INTRAVENOUS at 21:34

## 2022-06-01 RX ADMIN — TRAMADOL HYDROCHLORIDE 50 MG: 50 TABLET, COATED ORAL at 18:58

## 2022-06-01 RX ADMIN — CLOPIDOGREL BISULFATE 75 MG: 75 TABLET ORAL at 08:04

## 2022-06-01 RX ADMIN — DEXAMETHASONE SODIUM PHOSPHATE 4 MG: 4 INJECTION, SOLUTION INTRA-ARTICULAR; INTRALESIONAL; INTRAMUSCULAR; INTRAVENOUS; SOFT TISSUE at 21:34

## 2022-06-01 RX ADMIN — SODIUM CHLORIDE, PRESERVATIVE FREE 10 ML: 5 INJECTION INTRAVENOUS at 00:35

## 2022-06-01 RX ADMIN — DOCUSATE SODIUM 100 MG: 100 CAPSULE, LIQUID FILLED ORAL at 08:04

## 2022-06-01 RX ADMIN — DEXAMETHASONE SODIUM PHOSPHATE 4 MG: 4 INJECTION, SOLUTION INTRA-ARTICULAR; INTRALESIONAL; INTRAMUSCULAR; INTRAVENOUS; SOFT TISSUE at 09:56

## 2022-06-01 RX ADMIN — HYDROMORPHONE HYDROCHLORIDE 0.5 MG: 1 INJECTION, SOLUTION INTRAMUSCULAR; INTRAVENOUS; SUBCUTANEOUS at 04:01

## 2022-06-01 ASSESSMENT — PAIN DESCRIPTION - FREQUENCY: FREQUENCY: CONTINUOUS

## 2022-06-01 ASSESSMENT — PAIN DESCRIPTION - DESCRIPTORS
DESCRIPTORS: ACHING;NAGGING;THROBBING
DESCRIPTORS: ACHING;DISCOMFORT
DESCRIPTORS: SHOOTING;SHARP
DESCRIPTORS: ACHING;DISCOMFORT;GNAWING

## 2022-06-01 ASSESSMENT — PAIN SCALES - GENERAL
PAINLEVEL_OUTOF10: 8
PAINLEVEL_OUTOF10: 8
PAINLEVEL_OUTOF10: 7
PAINLEVEL_OUTOF10: 5
PAINLEVEL_OUTOF10: 8
PAINLEVEL_OUTOF10: 6
PAINLEVEL_OUTOF10: 6
PAINLEVEL_OUTOF10: 7
PAINLEVEL_OUTOF10: 5

## 2022-06-01 ASSESSMENT — PAIN DESCRIPTION - LOCATION
LOCATION: BACK

## 2022-06-01 ASSESSMENT — PAIN - FUNCTIONAL ASSESSMENT
PAIN_FUNCTIONAL_ASSESSMENT: PREVENTS OR INTERFERES SOME ACTIVE ACTIVITIES AND ADLS
PAIN_FUNCTIONAL_ASSESSMENT: PREVENTS OR INTERFERES WITH MANY ACTIVE NOT PASSIVE ACTIVITIES
PAIN_FUNCTIONAL_ASSESSMENT: PREVENTS OR INTERFERES SOME ACTIVE ACTIVITIES AND ADLS

## 2022-06-01 ASSESSMENT — PAIN DESCRIPTION - ORIENTATION
ORIENTATION: MID;LOWER
ORIENTATION: MID
ORIENTATION: MID

## 2022-06-01 ASSESSMENT — PAIN DESCRIPTION - PAIN TYPE: TYPE: CHRONIC PAIN

## 2022-06-01 NOTE — PROGRESS NOTES
0800 Pt alert/orientated x4,  incontinent of urine, cleaned/changed, repositioned. Assessment complete, vss, lung sounds clear, BS active x4, c/o 8/10 pain, Am medications/prn dilaudid given per mar. Pt denies further needs at this time, transport will  approx 10:30a for radiation appt. will continue to monitor. Electronically signed by Jorge Coughlin RN on 6/1/2022 at 10:53 AM   1400 Pt arrived on unit, cleaned/changed/repositioned for comfort. Stated \"im going to take a nap\". Will continue to monitor. Electronically signed by Jorge Coughlin RN on 6/1/2022 at 1:53 PM  [phone] Palliative care called, unable to see patient for home pain medication. Dr. Wilhelminia Claude, He informed this nurse to contact Dr. Carrie Bryan in office. Dr. Carrie Bryan contacted and informed me patient was referred to palliative care and needs to be seen by them. Electronically signed by Jorge Coughlin RN on 6/1/2022 at 2:12 PM   [phone] Dr. Carrie Bryan called, spoke with Dr. Aneta Dakins, who will cover the patient d/t palliative care  unavailable until September. Palliative care notified. Electronically signed by Jorge Coughlin RN on 6/1/2022 at 2:16 PM  1500 Pt states he feels dehydrated wanted doctor asked for iv fluids. Messaged  [facility] [employer] [facility], Pt informed to drink more water. Pt given fresh water.  Electronically signed by Jorge Coughlin RN on 6/1/2022 at 3:01 PM  \

## 2022-06-01 NOTE — PROGRESS NOTES
[facility] Occupational Therapy      Date: 2022  Patient Name: Avril Michaud        MRN: [id number]  Account: [de-identified]   : 1950  (73 y.o.)  [id number]    Chart reviewed, attempted OT at [phone] for treatment. Patient not seen 2° to:    Pt. declined, stating: Pt not feeling up to participating. Will attempt again when able.     Electronically signed by ELISE López on 1694 at 1:44 PM

## 2022-06-01 NOTE — CARE COORDINATION
Ambulatory Care Coordination Note  6/1/2022  CM Risk Score: 9  [place] 10 Year Mortality Risk Score: 100%     ACC: Gregorio Damian RN    Summary Note:     Patrica Glasgow has called my phone several times. I picked up his last call and he states that he is upset that he is being discharged  He says that he was just diagnosed with the spinal tumor and he has new treatments with other physical changes that he feels have not been completely addressed. I did explain that his radiation treatments should remain as they are currently  He states that he is not walking well and needs home equipment  He adds that he is still incontinent and this will be problematic at home. I will reach out to case management to obtain their input and plan of care     Lab Results     None          Care Coordination Interventions    Program Enrollment: Complex Care  Referral from Primary Care Provider: No  Suggested Interventions and Community Resources  Disease Specific Clinic: In Process  Disease Association: In Process  Senior Services: In Process  Social Work: In Process  Zone Management Tools: In Process  Other Services or Interventions: Pharmacy referral declined (in Clinical Study) Dietician referral declined, Walk in Clinic hours and usage discussed  COPD zone tool education initiated         Goals Addressed    None         Prior to Admission medications    Medication Sig Start Date End Date Taking?  Authorizing Provider   losartan (COZAAR) 25 MG tablet Take 1 tablet by mouth daily 6/2/22   [facility] DO   metoprolol succinate (TOPROL XL) 25 MG extended release tablet Take 1 tablet by mouth daily 6/2/22   [facility] DO   docusate sodium (COLACE) 100 mg capsule Take 1 capsule by mouth daily 6/2/22 7/2/22  [facility] DO   polyethylene glycol (GLYCOLAX) 17 g packet Take 17 g by mouth daily as needed for Constipation 6/1/22 7/1/22  [facility] DO   traMADol (ULTRAM) 50 MG tablet Take 50 mg by mouth every 6 hours as needed for Pain. Historical Provider, MD   clopidogrel (PLAVIX) 75 MG tablet TAKE 1 TABLET BY MOUTH  DAILY 5/5/22   Dmitri Mills APRN - CNP   Handicap Placard MISC by Does not apply route Exp 5 years 4/21/22   Varinder Sultana MD   aspirin 81 MG EC tablet Take 81 mg by mouth daily    Historical Provider, MD   atorvastatin (LIPITOR) 40 MG tablet Take 1 tablet by mouth daily 9/27/21   Adan CARRASCO Holiday, DO   Talazoparib Tosylate 0.25 MG CAPS Take 1 capsule by mouth daily Take 2 tablets daily    Historical Provider, MD   Cyanocobalamin (VITAMIN B-12 CR PO) Take 1 tablet by mouth daily     Historical Provider, MD   tamsulosin (FLOMAX) 0.4 MG capsule Take 0.4 mg by mouth daily  7/6/19   Historical Provider, MD   CPAP Machine MISC by Does not apply route Use nightly as directed. Pressure setting 5 cm H2O. Patient taking differently: 1 Device by Does not apply route Use nightly as directed.   Pressure setting 5 cm H2O. 1/9/18   Kehinde eJan Baptiste MD   Ascorbic Acid (VITAMIN C PO) Take 500 mg by mouth daily     Historical Provider, MD       Future Appointments   Date Time Provider Sana Rachel   6/2/2022 10:30 AM SCHEDULE, [facility] [place] H[facility]   6/2/2022 10:45 AM Mckayla March MD [facility] [place] [facility] [place]   6/3/2022 10:30 AM SCHEDULE, [facility] 1 [facility] [place] HOD   6/6/2022 10:30 AM SCHEDULE, [facility] 1 [facility] [place] HOD   6/6/2022  1:30 PM Varinder Sultana MD [facility] [place]   6/7/2022 10:30 AM SCHEDULE, [facility] 1 [facility] [place] HOD   6/8/2022 10:30 AM SCHEDULE, [facility] 1 [facility] [place] HOD   6/9/2022 10:30 AM SCHEDULE, [facility] 1 [facility] [place] HOD   6/10/2022 10:30 AM SCHEDULE, [facility] 1 [facility] [place] HOD   6/13/2022 10:30 AM SCHEDULE, [facility] 1 [facility] [place] HOD

## 2022-06-01 NOTE — PROGRESS NOTES
Physical Therapy Med Surg Daily Treatment Note  Facility/Department: [facility]  Room: [id number]       NAME: Leslie Sanchez  : 1950 (73 y.o.)  MRN: [id number]  CODE STATUS: Full Code    Date of Service: 2022    Patient Diagnosis(es): Lightheadedness [R42]  Anemia [D64.9]  Bilateral pleural effusion [J90]  Anemia, unspecified type [D64.9]  Malignant neoplasm of bone, unspecified location [facility]) [C41.9]   Chief Complaint   Patient presents with    Dizziness     pt c/o dizziness, near syncope tonight     Patient Active Problem List    Diagnosis Date Noted    Paroxysmal atrial fibrillation ([address]     PAD (peripheral artery disease) ([address]     Secondary malignant neoplasm of thoracic vertebral column ([address] 2022    Anemia 2022    Other emphysema ([address]     Abnormal stress test 2022    Pleural effusion 2022    Acute occlusion of artery of lower extremity ([address] 2021    History of colon polyps     Polyp of cecum     Family history of colon cancer     S/P radiation therapy 2019    Drug-induced erectile dysfunction 2019    Primary insomnia 2018    UTE on CPAP 2018    Prediabetes 2016    Hypertension     Prostate cancer ([address]     Hyperlipidemia     Coronary artery disease involving native coronary artery of native heart without angina pectoris 2006    Old MI (myocardial infarction) 2006        Past Medical History:   Diagnosis Date    CAD (coronary artery disease)     Family history of colon cancer     Hyperlipidemia     Hypertension     Old MI (myocardial infarction) 2006    UTE on CPAP     PAD (peripheral artery disease) (HCC)     Prostate cancer ([address]      Past Surgical History:   Procedure Laterality Date    COLONOSCOPY N/A 2019    COLORECTAL CANCER SCREENING, NOT HIGH RISK performed by Benitez Sen MD at [address] [place] [address]      X    THORACENTESIS Right 03/07/2022    870 ml removed by Dr. Fernando Clark - diagnostics sent    THORACENTESIS Left 03/08/2022    285 ml removed by Dr Fernando Clark - cytology only sent       Chart Reviewed: Yes  Family / Caregiver Present: No    Restrictions:  Restrictions/Precautions: Fall Risk    SUBJECTIVE:   Subjective: Patient resting  in bed. Agreeable to tx. States he was just medicated for pain. Response To Previous Treatment: Patient with no complaints from previous session. Pain  Patient denies pain     OBJECTIVE:   Bed mobility  Rolling to Left: Stand by assistance (requires use of bed rail)  Rolling to Right: Stand by assistance (requires use of bed rail)  Supine to Sit: Minimal assistance  Sit to Supine: Minimal assistance; Moderate assistance (for bilateral LE)    Transfers  Sit to Stand: Minimal Assistance; Moderate Assistance  Stand to sit: Minimal Assistance  Comment: Completes sit to stand x1- tolerates approx 20\" of standing. Request to return to seated position due to fatigue. ASSESSMENT   Body Structures, Functions, Activity Limitations Requiring Skilled Therapeutic Intervention: Decreased functional mobility ; Decreased ADL status; Decreased endurance;Decreased strength;Decreased sensation;Decreased balance;Decreased coordination;Decreased safe awareness;Decreased vision/visual deficit  Assessment: Patient demonstrates decline in functional mobility training this date. Requires increased assistance for bed mobility and transfers. Unable to ambulate due to safety concerns/fatigue. Patient recently received Dilaudid and is mildly. lethargic. He feels his inability to ambulate is due medication.      Discharge Recommendations:  Continue to assess pending progress,Patient would benefit from continued therapy after discharge  Goals  Long Term Goals  Long term goal 1: Pt to complete bed mobility indep  Long term goal 2: Pt to complete transfers with indep  Long term goal 3: Pt to ambulate 50-150ft with LRD and indep  Long term goal 4: Pt to complete 3 steps with HR and indep  Long term goal 5: Pt to complete TUG within 13.5sec    PLAN    Plan: 1 time a day 3-6 times a week  Safety Devices  Type of Devices: All fall risk precautions in place     AMPAC (6 CLICK) BASIC MOBILITY  AM-PAC Inpatient Mobility Raw Score : 12     Therapy Time   Individual   Time In 0845   Time Out 0903   Minutes 18      Bed mobility/tr [facility] [place] [facility], 06/01/22 at 10:18 AM         Definitions for assistance levels  Independent = pt does not require any physical supervision or assistance from another person for activity completion. Device may be needed.   Stand by assistance = pt requires verbal cues or instructions from another person, close to but not touching, to perform the activity  Minimal assistance= pt performs 75% or more of the activity; assistance is required to complete the activity  Moderate assistance= pt performs 50% of the activity; assistance is required to complete the activity  Maximal assistance = pt performs 25% of the activity; assistance is required to complete the activity  Dependent = pt requires total physical assistance to accomplish the task Spontaneous, unlabored and symmetrical

## 2022-06-01 NOTE — PROGRESS NOTES
[facility] Occupational Therapy      Date: 2022  Patient Name: Luis Elliott        MRN: [id number]  Account: [de-identified]   : 1950  (73 y.o.)  Room: [place] [address]    Chart reviewed, attempted OT at [phone] for treatment. Patient not seen 2° to:    Pt. declined, stating: \"I can't right now. I had my Dilaudid and can't do anything. \"     Will attempt again when able.     Electronically signed by ELISE Richter on 9/3/1147 at 9:21 AM

## 2022-06-01 NOTE — PROGRESS NOTES
Department of Internal Medicine  General Internal Medicine  Attending Progress Note      SUBJECTIVE:  Pt seen and examined. Pt very difficult as he screams out in pain as norco not working, then gets dilaudid and is too tired to even stand so he refuses PT/OT. Pt was adamantly refusing SNF despite his inability to stand and daily transportation was set up to take him to radiation daily, but spoke with patient who is now agreeable to SNF if they are able to set up transport to his radiation treatments. Pt agreeable to discontinue dilaudid in an attempt to work with PT which is needed for SNF placement.  Oncology and palliative care working to manage pain as they will be following up with him after discharge    OBJECTIVE      Medications    Current Facility-Administered Medications: docusate sodium (COLACE) capsule 100 mg, 100 mg, Oral, Daily  metoprolol succinate (TOPROL XL) extended release tablet 25 mg, 25 mg, Oral, Daily  losartan (COZAAR) tablet 25 mg, 25 mg, Oral, Daily  sodium chloride flush 0.9 % injection 10 mL, 10 mL, IntraVENous, PRN  dexamethasone (DECADRON) injection 4 mg, 4 mg, IntraVENous, Q6H  phytonadione ADULT (VITAMIN K) inj 10 mg/mL, 10 mg, SubCUTAneous, Once  aspirin EC tablet 81 mg, 81 mg, Oral, Daily  atorvastatin (LIPITOR) tablet 40 mg, 40 mg, Oral, Daily  clopidogrel (PLAVIX) tablet 75 mg, 75 mg, Oral, Daily  sodium chloride flush 0.9 % injection 5-40 mL, 5-40 mL, IntraVENous, 2 times per day  sodium chloride flush 0.9 % injection 5-40 mL, 5-40 mL, IntraVENous, PRN  0.9 % sodium chloride infusion, 25 mL, IntraVENous, PRN  enoxaparin (LOVENOX) injection 40 mg, 40 mg, SubCUTAneous, Daily  ondansetron (ZOFRAN-ODT) disintegrating tablet 4 mg, 4 mg, Oral, Q8H PRN **OR** ondansetron (ZOFRAN) injection 4 mg, 4 mg, IntraVENous, Q6H PRN  polyethylene glycol (GLYCOLAX) packet 17 g, 17 g, Oral, Daily PRN  acetaminophen (TYLENOL) tablet 650 mg, 650 mg, Oral, Q6H PRN **OR** acetaminophen (TYLENOL) suppository 650 mg, 650 mg, Rectal, Q6H PRN  HYDROcodone-acetaminophen (NORCO) 7.5-325 MG per tablet 1 tablet, 1 tablet, Oral, Q6H PRN  0.9 % sodium chloride infusion, , IntraVENous, PRN  acetaminophen (TYLENOL) tablet 650 mg, 650 mg, Oral, See Admin Instructions  diphenhydrAMINE (BENADRYL) tablet 25 mg, 25 mg, Oral, See Admin Instructions  Physical    VITALS:  BP (!) 116/58   Pulse 64   Temp 98.6 °F (37 °C) (Oral)   Resp 18   Ht 5' 10\" (1.778 m)   Wt 174 lb (78.9 kg)   SpO2 99%   BMI 24.97 kg/m²   Constitutional: Lying in bed comfortably  Head: Normocephalic, atraumatic  ENT: moist mucous membranes  Neck: neck supple, trachea midline  Lungs: non labored  Heart: RRR  GI: non-distended  MSK: no edema noted  Skin: warm, dry     Data    CBC:   Lab Results   Component Value Date    WBC 4.5 06/01/2022    RBC 2.67 06/01/2022    HGB 7.9 06/01/2022    HCT 24.7 06/01/2022    MCV 92.7 06/01/2022    MCH 29.7 06/01/2022    MCHC 32.1 06/01/2022    RDW 21.5 06/01/2022    PLT 99 06/01/2022     CMP:    Lab Results   Component Value Date     06/01/2022    K 4.4 06/01/2022     06/01/2022    CO2 20 06/01/2022    BUN 32 06/01/2022    CREATININE 0.77 06/01/2022    GFRAA >60.0 06/01/2022    LABGLOM >60.0 06/01/2022    GLUCOSE 109 06/01/2022    PROT 5.8 06/01/2022    LABALBU 3.4 06/01/2022    CALCIUM 8.2 06/01/2022    BILITOT 0.6 06/01/2022    ALKPHOS 509 06/01/2022    AST 79 06/01/2022    ALT 16 06/01/2022       ASSESSMENT AND PLAN      # Metastatic prostate cancer to bone with T4/T5 cord compression  - NSGY consulted - non surgical due to being on plavix for CAD w/stents  - started on decadron  - palliative care consulted  - rad/onc planning on radiation treatment 5x/week  - PT/OT - SNF at discharge. Pt initially refused, but unable to stand, so now agreeable. Needs to work with PT and too weak due to dilaudid. Pt agreeable to discontinue dilaudid due to sedative effect on him.     # Near syncope - resolved  - monitor    # Anemia  - oncology consulted  - sp 1U blood  - continue to monitor    Disposition: Plan is for radiation treatment 5x/week. Needs SNF and transportation set up for daily radiation. Pt very difficult as he is in severe pain, then gets sedated with pain meds and refuses therapy. Palliative care, NSGY, oncology consulted.  Will need pain regimen and steroids addressed prior to discharge and will need good transportation plan from SNF to radiation treatments      [facility] DO  Internal Medicine   Hospitalist    >35 minutes in total care time

## 2022-06-01 NOTE — PROGRESS NOTES
Hematology/Oncology   Progress Note        CHIEF COMPLAINT/HPI:  Mr. Matthew Jean Baptiste was started on Radiation therapy yesterday. Mr. Matthew Jean Baptiste continues to have upper back pain which is relieved by Tramadol. IV Dilaudid has been discontinued . Patient has not participated in physical therapy much. He is unable to bear weight because of leg weakness . He needs to get physical therapy . Patient cannot go home as His wife will not be able to care for him . He should go to Skilled nursing care facility to get physical therapy . REVIEW OF SYSTEMS:    Unremarkable except for symptoms mentioned in HPI.     Current Inpatient Medications:    Current Facility-Administered Medications   Medication Dose Route Frequency Provider Last Rate Last Admin    traMADol (ULTRAM) tablet 50 mg  50 mg Oral Q6H PRN Jose Martin Causey MD        docusate sodium (COLACE) capsule 100 mg  100 mg Oral Daily [facility] DO   100 mg at 06/01/22 0804    metoprolol succinate (TOPROL XL) extended release tablet 25 mg  25 mg Oral Daily Coreen Amezcua MD   25 mg at 06/01/22 0804    losartan (COZAAR) tablet 25 mg  25 mg Oral Daily Coreen Amezcua MD   25 mg at 06/01/22 0803    sodium chloride flush 0.9 % injection 10 mL  10 mL IntraVENous PRN Eusebia Phillips MD   10 mL at 06/01/22 0035    dexamethasone (DECADRON) injection 4 mg  4 mg IntraVENous Q6H Eusebia Phillips MD   4 mg at 06/01/22 1545    phytonadione ADULT (VITAMIN K) inj 10 mg/mL  10 mg SubCUTAneous Once Eusebia Phillips MD        aspirin EC tablet 81 mg  81 mg Oral Daily Demarcus Monaco MD   81 mg at 06/01/22 0804    atorvastatin (LIPITOR) tablet 40 mg  40 mg Oral Daily Demarcus Monaco MD   40 mg at 06/01/22 0804    clopidogrel (PLAVIX) tablet 75 mg  75 mg Oral Daily Demarcus Mnoaco MD   75 mg at 06/01/22 0804    sodium chloride flush 0.9 % injection 5-40 mL  5-40 mL IntraVENous 2 times per day Demarcus Monaco MD   10 mL at 06/01/22 0921    sodium chloride flush 0.9 % injection 5-40 mL  5-40 mL IntraVENous PRN Quirino Gates MD        0.9 % sodium chloride infusion  25 mL IntraVENous PRN Quirino Gates MD        enoxaparin (LOVENOX) injection 40 mg  40 mg SubCUTAneous Daily Quirino Gates MD   40 mg at 06/01/22 0804    ondansetron (ZOFRAN-ODT) disintegrating tablet 4 mg  4 mg Oral Q8H PRN Quirino Gates MD        Or    ondansetron [facility]) injection 4 mg  4 mg IntraVENous Q6H PRN Quirino Gates MD   4 mg at 05/28/22 0806    polyethylene glycol (GLYCOLAX) packet 17 g  17 g Oral Daily PRN Quirino Gates MD   17 g at 05/29/22 0854    acetaminophen (TYLENOL) tablet 650 mg  650 mg Oral Q6H PRN Quirino Gates MD        Or   Sun Ping acetaminophen (TYLENOL) suppository 650 mg  650 mg Rectal Q6H PRN Quirino Gates MD        0.9 % sodium chloride infusion   IntraVENous PRN Charline Gannon MD        acetaminophen (TYLENOL) tablet 650 mg  650 mg Oral See Admin Instructions Charline Gannon MD        diphenhydrAMINE (BENADRYL) tablet 25 mg  25 mg Oral See Admin Instructions Charline Gannon MD           PHYSICAL EXAM:    EYES:  Lids and lashes normal, pupils equal, round and reactive to light, extra ocular muscles intact, sclera clear, conjunctiva pale    ENT:  Normocephalic, without obvious abnormality, atraumatic, sinuses nontender on palpation, external ears without lesions, oral pharynx with moist mucus membranes, tonsils without erythema or exudates, gums normal and good dentition.     NECK:  Supple, symmetrical, trachea midline, no adenopathy, thyroid symmetric, not enlarged and no tenderness, skin normal    CHEST:    LUNGS:  No increased work of breathing, good air exchange, clear to auscultation bilaterally, no crackles or wheezing    CARDIOVASCULAR:  Normal apical impulse, regular rate and rhythm, normal S1 and S2, no S3 or S4, and no murmur noted    ABDOMEN:  No scars, normal bowel sounds, soft, non-distended, non-tender, no masses palpated, no hepatosplenomegally    MUSCULOSKELETAL:  There is no redness, PAD (peripheral artery disease) (HCC)    Paroxysmal atrial fibrillation (HCC)    Pleural effusion    Abnormal stress test    Other emphysema (HCC)    Anemia    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)      -  Spinal Cord compression secondary to metastatic prostate       cancer     - Normocytic Normochromic anemia and Thrombocytopenia could be secondary to chemotherapy, metastatic prostate cancer , combined iron and Folate/B12 deficiency     Plan : I shall start him on Tramadol as his pain relief is better relieved compared to Norco. Please continue steroids and Dexamethasone. Patient needs Skilled nursing care placement for physical therapy etc. Patient will benefit from 1 unit of packed RBC transfusion as this will help him participate in physical therapy .             Electronically signed by Sandeep Gutierrez MD on 6/1/22 at 5:39 PM EDT

## 2022-06-01 NOTE — CARE COORDINATION
This LSW met with patient at bedside this afternoon. Patient is requesting that his referral be sent to [facility] for SNF placement. Patient referral sent to [facility]. Awaiting response at this time. Patient has completed 2 radiation tx., has 8 tx left @ [facility] / [address] [place] [address]. LSW / SYLVIE to follow.   Electronically signed by SHIKHA Christianson, LSW on 6/1/22 at 2:37 PM EDT

## 2022-06-01 NOTE — CARE COORDINATION
I spoke with Osvaldo Montoya to discuss plan of care regarding Angelita Granados. She states that the discharge has been put on hold for now  The plan is to have him go to [facility] at time of discharge  They are also working on plans for ongoing radiation treatment after inpatient discharge.

## 2022-06-01 NOTE — PROGRESS NOTES
Spiritual Care Services     Summary of Visit:   visited patient on 2W. Patient feeling frustrated because of some difficulties he has experienced in the hospital. Patient feels he is being rushed out of the hospital before he is ready and that his concerns have not been heard or addressed.  provided active listening to help patient vocalize his feelings. Patient said he is not particularly Shinto but he considers himself a spiritualist. [address] provided active listening to help patient vocalize his emotions and concerns. Spiritual Assessment/Intervention/Outcomes:    Encounter Summary  Encounter Overview/Reason : Initial Encounter  Service Provided For[de-identified] Patient  Referral/Consult From[de-identified] Rounding  Support System: Significant other,Children,Friends/neighbors  Complexity of Encounter: Moderate  Begin Time: 1600  End Time : 1645  Total Time Calculated: 45 min  Encounter   Type: Initial Screen/Assessment     Spiritual/Emotional needs  Type: Spiritual Distress                    Values / Beliefs  Do You Have Any Ethnic, Cultural, Sacramental, or Spiritual Mosque Needs You Would Like Us To Be Aware of While You Are in the Hospital : No    Care Plan:    Provide continued support to help patient vocalize emotions. Spiritual Care Services   Electronically signed by Franny Vincent on 6/1/22 at 4:54 PM EDT     To reach a  for emotional and spiritual support, place an [facility] [place] consult request.   If a  is needed immediately, dial 0 and ask to page the on-call .

## 2022-06-01 NOTE — FLOWSHEET NOTE
Patient was reported to be incontinent  After he came back from his radiation treatment,he denies any pain or burning during or after urination. 20:35 patient hand grasp remain strong ,no complain of numbness or tingling in his legs. 22:39 dilaudid 0.5 mg was given for patient complain of pain rated 7/10 and he wants his pain medicine to be given on time,every 4 hours for him to have his pain under control    23:45 patient heart rate is regular,lungs clear to auscultation,no jugular vein distention,abdomen soft with active bowel sounds,skin is warm and dry to touch,right elbow skin tear was covered with mepilex,no calf pain,no edema. 02:05 : patient is sleeping his breathing is effortless. 04:01:meicated patient with dilaudid per his request,his iv have excellent blood return and flushed . 04:30 He is asleep,no respiratory distress. his call light is within his easy reach.

## 2022-06-02 ENCOUNTER — APPOINTMENT (OUTPATIENT)
Dept: RADIATION ONCOLOGY | Age: 72
End: 2022-06-02
Payer: MEDICAID

## 2022-06-02 LAB
ALBUMIN SERPL-MCNC: 3.2 G/DL (ref 3.5–4.6)
ALP BLD-CCNC: 451 U/L (ref 35–104)
ALT SERPL-CCNC: 15 U/L (ref 0–41)
ANION GAP SERPL CALCULATED.3IONS-SCNC: 13 MEQ/L (ref 9–15)
AST SERPL-CCNC: 43 U/L (ref 0–40)
BILIRUB SERPL-MCNC: 0.8 MG/DL (ref 0.2–0.7)
BUN BLDV-MCNC: 29 MG/DL (ref 8–23)
CALCIUM SERPL-MCNC: 8.2 MG/DL (ref 8.5–9.9)
CHLORIDE BLD-SCNC: 101 MEQ/L (ref 95–107)
CO2: 18 MEQ/L (ref 20–31)
CREAT SERPL-MCNC: 0.62 MG/DL (ref 0.7–1.2)
GFR AFRICAN AMERICAN: >60
GFR NON-AFRICAN AMERICAN: >60
GLOBULIN: 2.5 G/DL (ref 2.3–3.5)
GLUCOSE BLD-MCNC: 123 MG/DL (ref 70–99)
POTASSIUM REFLEX MAGNESIUM: 4.3 MEQ/L (ref 3.4–4.9)
SODIUM BLD-SCNC: 132 MEQ/L (ref 135–144)
TOTAL PROTEIN: 5.7 G/DL (ref 6.3–8)

## 2022-06-02 PROCEDURE — 6370000000 HC RX 637 (ALT 250 FOR IP): Performed by: NURSE PRACTITIONER

## 2022-06-02 PROCEDURE — 2580000003 HC RX 258: Performed by: INTERNAL MEDICINE

## 2022-06-02 PROCEDURE — 1210000000 HC MED SURG R&B

## 2022-06-02 PROCEDURE — 97535 SELF CARE MNGMENT TRAINING: CPT

## 2022-06-02 PROCEDURE — 97110 THERAPEUTIC EXERCISES: CPT

## 2022-06-02 PROCEDURE — 99233 SBSQ HOSP IP/OBS HIGH 50: CPT | Performed by: NURSE PRACTITIONER

## 2022-06-02 PROCEDURE — 6360000002 HC RX W HCPCS: Performed by: INTERNAL MEDICINE

## 2022-06-02 PROCEDURE — 36415 COLL VENOUS BLD VENIPUNCTURE: CPT

## 2022-06-02 PROCEDURE — 6370000000 HC RX 637 (ALT 250 FOR IP): Performed by: INTERNAL MEDICINE

## 2022-06-02 PROCEDURE — 80053 COMPREHEN METABOLIC PANEL: CPT

## 2022-06-02 RX ORDER — PREDNISONE 50 MG/1
50 TABLET ORAL DAILY
Qty: 10 TABLET | Refills: 0 | Status: ON HOLD | OUTPATIENT
Start: 2022-06-02 | End: 2022-06-10 | Stop reason: HOSPADM

## 2022-06-02 RX ORDER — HYDROCODONE BITARTRATE AND ACETAMINOPHEN 5; 325 MG/1; MG/1
1 TABLET ORAL EVERY 4 HOURS PRN
Status: DISCONTINUED | OUTPATIENT
Start: 2022-06-02 | End: 2022-06-02

## 2022-06-02 RX ORDER — OXYCODONE HYDROCHLORIDE 5 MG/1
5 TABLET ORAL EVERY 4 HOURS PRN
Status: DISCONTINUED | OUTPATIENT
Start: 2022-06-02 | End: 2022-06-03 | Stop reason: HOSPADM

## 2022-06-02 RX ORDER — HYDROCODONE BITARTRATE AND ACETAMINOPHEN 5; 325 MG/1; MG/1
2 TABLET ORAL NIGHTLY
Status: DISCONTINUED | OUTPATIENT
Start: 2022-06-02 | End: 2022-06-02

## 2022-06-02 RX ORDER — ACETAMINOPHEN 650 MG/1
650 SUPPOSITORY RECTAL EVERY 12 HOURS PRN
Status: DISCONTINUED | OUTPATIENT
Start: 2022-06-02 | End: 2022-06-03 | Stop reason: HOSPADM

## 2022-06-02 RX ORDER — OXYCODONE HYDROCHLORIDE 5 MG/1
10 TABLET ORAL NIGHTLY
Status: DISCONTINUED | OUTPATIENT
Start: 2022-06-02 | End: 2022-06-03 | Stop reason: HOSPADM

## 2022-06-02 RX ORDER — OXYCODONE HYDROCHLORIDE 10 MG/1
10 TABLET ORAL NIGHTLY
Qty: 3 TABLET | Refills: 0 | Status: ON HOLD | OUTPATIENT
Start: 2022-06-02 | End: 2022-06-05

## 2022-06-02 RX ORDER — OXYCODONE HYDROCHLORIDE 5 MG/1
5 TABLET ORAL ONCE
Status: COMPLETED | OUTPATIENT
Start: 2022-06-02 | End: 2022-06-02

## 2022-06-02 RX ORDER — OXYCODONE HYDROCHLORIDE 5 MG/1
5 TABLET ORAL EVERY 4 HOURS PRN
Qty: 18 TABLET | Refills: 0 | Status: ON HOLD | OUTPATIENT
Start: 2022-06-02 | End: 2022-06-10 | Stop reason: HOSPADM

## 2022-06-02 RX ADMIN — DEXAMETHASONE SODIUM PHOSPHATE 4 MG: 4 INJECTION, SOLUTION INTRA-ARTICULAR; INTRALESIONAL; INTRAMUSCULAR; INTRAVENOUS; SOFT TISSUE at 05:01

## 2022-06-02 RX ADMIN — TRAMADOL HYDROCHLORIDE 50 MG: 50 TABLET, COATED ORAL at 07:36

## 2022-06-02 RX ADMIN — OXYCODONE 10 MG: 5 TABLET ORAL at 21:04

## 2022-06-02 RX ADMIN — OXYCODONE 5 MG: 5 TABLET ORAL at 10:53

## 2022-06-02 RX ADMIN — TRAMADOL HYDROCHLORIDE 50 MG: 50 TABLET, COATED ORAL at 01:18

## 2022-06-02 RX ADMIN — DEXAMETHASONE SODIUM PHOSPHATE 4 MG: 4 INJECTION, SOLUTION INTRA-ARTICULAR; INTRALESIONAL; INTRAMUSCULAR; INTRAVENOUS; SOFT TISSUE at 10:53

## 2022-06-02 RX ADMIN — OXYCODONE 5 MG: 5 TABLET ORAL at 16:58

## 2022-06-02 RX ADMIN — DEXAMETHASONE SODIUM PHOSPHATE 4 MG: 4 INJECTION, SOLUTION INTRA-ARTICULAR; INTRALESIONAL; INTRAMUSCULAR; INTRAVENOUS; SOFT TISSUE at 22:35

## 2022-06-02 RX ADMIN — METOPROLOL SUCCINATE 25 MG: 25 TABLET, EXTENDED RELEASE ORAL at 10:53

## 2022-06-02 RX ADMIN — DEXAMETHASONE SODIUM PHOSPHATE 4 MG: 4 INJECTION, SOLUTION INTRA-ARTICULAR; INTRALESIONAL; INTRAMUSCULAR; INTRAVENOUS; SOFT TISSUE at 16:16

## 2022-06-02 RX ADMIN — CLOPIDOGREL BISULFATE 75 MG: 75 TABLET ORAL at 10:53

## 2022-06-02 RX ADMIN — ATORVASTATIN CALCIUM 40 MG: 40 TABLET, FILM COATED ORAL at 10:53

## 2022-06-02 RX ADMIN — ASPIRIN 81 MG: 81 TABLET, COATED ORAL at 10:52

## 2022-06-02 RX ADMIN — DOCUSATE SODIUM 100 MG: 100 CAPSULE, LIQUID FILLED ORAL at 10:53

## 2022-06-02 RX ADMIN — Medication 10 ML: at 10:54

## 2022-06-02 RX ADMIN — Medication 10 ML: at 21:06

## 2022-06-02 RX ADMIN — ENOXAPARIN SODIUM 40 MG: 100 INJECTION SUBCUTANEOUS at 10:53

## 2022-06-02 RX ADMIN — LOSARTAN POTASSIUM 25 MG: 25 TABLET, FILM COATED ORAL at 10:53

## 2022-06-02 ASSESSMENT — ENCOUNTER SYMPTOMS
CONSTIPATION: 0
TROUBLE SWALLOWING: 0
DIARRHEA: 0
COLOR CHANGE: 0
WHEEZING: 0
ABDOMINAL PAIN: 0
BACK PAIN: 1
SHORTNESS OF BREATH: 0
NAUSEA: 0

## 2022-06-02 ASSESSMENT — PAIN - FUNCTIONAL ASSESSMENT: PAIN_FUNCTIONAL_ASSESSMENT: PREVENTS OR INTERFERES SOME ACTIVE ACTIVITIES AND ADLS

## 2022-06-02 ASSESSMENT — PAIN SCALES - GENERAL
PAINLEVEL_OUTOF10: 7
PAINLEVEL_OUTOF10: 7
PAINLEVEL_OUTOF10: 0
PAINLEVEL_OUTOF10: 0
PAINLEVEL_OUTOF10: 6
PAINLEVEL_OUTOF10: 6
PAINLEVEL_OUTOF10: 0
PAINLEVEL_OUTOF10: 6

## 2022-06-02 ASSESSMENT — PAIN DESCRIPTION - ORIENTATION
ORIENTATION: LOWER
ORIENTATION: UPPER
ORIENTATION: RIGHT;LEFT;MID

## 2022-06-02 ASSESSMENT — PAIN DESCRIPTION - LOCATION
LOCATION: BACK
LOCATION: BACK

## 2022-06-02 ASSESSMENT — PAIN DESCRIPTION - DESCRIPTORS
DESCRIPTORS: ACHING;DISCOMFORT
DESCRIPTORS: SHARP

## 2022-06-02 NOTE — PROGRESS NOTES
[facility] OCCUPATIONAL THERAPY MED SURG TREATMENT NOTE     Date: 2022  Patient Name: Florencia Aguiar        MRN: [id number]  Account: [de-identified]   : 1950  (73 y.o.)  Room: [place] [id number]    Chart Review:    Restrictions  Restrictions/Precautions  Restrictions/Precautions: Fall Risk     Safety:  Safety Devices  Type of Devices: All fall risk precautions in place    Patient's birthday verified: Yes    Subjective:    Subjective: \"I feel kind of cruddy\"       Pain at start of treatment: Yes: 8/10    Pain at end of treatment: Yes: 8/10    Location: Back  Nursing notified: Declined  RN: Jose Rogers  Intervention: None- pt states he has had medication and just would like to sleep    Objective:    ADL Status:  ADL  Feeding: Independent  Grooming: Setup  UE Bathing: Setup  LE Bathing: Moderate assistance  UE Dressing: None  UE Dressing Skilled Clinical Factors: Hospital gown  LE Dressing: None  LE Dressing Skilled Clinical Factors: Gown only  Toileting: None  Toileting Skilled Clinical Factors: Declined need  Additional Comments: Pt completed bath in supine at bed level d/t increased fatigue and weakness this date. Pt requires increased  time for mobility and cues for initiation of some tasks when distracted by conversation.   Toilet Transfers  Toilet Transfers Comments: Pt declined to attempt    GASPER Hose donned: No  If no - why  n/a    Therapy key for assistance levels -   Independent/Mod I = Pt. is able to perform task with no assistance but may require a device   Stand by assistance = Pt. does not perform task at an independent level but does not need physical assistance, requires verbal cues  Minimal, Moderate, Maximal Assistance = Pt. requires physical assistance (25%, 50%, 75% assist from helper) for task but is able to actively participate in task   Dependent = Pt. requires total assistance with task and is not able to actively participate with task completion    Orientation Status:  Orientation  Overall Orientation Status: Within Functional Limits    Observation:  Observation/Palpation  Posture: Fair  Observation: No acute distress noted. Pt pleasant and motivated. Cognition Status:  Cognition  Overall Cognitive Status: WFL    Perception Status:  Perception  Overall Perceptual Status: WFL    Vision and Hearing Status:  Vision  Vision Exceptions: Wears glasses for reading  Hearing  Hearing: Within functional limits   Vision - Basic Assessment  Prior Vision: Wears glasses all the time  Visual History: No significant visual history  Patient Visual Report: No visual complaint reported. Visual Field Cut: No  Oculo Motor Control: WNL    GROSS ASSESSMENT AROM/PROM:  AROM: Generally decreased, functional  PROM: Generally decreased, functional    ROM:   LUE AROM (degrees)  LUE AROM : WFL  Left Hand AROM (degrees)  Left Hand AROM: WFL  RUE AROM (degrees)  RUE AROM : WFL  Right Hand AROM (degrees)  Right Hand AROM: WFL    UE STRENGTH:  Strength: Generally decreased, functional    UE COORDINATION:  Coordination: Generally decreased, functional    UE TONE:  Tone: Normal    UE SENSATION:  Sensation: Impaired (BLE numbness/tingling)    Functional Mobility:  Pt declined ambulation this date d/t fatigue. Transfers:  Transfers  Transfer Comments: Pt declined to attempt  Toilet Transfers  Toilet Transfers Comments: Pt declined to attempt    Bed Mobility  Bed mobility  Bed Mobility Comments: Pt declined to complete rolling in bed this date or sitting up. Partial long sit to allow therapist to tie gown only. Seated and Standing Balance:  Balance  Sitting:  (Unable to assess d/t pt. declining d/t pain and fatigue)  Standing:  (Unable to assess)    Functional Endurance:  Activity Tolerance  Activity Tolerance: Patient Tolerated treatment well    Treatment consisted of:    ADL training    Assessment/Discharge Disposition:  Pt demonstrates a decline in ability to engatge in functional tasks d/t reported weakness and fatigue.  Pt. requires increased time for processing and cues for initiation d/t reported pain. Pt. would continue to benefit from OT to maximize independence and safety with ADL tasks. [employer]  How much help for putting on and taking off regular lower body clothing?: A Lot  How much help for Bathing?: A Lot  How much help for Toileting?: A Lot  How much help for putting on and taking off regular upper body clothing?: A Little  How much help for taking care of personal grooming?: A Little  How much help for eating meals?: None  AM-PAC Inpatient Daily Activity Raw Score: 16  AM-PAC Inpatient ADL T-Scale Score : 35.96  ADL Inpatient CMS 0-100% Score: 53.32  ADL Inpatient CMS G-Code Modifier : CK    Plan:    Continue OT per POC    Patient Education:  Patient Education  Education Given To: Patient  Education Provided: Role of Therapy;Plan of Care  Education Method: Verbal  Barriers to Learning: None  Education Outcome: Verbalized understanding    Equipment recommendations:    Continue to assess    Goals/Plan of care addressed during this session:  Improve [place] with ADLs    Therapy Time:   Individual Group Co-Treat   Time In 1540       Time Out 1552         Minutes 12           ADL/IADL trainin minutes    Electronically signed by:     ELISE Gramajo    2022, 4:28 PM

## 2022-06-02 NOTE — CARE COORDINATION
MET W/PT TO DISCUSS DISCHARGE PLAN. PT VERBALIZES THAT HE WOULD LIKE TO GO HOME WHEN DISCHARGED, HOWEVER HE REALIZES THAT HE NEEDS ADDITIONAL THERAPY AND DOES NOT FEEL STRONG ENOUGH TO RETURN HOME AT THIS TIME. PLAN IS TO GO TO [facility] FOR SNF TO CONTINUE THERAPY. CM/LSW TO FOLLOW. [facility] ACCEPTED, AUTH IS PENDING.

## 2022-06-02 NOTE — PROGRESS NOTES
Physical Therapy Med Surg Daily Treatment Note  Fac[facility]/Department[facility]  [facility]       NAME: Lenard Campbell  : 1950 (73 y.o.)  MRN: [id number]  CODE STATUS: Full Code    Date of Service: 2022    Patient Diagnosis(es): Lightheadedness [R42]  Anemia [D64.9]  Bilateral pleural effusion [J90]  Anemia, unspecified type [D64.9]  Malignant neoplasm of bone, unspecified location [facility]) [C41.9]   Chief Complaint   Patient presents with    Dizziness     pt c/o dizziness, near syncope tonight     Patient Active Problem List    Diagnosis Date Noted    Paroxysmal atrial fibrillation ([address]     PAD (peripheral artery disease) ([address]     Secondary malignant neoplasm of thoracic vertebral column ([address] 2022    Anemia 2022    Other emphysema ([address]     Abnormal stress test 2022    Pleural effusion 2022    Acute occlusion of artery of lower extremity ([address] 2021    History of colon polyps     Polyp of cecum     Family history of colon cancer     S/P radiation therapy 2019    Drug-induced erectile dysfunction 2019    Primary insomnia 2018    UTE on CPAP 2018    Prediabetes 2016    Hypertension     Prostate cancer ([address]     Hyperlipidemia     Coronary artery disease involving native coronary artery of native heart without angina pectoris 2006    Old MI (myocardial infarction) 2006        Past Medical History:   Diagnosis Date    CAD (coronary artery disease)     Family history of colon cancer     Hyperlipidemia     Hypertension     Old MI (myocardial infarction) 2006    UTE on CPAP     PAD (peripheral artery disease) (HCC)     Prostate cancer ([address]      Past Surgical History:   Procedure Laterality Date    COLONOSCOPY N/A 2019    COLORECTAL CANCER SCREENING, NOT HIGH RISK performed by Aquiles Vega MD at [address] [place] [address]      X    THORACENTESIS Right 03/07/2022    870 ml removed by Dr. Bryant Rodgers - diagnostics sent    THORACENTESIS Left 03/08/2022    285 ml removed by Dr Bryant Rodgers - cytology only sent       Chart Reviewed: Yes  Family / Caregiver Present: No    Restrictions:  Restrictions/Precautions: Fall Risk    SUBJECTIVE:   Subjective: Patient resting  in bed. Agreeable to supine JIAN. States he was able to get to beside chair with the assist of 3 nursing staff earlier today. Response To Previous Treatment: Patient with no complaints from previous session. Pain  6/10 low back, c/o bilateral LE numbness and tingling. OBJECTIVE:     PT Exercises  A/AROM Exercises: Supine: AP, QS, GS, TA isos, heel slides, SAQ, hip abd x20 ea      ASSESSMENT   Body Structures, Functions, Activity Limitations Requiring Skilled Therapeutic Intervention: Decreased functional mobility ; Decreased ADL status; Decreased endurance;Decreased strength;Decreased sensation;Decreased balance;Decreased coordination;Decreased safe awareness;Decreased vision/visual deficit  Assessment: Patient declined OOB activity due to fatigue. Session focused on bilateral LE strengthening exercises. Patient demonstrates bilateral LE weakness, R>L. Discharge Recommendations:  Continue to assess pending progress,Patient would benefit from continued therapy after discharge         Goals  Long Term Goals  Long term goal 1: Pt to complete bed mobility indep  Long term goal 2: Pt to complete transfers with indep  Long term goal 3: Pt to ambulate 50-150ft with LRD and indep  Long term goal 4: Pt to complete 3 steps with HR and indep  Long term goal 5: Pt to complete TUG within 13.5sec    PLAN    Plan: 1 time a day 3-6 times a week  Safety Devices  Type of Devices:  All fall risk precautions in place     AMPAC (6 CLICK) BASIC MOBILITY   AM-PAC Inpatient Mobility Raw Score : 12     Therapy Time   Individual   Time In 1525   Time Out 1540   Minutes 15     Timed Code Treatment Minutes: 15 Minutes   JIAN [address] Vogt, PTA, 06/02/22 at 3:50 PM         Definitions for assistance levels  Independent = pt does not require any physical supervision or assistance from another person for activity completion. Device may be needed.   Stand by assistance = pt requires verbal cues or instructions from another person, close to but not touching, to perform the activity  Minimal assistance= pt performs 75% or more of the activity; assistance is required to complete the activity  Moderate assistance= pt performs 50% of the activity; assistance is required to complete the activity  Maximal assistance = pt performs 25% of the activity; assistance is required to complete the activity  Dependent = pt requires total physical assistance to accomplish the task

## 2022-06-02 NOTE — PROGRESS NOTES
6/2/22    From: HOME WITH PARTNER [facility]    Admit: ANEMIA, BIALT PLEURAL EFFUSIONS     PMH: ASA-CPAP, HLD, CAD, PAD, PROSTATE CANCER WITH BONE METS, LUNG CANCER    Anticipated Discharge Disposition:HOME WITH [facility]    Patient Mobility or PT/OT ordered:  Consults:  PALL CARE,, HEM/ONC, JOE PER ESCURO    Covid result &/or vacc status: VACC X3    Barriers to Discharge:PAIN CONTROL, RAD TX    Assessments: CMI DONE, [facility] NOTIFIED

## 2022-06-02 NOTE — PLAN OF CARE
See OT evaluation for all goals and OT POC.  Electronically signed by Amalia Whalen OTR/L on 6/2/2022 at 4:31 PM

## 2022-06-02 NOTE — PROGRESS NOTES
Physician Progress Note      PATIENT:               Gerry Doty  CSN #:                  [id number]  :                       1950  ADMIT DATE:       2022 7:27 PM  [address] [place] DATE:  RESPONDING  PROVIDER #:        Haresh Tolbert DO          QUERY TEXT:    Pt admitted with near syncope. Pt noted to have been smoking marijuana when   his dizziness worsened and has metastatic prostate cancer to bone and T4/5   spinal cord compression, anemia, hypotension. If possible, please document in progress notes and discharge summary the cause   of the near syncope. [[near syncope due to multi-factorial causes of   metastatic prostate cancer to bone and T4/5 spinal cord compression, anemia,   hypotension::This patient has near syncope due to multi-factorial causes of   metastatic prostate cancer to bone and T4/5 spinal cord compression, anemia,   hypotension    The medical record reflects the following:  Risk Factors: metastatic prostate cancer to bone and spinal cord, possible   pathological fracture, anemia, currently on chemo & radiation, hypotension  Clinical Indicators: dizziness, lightheadedness,  Treatment: Oncology, Neurosurgery and Cardiology consults, PRBC, IV Zometa,   Palliative care with possible NH for rehab, serial CBC, PT/OT eval, labs and   monitoring    Thank you,  Mp Martin, RN BSN [facility] [address]  Outside Callers: [phone]  Options provided:  -- near syncope due to hypotension  -- near syncope due to anemia  -- near syncope due to marijuana use  -- near syncope due to anemia due to chemotherapy and radiation treatment  -- near syncope due to anemia due to metastatic prostate cancer to bone  -- near syncope due to T4/5 spinal cord compression  -- Other - I will add my own diagnosis  -- Disagree - Not applicable / Not valid  -- Disagree - Clinically unable to determine / Unknown  -- Refer to Clinical Documentation Reviewer    PROVIDER RESPONSE TEXT:    This patient has near syncope due to hypotension.     Query created by: Laith Tejada on 6/1/2022 11:51 AM      Electronically signed by:  Reece Severin DO 6/2/2022 4:27 PM

## 2022-06-02 NOTE — CONSULTS
Palliative Care Consult Note  Patient: Sheron Reynoso  Gender: male  YOB: 1950  Unit/Bed: Q215/U993-09  CodeStatus: Full Code  Inpatient Treatment Team: Treatment Team: Attending Provider: Riki Hart DO; Consulting Physician: Jay Forrest MD; Consulting Physician: Anali Gill MD; Utilization Reviewer: Justino Patterson RN; : Jean Helton RN; Registered Nurse: Thais Alamo RN; Respiratory Therapist (Day): Pedrito Sage RCP; Patient Care Tech: Ollie Wilkins  Admit Date:  5/26/2022    Chief Complaint: generalized pain    History of Presenting Illness:      Sheron Reynoso is a 73 y.o. male on hospital day 6 with a history of CAD s/p stent, anemia, HTN, HLD, PAD, UTE, malignant neoplasm of bone. Presented to ED 5/26 with several weeks of dizziness and near syncope. Previously consulted 5/27 by Palliative Medicine for pain management. Palliative Care re-consulted for GOC and symptom management. 5/27 by [facility]- seen by previous provider       Sheron Reynoso is a 73 y.o. male on hospital day 0 with a history of Anemia, Malignant neoplasm of bone, lightheadedness. Has a PMH of CAD, HTN, HLD, PAD, UTE, Bone cancer. Presented to the emergency department after several weeks dizziness and an episode of near syncope.  Was outside smoking medical marijuana when episode of near syncope occurred. Patient has chronic back pain due to cancer though at baseline. Admit labs showed hgb 8.0 and chest x-ray showed \"BILATERAL LOWER LUNG ATELECTASIS/PNEUMONIA WITH LEFT PLEURAL EFFUSION\". Was admitted for further eval.       Today patient presents as A+Ox3. Having painj at 10/10 per patient. Using ultram PO and dilaudid IV for breakthrough with relief from only dilaudid. Patient also reports decreased appetite over the past several weeks 5Lb weigh loss noted on records. At home Patient is on ultram PRN for his bone cancer pain and uses medical marijuana.  Is scheduled to start CTX soon on outpatient bases. Wishes to continue full oncologic treatment. [address] and ACP as below      6/2/22  Patient re consulted for pain management. Patient was prescribed IV dilaudid and PO Norco. Patient reports he has been refusing to be seen by PT because he is too tired and in too much pain when he ambulates or does any form of physical activity. He is unsure why PO Norco or IV Dilaudid were discontinued. Asked his bedside nurse and attending, they were unsure as well. Pt reports he thinks they stopped his IV pain medication because he wasn't participating with PT. Patient currently rates pain 6/10. Pain is primarily in his back, describes as aching. He takes Ultram at home, has it ordered as 50 mg 1 tab q 6 hours PRN for pain. Pt reports he takes 1.5 tablets at home before bed only and it helps with his pain and sleeping. He also uses medical marijuana at home that helps tremendously with his symptoms, unfortunately we cannot give this to him while he is hospitalized. He reports great relief of pain with PO Norco, as prescribed a few days ago. He does state that the medication wore off within 4 hours, but he was unable to have anymore. States his pain is worse at bedtime. Denies other issues. Is unhappy with hospital stay but is dealing with it. He is unsure what his discharge plan is. Review of Systems:       Review of Systems   Constitutional: Positive for activity change and fatigue. Negative for appetite change and unexpected weight change. HENT: Negative for trouble swallowing. Respiratory: Negative for shortness of breath and wheezing. Cardiovascular: Negative for chest pain, palpitations and leg swelling. Gastrointestinal: Negative for abdominal pain, constipation, diarrhea and nausea. Incontinent at times   Genitourinary: Positive for difficulty urinating. Incontinent at times   Musculoskeletal: Positive for arthralgias, back pain, gait problem and myalgias.    Skin: Negative for color change and wound. Neurological: Positive for dizziness, weakness and numbness (BLE with ambulation). Negative for light-headedness. Psychiatric/Behavioral: Positive for sleep disturbance. Negative for confusion and dysphoric mood. The patient is not nervous/anxious. Physical Examination:       /63   Pulse 58   Temp 97.3 °F (36.3 °C) (Oral)   Resp 16   Ht 5' 10\" (1.778 m)   Wt 174 lb (78.9 kg)   SpO2 99%   BMI 24.97 kg/m²    Physical Exam  Constitutional:       General: He is not in acute distress. Appearance: He is ill-appearing. HENT:      Head: Normocephalic. Eyes:      Pupils: Pupils are equal, round, and reactive to light. Cardiovascular:      Rate and Rhythm: Regular rhythm. Bradycardia present. Pulses: Normal pulses. Pulmonary:      Effort: Pulmonary effort is normal.      Breath sounds: Normal breath sounds. No wheezing or rhonchi. Abdominal:      General: Abdomen is flat. Bowel sounds are normal.      Palpations: Abdomen is soft. Tenderness: There is no abdominal tenderness. Skin:     General: Skin is warm and dry. Neurological:      Mental Status: He is alert and oriented to person, place, and time. Motor: Weakness present. Psychiatric:         Behavior: Behavior normal.       Allergies:       Allergies   Allergen Reactions    Morphine Swelling and Rash       Medications:      Current Facility-Administered Medications   Medication Dose Route Frequency Provider Last Rate Last Admin    traMADol (ULTRAM) tablet 50 mg  50 mg Oral Q6H PRN Adrian Howard MD   50 mg at 06/02/22 0736    0.9 % sodium chloride infusion   IntraVENous PRN [facility]        docusate sodium (COLACE) capsule 100 mg  100 mg Oral Daily [facility] DO   100 mg at 06/01/22 0804    metoprolol succinate (TOPROL XL) extended release tablet 25 mg  25 mg Oral Daily Trever Ramirez MD   25 mg at 06/01/22 0804    losartan (COZAAR) tablet 25 mg  25 mg Oral Daily Alex Velarde MD   25 mg at 06/01/22 0803    sodium chloride flush 0.9 % injection 10 mL  10 mL IntraVENous PRN Jay Forrest MD   10 mL at 06/01/22 0035    dexamethasone (DECADRON) injection 4 mg  4 mg IntraVENous Q6H Jay Forrest MD   4 mg at 06/02/22 0501    phytonadione ADULT (VITAMIN K) inj 10 mg/mL  10 mg SubCUTAneous Once Jay Forrest MD        aspirin EC tablet 81 mg  81 mg Oral Daily Jaye Lion MD   81 mg at 06/01/22 0804    atorvastatin (LIPITOR) tablet 40 mg  40 mg Oral Daily Jaye Lion MD   40 mg at 06/01/22 0804    clopidogrel (PLAVIX) tablet 75 mg  75 mg Oral Daily Jaye Lion MD   75 mg at 06/01/22 0804    sodium chloride flush 0.9 % injection 5-40 mL  5-40 mL IntraVENous 2 times per day Jaye Lion MD   10 mL at 06/01/22 2135    sodium chloride flush 0.9 % injection 5-40 mL  5-40 mL IntraVENous PRN Jaye Lion MD        0.9 % sodium chloride infusion  25 mL IntraVENous PRN Jaye Lion MD        enoxaparin (LOVENOX) injection 40 mg  40 mg SubCUTAneous Daily Jaye Lion MD   40 mg at 06/01/22 0804    ondansetron (ZOFRAN-ODT) disintegrating tablet 4 mg  4 mg Oral Q8H PRN Jaye Lion MD        Or    ondansetron [facility]) injection 4 mg  4 mg IntraVENous Q6H PRN Jaye Lion MD   4 mg at 05/28/22 0806    polyethylene glycol (GLYCOLAX) packet 17 g  17 g Oral Daily PRN Jaye Lion MD   17 g at 05/29/22 0854    acetaminophen (TYLENOL) tablet 650 mg  650 mg Oral Q6H PRN Jaye Lion MD   650 mg at 06/01/22 2135    Or    acetaminophen (TYLENOL) suppository 650 mg  650 mg Rectal Q6H PRN Jaye Lion MD        0.9 % sodium chloride infusion   IntraVENous PRN Jay Forrest MD        acetaminophen (TYLENOL) tablet 650 mg  650 mg Oral See Admin Instructions Jay Forrest MD        diphenhydrAMINE (BENADRYL) tablet 25 mg  25 mg Oral See Admin Instructions Jay Forrest MD           History:       PMHx:  Past Medical History:   Diagnosis Date    CAD (coronary artery disease)     Family history of colon cancer     Hyperlipidemia     Hypertension     Old MI (myocardial infarction) 2006    UTE on CPAP     PAD (peripheral artery disease) (HCC)     Prostate cancer (HCC)        PSHx:  Past Surgical History:   Procedure Laterality Date    COLONOSCOPY N/A 2019    COLORECTAL CANCER SCREENING, NOT HIGH RISK performed by Darrick Pham MD at [facility]      X3    THORACENTESIS Right 2022    870 ml removed by Dr. Yani Bailey - diagnostics sent    THORACENTESIS Left 2022    285 ml removed by Dr Yani Bailey - cytology only sent       Social Hx:  Social History     Socioeconomic History    Marital status: Single     Spouse name: None    Number of children: 2    Years of education: None    Highest education level: Associate degree: occupational, technical, or vocational program   Occupational History    Occupation:     Tobacco Use    Smoking status: Former Smoker     Packs/day: 2.00     Years: 25.00     Pack years: 50.00     Types: Cigarettes     Quit date: 1995     Years since quittin.4    Smokeless tobacco: Never Used    Tobacco comment: RARE CIGAR FROM TIME TO TIME   Vaping Use    Vaping Use: Never used   Substance and Sexual Activity    Alcohol use:  Yes     Alcohol/week: 4.0 standard drinks     Types: 2 Cans of beer, 2 Shots of liquor per week     Comment: Social, but less in the last month    Drug use: Yes     Types: Marijuana Maricarmen Juan)    Sexual activity: Not Currently   Other Topics Concern    None   Social History Narrative    Single floor home    4 steps to get into the home    Full basement does not go downstairs    Lives with girlfriend and her son    He has two children one in [place] and one in [place]    One Dog     Working smoke detectors and CO2     Social Determinants of Health     Financial Resource Strain:     Difficulty of Paying Living Expenses: Not on file   Food Insecurity:     Worried About Running Out of Food in the Last Year: Not on file    Eloy of Food in the Last Year: Not on file   Transportation Needs:     Lack of Transportation (Medical): Not on file    Lack of Transportation (Non-Medical):  Not on file   Physical Activity:     Days of Exercise per Week: Not on file    Minutes of Exercise per Session: Not on file   Stress:     Feeling of Stress : Not on file   Social Connections:     Frequency of Communication with Friends and Family: Not on file    Frequency of Social Gatherings with Friends and Family: Not on file    Attends Restorationism Services: Not on file    Active Member of [address] [place] [address] [employer] or Organizations: Not on file    Attends Club or Organization Meetings: Not on file    Marital Status: Not on file   Intimate Partner Violence:     Fear of Current or Ex-Partner: Not on file    Emotionally Abused: Not on file    Physically Abused: Not on file    Sexually Abused: Not on file   Housing Stability:     Unable to Pay for Housing in the Last Year: Not on file    Number of [place] in the Last Year: Not on file    Unstable Housing in the Last Year: Not on file       Family Hx:  Family History   Problem Relation Age of Onset    Cancer Mother 76        BONE    Cancer Father 64        COLON    Colon Cancer Father     Cancer Brother 43        THYROID    Diabetes Maternal Grandmother        LABS: Reviewed     CBC:  Lab Results   Component Value Date    WBC 4.5 06/01/2022    RBC 2.67 06/01/2022    HGB 7.9 06/01/2022    HCT 24.7 06/01/2022    MCV 92.7 06/01/2022    MCH 29.7 06/01/2022    MCHC 32.1 06/01/2022    RDW 21.5 06/01/2022    PLT 99 06/01/2022     CBC with Differential:   Lab Results   Component Value Date    WBC 4.5 06/01/2022    RBC 2.67 06/01/2022    HGB 7.9 06/01/2022    HCT 24.7 06/01/2022    PLT 99 06/01/2022    MCV 92.7 06/01/2022    MCH 29.7 06/01/2022    MCHC 32.1 06/01/2022    RDW 21.5 06/01/2022    NRBC 2 05/26/2022 diseases/conditions. Provided emotional support and active listening. Patient understands and is agreeable to current plan and consents to follow up as outpatient. - Will try and schedule patient at cancer center as soon as availability opens up. 2. Cancer associated pain  - Patient having pain exacerbation at time of eval. Will DC ultram as ineffective  - Pt may develop liver mets. Currently has elevated LFTs, c/f Tylenol use. Will start Oxycodone 5 mg 1 tab q 4 hours as needed for pain. Will schedule 2 tabs at 2100, as pain is worse at night and causes insomnia. Please do not give PRN dose sooner than 4 hours before or after scheduled dose. Will not add IV medications at this time. - 3 day scripts for discharged signed. - Please hold scheduled night dose in setting of sedation      3. Encounter for hospice care discussion     Patient is hospice appropriate given metastatic cancer, though chooses to have full aggressive oncologic treatment. If opts to not have treatment, is canidate for hospice     -Advance Care Planning (Greater that 17 Minutes)  Discussed goals of care with patient. Explained in extensive detail nuances between full code, DNR CCA and DNR CC. Patient has made the decision to be Full Code        Patient is being treated for multiple medical conditions this admission includin. Anemia  2. Malignant neoplasm of bone  3. Lightheadedness  4. Bilateral pleural effusion      Will discuss with collaborating physician Dr. Hiram Sauer.       Electronically signed by JACQUELYN Mulligan CNP on 2022 at 10:02 AM

## 2022-06-02 NOTE — PLAN OF CARE
Problem: Nutrition Deficit:  Goal: Optimize nutritional status  Outcome: Progressing   Nutrition Problem #1: Inadequate oral intake  Intervention: Food and/or Nutrient Delivery: Continue Current Diet,Continue Oral Nutrition Supplement  Nutritional  Goals: PO intake 75% or greater,other (specify)  Specify Other Goals: no weight loss

## 2022-06-02 NOTE — PROGRESS NOTES
17 [facility]           Radiation Oncology      [address] [facility]        Nithin Martinez Bear: [phone]        F: [phone]       [employer]                   Dr. 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 Wt Readings from Last 3 Encounters:   22 174 lb (78.9 kg)   22 177 lb 9.6 oz (80.6 kg)   22 180 lb (81.6 kg)       /72   Pulse 61   Temp 97 °F (36.1 °C)   Resp 16   Ht 5' 10\" (1.778 m)   Wt 174 lb (78.9 kg)   SpO2 99%   BMI 24.97 kg/m²     Lab Results   Component Value Date    WBC 4.5 (L) 2022    PLT 99 (L) 2022       Comfort Alteration  Fatigue:Confined to bed due to debility    Pain Location: From shoulder blade to shoulder blade  Pain Intensity (Current): 5 Between moderate and severe pain  Pain Treatment: Tramadol 50 mg every 6 hrs  Pain Relief: Pain relieved 50%    Emotional Alteration:   Coping: somewhat effective, mostly because feels communication at the hospital is not clear. Nutritional Alteration  Anorexia: none   Nausea: No nausea noted  Vomiting: No vomiting     Skin Alteration   Skin reaction: No changes noted    Additional Comments: Denies any cough or SOB. Denies any problems swallowing or sore throat. Able to move legs, but legs are weak. Some tingling without  numbness to lower extremities.     MEDICATIONS:     Current Facility-Administered Medications   Medication Dose Route Frequency Provider Last Rate Last Admin    traMADol (ULTRAM) tablet 50 mg  50 mg Oral Q6H PRN Thalia Dorsey MD   50 mg at 22 0736    0.9 % sodium chloride infusion chloride infusion   IntraVENous PRN Brigette Coughlin MD        acetaminophen (TYLENOL) tablet 650 mg  650 mg Oral See Admin Instructions Brigette Coughlin MD        diphenhydrAMINE (BENADRYL) tablet 25 mg  25 mg Oral See Admin Instructions Brigette Coughlin MD         * New    PHYSICAL EXAM:       ECO - Symptomatic, <50% in bed during the day (Ambulatory and capable of all self care but unable to carry out any work activities. Up and about more than 50% of waking hours)     General: NAD, AO x 3, Mentation is clear with appropriate affect. HEENT: Normocephalic, atraumatic  Thorax:  Unlabored  Abdomen:  Non-distended    Chemotherapy Update: None    Treatment Imaging: [facility] Pair    ASSESSMENT: Minimal radiation side effects. Responding appropriately to symptomatic management. New medications, diagnostic results: Continue treatment as planned    PLAN: Again reviewed potential side effects of radiation for the patient's treatment. Continue local/topical care. Interval improvement in pain, will coordinate with inpatient team in event patient needs additional premedication to tolerate positioning for radiation treatments. Continue current radiation course as prescribed.

## 2022-06-02 NOTE — FLOWSHEET NOTE
1 U PRBC completed, pt tolerated well. Pt cleaned of incontinent BM and urine. Pt c/o of pain stating he took 75 mg of Tramadol at home. Aileen Rosales NP saw pt took 50 mg of Tramadol. Pt updated. Administered 50 mg Tramadol, white board updated. Fall protocol in place, call light within reach.

## 2022-06-02 NOTE — DISCHARGE SUMMARY
Physician Discharge Summary     Patient ID:  Guerita Kat  [id number]  15 y.o.  1950    Admit date: 5/26/2022    Discharge date : 06/02/22     Admitting Physician: Carlos Landin MD     Discharge Physician: Lisa Camarena DO     Admission Diagnoses: Lightheadedness [R42]  Anemia [D64.9]  Bilateral pleural effusion [J90]  Anemia, unspecified type [D64.9]  Malignant neoplasm of bone, unspecified location [facility]) [C41.9]    Discharge Diagnoses: Metastatic T4/T5 cord compression, anemia, near syncope    Admission Condition: fair    Discharged Condition: fair    Hospital Course: 73 y.o. male with a history of CAD status post stents recently, hypertension, hyperlipidemia, PAD, bone malignancy presents with dizziness. Patient has been having dizziness for the past week that has been progressively worsening. Patient was smoking marijuana when his dizziness worsened and he had to lower himself down to the floor before passing out. He denied passing out completely that he was about to. He describes his dizziness as lightheadedness and not spinning sensation. He denied any associated symptoms. Patient is on fentanyl and Ultram for his bone cancer pain control. He also is on blood pressure medication but his blood pressure has been running low. Therefore, he decided to stop it. He has not been eating or drinking much fluids. Oncology and palliative care were consulted and pt was transfused for anemia. Pt was having bilateral LE weakness and numbness, no loss of bladder or bowel. MRI was done on 5/28 and showed cord compression. NSGY was consulted and recommended against surgery in the setting of being on plavix for recent stent placement. Pt was offered transfer to tertiary care center and declined. Pt was started on steroids and radiation/oncology was consulted, but unable to start radiation until 5/31.  PT/OT recommended SNF but patient initially declined, but continued to have extreme difficulty with any movements due to pain. Strength and numbness improved with steroids, but pain remained. No significant improvement after first 2 radiation treatments and pt was finally agreeable to SNF. CM set up transport from [place] to radiation which he will receive 5x/week. Pt pain regimen set up with palliative care and should be continued at SNF until further seen by palliative care or pain management. Pt stable for discharge and discharged on 6/2 to [place]. Pt with poor overall prognosis and will have a lengthy recovery. Oncology to continue to follow after discharge. Consults: hematology/oncology, neurology, rehabilitation medicine and NSGY, palliative care    Discharge Exam:  Constitutional: Lying in bed comfortably  Head: Normocephalic, atraumatic  ENT: moist mucous membranes  Neck: neck supple, trachea midline  Lungs: non labored  Heart: RRR  GI: non-distended  MSK: no edema noted  Skin: warm, dry    Labs:   Recent Labs     05/31/22  0722 06/01/22  0654   WBC 8.3 4.5*   HGB 9.2* 7.9*   HCT 28.1* 24.7*   * 99*     Recent Labs     05/31/22  0722 06/01/22  0653 06/02/22  0610   * 134* 132*   K 4.7 4.4 4.3   CL 99 100 101   CO2 20 20 18*   BUN 31* 32* 29*   CREATININE 0.80 0.77 0.62*   CALCIUM 8.6 8.2* 8.2*     Recent Labs     05/31/22  0722 06/01/22  0653 06/02/22  0610   * 79* 43*   ALT 15 16 15   BILITOT 0.5 0.6 0.8*   ALKPHOS 574* 509* 451*     No results for input(s): INR in the last 72 hours. No results for input(s): Leodan [place] in the last 72 hours. Urinalysis:   Lab Results   Component Value Date    NITRU Negative 05/26/2022    WBCUA 0-2 05/26/2022    BACTERIA Negative 05/26/2022    RBCUA 0-2 05/26/2022    BLOODU Negative 05/26/2022    SPECGRAV 1.022 05/26/2022    GLUCOSEU Negative 05/26/2022       Radiology:   Most recent    Chest CT      WITH CONTRAST:No results found for this or any previous visit.        WITHOUT CONTRAST: Results for orders placed during the hospital encounter of 03/05/22    CT CHEST WO CONTRAST    Narrative  Patient: He Figueroa  Time Out: 20:38  Exam(s): CT CHEST Without Contrast    EXAM:  CT Chest Without Intravenous Contrast    CLINICAL HISTORY:  Reason for exam: sob. Chief complaint sob, cp, cough, pt had pe study  on 3/3/22, told he has fluid around lungs    TECHNIQUE:  Axial computed tomography images of the chest without intravenous  contrast.  All CT scan at this facility use dose modulation, iterative  reconstruction, and/or weight based dosing when appropriate to reduce  radiation dose to as low as reasonably achievable. COMPARISON:  Chest x-ray done earlier today, and a CTA chest 3/3/22. FINDINGS:  Lungs: Predominant bibasilar infiltrates, nonspecific, cannot rule out  pneumonia. Slight progressive right middle lobe infiltrate. Stable  probable emphysema with small blebs right upper lobe. Pulmonary arteries:    No engorgement. Aorta:  No thoracic aortic aneurysm. Pleural space: Moderate to large bilateral pleural effusion has  progressed. No pneumothorax. Heart: Stable, moderate cardiomegaly. No significant pericardial  effusion. Bones/joints:  No acute fracture. Stable heterogeneous bone density,  cannot rule out metastatic disease. Soft tissues: Thyroid nodules, stable and nonspecific. Punctate  calcification right thyroid again noted. Lymph nodes:  Stable, mediastinal adenopathy, enlarged in the  subcarinal region. Electronically signed by Ruiz Benítez M.D. on 03-05-22 at 2038    Impression  1. Moderate to large bilateral pleural effusions have mildly progressed. 2.  Slight progressive right middle lobe infiltrate with stable bibasilar  infiltrates. 3.  Stable mediastinal adenopathy, nonspecific. Neoplasm not excluded. 4.  Stable heterogeneous bone density, metastatic disease not excluded.   Stable and nonspecific thyroid nodules and punctate calcification on the  right      CXR      2-view: Results for orders placed during the hospital encounter of 03/26/22    XR CHEST (2 VW)    Narrative  EXAMINATION: XR CHEST (2 VW)    CLINICAL HISTORY: PLEURAL EFFUSIONS. PNEUMONIA. PROSTATE CANCER WITH OSSEOUS METASTATIC MALIGNANCY    COMPARISONS: MARCH 8, 2022    FINDINGS: Osseous structures intact. Cardiopericardial silhouette normal. Pulmonary vasculature normal. Mild blunting left costophrenic angle. Lungs otherwise clear. Impression  SMALL LEFT PLEURAL EFFUSION      Results for orders placed during the hospital encounter of 03/08/22    XR CHEST (2 VW)    Impression  1. No evidence of pneumothorax. Resolution of bilateral pleural effusions. Tiny amount of residual fluid in the left costophrenic angle. COMPARISON: March 7, 2022    DIAGNOSIS: Post left thoracentesis    COMMENTS: None    TECHNIQUE: XR CHEST (2 VW)    FINDINGS:  The lungs are clear. Resolution of previously seen bilateral pleural effusions with a very tiny amount of fluid in the left costophrenic angle. No evidence of pneumothorax. Cardiac silhouette is normal in size. Visualized soft tissues, and osseous  structures are unremarkable. Portable: Results for orders placed during the hospital encounter of 05/26/22    XR CHEST PORTABLE    Narrative  EXAMINATION: XR CHEST PORTABLE    CLINICAL HISTORY: FATIGUE    COMPARISONS: MARCH 26, 2022    FINDINGS: Osseous structures are intact. Cardiopericardial silhouette is normal. Pulmonary vasculature is normal. Ill-defined area increased opacity right lower lung. Blunting left costophrenic angle with increased opacity left lung base. Impression  BILATERAL LOWER LUNG ATELECTASIS/PNEUMONIA WITH LEFT PLEURAL EFFUSION      Echo No results found for this or any previous visit.       Disposition: [facility]    In process/preliminary results:  Outstanding Order Results     Date and Time Order Name Status Description    6/1/2022  6:25 PM PREPARE RBC (CROSSMATCH), 1 Units Preliminary           Patient Instructions:   Current Discharge Medication List      START taking these medications    Details   !! oxyCODONE (OXY-IR) 10 MG immediate release tablet Take 1 tablet by mouth nightly for 3 days. Qty: 3 tablet, Refills: 0    Comments: Hold for sedation. Reduce doses taken as pain becomes manageable  Associated Diagnoses: Cancer associated pain      !! oxyCODONE (ROXICODONE) 5 MG immediate release tablet Take 1 tablet by mouth every 4 hours as needed for Pain for up to 3 days. Qty: 18 tablet, Refills: 0    Comments: Reduce doses taken as pain becomes manageable  Associated Diagnoses: Cancer associated pain      predniSONE (DELTASONE) 50 MG tablet Take 1 tablet by mouth daily for 10 days  Qty: 10 tablet, Refills: 0      losartan (COZAAR) 25 MG tablet Take 1 tablet by mouth daily  Qty: 30 tablet, Refills: 3      metoprolol succinate (TOPROL XL) 25 MG extended release tablet Take 1 tablet by mouth daily  Qty: 30 tablet, Refills: 3      docusate sodium (COLACE) 100 mg capsule Take 1 capsule by mouth daily  Qty: 30 capsule, Refills: 0      polyethylene glycol (GLYCOLAX) 17 g packet Take 17 g by mouth daily as needed for Constipation  Qty: 527 g, Refills: 1       !! - Potential duplicate medications found. Please discuss with provider.       CONTINUE these medications which have NOT CHANGED    Details   clopidogrel (PLAVIX) 75 MG tablet TAKE 1 TABLET BY MOUTH  DAILY  Qty: 90 tablet, Refills: 3    Comments: Requesting 1 year supply      Handicap Placard MISC by Does not apply route Exp 5 years  Qty: 1 each, Refills: 0      aspirin 81 MG EC tablet Take 81 mg by mouth daily      atorvastatin (LIPITOR) 40 MG tablet Take 1 tablet by mouth daily  Qty: 90 tablet, Refills: 1      Talazoparib Tosylate 0.25 MG CAPS Take 1 capsule by mouth daily Take 2 tablets daily      Cyanocobalamin (VITAMIN B-12 CR PO) Take 1 tablet by mouth daily       tamsulosin (FLOMAX) 0.4 MG capsule Take 0.4 mg by mouth daily   Refills: 11      CPAP Machine MISC by Does not apply route Use nightly as directed. Pressure setting 5 cm H2O. Qty: 1 each, Refills: 0      Ascorbic Acid (VITAMIN C PO) Take 500 mg by mouth daily          STOP taking these medications       traMADol (ULTRAM) 50 MG tablet Comments:   Reason for Stopping:         tiZANidine (ZANAFLEX) 2 MG tablet Comments:   Reason for Stopping:         lisinopril (PRINIVIL;ZESTRIL) 20 MG tablet Comments:   Reason for Stopping:             Activity: activity as tolerated  Diet: regular diet  Wound Care: none needed    Follow-up with Lennie Escobar MD  in 2 weeks.     DC time 35 minutes    Signed:  Electronically signed by [facility]SD[facility] Juan on 6/2/2022 at 4:16 PM

## 2022-06-02 NOTE — PROGRESS NOTES
0443: Shift change report received. VSS. Tramadol 50 mg PO administered per [facility] for pain 6/10.     1050: Assessment complete. VSS on room air. Patient A+Ox4. Pt incontinent of stool and urine. Pt states he is unable to ambulate due to bilateral leg weakness. Safety maintained, call light within reach. No complaints. Pt is to go for radiation at 11:00am today. PerfectServe sent to Dr. JOHNSON [facility] for additional pain medication order. Orders received for a one time dose of 5mg Blessing PO.     1053: One time dose of Blessing administered per [facility].    1130: Pt transported out for Radiation treatment at this time. 1300: Pt back to floor from Radiation treatment at this time. 1500: Palliative Care at bedside. Pt's pain regimen adjusted. Please see consult note from Palliative Care. 1530: PT/OT in room with patient at this time.     Electronically signed by Jose Nielsen RN on 6/2/22 at 12:50 PM EDT

## 2022-06-02 NOTE — PROGRESS NOTES
Comprehensive Nutrition Assessment    Type and Reason for Visit:  Reassess    Nutrition Recommendations/Plan:   1. Continue regular diet  2. Continue standard high calorie/high protein oral supplement TID     Malnutrition Assessment:  Malnutrition Status: At risk for malnutrition (Comment) (05/27/22 3316)    Context:  Chronic Illness     Findings of the 6 clinical characteristics of malnutrition:  Energy Intake:  75% or less estimated energy requirements for 1 month or longer  Weight Loss:  No significant weight loss (7% over 3 months)     Body Fat Loss:  No significant body fat loss     Muscle Mass Loss:  No significant muscle mass loss    Fluid Accumulation:  No significant fluid accumulation     Strength:  Not Performed    Nutrition Assessment:    Pt remains at risk for malnutrition d/t inadequate PO intake. S/p radiation today. Pt did not eat lunch but was drinking Ensure after RD opened it at visit. Nutrition Related Findings:    ah/o CAD, HTN, HLD, PAD, UTE, malignancy (bone cancer). 6/2: Na low at 132, BUN elevated at 29, creatinine low at 0.62. steroids. incontinent BM 6/2. no edema noted. Reported poor appetite for the past several weeks due to pain and early satiety. No lunch today d/t needing to rest. Denies N/V. Wound Type: None       Current Nutrition Intake & Therapies:    Average Meal Intake: 0%,51-75%,% (0% lunch today. 51-75% breakfast. % yesterday)  Average Supplements Intake:  (drinking Ensure during RD visit)  ADULT DIET; Regular  ADULT ORAL NUTRITION SUPPLEMENT; Breakfast, Lunch, Dinner; Standard High Calorie/High Protein Oral Supplement    Anthropometric Measures:  Height: 5' 10\" (177.8 cm)  Ideal Body Weight (IBW): 166 lbs (75 kg)    Admission Body Weight: 172 lb (78 kg) (stated)  Current Body Weight: 174 lb (78.9 kg) (5/27),   IBW.  Weight Source: Bed Scale  Current BMI (kg/m2): 25  Usual Body Weight: 187 lb (84.8 kg) (3/16/22-office visit, 204 lb (6/18/21-office visit))  % Weight Change (Calculated): -7                    BMI Categories: Normal Weight (BMI 22.0 to 24.9) age over 72    Estimated Daily Nutrient Needs:     Weight Used for Energy Requirements: Current (78.9 kg)  Energy (kcal/day): 73473515  (kg x 25-28)  Weight Used for Protein Requirements: Current (78.9)  Protein (g/day): 95-79 gm (kg x 1.2-1.4)  Method Used for Fluid Requirements: 1 ml/kcal  Fluid (ml/day): ~2000 ml    Nutrition Diagnosis:   · Inadequate oral intake related to pain,early satiety as evidenced by poor intake prior to admission,intake 26-50%,weight loss    Nutrition Interventions:   Food and/or Nutrient Delivery: Continue Current Diet,Continue Oral Nutrition Supplement  Nutrition Education/Counseling: No recommendation at this time  Coordination of Nutrition Care: Continue to monitor while inpatient       Goals:     Goals: PO intake 75% or greater,other (specify)  Specify Other Goals: no weight loss    Nutrition Monitoring and Evaluation:      Food/Nutrient Intake Outcomes: Food and Nutrient Intake,Supplement Intake  Physical Signs/Symptoms Outcomes: Weight,Meal Time Behavior    Discharge Planning:     Too soon to determine     Liz Gonzalez RD, LD

## 2022-06-03 ENCOUNTER — APPOINTMENT (OUTPATIENT)
Dept: RADIATION ONCOLOGY | Age: 72
End: 2022-06-03
Payer: MEDICAID

## 2022-06-03 ENCOUNTER — CARE COORDINATION (OUTPATIENT)
Dept: CARE COORDINATION | Age: 72
End: 2022-06-03

## 2022-06-03 VITALS
SYSTOLIC BLOOD PRESSURE: 123 MMHG | HEIGHT: 70 IN | BODY MASS INDEX: 24.91 KG/M2 | HEART RATE: 73 BPM | OXYGEN SATURATION: 100 % | TEMPERATURE: 97.3 F | RESPIRATION RATE: 16 BRPM | DIASTOLIC BLOOD PRESSURE: 58 MMHG | WEIGHT: 174 LBS

## 2022-06-03 LAB
ALBUMIN SERPL-MCNC: 3 G/DL (ref 3.5–4.6)
ALP BLD-CCNC: 386 U/L (ref 35–104)
ALT SERPL-CCNC: 14 U/L (ref 0–41)
ANION GAP SERPL CALCULATED.3IONS-SCNC: 16 MEQ/L (ref 9–15)
AST SERPL-CCNC: 19 U/L (ref 0–40)
BILIRUB SERPL-MCNC: 0.5 MG/DL (ref 0.2–0.7)
BUN BLDV-MCNC: 28 MG/DL (ref 8–23)
CALCIUM SERPL-MCNC: 8.1 MG/DL (ref 8.5–9.9)
CHLORIDE BLD-SCNC: 101 MEQ/L (ref 95–107)
CO2: 17 MEQ/L (ref 20–31)
CREAT SERPL-MCNC: 0.56 MG/DL (ref 0.7–1.2)
GFR AFRICAN AMERICAN: >60
GFR NON-AFRICAN AMERICAN: >60
GLOBULIN: 2.9 G/DL (ref 2.3–3.5)
GLUCOSE BLD-MCNC: 126 MG/DL (ref 70–99)
POTASSIUM REFLEX MAGNESIUM: 4.5 MEQ/L (ref 3.4–4.9)
SARS-COV-2, NAAT: NOT DETECTED
SODIUM BLD-SCNC: 134 MEQ/L (ref 135–144)
TOTAL PROTEIN: 5.9 G/DL (ref 6.3–8)

## 2022-06-03 PROCEDURE — 2580000003 HC RX 258: Performed by: INTERNAL MEDICINE

## 2022-06-03 PROCEDURE — 6360000002 HC RX W HCPCS: Performed by: INTERNAL MEDICINE

## 2022-06-03 PROCEDURE — 36415 COLL VENOUS BLD VENIPUNCTURE: CPT

## 2022-06-03 PROCEDURE — 6370000000 HC RX 637 (ALT 250 FOR IP): Performed by: INTERNAL MEDICINE

## 2022-06-03 PROCEDURE — 6370000000 HC RX 637 (ALT 250 FOR IP): Performed by: NURSE PRACTITIONER

## 2022-06-03 PROCEDURE — 80053 COMPREHEN METABOLIC PANEL: CPT

## 2022-06-03 PROCEDURE — 97110 THERAPEUTIC EXERCISES: CPT

## 2022-06-03 PROCEDURE — 87635 SARS-COV-2 COVID-19 AMP PRB: CPT

## 2022-06-03 RX ADMIN — Medication 10 ML: at 09:43

## 2022-06-03 RX ADMIN — ASPIRIN 81 MG: 81 TABLET, COATED ORAL at 09:37

## 2022-06-03 RX ADMIN — DEXAMETHASONE SODIUM PHOSPHATE 4 MG: 4 INJECTION, SOLUTION INTRA-ARTICULAR; INTRALESIONAL; INTRAMUSCULAR; INTRAVENOUS; SOFT TISSUE at 05:40

## 2022-06-03 RX ADMIN — DEXAMETHASONE SODIUM PHOSPHATE 4 MG: 4 INJECTION, SOLUTION INTRA-ARTICULAR; INTRALESIONAL; INTRAMUSCULAR; INTRAVENOUS; SOFT TISSUE at 09:37

## 2022-06-03 RX ADMIN — SODIUM CHLORIDE, PRESERVATIVE FREE 10 ML: 5 INJECTION INTRAVENOUS at 05:40

## 2022-06-03 RX ADMIN — DOCUSATE SODIUM 100 MG: 100 CAPSULE, LIQUID FILLED ORAL at 09:34

## 2022-06-03 RX ADMIN — OXYCODONE 5 MG: 5 TABLET ORAL at 01:49

## 2022-06-03 RX ADMIN — METOPROLOL SUCCINATE 25 MG: 25 TABLET, EXTENDED RELEASE ORAL at 09:35

## 2022-06-03 RX ADMIN — ENOXAPARIN SODIUM 40 MG: 100 INJECTION SUBCUTANEOUS at 09:40

## 2022-06-03 RX ADMIN — CLOPIDOGREL BISULFATE 75 MG: 75 TABLET ORAL at 09:36

## 2022-06-03 RX ADMIN — LOSARTAN POTASSIUM 25 MG: 25 TABLET, FILM COATED ORAL at 09:37

## 2022-06-03 RX ADMIN — ATORVASTATIN CALCIUM 40 MG: 40 TABLET, FILM COATED ORAL at 09:34

## 2022-06-03 RX ADMIN — OXYCODONE 5 MG: 5 TABLET ORAL at 10:22

## 2022-06-03 RX ADMIN — OXYCODONE 5 MG: 5 TABLET ORAL at 14:27

## 2022-06-03 ASSESSMENT — PAIN DESCRIPTION - LOCATION
LOCATION: BACK
LOCATION: BACK;LEG
LOCATION: BACK;LEG
LOCATION: BACK

## 2022-06-03 ASSESSMENT — PAIN DESCRIPTION - DESCRIPTORS
DESCRIPTORS: ACHING
DESCRIPTORS: SHARP;SPASM

## 2022-06-03 ASSESSMENT — PAIN SCALES - GENERAL
PAINLEVEL_OUTOF10: 7
PAINLEVEL_OUTOF10: 10
PAINLEVEL_OUTOF10: 7
PAINLEVEL_OUTOF10: 6
PAINLEVEL_OUTOF10: 7

## 2022-06-03 ASSESSMENT — PAIN - FUNCTIONAL ASSESSMENT
PAIN_FUNCTIONAL_ASSESSMENT: PREVENTS OR INTERFERES SOME ACTIVE ACTIVITIES AND ADLS

## 2022-06-03 ASSESSMENT — PAIN DESCRIPTION - ORIENTATION: ORIENTATION: RIGHT;LEFT;MID;LOWER

## 2022-06-03 NOTE — PROGRESS NOTES
Pt ANOx4. Pt stated he had pain of 7, Oxycodone given at scheduled time. Lovenox held due to low platelet level of 99. Pt stated that his legs are weak and does not feel he is able to stand safely, he was concerned that PT was going to want him to stand. Pt came in and patient was satisfied with his therapy. VSS and assessment completed. Pt denied any further needs and will continue to monitor. Electronically signed by Heladio Estrada RN on 6/3/2022 at 4:05 PM      Attempted to give report to [employer], I called 6 times and tried many extensions. Not one person answered the phone.  Electronically signed by Heladio Estrada RN on 6/3/2022 at 9:24 PM

## 2022-06-03 NOTE — FLOWSHEET NOTE
Patient is alert and oriented,heart rate regular,no jugular vein distention,lungs clear,abdomen soft,no edema or calf pain.hand grasp and push /pulls are strong. 21:20 pain voiced that he experienced tingling in his legs,he was also incontinent of bladder and bowel. he defecated a moderate amount of soft brown stool. tristian care was provided and new brief on.    00:07 another bladder incontinence ,no complain of burning or pain during or after urination,pericare was provided per kathryn. patient expressed comfort  In supine position. his call light is within his easy reach.

## 2022-06-03 NOTE — PROGRESS NOTES
06/03/22    From: HOME WITH PARTNER [facility]    Admit: ANEMIA, BIALT PLEURAL EFFUSIONS     PMH: ASA-CPAP, HLD, CAD, PAD, PROSTATE CANCER WITH BONE METS, LUNG CANCER    Anticipated Discharge Disposition:[facility]. RADIATION TX TODAY, NEXT WEEK AND THE FOLLOWING MON. REQUESTS WALKER.      Patient Mobility or PT/OT ordered:  Consults:  PALL CARE,, HEM/ONC, JOE PER ESCURO    Covid result &/or vacc status: VACC X3    Barriers to Discharge:PAIN CONTROL, RAD TX    Assessments: CMI DONE, VA NOTIFIED

## 2022-06-03 NOTE — PROGRESS NOTES
Department of Internal Medicine  General Internal Medicine  Attending Progress Note      SUBJECTIVE:  Pt seen and examined. Discharge today to [employer]. Pain regimen in chart and pt has transportation to radiation set up.      OBJECTIVE      Medications    Current Facility-Administered Medications: [DISCONTINUED] acetaminophen (TYLENOL) tablet 650 mg, 650 mg, Oral, Q6H PRN **OR** acetaminophen (TYLENOL) suppository 650 mg, 650 mg, Rectal, Q12H PRN  oxyCODONE (ROXICODONE) immediate release tablet 5 mg, 5 mg, Oral, Q4H PRN  oxyCODONE (ROXICODONE) immediate release tablet 10 mg, 10 mg, Oral, Nightly  0.9 % sodium chloride infusion, , IntraVENous, PRN  docusate sodium (COLACE) capsule 100 mg, 100 mg, Oral, Daily  metoprolol succinate (TOPROL XL) extended release tablet 25 mg, 25 mg, Oral, Daily  losartan (COZAAR) tablet 25 mg, 25 mg, Oral, Daily  sodium chloride flush 0.9 % injection 10 mL, 10 mL, IntraVENous, PRN  dexamethasone (DECADRON) injection 4 mg, 4 mg, IntraVENous, Q6H  phytonadione ADULT (VITAMIN K) inj 10 mg/mL, 10 mg, SubCUTAneous, Once  aspirin EC tablet 81 mg, 81 mg, Oral, Daily  atorvastatin (LIPITOR) tablet 40 mg, 40 mg, Oral, Daily  clopidogrel (PLAVIX) tablet 75 mg, 75 mg, Oral, Daily  sodium chloride flush 0.9 % injection 5-40 mL, 5-40 mL, IntraVENous, 2 times per day  sodium chloride flush 0.9 % injection 5-40 mL, 5-40 mL, IntraVENous, PRN  0.9 % sodium chloride infusion, 25 mL, IntraVENous, PRN  enoxaparin (LOVENOX) injection 40 mg, 40 mg, SubCUTAneous, Daily  ondansetron (ZOFRAN-ODT) disintegrating tablet 4 mg, 4 mg, Oral, Q8H PRN **OR** ondansetron (ZOFRAN) injection 4 mg, 4 mg, IntraVENous, Q6H PRN  polyethylene glycol (GLYCOLAX) packet 17 g, 17 g, Oral, Daily PRN  0.9 % sodium chloride infusion, , IntraVENous, PRN  acetaminophen (TYLENOL) tablet 650 mg, 650 mg, Oral, See Admin Instructions  diphenhydrAMINE (BENADRYL) tablet 25 mg, 25 mg, Oral, See Admin Instructions  Physical    VITALS:  BP (!) 123/58   Pulse 73   Temp 98.4 °F (36.9 °C)   Resp 16   Ht 5' 10\" (1.778 m)   Wt 174 lb (78.9 kg)   SpO2 100%   BMI 24.97 kg/m²   Constitutional: Lying in bed comfortably  Head: Normocephalic, atraumatic  ENT: moist mucous membranes  Neck: neck supple, trachea midline  Lungs: non labored  Heart: RRR  GI: non-distended  MSK: no edema noted  Skin: warm, dry     Data    CBC:   Lab Results   Component Value Date    WBC 4.5 06/01/2022    RBC 2.67 06/01/2022    HGB 7.9 06/01/2022    HCT 24.7 06/01/2022    MCV 92.7 06/01/2022    MCH 29.7 06/01/2022    MCHC 32.1 06/01/2022    RDW 21.5 06/01/2022    PLT 99 06/01/2022     CMP:    Lab Results   Component Value Date     06/03/2022    K 4.5 06/03/2022     06/03/2022    CO2 17 06/03/2022    BUN 28 06/03/2022    CREATININE 0.56 06/03/2022    GFRAA >60.0 06/03/2022    LABGLOM >60.0 06/03/2022    GLUCOSE 126 06/03/2022    PROT 5.9 06/03/2022    LABALBU 3.0 06/03/2022    CALCIUM 8.1 06/03/2022    BILITOT 0.5 06/03/2022    ALKPHOS 386 06/03/2022    AST 19 06/03/2022    ALT 14 06/03/2022       ASSESSMENT AND PLAN      # Metastatic prostate cancer to bone with T4/T5 cord compression  - NSGY consulted - non surgical due to being on plavix for CAD w/stents  - started on decadron  - palliative care consulted  - rad/onc planning on radiation treatment 5x/week  - PT/OT - SNF at discharge. Pt initially refused, but unable to stand, so now agreeable. Pain regimen now in place and transport set up for radiation from SNF    # Near syncope - resolved  - monitor    # Anemia  - oncology consulted  - sp 1U blood  - continue to monitor    Disposition: Discharge today to SNF.        Mathieu Perdomo DO  Internal Medicine   Hospitalist    >35 minutes in total care time

## 2022-06-03 NOTE — PROGRESS NOTES
[facility] Occupational Therapy      Date: 6/3/2022  Patient Name: Mayra Levine        MRN: [id number]  Account: [de-identified]   : 1950  (73 y.o.)  [facility]    Chart reviewed, attempted OT at [phone]. Patient not seen 2° to:    Pt. off floor for test/procedure    Will attempt again when able.     Electronically signed by DARNELL Cameron on 6/3/2022 at 11:43 AM

## 2022-06-03 NOTE — PROGRESS NOTES
Physical Therapy Med Surg Daily Treatment Note  Facility/Department: [facility]  Room: [place] [address]       NAME: Caden Palomares  : 1950 (73 y.o.)  MRN: [id number]  CODE STATUS: Full Code    Date of Service: 6/3/2022    Patient Diagnosis(es): Lightheadedness [R42]  Anemia [D64.9]  Bilateral pleural effusion [J90]  Anemia, unspecified type [D64.9]  Malignant neoplasm of bone, unspecified location [facility]) [C41.9]   Chief Complaint   Patient presents with    Dizziness     pt c/o dizziness, near syncope tonight     Patient Active Problem List    Diagnosis Date Noted    [facility]oxysmal atrial fibrillation ([facility]     PAD (peripheral artery disease) ([address]     Secondary malignant neoplasm of thoracic vertebral column ([address] 2022    Anemia 2022    Other emphysema ([address]     Abnormal stress test 2022    Pleural effusion 2022    Acute occlusion of artery of lower extremity ([address] 2021    History of colon polyps     Polyp of cecum     Family history of colon cancer     S/P radiation therapy 2019    Drug-induced erectile dysfunction 2019    Primary insomnia 2018    UTE on CPAP 2018    Prediabetes 2016    Hypertension     Prostate cancer ([address]     Hyperlipidemia     Coronary artery disease involving native coronary artery of native heart without angina pectoris 2006    Old MI (myocardial infarction) 2006        Past Medical History:   Diagnosis Date    CAD (coronary artery disease)     Family history of colon cancer     Hyperlipidemia     Hypertension     Old MI (myocardial infarction) 2006    UTE on CPAP     PAD (peripheral artery disease) (HCC)     Prostate cancer ([address]      Past Surgical History:   Procedure Laterality Date    COLONOSCOPY N/A 2019    COLORECTAL CANCER SCREENING, NOT HIGH RISK performed by Williams Mark MD at [address] [place] [address]      X    THORACENTESIS Right 03/07/2022    870 ml removed by Dr. Clemente Caputo - diagnostics sent    THORACENTESIS Left 03/08/2022    285 ml removed by Dr Clemente Caputo - cytology only sent            Restrictions:       SUBJECTIVE:   Subjective: \"I liked moving my legs in the bed but I am not going to sit or stand up. \"    Pain  Pain: 7/10 mid back pain with shoulder elevation, slightly periphrealizes laterally was more centralized yesterday. OBJECTIVE:                                      PT Exercises  Exercise Treatment: Provided HEP of supine exercises to perform during the day to assist with strength progression. A/AROM Exercises: Supine: AP, QS, GS, heel slides, hipabd/add,x10-20 each                     ASSESSMENT   Assessment: Pt does not wish to stand or sit OOB d/t poor motor control of B LE, reports increased \"pressure\" in spine. Agreeable supine exercises, requires AAROM to complete full ROM. Discharge Recommendations:  Continue to assess pending progress,Patient would benefit from continued therapy after discharge         Goals  Long Term Goals  Long term goal 1: Pt to complete bed mobility indep  Long term goal 2: Pt to complete transfers with indep  Long term goal 3: Pt to ambulate 50-150ft with LRD and indep  Long term goal 4: Pt to complete 3 steps with HR and indep  Long term goal 5: Pt to complete TUG within 13.5sec    PLAN    Plan: 1 time a day 3-6 times a week  Safety Devices  Type of Devices: All fall risk precautions in place,Call light within reach,Bed alarm in place,Left in bed     [facility] (6 CLICK) Loyd Cardenas 28 Inpatient Mobility Raw Score : 10     Therapy Time   Individual   Time In 1414   Time Out 1426   Minutes 12         There Ex: 12    Enriqueta Fairchild PTA, 06/03/22 at 2:49 PM         Definitions for assistance levels  Independent = pt does not require any physical supervision or assistance from another person for activity completion. Device may be needed.   Stand by assistance = pt requires verbal cues or instructions from another person, close to but not touching, to perform the activity  Minimal assistance= pt performs 75% or more of the activity; assistance is required to complete the activity  Moderate assistance= pt performs 50% of the activity; assistance is required to complete the activity  Maximal assistance = pt performs 25% of the activity; assistance is required to complete the activity  Dependent = pt requires total physical assistance to accomplish the task

## 2022-06-03 NOTE — CARE COORDINATION
Telephone call with patient. He indicated that he is presently at [facility]. He spoke of his tumor on his spine and having problems with ambulation. He is having radiation. Plan for discharge is to go to [facility] from the hospital for rehabilitation. No immediate plan for chemotherapy at this time. Hospital to provide patient with walker on discharge.

## 2022-06-03 NOTE — DISCHARGE INSTR - COC
N52.2    Family history of colon cancer Z80.0    History of colon polyps Z86.010    Polyp of cecum K63.5    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) I70.209    PAD (peripheral artery disease) (HCC) I73.9    Paroxysmal atrial fibrillation (HCC) I48.0    Pleural effusion J90    Abnormal stress test R94.39    Other emphysema (HCC) J43.8    Anemia D64.9    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) C79.51       Isolation/Infection:   Isolation            No Isolation          Patient Infection Status       Infection Onset Added Last Indicated Last Indicated By Review Planned Expiration Resolved Resolved By    None active    Resolved    COVID-19 (Rule Out) 05/26/22 05/26/22 05/26/22 COVID-19, Rapid (Ordered)   05/26/22 Rule-Out Test Resulted    COVID-19 (Rule Out) 03/03/22 03/03/22 03/03/22 COVID-19, Rapid (Ordered)   03/03/22 Rule-Out Test Resulted            Nurse Assessment:  Last Vital Signs: BP (!) 123/58   Pulse 73   Temp 98.4 °F (36.9 °C)   Resp 16   Ht 5' 10\" (1.778 m)   Wt 174 lb (78.9 kg)   SpO2 100%   BMI 24.97 kg/m²     Last documented pain score (0-10 scale): Pain Level: 10  Last Weight:   Wt Readings from Last 1 Encounters:   05/31/22 174 lb (78.9 kg)     Mental Status:  oriented and alert    IV Access:  - None    Nursing Mobility/ADLs:  Walking   Dependent  Transfer  Assisted  Bathing  Assisted  Dressing  Assisted  Toileting  Assisted  Feeding  Independent  Med Admin  Assisted  Med Delivery   whole    Wound Care Documentation and Therapy:        Elimination:  Continence: Bowel: Yes  Bladder:no  Urinary Catheter: None   Colostomy/Ileostomy/Ileal Conduit: No       Date of Last BM: ***  No intake or output data in the 24 hours ending 06/03/22 1345  I/O last 3 completed shifts: In: 480 [P.O.:480]  Out: -     Safety Concerns:      At Risk for Falls    Impairments/Disabilities:      None    Nutrition Therapy:  Current Nutrition Therapy:   - Oral Diet:  General    Routes of Feeding: Oral  Liquids: No Restrictions  Daily Fluid Restriction: no  Last Modified Barium Swallow with Video (Video Swallowing Test): not done    Treatments at the Time of Hospital Discharge:   Respiratory Treatments:       Oxygen Therapy:  is not on home oxygen therapy. Ventilator:    - No ventilator support    Rehab Therapies: Physical Therapy and Occupational Therapy  Weight Bearing Status/Restrictions: No weight bearing restrictions  Other Medical Equipment (for information only, NOT a DME order):  wheelchair  Other Treatments:       Patient's personal belongings (please select all that are sent with patient):  Glasses, Dentures upper and lower    RN SIGNATURE:  Electronically signed by Micki Lemos RN on 6/3/22 at 5:36 PM EDT    CASE MANAGEMENT/SOCIAL WORK SECTION    Inpatient Status Date:       Readmission Risk Assessment Score:  Readmission Risk              Risk of Unplanned Readmission:  34           Discharging to Facility/ Agency   Name: [facility]  Address:  Phone: [phone]  Fax:    Dialysis Facility (if applicable)   Name:  Address:  Dialysis Schedule:  Phone:  Fax:    / signature: Electronically signed by SHIKHA Kaur LSW on 6/3/22 at 1:47 PM EDT     PHYSICIAN SECTION    Prognosis: Fair    Condition at Discharge: Stable    Rehab Potential (if transferring to Rehab): Fair    Recommended Labs or Other Treatments After Discharge:     Physician Certification: I certify the above information and transfer of Taylor Walters  is necessary for the continuing treatment of the diagnosis listed and that he requires [facility] for less 30 days.      Update Admission H&P: No change in H&P    PHYSICIAN SIGNATURE:  Electronically signed by Serena Valera DO on 6/3/22 at 2:26 PM EDT

## 2022-06-04 ENCOUNTER — HOSPITAL ENCOUNTER (EMERGENCY)
Age: 72
Discharge: ANOTHER HOSPITAL | End: 2022-06-04
Attending: EMERGENCY MEDICINE
Payer: MEDICARE

## 2022-06-04 VITALS
SYSTOLIC BLOOD PRESSURE: 104 MMHG | OXYGEN SATURATION: 94 % | HEART RATE: 54 BPM | RESPIRATION RATE: 18 BRPM | BODY MASS INDEX: 24.91 KG/M2 | HEIGHT: 70 IN | DIASTOLIC BLOOD PRESSURE: 56 MMHG | WEIGHT: 174 LBS | TEMPERATURE: 98.1 F

## 2022-06-04 DIAGNOSIS — G95.20 SPINAL CORD COMPRESSION (HCC): Primary | ICD-10-CM

## 2022-06-04 LAB
ALBUMIN SERPL-MCNC: 3.2 G/DL (ref 3.5–4.6)
ALP BLD-CCNC: 370 U/L (ref 35–104)
ALT SERPL-CCNC: 15 U/L (ref 0–41)
ANION GAP SERPL CALCULATED.3IONS-SCNC: 13 MEQ/L (ref 9–15)
ANISOCYTOSIS: ABNORMAL
AST SERPL-CCNC: 17 U/L (ref 0–40)
BASOPHILS ABSOLUTE: 0 K/UL (ref 0–0.2)
BASOPHILS RELATIVE PERCENT: 0.4 %
BILIRUB SERPL-MCNC: 0.5 MG/DL (ref 0.2–0.7)
BUN BLDV-MCNC: 30 MG/DL (ref 8–23)
CALCIUM SERPL-MCNC: 8.7 MG/DL (ref 8.5–9.9)
CHLORIDE BLD-SCNC: 100 MEQ/L (ref 95–107)
CO2: 23 MEQ/L (ref 20–31)
CREAT SERPL-MCNC: 0.67 MG/DL (ref 0.7–1.2)
EOSINOPHILS ABSOLUTE: 0.1 K/UL (ref 0–0.7)
EOSINOPHILS RELATIVE PERCENT: 1 %
GFR AFRICAN AMERICAN: >60
GFR NON-AFRICAN AMERICAN: >60
GLOBULIN: 2.9 G/DL (ref 2.3–3.5)
GLUCOSE BLD-MCNC: 94 MG/DL (ref 70–99)
HCT VFR BLD CALC: 29 % (ref 42–52)
HEMOGLOBIN: 9.7 G/DL (ref 14–18)
LYMPHOCYTES ABSOLUTE: 1.3 K/UL (ref 1–4.8)
LYMPHOCYTES RELATIVE PERCENT: 18 %
MACROCYTES: ABNORMAL
MCH RBC QN AUTO: 30.1 PG (ref 27–31.3)
MCHC RBC AUTO-ENTMCNC: 33.4 % (ref 33–37)
MCV RBC AUTO: 90.2 FL (ref 80–100)
METAMYELOCYTES RELATIVE PERCENT: 1 %
MICROCYTES: ABNORMAL
MONOCYTES ABSOLUTE: 0.5 K/UL (ref 0.2–0.8)
MONOCYTES RELATIVE PERCENT: 6.8 %
MYELOCYTE PERCENT: 3 %
NEUTROPHILS ABSOLUTE: 5.4 K/UL (ref 1.4–6.5)
NEUTROPHILS RELATIVE PERCENT: 70 %
NUCLEATED RED BLOOD CELLS: 4 /100 WBC
PDW BLD-RTO: 21.1 % (ref 11.5–14.5)
PLATELET # BLD: 110 K/UL (ref 130–400)
PLATELET SLIDE REVIEW: ABNORMAL
POIKILOCYTES: ABNORMAL
POLYCHROMASIA: ABNORMAL
POTASSIUM SERPL-SCNC: 4.6 MEQ/L (ref 3.4–4.9)
PROMYELOCYTES PERCENT: 1 %
RBC # BLD: 3.22 M/UL (ref 4.7–6.1)
SARS-COV-2, NAAT: NOT DETECTED
SMUDGE CELLS: 2.5
SODIUM BLD-SCNC: 136 MEQ/L (ref 135–144)
TOTAL PROTEIN: 6.1 G/DL (ref 6.3–8)
WBC # BLD: 7.2 K/UL (ref 4.8–10.8)

## 2022-06-04 PROCEDURE — 85025 COMPLETE CBC W/AUTO DIFF WBC: CPT

## 2022-06-04 PROCEDURE — 80053 COMPREHEN METABOLIC PANEL: CPT

## 2022-06-04 PROCEDURE — 87635 SARS-COV-2 COVID-19 AMP PRB: CPT

## 2022-06-04 PROCEDURE — 6360000002 HC RX W HCPCS: Performed by: EMERGENCY MEDICINE

## 2022-06-04 PROCEDURE — 36415 COLL VENOUS BLD VENIPUNCTURE: CPT

## 2022-06-04 PROCEDURE — 96374 THER/PROPH/DIAG INJ IV PUSH: CPT

## 2022-06-04 PROCEDURE — 2580000003 HC RX 258: Performed by: EMERGENCY MEDICINE

## 2022-06-04 PROCEDURE — 96375 TX/PRO/DX INJ NEW DRUG ADDON: CPT

## 2022-06-04 PROCEDURE — 99285 EMERGENCY DEPT VISIT HI MDM: CPT

## 2022-06-04 RX ORDER — 0.9 % SODIUM CHLORIDE 0.9 %
1000 INTRAVENOUS SOLUTION INTRAVENOUS ONCE
Status: COMPLETED | OUTPATIENT
Start: 2022-06-04 | End: 2022-06-04

## 2022-06-04 RX ORDER — ONDANSETRON 2 MG/ML
4 INJECTION INTRAMUSCULAR; INTRAVENOUS ONCE
Status: COMPLETED | OUTPATIENT
Start: 2022-06-04 | End: 2022-06-04

## 2022-06-04 RX ADMIN — HYDROMORPHONE HYDROCHLORIDE 1 MG: 1 INJECTION, SOLUTION INTRAMUSCULAR; INTRAVENOUS; SUBCUTANEOUS at 01:59

## 2022-06-04 RX ADMIN — SODIUM CHLORIDE 1000 ML: 9 INJECTION, SOLUTION INTRAVENOUS at 01:59

## 2022-06-04 RX ADMIN — ONDANSETRON 4 MG: 2 INJECTION INTRAMUSCULAR; INTRAVENOUS at 01:59

## 2022-06-04 ASSESSMENT — PAIN SCALES - GENERAL
PAINLEVEL_OUTOF10: 7
PAINLEVEL_OUTOF10: 6
PAINLEVEL_OUTOF10: 10

## 2022-06-04 ASSESSMENT — ENCOUNTER SYMPTOMS
PHOTOPHOBIA: 0
ABDOMINAL PAIN: 0
COUGH: 0
ABDOMINAL DISTENTION: 0
CHEST TIGHTNESS: 0
VOMITING: 0
EYE DISCHARGE: 0
SHORTNESS OF BREATH: 0
SORE THROAT: 0
WHEEZING: 0
NAUSEA: 0

## 2022-06-04 ASSESSMENT — PAIN - FUNCTIONAL ASSESSMENT
PAIN_FUNCTIONAL_ASSESSMENT: 0-10
PAIN_FUNCTIONAL_ASSESSMENT: 0-10

## 2022-06-04 ASSESSMENT — PAIN DESCRIPTION - LOCATION
LOCATION: BACK;CHEST
LOCATION: BACK

## 2022-06-04 NOTE — ED NOTES
Previous staff (Dr. Odalys Hamilton) had attempted to call patient's wife with no answer, but a voicemail was left. She called in and she was updated with plan of care. She was unhappy with the location that he was transferred to. She stated that \"no one fucking called me and did not have my permission to send him to that location\". Patient was alert and oriented and consented for transfer. Patient's wife was then transferred to the charge nurse for escalation of complaints. Mya Xavier RN  06/04/22 4094

## 2022-06-04 NOTE — ED NOTES
145 Maranda Rivera. to initiate Transfer to [facility] spoke to Eloy COELLO Page  06/04/22 6125

## 2022-06-04 NOTE — ED TRIAGE NOTES
Patient presents by EMS for pain control and a complaint of new onset of weakness in his bilateral lower extremities that started today. Patient is currently receiving radiation treatment for a spinal tumor and was discharged from [facility] today and admitted to [facility] for rehab. Patient is alert and oriented. No distress is noted other than patient's complaints of pain.

## 2022-06-04 NOTE — PROGRESS NOTES
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 He is on anticoagulants aspirin and Plavix. Recent cardiac stent requires him to remain taking his anticoagulation. He is also had severe peripheral vascular disease with stents in the both lower extremities. Surgery is contraindicated from his required chronic anticoagulation and his cardiac disease with multiple cardiac stents. Please see my note of 5/28/2022.

## 2022-06-04 NOTE — ED NOTES
Assumed care of patient. Belongings completed. Report was previously given to [facility]. Awaiting [facility]. Patient is A & O x4. Denies needs at this time. Updated on plan of care. Mame Aburto RN  06/04/22 4388

## 2022-06-04 NOTE — ED PROVIDER NOTES
[address] [facility] ED  eMERGENCY dEPARTMENT eNCOUnter      Pt Name: Ofelia Ibarra  MRN: [id number]  [place] 1950  Date of evaluation: 6/4/2022  Provider: Judy Sheehan MD    CHIEF COMPLAINT       Chief Complaint   Patient presents with    Extremity Weakness         HISTORY OF PRESENT ILLNESS   (Location/Symptom, Timing/Onset,Context/Setting, Quality, Duration, Modifying Factors, Severity)  Note limiting factors. Ofelia Ibarra is a 73 y.o. male who presents to the emergency department for evaluation of extremity weakness and generalized bone pain. Patient was recently hospitalized here for a spinal cord compression tumor evolving T4 and T5. Patient received 4 rounds of chemotherapy here in the hospital had neurosurgical consultation and was even offered tertiary care facility for what was felt to be a very poor prognosis. He elected to receive the therapy treatments and then be discharged to outpatient therapy 24 hours ago. He presents from the therapy center via EMS for increasing weakness and generalized back pain that has not been controlled with Percocet. He has had ongoing problems with incontinence and now has increased weakness of his legs. He states that 2 days ago he did the physical therapy here and was able to stand some assist now he cannot even lift his right or left leg. He has new numbness of the lower extremities bilateral.    HPI    NursingNotes were reviewed. REVIEW OF SYSTEMS    (2-9 systems for level 4, 10 or more for level 5)     Review of Systems   Constitutional: Positive for fatigue. Negative for chills and diaphoresis. HENT: Negative for congestion, ear pain, mouth sores and sore throat. Eyes: Negative for photophobia and discharge. Respiratory: Negative for cough, chest tightness, shortness of breath and wheezing. Cardiovascular: Negative for chest pain and palpitations.    Gastrointestinal: Negative for abdominal distention, abdominal pain, nausea and vomiting. Endocrine: Negative for cold intolerance. Genitourinary: Negative for difficulty urinating. Musculoskeletal: Negative for arthralgias. Skin: Negative for pallor and rash. Allergic/Immunologic: Negative for immunocompromised state. Neurological: Positive for weakness. Negative for dizziness and syncope. Hematological: Negative for adenopathy. Psychiatric/Behavioral: Negative for agitation and hallucinations. All other systems reviewed and are negative. Except as noted above the remainder of the review of systems was reviewed and negative. PAST MEDICAL HISTORY     Past Medical History:   Diagnosis Date    CAD (coronary artery disease)     Family history of colon cancer     Hyperlipidemia     Hypertension     Old MI (myocardial infarction) 02/03/2006    TUE on CPAP     PAD (peripheral artery disease) (HCC)     Prostate cancer ([facility]          SURGICALHISTORY       Past Surgical History:   Procedure Laterality Date    COLONOSCOPY N/A 08/19/2019    COLORECTAL CANCER SCREENING, NOT HIGH RISK performed by Benitez Sen MD at [address] [place] [address]      X3    THORACENTESIS Right 03/07/2022    870 ml removed by Dr. Clemente Caputo - diagnostics sent    THORACENTESIS Left 03/08/2022    285 ml removed by Dr Clemente Caputo - cytology only sent         CURRENT MEDICATIONS       Previous Medications    ASCORBIC ACID (VITAMIN C PO)    Take 500 mg by mouth daily     ASPIRIN 81 MG EC TABLET    Take 81 mg by mouth daily    ATORVASTATIN (LIPITOR) 40 MG TABLET    Take 1 tablet by mouth daily    CLOPIDOGREL (PLAVIX) 75 MG TABLET    TAKE 1 TABLET BY MOUTH  DAILY    CPAP MACHINE MISC    by Does not apply route Use nightly as directed. Pressure setting 5 cm H2O.     CYANOCOBALAMIN (VITAMIN B-12 CR PO)    Take 1 tablet by mouth daily     DOCUSATE SODIUM (COLACE) 100 MG CAPSULE    Take 1 capsule by mouth daily    HANDICAP PLACARD MISC    by Does not apply route Exp 5 years    LOSARTAN (COZAAR) 25 MG TABLET    Take 1 tablet by mouth daily    METOPROLOL SUCCINATE (TOPROL XL) 25 MG EXTENDED RELEASE TABLET    Take 1 tablet by mouth daily    OXYCODONE (OXY-IR) 10 MG IMMEDIATE RELEASE TABLET    Take 1 tablet by mouth nightly for 3 days. OXYCODONE (ROXICODONE) 5 MG IMMEDIATE RELEASE TABLET    Take 1 tablet by mouth every 4 hours as needed for Pain for up to 3 days. POLYETHYLENE GLYCOL (GLYCOLAX) 17 G PACKET    Take 17 g by mouth daily as needed for Constipation    PREDNISONE (DELTASONE) 50 MG TABLET    Take 1 tablet by mouth daily for 10 days    TALAZOPARIB TOSYLATE 0.25 MG CAPS    Take 1 capsule by mouth daily Take 2 tablets daily    TAMSULOSIN (FLOMAX) 0.4 MG CAPSULE    Take 0.4 mg by mouth daily        ALLERGIES     Morphine    FAMILY HISTORY       Family History   Problem Relation Age of Onset    Cancer Mother 76        BONE    Cancer Father 64        COLON    Colon Cancer Father     Cancer Brother 43        THYROID    Diabetes Maternal Grandmother           SOCIAL HISTORY       Social History     Socioeconomic History    Marital status: Single     Spouse name: None    Number of children: 2    Years of education: None    Highest education level: Associate degree: occupational, technical, or vocational program   Occupational History    Occupation:     Tobacco Use    Smoking status: Former Smoker     Packs/day: 2.00     Years: 25.00     Pack years: 50.00     Types: Cigarettes     Quit date: 1995     Years since quittin.4    Smokeless tobacco: Never Used    Tobacco comment: RARE CIGAR FROM TIME TO TIME   Vaping Use    Vaping Use: Never used   Substance and Sexual Activity    Alcohol use:  Yes     Alcohol/week: 4.0 standard drinks     Types: 2 Cans of beer, 2 Shots of liquor per week     Comment: Social, but less in the last month    Drug use: Yes     Types: Marijuana Myrtis Regulus)    Sexual activity: Not Currently   Other Topics Concern    None Social History Narrative    Single floor home    4 steps to get into the home    Full basement does not go downstairs    Lives with girlfriend and her son    He has two children one in [place] and one in [place]    One Dog     Working smoke detectors and CO2     Social Determinants of Health     Financial Resource Strain:     Difficulty of Paying Living Expenses: Not on file   Food Insecurity:     Worried About [address] in the Last Year: Not on file    [address] in the Last Year: Not on file   Transportation Needs:     Lack of Transportation (Medical): Not on file    Lack of Transportation (Non-Medical):  Not on file   Physical Activity:     Days of Exercise per Week: Not on file    Minutes of Exercise per Session: Not on file   Stress:     Feeling of Stress : Not on file   Social Connections:     Frequency of Communication with Friends and Family: Not on file    Frequency of Social Gatherings with Friends and Family: Not on file    Attends Evangelical Services: Not on file    Active Member of [address] [place] [address] or Organizations: Not on file    Attends Club or Organization Meetings: Not on file    Marital Status: Not on file   Intimate Partner Violence:     Fear of Current or Ex-Partner: Not on file    Emotionally Abused: Not on file    Physically Abused: Not on file    Sexually Abused: Not on file   Housing Stability:     Unable to Pay for Housing in the Last Year: Not on file    Number of [place] in the Last Year: Not on file    Unstable Housing in the Last Year: Not on file       SCREENINGS    [place] Coma Scale  Eye Opening: Spontaneous  Best Verbal Response: Oriented  Best Motor Response: Obeys commands  [place] Coma Scale Score: 15 @FLOW([id number])@      PHYSICAL EXAM    (up to 7 for level 4, 8 or more for level 5)     ED Triage Vitals [06/04/22 0122]   BP Temp Temp Source Heart Rate Resp SpO2 Height Weight   136/66 98.1 °F (36.7 °C) Oral 62 -- -- 5' 10\" (1.778 m) 174 lb (78.9 kg) Physical Exam  Vitals and nursing note reviewed. Constitutional:       Appearance: He is well-developed. He is ill-appearing. HENT:      Head: Normocephalic. Right Ear: Tympanic membrane normal.      Left Ear: Tympanic membrane normal.      Nose: Nose normal.      Mouth/Throat:      Mouth: Mucous membranes are moist.   Eyes:      Conjunctiva/sclera: Conjunctivae normal.      Pupils: Pupils are equal, round, and reactive to light. Cardiovascular:      Rate and Rhythm: Normal rate and regular rhythm. Heart sounds: Normal heart sounds. Pulmonary:      Effort: Pulmonary effort is normal.      Breath sounds: Normal breath sounds. Abdominal:      General: Bowel sounds are normal.      Palpations: Abdomen is soft. Tenderness: There is no abdominal tenderness. There is no guarding. Musculoskeletal:         General: Normal range of motion. Cervical back: Normal range of motion and neck supple. Skin:     General: Skin is warm and dry. Capillary Refill: Capillary refill takes less than 2 seconds. Neurological:      Mental Status: He is alert and oriented to person, place, and time. GCS: GCS eye subscore is 4. GCS verbal subscore is 5. GCS motor subscore is 6. Sensory: Sensory deficit present. Motor: Weakness present. Comments: Patient with severe weakness of his legs bilateral and spasms. Decreased sensation to the lower legs below the knees. Unable to bend at the knees or plantar or dorsiflex.          DIAGNOSTIC RESULTS     EKG: All EKG's are interpreted by the Emergency Department Physician who either signs or Co-signsthis chart in the absence of a cardiologist.        RADIOLOGY:   Non-plain filmimages such as CT, Ultrasound and MRI are read by the radiologist. Plain radiographic images are visualized and preliminarily interpreted by the emergency physician with the below findings:      Interpretation per the Radiologist below, if available at the time ofthis note:    No orders to display         ED BEDSIDE ULTRASOUND:   Performed by ED Physician - none    LABS:  Labs Reviewed   COMPREHENSIVE METABOLIC PANEL - Abnormal; Notable for the following components:       Result Value    BUN 30 (*)     CREATININE 0.67 (*)     Total Protein 6.1 (*)     Albumin 3.2 (*)     Alkaline Phosphatase 370 (*)     All other components within normal limits   CBC WITH AUTO DIFFERENTIAL - Abnormal; Notable for the following components:    RBC 3.22 (*)     Hemoglobin 9.7 (*)     Hematocrit 29.0 (*)     RDW 21.1 (*)     Platelets 572 (*)     All other components within normal limits   COVID-19, RAPID       All other labs were within normal range or not returned as of this dictation. EMERGENCY DEPARTMENT COURSE and DIFFERENTIAL DIAGNOSIS/MDM:   Vitals:    Vitals:    06/04/22 0122 06/04/22 0211 06/04/22 0247 06/04/22 0315   BP: 136/66 120/62 112/61 (!) 104/56   Pulse: 62 57 54 54   Resp:  18 18 18   Temp: 98.1 °F (36.7 °C)      TempSrc: Oral      SpO2:  100% 100% 94%   Weight: 174 lb (78.9 kg)      Height: 5' 10\" (1.778 m)           MDM patient has obvious worsening spinal cord compression. Sometime in the past 24 hours he is gone from 4/5 leg strength to now 1/5 leg strength bilateral.  He has been incontinent on occasion. Discussed the case with Dr. Candice Jones and who had been a consultant on the case and he was concerned that the patient would have progression of his disease and of his weakness. However, the patient is on Plavix and aspirin therapy for legitimate cardiac disease and recent stenting in the past 2 months. He is not a surgical candidate here and the patient would like a further evaluation at a tertiary center by both neurosurgery and cardiology for treatment options. His overall prognosis is poor but there may be further options at a tertiary facility.     Dr. Yolanda Esqueda orthopedic back specialist is excepting the patient at [facility] through the ED for cardiology and Ortho/neurosurgery assessment. Patient is in agreement with the transfer and further assessment        CONSULTS:  None    PROCEDURES:  Unless otherwise noted below, none     Procedures    FINAL IMPRESSION      1. Spinal cord compression [facility])          DISPOSITION/PLAN   DISPOSITION Decision To Transfer 06/04/2022 03:07:21 AM      PATIENT REFERRED TO:  No follow-up provider specified.     DISCHARGE MEDICATIONS:  New Prescriptions    No medications on file          (Please note that portions of this note were completed with a voice recognition program.  Efforts were made to edit the dictations but occasionally words are mis-transcribed.)    Delfino Davila MD (electronically signed)  Attending Emergency Physician          Delfino Davila MD  06/04/22 2336       Delfino Davila MD  06/04/22 7331

## 2022-06-04 NOTE — ED NOTES
Handoff given to [facility]. Nursing report called to [facility] and [facility] for patient transfer.      Vidya Hicks RN  06/04/22 2062

## 2022-06-04 NOTE — ED NOTES
Patient turned, repositioned onto left side. Pillow placed under right side. Patient tolerated well and states he is comfortable. Also states pain has improved from 10 to 8/10.      Mariposa Awad RN  06/04/22 1671

## 2022-06-04 NOTE — ED NOTES
Patient is updated on plan of care. Awaiting acceptance to outside facility. Patient requested that we update is wife on particulars of transport-- Dr. Simin España will update patient's wife.      Grace Breen RN  06/04/22 0320

## 2022-06-05 ENCOUNTER — HOSPITAL ENCOUNTER (INPATIENT)
Age: 72
LOS: 1 days | Discharge: NURSING, REHAB OR LONG-TERM CARE | DRG: 543 | End: 2022-06-10
Attending: INTERNAL MEDICINE
Payer: MEDICARE

## 2022-06-05 DIAGNOSIS — C61 PROSTATE CANCER (HCC): ICD-10-CM

## 2022-06-05 DIAGNOSIS — G89.29 CHRONIC LOW BACK PAIN WITHOUT SCIATICA, UNSPECIFIED BACK PAIN LATERALITY: ICD-10-CM

## 2022-06-05 DIAGNOSIS — C79.51 SPINE METASTASIS (HCC): Primary | ICD-10-CM

## 2022-06-05 DIAGNOSIS — C79.51 SECONDARY MALIGNANT NEOPLASM OF THORACIC VERTEBRAL COLUMN (HCC): ICD-10-CM

## 2022-06-05 DIAGNOSIS — E53.8 B12 DEFICIENCY: ICD-10-CM

## 2022-06-05 DIAGNOSIS — M54.50 CHRONIC LOW BACK PAIN WITHOUT SCIATICA, UNSPECIFIED BACK PAIN LATERALITY: ICD-10-CM

## 2022-06-05 PROBLEM — M54.9 BACK PAIN: Status: ACTIVE | Noted: 2022-06-05

## 2022-06-05 PROCEDURE — 2580000003 HC RX 258: Performed by: NURSE PRACTITIONER

## 2022-06-05 PROCEDURE — 96374 THER/PROPH/DIAG INJ IV PUSH: CPT

## 2022-06-05 PROCEDURE — 6370000000 HC RX 637 (ALT 250 FOR IP): Performed by: INTERNAL MEDICINE

## 2022-06-05 PROCEDURE — 6360000002 HC RX W HCPCS: Performed by: INTERNAL MEDICINE

## 2022-06-05 PROCEDURE — G0379 DIRECT REFER HOSPITAL OBSERV: HCPCS

## 2022-06-05 PROCEDURE — G0378 HOSPITAL OBSERVATION PER HR: HCPCS

## 2022-06-05 PROCEDURE — 6370000000 HC RX 637 (ALT 250 FOR IP): Performed by: NURSE PRACTITIONER

## 2022-06-05 RX ORDER — TRAMADOL HYDROCHLORIDE 50 MG/1
75 TABLET ORAL
Status: DISCONTINUED | OUTPATIENT
Start: 2022-06-05 | End: 2022-06-10 | Stop reason: HOSPADM

## 2022-06-05 RX ORDER — POLYETHYLENE GLYCOL 3350 17 G/17G
17 POWDER, FOR SOLUTION ORAL DAILY PRN
Status: DISCONTINUED | OUTPATIENT
Start: 2022-06-05 | End: 2022-06-10 | Stop reason: HOSPADM

## 2022-06-05 RX ORDER — OXYCODONE HYDROCHLORIDE 5 MG/1
5 TABLET ORAL EVERY 4 HOURS PRN
Status: DISCONTINUED | OUTPATIENT
Start: 2022-06-05 | End: 2022-06-05

## 2022-06-05 RX ORDER — ONDANSETRON 4 MG/1
4 TABLET, ORALLY DISINTEGRATING ORAL EVERY 8 HOURS PRN
Status: DISCONTINUED | OUTPATIENT
Start: 2022-06-05 | End: 2022-06-05

## 2022-06-05 RX ORDER — SODIUM CHLORIDE 0.9 % (FLUSH) 0.9 %
5-40 SYRINGE (ML) INJECTION PRN
Status: DISCONTINUED | OUTPATIENT
Start: 2022-06-05 | End: 2022-06-10 | Stop reason: HOSPADM

## 2022-06-05 RX ORDER — DEXAMETHASONE SODIUM PHOSPHATE 4 MG/ML
4 INJECTION, SOLUTION INTRA-ARTICULAR; INTRALESIONAL; INTRAMUSCULAR; INTRAVENOUS; SOFT TISSUE EVERY 6 HOURS
Status: DISCONTINUED | OUTPATIENT
Start: 2022-06-05 | End: 2022-06-10 | Stop reason: HOSPADM

## 2022-06-05 RX ORDER — ACETAMINOPHEN 650 MG/1
650 SUPPOSITORY RECTAL EVERY 6 HOURS PRN
Status: DISCONTINUED | OUTPATIENT
Start: 2022-06-05 | End: 2022-06-10 | Stop reason: HOSPADM

## 2022-06-05 RX ORDER — ACETAMINOPHEN 80 MG
TABLET,CHEWABLE ORAL ONCE
Status: COMPLETED | OUTPATIENT
Start: 2022-06-05 | End: 2022-06-06

## 2022-06-05 RX ORDER — ATORVASTATIN CALCIUM 40 MG/1
40 TABLET, FILM COATED ORAL DAILY
Status: DISCONTINUED | OUTPATIENT
Start: 2022-06-05 | End: 2022-06-10 | Stop reason: HOSPADM

## 2022-06-05 RX ORDER — PANTOPRAZOLE SODIUM 40 MG/1
40 TABLET, DELAYED RELEASE ORAL
Status: DISCONTINUED | OUTPATIENT
Start: 2022-06-06 | End: 2022-06-05

## 2022-06-05 RX ORDER — CLOPIDOGREL BISULFATE 75 MG/1
75 TABLET ORAL DAILY
Status: DISCONTINUED | OUTPATIENT
Start: 2022-06-05 | End: 2022-06-10 | Stop reason: HOSPADM

## 2022-06-05 RX ORDER — PANTOPRAZOLE SODIUM 40 MG/1
40 GRANULE, DELAYED RELEASE ORAL
COMMUNITY
End: 2022-06-20

## 2022-06-05 RX ORDER — SODIUM CHLORIDE 0.9 % (FLUSH) 0.9 %
5-40 SYRINGE (ML) INJECTION EVERY 12 HOURS SCHEDULED
Status: DISCONTINUED | OUTPATIENT
Start: 2022-06-05 | End: 2022-06-10 | Stop reason: HOSPADM

## 2022-06-05 RX ORDER — ASPIRIN 81 MG/1
81 TABLET ORAL DAILY
Status: DISCONTINUED | OUTPATIENT
Start: 2022-06-05 | End: 2022-06-10 | Stop reason: HOSPADM

## 2022-06-05 RX ORDER — SODIUM CHLORIDE 9 MG/ML
INJECTION, SOLUTION INTRAVENOUS PRN
Status: DISCONTINUED | OUTPATIENT
Start: 2022-06-05 | End: 2022-06-10 | Stop reason: HOSPADM

## 2022-06-05 RX ORDER — ACETAMINOPHEN 325 MG/1
650 TABLET ORAL EVERY 6 HOURS PRN
Status: DISCONTINUED | OUTPATIENT
Start: 2022-06-05 | End: 2022-06-10 | Stop reason: HOSPADM

## 2022-06-05 RX ORDER — ENOXAPARIN SODIUM 100 MG/ML
40 INJECTION SUBCUTANEOUS DAILY
Status: DISCONTINUED | OUTPATIENT
Start: 2022-06-05 | End: 2022-06-10 | Stop reason: HOSPADM

## 2022-06-05 RX ORDER — OXYCODONE HYDROCHLORIDE 5 MG/1
10 TABLET ORAL NIGHTLY
Status: DISCONTINUED | OUTPATIENT
Start: 2022-06-05 | End: 2022-06-06

## 2022-06-05 RX ORDER — DEXAMETHASONE 4 MG/1
4 TABLET ORAL 2 TIMES DAILY WITH MEALS
Status: ON HOLD | COMMUNITY
End: 2022-06-05

## 2022-06-05 RX ORDER — TAMSULOSIN HYDROCHLORIDE 0.4 MG/1
0.4 CAPSULE ORAL DAILY
Status: DISCONTINUED | OUTPATIENT
Start: 2022-06-05 | End: 2022-06-10 | Stop reason: HOSPADM

## 2022-06-05 RX ORDER — OXYCODONE HYDROCHLORIDE 5 MG/1
5 TABLET ORAL 3 TIMES DAILY
Status: DISCONTINUED | OUTPATIENT
Start: 2022-06-05 | End: 2022-06-06

## 2022-06-05 RX ORDER — ENOXAPARIN SODIUM 300 MG/3ML
40 INJECTION INTRAVENOUS; SUBCUTANEOUS DAILY
Status: ON HOLD | COMMUNITY
End: 2022-06-05

## 2022-06-05 RX ORDER — ONDANSETRON 2 MG/ML
4 INJECTION INTRAMUSCULAR; INTRAVENOUS EVERY 6 HOURS PRN
Status: DISCONTINUED | OUTPATIENT
Start: 2022-06-05 | End: 2022-06-05

## 2022-06-05 RX ADMIN — DEXAMETHASONE SODIUM PHOSPHATE 4 MG: 4 INJECTION, SOLUTION INTRAMUSCULAR; INTRAVENOUS at 22:01

## 2022-06-05 RX ADMIN — OXYCODONE 5 MG: 5 TABLET ORAL at 18:00

## 2022-06-05 RX ADMIN — Medication 10 ML: at 22:04

## 2022-06-05 RX ADMIN — TRAMADOL HYDROCHLORIDE 75 MG: 50 TABLET, COATED ORAL at 22:01

## 2022-06-05 ASSESSMENT — PAIN SCALES - GENERAL
PAINLEVEL_OUTOF10: 6
PAINLEVEL_OUTOF10: 0

## 2022-06-05 ASSESSMENT — ENCOUNTER SYMPTOMS
GASTROINTESTINAL NEGATIVE: 1
RESPIRATORY NEGATIVE: 1
ALLERGIC/IMMUNOLOGIC NEGATIVE: 1
EYES NEGATIVE: 1
BACK PAIN: 1

## 2022-06-05 NOTE — PROGRESS NOTES
Patient arrived to [facility] from [facility] with VSS and no complaints. Patients skin checked with Karri Godoy RN and has redness on coccyx and right heel. mepilexes applied to coccyx and heels with prevalon boots. scd's applied. Admission questions completed and med list pulled from [facility] paperwork. Messaged dr Manuela Villalobos that patient was here.

## 2022-06-05 NOTE — H&P
Hospitalist History and Physical  Name: Glory Wilson  Age: 73 y.o. Gender: male  CodeStatus: Full Code  Allergies: Morphine    Chief Complaint:No chief complaint on file. Primary Care Provider: Lorna Pena MD  InpatientTreatment Team: Treatment Team: Attending Provider: Charles Andres MD; Registered Nurse: Yaneli Ferguson RN  Admission Date: 6/5/2022      Subjective: 73 y.o. male with a history of CAD status post stents recently, hypertension, hyperlipidemia, PAD, bone malignancy initially presented with dizziness on 5/27. Dizziness and orthostasis was likely secondary to antihypertensives, fentanyl and Ultram therefore medications were stopped. Oncology and palliative medicine were consulted for further pain management. Patient was transfused for anemia. On 5/28 patient complained of bilateral lower extremity weakness and numbness. MRI showed a cord compression. Neurosurgery was consulted however did not recommend surgery. Patient was then started on steroids and and transfer to a tertiary care center was offered however patient declined. While here at [facility] patient did receive 2 radiation treatments with no significant improvement. Patient was agreeable to SNF and transferred to [facility] (6/03) with the plan to continue radiation 5 times per week. The following day (06/04) patient presented back to the emergency room for increased weakness and generalized back pain which was not well controlled with Percocet. Neurosurgery and hospital medicine agreed that patient should be transferred for further radiation treatments at a tertiary care facility. Patient was then transferred to [facility]; per, patient then declined treatments and requested to be come back to [facility]. Patient is hemodynamically stable and afebrile. No significant distress noted he does complain of back pain; however, patient is unable to tolerate IV pain medications.   Patient is planned for radiation treatment tomorrow at 6 AM.      Physical Exam  Constitutional:       General: He is not in acute distress. Appearance: He is well-developed. He is ill-appearing. HENT:      Head: Normocephalic and atraumatic. Nose: Nose normal.      Mouth/Throat:      Pharynx: Oropharynx is clear. Eyes:      Pupils: Pupils are equal, round, and reactive to light. Cardiovascular:      Rate and Rhythm: Normal rate and regular rhythm. Pulses: Normal pulses. Heart sounds: Normal heart sounds. Pulmonary:      Effort: Pulmonary effort is normal.      Breath sounds: Normal breath sounds. Abdominal:      General: Bowel sounds are normal.      Palpations: Abdomen is soft. Musculoskeletal:         General: Normal range of motion. Cervical back: Normal range of motion and neck supple. Skin:     General: Skin is warm and dry. Capillary Refill: Capillary refill takes less than 2 seconds. Neurological:      General: No focal deficit present. Mental Status: He is alert and oriented to person, place, and time. Motor: Weakness present. Gait: Gait abnormal.   Psychiatric:         Behavior: Behavior normal.         Review of Systems   Constitutional: Positive for fatigue. HENT: Negative. Eyes: Negative. Respiratory: Negative. Cardiovascular: Negative. Gastrointestinal: Negative. Endocrine: Negative. Genitourinary: Negative. Musculoskeletal: Positive for arthralgias, back pain and gait problem. Skin: Negative. Allergic/Immunologic: Negative. Neurological: Positive for weakness. Hematological: Negative. Psychiatric/Behavioral: Negative. Medications:  Reviewed     No current facility-administered medications on file prior to encounter.      Current Outpatient Medications on File Prior to Encounter   Medication Sig Dispense Refill    dexamethasone (DECADRON) tablet Take 4 mg by mouth 2 times daily (with meals)      enoxaparin (LOVENOX) 300 MG/3ML injection Inject 40 mg into the skin daily      pantoprazole sodium (PROTONIX) 40 MG PACK packet Take 40 mg by mouth every morning (before breakfast)      oxyCODONE (OXY-IR) 10 MG immediate release tablet Take 1 tablet by mouth nightly for 3 days. 3 tablet 0    oxyCODONE (ROXICODONE) 5 MG immediate release tablet Take 1 tablet by mouth every 4 hours as needed for Pain for up to 3 days. 18 tablet 0    predniSONE (DELTASONE) 50 MG tablet Take 1 tablet by mouth daily for 10 days 10 tablet 0    losartan (COZAAR) 25 MG tablet Take 1 tablet by mouth daily 30 tablet 3    metoprolol succinate (TOPROL XL) 25 MG extended release tablet Take 1 tablet by mouth daily 30 tablet 3    docusate sodium (COLACE) 100 mg capsule Take 1 capsule by mouth daily 30 capsule 0    polyethylene glycol (GLYCOLAX) 17 g packet Take 17 g by mouth daily as needed for Constipation 527 g 1    clopidogrel (PLAVIX) 75 MG tablet TAKE 1 TABLET BY MOUTH  DAILY 90 tablet 3    Handicap Placard MISC by Does not apply route Exp 5 years 1 each 0    aspirin 81 MG EC tablet Take 81 mg by mouth daily      atorvastatin (LIPITOR) 40 MG tablet Take 1 tablet by mouth daily 90 tablet 1    Talazoparib Tosylate 0.25 MG CAPS Take 1 capsule by mouth daily Take 2 tablets daily      Cyanocobalamin (VITAMIN B-12 CR PO) Take 1 tablet by mouth daily       tamsulosin (FLOMAX) 0.4 MG capsule Take 0.4 mg by mouth daily   11    CPAP Machine MISC by Does not apply route Use nightly as directed. Pressure setting 5 cm H2O. (Patient taking differently: 1 Device by Does not apply route Use nightly as directed.   Pressure setting 5 cm H2O.) 1 each 0    Ascorbic Acid (VITAMIN C PO) Take 500 mg by mouth daily           Past Medical History:   Diagnosis Date    CAD (coronary artery disease)     Family history of colon cancer     Hyperlipidemia     Hypertension     Old MI (myocardial infarction) 02/03/2006    UTE on CPAP     PAD (peripheral artery disease) (HCC)     Prostate cancer [facility])        Past Surgical History: Procedure Laterality Date    COLONOSCOPY N/A 2019    COLORECTAL CANCER SCREENING, NOT HIGH RISK performed by Marilia Guerrero MD at  [address] Right 2022    870 ml removed by Dr. Diana Campuzano - diagnostics sent    THORACENTESIS Left 2022    285 ml removed by Dr Diana Campuzano - cytology only sent        Family History   Problem Relation Age of Onset    Cancer Mother 76        BONE    Cancer Father 64        COLON    Colon Cancer Father     Cancer Brother 43        THYROID    Diabetes Maternal Grandmother         Social History     Socioeconomic History    Marital status: Single     Spouse name: Not on file    Number of children: 2    Years of education: Not on file    Highest education level: Associate degree: occupational, technical, or vocational program   Occupational History    Occupation:     Tobacco Use    Smoking status: Former Smoker     Packs/day: 2.00     Years: 25.00     Pack years: 50.00     Types: Cigarettes     Quit date: 1995     Years since quittin.4    Smokeless tobacco: Never Used    Tobacco comment: RARE CIGAR FROM TIME TO TIME   Vaping Use    Vaping Use: Never used   Substance and Sexual Activity    Alcohol use:  Yes     Alcohol/week: 4.0 standard drinks     Types: 2 Cans of beer, 2 Shots of liquor per week     Comment: Social, but less in the last month    Drug use: Yes     Types: Marijuana Marianna Ing)    Sexual activity: Not Currently   Other Topics Concern    Not on file   Social History Narrative    Single floor home    4 steps to get into the home    Full basement does not go downstairs    Lives with girlfriend and her son    He has two children one in [place] and one in [place]    One Dog     Working smoke detectors and CO2     Social Determinants of Health     Financial Resource Strain:     Difficulty of Paying Living Expenses: Not on file   Food Insecurity:     Worried About [address] in the Last Year: Not on file    Ran Out of Food in the Last Year: Not on file   Transportation Needs:     Lack of Transportation (Medical): Not on file    Lack of Transportation (Non-Medical):  Not on file   Physical Activity:     Days of Exercise per Week: Not on file    Minutes of Exercise per Session: Not on file   Stress:     Feeling of Stress : Not on file   Social Connections:     Frequency of Communication with Friends and Family: Not on file    Frequency of Social Gatherings with Friends and Family: Not on file    Attends Sikh Services: Not on file    Active Member of Clubs or Organizations: Not on file    Attends Club or Organization Meetings: Not on file    Marital Status: Not on file   Intimate Partner Violence:     Fear of Current or Ex-Partner: Not on file    Emotionally Abused: Not on file    Physically Abused: Not on file    Sexually Abused: Not on file   Housing Stability:     Unable to Pay for Housing in the Last Year: Not on file    Number of Places Lived in the Last Year: Not on file    Unstable Housing in the Last Year: Not on file        Infusion Medications:    sodium chloride       Scheduled Medications:    aspirin  81 mg Oral Daily    atorvastatin  40 mg Oral Daily    clopidogrel  75 mg Oral Daily    [START ON 6/6/2022] pantoprazole  40 mg Oral QAM AC    tamsulosin  0.4 mg Oral Daily    predniSONE  50 mg Oral Daily    sodium chloride flush  5-40 mL IntraVENous 2 times per day    enoxaparin  40 mg SubCUTAneous Daily     PRN Meds: oxyCODONE, polyethylene glycol, sodium chloride flush, sodium chloride, ondansetron **OR** ondansetron, acetaminophen **OR** acetaminophen    Labs:   Recent Labs     06/04/22  0145   WBC 7.2   HGB 9.7*   HCT 29.0*   *     Recent Labs     06/03/22  0737 06/04/22  0145   * 136   K 4.5 4.6    100   CO2 17* 23   BUN 28* 30*   CREATININE 0.56* 0.67*   CALCIUM 8.1* 8.7     Recent Labs     06/03/22 0737 06/04/22  0145   AST 19 17   ALT 14 15   BILITOT 0.5 0. 5   ALKPHOS 386* 370*     No results for input(s): INR in the last 72 hours. No results for input(s): Mulu Beat in the last 72 hours. Urinalysis:   Lab Results   Component Value Date    NITRU Negative 05/26/2022    WBCUA 0-2 05/26/2022    BACTERIA Negative 05/26/2022    RBCUA 0-2 05/26/2022    BLOODU Negative 05/26/2022    SPECGRAV 1.022 05/26/2022    GLUCOSEU Negative 05/26/2022       Radiology:   Most recent    Chest CT      WITH CONTRAST:No results found for this or any previous visit. WITHOUT CONTRAST: Results for orders placed during the hospital encounter of 03/05/22    CT CHEST [address]    Narrative  Patient: Sharmila Hsu  Time Out: 20:38  Exam(s): CT CHEST Without Contrast    EXAM:  CT Chest Without Intravenous Contrast    CLINICAL HISTORY:  Reason for exam: sob. Chief complaint sob, cp, cough, pt had pe study  on 3/3/22, told he has fluid around lungs    TECHNIQUE:  Axial computed tomography images of the chest without intravenous  contrast.  All CT scan at this facility use dose modulation, iterative  reconstruction, and/or weight based dosing when appropriate to reduce  radiation dose to as low as reasonably achievable. COMPARISON:  Chest x-ray done earlier today, and a CTA chest 3/3/22. FINDINGS:  Lungs: Predominant bibasilar infiltrates, nonspecific, cannot rule out  pneumonia. Slight progressive right middle lobe infiltrate. Stable  probable emphysema with small blebs right upper lobe. Pulmonary arteries:    No engorgement. Aorta:  No thoracic aortic aneurysm. Pleural space: Moderate to large bilateral pleural effusion has  progressed. No pneumothorax. Heart: Stable, moderate cardiomegaly. No significant pericardial  effusion. Bones/joints:  No acute fracture. Stable heterogeneous bone density,  cannot rule out metastatic disease. Soft tissues: Thyroid nodules, stable and nonspecific. Punctate  calcification right thyroid again noted.   Lymph nodes:  Stable, mediastinal adenopathy, enlarged in the  subcarinal region. Electronically signed by Jeannie Beavers M.D. on 03-05-22 at 2038    Impression  1. Moderate to large bilateral pleural effusions have mildly progressed. 2.  Slight progressive right middle lobe infiltrate with stable bibasilar  infiltrates. 3.  Stable mediastinal adenopathy, nonspecific. Neoplasm not excluded. 4.  Stable heterogeneous bone density, metastatic disease not excluded. Stable and nonspecific thyroid nodules and punctate calcification on the  right      CXR      2-view: Results for orders placed during the hospital encounter of 03/26/22    XR CHEST (2 VW)    Narrative  EXAMINATION: XR CHEST (2 VW)    CLINICAL HISTORY: PLEURAL EFFUSIONS. PNEUMONIA. PROSTATE CANCER WITH OSSEOUS METASTATIC MALIGNANCY    COMPARISONS: MARCH 8, 2022    FINDINGS: Osseous structures intact. Cardiopericardial silhouette normal. Pulmonary vasculature normal. Mild blunting left costophrenic angle. Lungs otherwise clear. Impression  SMALL LEFT PLEURAL EFFUSION      Results for orders placed during the hospital encounter of 03/08/22    XR CHEST (2 VW)    Impression  1. No evidence of pneumothorax. Resolution of bilateral pleural effusions. Tiny amount of residual fluid in the left costophrenic angle. COMPARISON: March 7, 2022    DIAGNOSIS: Post left thoracentesis    COMMENTS: None    TECHNIQUE: XR CHEST (2 VW)    FINDINGS:  The lungs are clear. Resolution of previously seen bilateral pleural effusions with a very tiny amount of fluid in the left costophrenic angle. No evidence of pneumothorax. Cardiac silhouette is normal in size. Visualized soft tissues, and osseous  structures are unremarkable. Portable: Results for orders placed during the hospital encounter of 05/26/22    XR CHEST PORTABLE    Narrative  EXAMINATION: XR CHEST PORTABLE    CLINICAL HISTORY: FATIGUE    COMPARISONS: MARCH 26, 2022    FINDINGS: Osseous structures are intact. Cardiopericardial silhouette is normal. Pulmonary vasculature is normal. Ill-defined area increased opacity right lower lung. Blunting left costophrenic angle with increased opacity left lung base. Impression  BILATERAL LOWER LUNG ATELECTASIS/PNEUMONIA WITH LEFT PLEURAL EFFUSION      Echo No results found for this or any previous visit. Assessment/Plan:    Metastatic prostate cancer to bone with T4/T5 cord compression  - non surgical due to being on plavix for CAD w/stents  - continue prednisone  - palliative care following  - rad/onc planning on radiation tomorrow at 11 AM.  - PT/OT - SNF placement. Anemia  - oncology following   - transfuse for HGB < 7  -continue to monitor     Thrombocytopenia:   -Repeat CBC in AM.   -Will discontinue lovenox SQ if/when platelets drop below 100K and switch to SCDs only. I personally spent estimated 60 minutes with this patient today. Additional work up or/and treatment plan may be added today or then after based on clinical progression. I am managing a portion of pt care. Some medical issues are handled by other specialists. Additional work up and treatment should be done in out pt setting by pt PCP and other out pt providers. In addition to examining and evaluating pt, I spent additional time explaining care, normaland abnormal findings, and treatment plan. All of pt questions were answered. Counseling, diet and education were provided. Case will be discussed with nursing staff when appropriate. Family will be updated if and when appropriate. Electronically signed by JACQUELYN Lee CNP on 6/5/2022 at 3:15 PM    Pt was just discharged to [facility] after [address] and oncology recommended pain control and radiation treatments for his metastatic cord compression. He then returned to [address] [facility] ED the next day due to pain and was transferred to [facility] for second opinion who agreed on non-surgical management.  Instead of discharge back to [facility], pt was transferred back to [address] [facility]. No medical issues. Will not escalate pain regimen. Pt frequently asks for Dilaudid despite knowing that it makes him too drowsy to participate in therapy. Pt also requests that he stay in the hospital until completion of his radiation treatments (which would be additional 8 days). I explained that is not an option and that the plan will be the same - discharge to SNF with transportation to radiation treatments and pain control per palliative care recs. PT/OT ordered. Radiation/oncology and palliative care consulted. Pt admitted for placement. I saw and evaluated the pt. I personally obtained the key and critical portions of the history and physical exam and made additions where appropriate in the documentation.  I reviewed the mid level documentation and agree with assessment and plan that we come up with together    Magdalena Gu DO  Internal Medicine

## 2022-06-05 NOTE — PROGRESS NOTES
Physical Therapy  Facility/Department: [facility] MED SURG   Physical Therapy Discharge      NAME: Frankey Side    : 1950 (3 y.o.)  MRN: [id number]    Account: [de-identified]  Gender: male      Patient has been discharged from acute care hospital. DC patient from current PT program.      Electronically signed by Steve Hernandez PT on 22 at 3:10 PM EDT

## 2022-06-06 ENCOUNTER — APPOINTMENT (OUTPATIENT)
Dept: RADIATION ONCOLOGY | Age: 72
End: 2022-06-06
Payer: MEDICAID

## 2022-06-06 ENCOUNTER — CARE COORDINATION (OUTPATIENT)
Dept: CARE COORDINATION | Age: 72
End: 2022-06-06

## 2022-06-06 LAB
ANION GAP SERPL CALCULATED.3IONS-SCNC: 16 MEQ/L (ref 9–15)
ANISOCYTOSIS: ABNORMAL
BANDED NEUTROPHILS RELATIVE PERCENT: 6 %
BASOPHILS ABSOLUTE: 0 K/UL (ref 0–0.2)
BASOPHILS RELATIVE PERCENT: 0.1 %
BUN BLDV-MCNC: 21 MG/DL (ref 8–23)
CALCIUM SERPL-MCNC: 8.1 MG/DL (ref 8.5–9.9)
CHLORIDE BLD-SCNC: 102 MEQ/L (ref 95–107)
CO2: 17 MEQ/L (ref 20–31)
CREAT SERPL-MCNC: 0.55 MG/DL (ref 0.7–1.2)
EOSINOPHILS ABSOLUTE: 0 K/UL (ref 0–0.7)
EOSINOPHILS RELATIVE PERCENT: 0.1 %
GFR AFRICAN AMERICAN: >60
GFR NON-AFRICAN AMERICAN: >60
GLUCOSE BLD-MCNC: 132 MG/DL (ref 70–99)
HCT VFR BLD CALC: 28.1 % (ref 42–52)
HEMOGLOBIN: 9.2 G/DL (ref 14–18)
LYMPHOCYTES ABSOLUTE: 0.9 K/UL (ref 1–4.8)
LYMPHOCYTES RELATIVE PERCENT: 14 %
MCH RBC QN AUTO: 29.9 PG (ref 27–31.3)
MCHC RBC AUTO-ENTMCNC: 32.6 % (ref 33–37)
MCV RBC AUTO: 92 FL (ref 80–100)
MONOCYTES ABSOLUTE: 0.1 K/UL (ref 0.2–0.8)
MONOCYTES RELATIVE PERCENT: 1.9 %
NEUTROPHILS ABSOLUTE: 5.5 K/UL (ref 1.4–6.5)
NEUTROPHILS RELATIVE PERCENT: 78 %
NUCLEATED RED BLOOD CELLS: 1 /100 WBC
OVALOCYTES: ABNORMAL
PDW BLD-RTO: 20.9 % (ref 11.5–14.5)
PLATELET # BLD: 102 K/UL (ref 130–400)
PLATELET SLIDE REVIEW: ABNORMAL
POIKILOCYTES: ABNORMAL
POLYCHROMASIA: ABNORMAL
POTASSIUM REFLEX MAGNESIUM: 4.8 MEQ/L (ref 3.4–4.9)
RBC # BLD: 3.06 M/UL (ref 4.7–6.1)
SODIUM BLD-SCNC: 135 MEQ/L (ref 135–144)
WBC # BLD: 6.6 K/UL (ref 4.8–10.8)

## 2022-06-06 PROCEDURE — 6370000000 HC RX 637 (ALT 250 FOR IP): Performed by: NURSE PRACTITIONER

## 2022-06-06 PROCEDURE — 6360000002 HC RX W HCPCS: Performed by: INTERNAL MEDICINE

## 2022-06-06 PROCEDURE — 85025 COMPLETE CBC W/AUTO DIFF WBC: CPT

## 2022-06-06 PROCEDURE — 80048 BASIC METABOLIC PNL TOTAL CA: CPT

## 2022-06-06 PROCEDURE — 96372 THER/PROPH/DIAG INJ SC/IM: CPT

## 2022-06-06 PROCEDURE — 99213 OFFICE O/P EST LOW 20 MIN: CPT | Performed by: NURSE PRACTITIONER

## 2022-06-06 PROCEDURE — G0378 HOSPITAL OBSERVATION PER HR: HCPCS

## 2022-06-06 PROCEDURE — 96376 TX/PRO/DX INJ SAME DRUG ADON: CPT

## 2022-06-06 PROCEDURE — 36415 COLL VENOUS BLD VENIPUNCTURE: CPT

## 2022-06-06 PROCEDURE — 2580000003 HC RX 258: Performed by: NURSE PRACTITIONER

## 2022-06-06 PROCEDURE — 6370000000 HC RX 637 (ALT 250 FOR IP): Performed by: INTERNAL MEDICINE

## 2022-06-06 PROCEDURE — 77427 RADIATION TX MANAGEMENT X5: CPT | Performed by: RADIOLOGY

## 2022-06-06 PROCEDURE — 6360000002 HC RX W HCPCS: Performed by: NURSE PRACTITIONER

## 2022-06-06 RX ORDER — OXYCODONE HYDROCHLORIDE 5 MG/1
10 TABLET ORAL EVERY 6 HOURS PRN
Status: DISCONTINUED | OUTPATIENT
Start: 2022-06-06 | End: 2022-06-10 | Stop reason: HOSPADM

## 2022-06-06 RX ORDER — SENNA AND DOCUSATE SODIUM 50; 8.6 MG/1; MG/1
2 TABLET, FILM COATED ORAL DAILY
Status: DISCONTINUED | OUTPATIENT
Start: 2022-06-06 | End: 2022-06-08

## 2022-06-06 RX ADMIN — Medication: at 03:18

## 2022-06-06 RX ADMIN — ATORVASTATIN CALCIUM 40 MG: 40 TABLET, FILM COATED ORAL at 08:58

## 2022-06-06 RX ADMIN — ENOXAPARIN SODIUM 40 MG: 100 INJECTION SUBCUTANEOUS at 08:58

## 2022-06-06 RX ADMIN — CLOPIDOGREL BISULFATE 75 MG: 75 TABLET ORAL at 08:59

## 2022-06-06 RX ADMIN — DEXAMETHASONE SODIUM PHOSPHATE 4 MG: 4 INJECTION, SOLUTION INTRAMUSCULAR; INTRAVENOUS at 21:19

## 2022-06-06 RX ADMIN — DEXAMETHASONE SODIUM PHOSPHATE 4 MG: 4 INJECTION, SOLUTION INTRAMUSCULAR; INTRAVENOUS at 16:12

## 2022-06-06 RX ADMIN — TAMSULOSIN HYDROCHLORIDE 0.4 MG: 0.4 CAPSULE ORAL at 08:58

## 2022-06-06 RX ADMIN — ASPIRIN 81 MG: 81 TABLET, COATED ORAL at 08:58

## 2022-06-06 RX ADMIN — DEXAMETHASONE SODIUM PHOSPHATE 4 MG: 4 INJECTION, SOLUTION INTRAMUSCULAR; INTRAVENOUS at 08:59

## 2022-06-06 RX ADMIN — OXYCODONE 10 MG: 5 TABLET ORAL at 13:17

## 2022-06-06 RX ADMIN — OXYCODONE 10 MG: 5 TABLET ORAL at 03:15

## 2022-06-06 RX ADMIN — Medication 10 ML: at 21:19

## 2022-06-06 RX ADMIN — TRAMADOL HYDROCHLORIDE 75 MG: 50 TABLET, COATED ORAL at 21:32

## 2022-06-06 RX ADMIN — Medication 10 ML: at 09:02

## 2022-06-06 RX ADMIN — DEXAMETHASONE SODIUM PHOSPHATE 4 MG: 4 INJECTION, SOLUTION INTRAMUSCULAR; INTRAVENOUS at 03:36

## 2022-06-06 RX ADMIN — SENNOSIDES AND DOCUSATE SODIUM 2 TABLET: 50; 8.6 TABLET ORAL at 12:40

## 2022-06-06 ASSESSMENT — PAIN DESCRIPTION - DESCRIPTORS
DESCRIPTORS: ACHING
DESCRIPTORS: ACHING

## 2022-06-06 ASSESSMENT — ENCOUNTER SYMPTOMS
CHEST TIGHTNESS: 0
BLOOD IN STOOL: 0
SORE THROAT: 0
ANAL BLEEDING: 0
WHEEZING: 0
SHORTNESS OF BREATH: 0
VOMITING: 0
NAUSEA: 0
COUGH: 0
ABDOMINAL DISTENTION: 0
STRIDOR: 0
VOICE CHANGE: 0
DIARRHEA: 0
TROUBLE SWALLOWING: 0
BACK PAIN: 1
ABDOMINAL PAIN: 0
RECTAL PAIN: 0
APNEA: 0
CHOKING: 0
EYE DISCHARGE: 0
COLOR CHANGE: 0
CONSTIPATION: 1

## 2022-06-06 ASSESSMENT — PAIN DESCRIPTION - ORIENTATION
ORIENTATION: MID
ORIENTATION: MID
ORIENTATION: UPPER;MID

## 2022-06-06 ASSESSMENT — PAIN DESCRIPTION - LOCATION
LOCATION: BACK

## 2022-06-06 ASSESSMENT — PAIN SCALES - GENERAL
PAINLEVEL_OUTOF10: 0
PAINLEVEL_OUTOF10: 5
PAINLEVEL_OUTOF10: 7

## 2022-06-06 NOTE — PROGRESS NOTES
Per RN, Socorro Hurst, patient does want to continue radiation treatment. [facility] notified and transportation has been set up from 6/6-6/10.  is 9:45am for 10:30am treatment time.

## 2022-06-06 NOTE — PROGRESS NOTES
Pt alert/orientated, assessment complete. Vss, lung sounds clear, bs active x4, no distress noted. Pt states \" I do not want my negin this morning, I can tolerate some pain\". Morning medications given per mar. Wet brief removed pt washed, [place] cath applied, functioning properly. Pt repositioned, breakfast tray set up. Denies further needs at this time, call light within reach. Spoke with Hayden Lui nurse, confirming pts appt this am. Electronically signed by Noemi Babb RN on 6/6/2022 at 10:34 AM  1322 Pt c/o 7/10 pain, prn 10mg roxicodone administered, denies further needs, call light within reach.  Electronically signed by Noemi Babb RN on 6/6/2022 at 1:23 PM

## 2022-06-06 NOTE — CARE COORDINATION
Met with patient at bedside. Patient does not want to return to [facility]. He chose [facility] for SNF. Referral called and secure voice mail left for admissions department. Awaiting response. CM/SW to continue to follow for d/c planning. Received message from [facility] that patient's insurance is not accepted. Notified patient. Patient wants to try [facility] and [facility]. Referral texted to [facility]ima [facility]. Awaiting response.

## 2022-06-06 NOTE — PLAN OF CARE
Problem: Discharge Planning  Goal: Discharge to home or other facility with appropriate resources  Outcome: Progressing  Flowsheets (Taken 6/6/2022 1035)  Discharge to home or other facility with appropriate resources: Identify barriers to discharge with patient and caregiver     Problem: Skin/Tissue Integrity  Goal: Absence of new skin breakdown  Outcome: Progressing     Problem: Pain  Goal: Verbalizes/displays adequate comfort level or baseline comfort level  Outcome: Progressing     Problem: Safety - Adult  Goal: Free from fall injury  Outcome: Progressing  Flowsheets (Taken 6/6/2022 1042)  Free From Fall Injury: Instruct family/caregiver on patient safety     Problem: ABCDS Injury Assessment  Goal: Absence of physical injury  Outcome: Progressing  Flowsheets (Taken 6/6/2022 1042)  Absence of Physical Injury: Implement safety measures based on patient assessment

## 2022-06-06 NOTE — PROGRESS NOTES
Hospitalist Progress Note      Date of Admission: 6/5/2022  Chief Complaint:    No chief complaint on file. Subjective:  No new complaints. No nausea, vomiting, chest pain, or headache      Medications:    Infusion Medications    sodium chloride       Scheduled Medications    sennosides-docusate sodium  2 tablet Oral Daily    aspirin  81 mg Oral Daily    atorvastatin  40 mg Oral Daily    clopidogrel  75 mg Oral Daily    tamsulosin  0.4 mg Oral Daily    sodium chloride flush  5-40 mL IntraVENous 2 times per day    enoxaparin  40 mg SubCUTAneous Daily    dexamethasone  4 mg IntraVENous Q6H     PRN Meds: oxyCODONE, polyethylene glycol, sodium chloride flush, sodium chloride, acetaminophen **OR** acetaminophen, trimethobenzamide, traMADol    Intake/Output Summary (Last 24 hours) at 6/6/2022 1728  Last data filed at 6/6/2022 0342  Gross per 24 hour   Intake 240 ml   Output 1000 ml   Net -760 ml     Exam:  /62   Pulse 58   Temp 97.9 °F (36.6 °C) (Oral)   Resp 18   Ht 5' 10\" (1.778 m)   Wt 176 lb 14.4 oz (80.2 kg)   SpO2 100%   BMI 25.38 kg/m²   Head: Normocephalic, atraumatic  Sclera clear  Neck JVD flat  Lungs: normal effort of breathing    Labs:   Recent Labs     06/04/22  0145 06/06/22  0555   WBC 7.2 6.6   HGB 9.7* 9.2*   HCT 29.0* 28.1*   * 102*     Recent Labs     06/04/22  0145 06/06/22  0555    135   K 4.6 4.8    102   CO2 23 17*   BUN 30* 21   CREATININE 0.67* 0.55*   CALCIUM 8.7 8.1*   AST 17  --    ALT 15  --    BILITOT 0.5  --    ALKPHOS 370*  --      No results for input(s): INR in the last 72 hours. No results for input(s): Mulu Beat in the last 72 hours. Radiology:  No orders to display     Assessment/Plan:    Cord compression secondary to metastatic prostate cancer. Currently receiving radiation. Anemia: Following with oncology. Thrombocytopenia. Following with oncology.     35 minutes total care time, >1/2 in unit/floor time and care coordination     Tamra Cueto MD ,MD

## 2022-06-06 NOTE — PROGRESS NOTES
[facility] Occupational Therapy      Date: 2022  Patient Name: Carmela Evans        MRN: [id number]  Account: [de-identified]   : 1950  (73 y.o.)  Room: [place] [address]    Chart reviewed, attempted OT at 12 for evaluation. Patient not seen 2° to:    Pt. off floor for test/procedure    . Will attempt again when able.     Electronically signed by ELISE Lo on 126 at 10:11 AM

## 2022-06-06 NOTE — CONSULTS
Palliative Care Consult Note  Patient: Marcela Lennon  Gender: male  YOB: 1950  Unit/Bed: J051/E879-51  Code Status: Full Code  Inpatient Treatment Team: Treatment Team: Attending Provider: Miles Claude, MD; Consulting Physician: Jeanie Singh MD; Consulting Physician: Sandeep Gutierrez MD; Consulting Physician: Sudha Holland MD; : Bishnu Cameron, RN; Registered Nurse: Raffy Cummings RN; Utilization Reviewer: Liza Jaramillo RN; Registered Nurse: Saman Smiley RN; Patient Care Tech: Luis Hartley  Admit Date:  6/5/2022    Chief Complaint: Goals of care and Pain    History of Presenting Illness:      Marcela Lennon is a 73 y.o. male on hospital day 0 with a history of CAD s/p stent, anemia, HTN, HLD, PAD, UTE, malignant neoplasm of bone. initially presented with dizziness on 5/27. Dizziness and orthostasis was likely secondary to antihypertensives, fentanyl and Ultram therefore medications were stopped. Oncology and palliative medicine were consulted for further pain management. Patient was transfused for anemia. On 5/28 patient complained of bilateral lower extremity weakness and numbness. MRI showed a cord compression. Neurosurgery was consulted however did not recommend surgery. Patient was then started on steroids and transfer to a tertiary care center was offered however patient declined. While here at [facility] patient did receive 2 radiation treatments with no significant improvement. Patient was agreeable to SNF and transferred to [facility] (6/03) with the plan to continue radiation 5 times per week. The following day (06/04) patient presented back to the emergency room for increased weakness and generalized back pain which was not well controlled with Percocet. Neurosurgery and hospital medicine agreed that patient should be transferred for further radiation treatments at a tertiary care facility.   Patient was then transferred to [facility]; per, patient then declined treatments and requested to be come back to [facility]. He is unable to tolerate IV meds due to hypotension and he is to start radiation today. He had been using oxycodone 5 mg po every 4 hours when transferred to [facility]. He has developed paraplegia. Notes per oncology:  1. Spinal cord compression at T4-T5 level resulting in paraplegia  2. 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 Normocytic , Normochromic anemia and thrombocytopenia most likely secondary to bone marrow involvement by metastatic prostate cancer   4. Coronary artery disease .     Recommendations : Please continue Radiation therapy during his hospital stay along with Steroids . His prognosis is poor as He has developed paraplegia. Needs SNF placement for rehab. Agree with pain management as ordered . His prognosis is poor         He feels oxycodone 5 mg is not helpful with pain at all. Oxycodone 10 mg is helpful and takes the edge off,he does not feel oversedated, \" but kind of sleepy\", he is using Tramadol 75 mg po at [facility] as he feel this helps his pain best and allows him to sleep. He is hopeful the radiation will allow him to not use any narcotics at all. Discussed changing regimen entirely to tramadol as he feels it is most helpful, he declines. He feels the tramadol makes him sleepier than the oxycodone. He hopes to be only using Tramadol upon discharge. He is fearful of taking so many drugs he will not be able to participate in Pt and not be able to get home. Positive constipation, no results with daily Miralax, no abdominal or nausea. Denies emotional, spiritual or sleep disturbance. No SOB. Appetite and weight are stable. Review of Systems:       Review of Systems   Constitutional: Negative for activity change, appetite change, chills, diaphoresis, fatigue, fever and unexpected weight change. HENT: Negative for drooling, hearing loss, mouth sores, sore throat, trouble swallowing and voice change.     Eyes: Negative for discharge and visual disturbance. Respiratory: Negative for apnea, cough, choking, chest tightness, shortness of breath, wheezing and stridor. Cardiovascular: Negative for chest pain, palpitations and leg swelling. Gastrointestinal: Positive for constipation. Negative for abdominal distention, abdominal pain, anal bleeding, blood in stool, diarrhea, nausea, rectal pain and vomiting. Genitourinary: Negative for difficulty urinating, dysuria, enuresis, frequency and hematuria. Musculoskeletal: Positive for back pain and gait problem. Negative for arthralgias, joint swelling and myalgias. Skin: Negative for color change, pallor, rash and wound. Allergic/Immunologic: Negative for food allergies and immunocompromised state. Neurological: Negative for dizziness, tremors, seizures, syncope, facial asymmetry, speech difficulty, weakness, light-headedness, numbness and headaches. Hematological: Negative for adenopathy. Does not bruise/bleed easily. Psychiatric/Behavioral: Negative for agitation, behavioral problems, confusion, decreased concentration, dysphoric mood, hallucinations, self-injury, sleep disturbance and suicidal ideas. The patient is not nervous/anxious and is not hyperactive. Physical Examination:       /62   Pulse 58   Temp 97.9 °F (36.6 °C) (Oral)   Resp 18   Ht 5' 10\" (1.778 m)   Wt 176 lb 14.4 oz (80.2 kg)   SpO2 100%   BMI 25.38 kg/m²    Physical Exam  Constitutional:       Appearance: He is well-groomed. HENT:      Head: Normocephalic and atraumatic. Jaw: There is normal jaw occlusion. Right Ear: Hearing and external ear normal.      Left Ear: Hearing and external ear normal.      Nose: Nose normal.      Mouth/Throat:      Lips: Pink. Mouth: Mucous membranes are moist.   Eyes:      General: Lids are normal. Gaze aligned appropriately. Cardiovascular:      Rate and Rhythm: Normal rate and regular rhythm.    Pulmonary:      Effort: Pulmonary effort is normal.      Breath sounds: Normal breath sounds. Abdominal:      General: Abdomen is flat. Bowel sounds are normal.      Palpations: Abdomen is soft. Musculoskeletal:      Cervical back: Full passive range of motion without pain. Skin:     General: Skin is warm and dry. Capillary Refill: Capillary refill takes less than 2 seconds. Coloration: Skin is pale. Neurological:      Mental Status: He is alert and oriented to person, place, and time. Psychiatric:         Attention and Perception: Attention normal.         Mood and Affect: Mood normal.         Speech: Speech normal.         Behavior: Behavior is cooperative. Thought Content: Thought content normal.         Allergies:       Allergies   Allergen Reactions    Morphine Swelling and Rash       Medications:      Current Facility-Administered Medications   Medication Dose Route Frequency Provider Last Rate Last Admin    aspirin EC tablet 81 mg  81 mg Oral Daily Earlie Rather, APRN - CNP   81 mg at 06/06/22 0858    atorvastatin (LIPITOR) tablet 40 mg  40 mg Oral Daily Earlie Rather, APRN - CNP   40 mg at 06/06/22 0858    clopidogrel (PLAVIX) tablet 75 mg  75 mg Oral Daily Earlie Rather, APRN - CNP   75 mg at 06/06/22 0859    tamsulosin (FLOMAX) capsule 0.4 mg  0.4 mg Oral Daily Earlie Rather, APRN - CNP   0.4 mg at 06/06/22 0858    polyethylene glycol (GLYCOLAX) packet 17 g  17 g Oral Daily PRN Earlie Rather, APRN - CNP        sodium chloride flush 0.9 % injection 5-40 mL  5-40 mL IntraVENous 2 times per day Earlie Rather, APRN - CNP   10 mL at 06/06/22 0902    sodium chloride flush 0.9 % injection 5-40 mL  5-40 mL IntraVENous PRN Earlie Rather, APRN - CNP        0.9 % sodium chloride infusion   IntraVENous PRN Earlie Rather, APRN - CNP        enoxaparin (LOVENOX) injection 40 mg  40 mg SubCUTAneous Daily Earlie Rather, APRN - CNP   40 mg at 06/06/22 0858    acetaminophen (TYLENOL) tablet 650 mg  650 mg Oral Q6H PRN Alvarez Check, APRN - CNP        Or    acetaminophen (TYLENOL) suppository 650 mg  650 mg Rectal Q6H PRN Alvarez Check, APRN - CNP        oxyCODONE (ROXICODONE) immediate release tablet 10 mg  10 mg Oral Nightly Alvarez Check, APRN - CNP   10 mg at 22 0315    oxyCODONE (ROXICODONE) immediate release tablet 5 mg  5 mg Oral TID Alvarez Check, APRN - CNP   5 mg at 22 1800    trimethobenzamide (TIGAN) injection 200 mg  200 mg IntraMUSCular Q6H PRN Alvarez Check, APRN - CNP        dexamethasone (DECADRON) injection 4 mg  4 mg IntraVENous Q6H [facility] P[facility] MD   4 mg at 22 0859    traMADol (ULTRAM) tablet 75 mg  75 mg Oral QHS PRN Miguelina Puente MD   75 mg at 22 2201       History:       PMHx:  Past Medical History:   Diagnosis Date    CAD (coronary artery disease)     Family history of colon cancer     Hyperlipidemia     Hypertension     Old MI (myocardial infarction) 2006    UTE on CPAP     PAD (peripheral artery disease) (HCC)     Prostate cancer (HCC)        PSHx:  Past Surgical History:   Procedure Laterality Date    COLONOSCOPY N/A 2019    COLORECTAL CANCER SCREENING, NOT HIGH RISK performed by Gelacio Brooks MD at [address] [place] [address]      X3    THORACENTESIS Right 2022    870 ml removed by Dr. Maria Esther Gill - diagnostics sent    THORACENTESIS Left 2022    285 ml removed by Dr Maria Esther Gill - cytology only sent       Social Hx:  Social History     Socioeconomic History    Marital status: Single     Spouse name: Not on file    Number of children: 2    Years of education: Not on file    Highest education level: Associate degree: occupational, technical, or vocational program   Occupational History    Occupation:     Tobacco Use    Smoking status: Former Smoker     Packs/day: 2.00     Years: 25.00     Pack years: 50.00     Types: Cigarettes     Quit date: 1995     Years since quittin.4  Smokeless tobacco: Never Used    Tobacco comment: RARE CIGAR FROM TIME TO TIME   Vaping Use    Vaping Use: Never used   Substance and Sexual Activity    Alcohol use: Yes     Alcohol/week: 4.0 standard drinks     Types: 2 Cans of beer, 2 Shots of liquor per week     Comment: Social, but less in the last month    Drug use: Yes     Types: Marijuana Rupakishor Lyons)    Sexual activity: Not Currently   Other Topics Concern    Not on file   Social History Narrative    Single floor home    4 steps to get into the home    Full basement does not go downstairs    Lives with girlfriend and her son    He has two children one in [place] and one in [place]    One Dog     Working smoke detectors and CO2     Social Determinants of Health     Financial Resource Strain:     Difficulty of Paying Living Expenses: Not on file   Food Insecurity:     Worried About [address] in the Last Year: Not on file    [address] in the Last Year: Not on file   Transportation Needs:     Lack of Transportation (Medical): Not on file    Lack of Transportation (Non-Medical):  Not on file   Physical Activity:     Days of Exercise per Week: Not on file    Minutes of Exercise per Session: Not on file   Stress:     Feeling of Stress : Not on file   Social Connections:     Frequency of Communication with Friends and Family: Not on file    Frequency of Social Gatherings with Friends and Family: Not on file    Attends [employer] Services: Not on file    Active Member of Clubs or Organizations: Not on file    Attends Club or Organization Meetings: Not on file    Marital Status: Not on file   Intimate Partner Violence:     Fear of Current or Ex-Partner: Not on file    Emotionally Abused: Not on file    Physically Abused: Not on file    Sexually Abused: Not on file   Housing Stability:     Unable to Pay for Housing in the Last Year: Not on file    Number of [place] in the Last Year: Not on file    Unstable Housing in the Last components found for: FOLIC,  T49  Urinalysis:   Lab Results   Component Value Date    NITRU Negative 05/26/2022    WBCUA 0-2 05/26/2022    BACTERIA Negative 05/26/2022    RBCUA 0-2 05/26/2022    BLOODU Negative 05/26/2022    SPECGRAV 1.022 05/26/2022    GLUCOSEU Negative 05/26/2022           FUNCTIONAL ADL´S:     Independent: [ x ] Eating, [   ] Dressing, [   ] Transferring, [   ] Venida Manges, [   ] Donny Lilia, [   ] Continence  Dependent   : [  ] Eating, [   ] Dressing, [   ] Transferring, [   ] Venida Manges, [   ] Donny Lilia, [   ] Continence  W. assistant : [  ] Eating, [ x  ] Dressing, [x   ] Transferring, [   x] Toileting, [ x  ] Bathing, [  x ] Continence    Radiology: Reviewed      No results found. Assessment and plan:    Back Pain rt Neoplasm  - Continue dexamethasone 6 mg IV every 6 hours  - Stop oxycodone 5 mg  - Start Oxycodone 10 mg po every 6 hours prn moderate to severe pain  - Continue Tramadol 75 mg po at HS prn      Constipation rt opiods and paraplegia  - Pericolace 2 tabs po daily, hold for loose stool      -Advance Care Planning  Discussed goals of care with patient. Explained in extensive detail nuances between full code, DNR CCA and DNR CC. Made the decision to be FULL CODE  MPOA and Living will in place      -Goals of Care Discussion:  Disease process and goals of treatment were discussed in basic terms. His goal is to optimize available comfort care measures to decrease hospitalizations and maximize function. We discussed the palliative care philosophy in light of those goals and our role in his care. We discussed all care options contingent on treatment response and QOL. Much active listening, presence, and emotional support were given.        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

## 2022-06-07 ENCOUNTER — APPOINTMENT (OUTPATIENT)
Dept: RADIATION ONCOLOGY | Age: 72
End: 2022-06-07
Payer: MEDICAID

## 2022-06-07 ENCOUNTER — CARE COORDINATION (OUTPATIENT)
Dept: CARE COORDINATION | Age: 72
End: 2022-06-07

## 2022-06-07 PROCEDURE — 6360000002 HC RX W HCPCS: Performed by: INTERNAL MEDICINE

## 2022-06-07 PROCEDURE — 6370000000 HC RX 637 (ALT 250 FOR IP): Performed by: NURSE PRACTITIONER

## 2022-06-07 PROCEDURE — 96372 THER/PROPH/DIAG INJ SC/IM: CPT

## 2022-06-07 PROCEDURE — 6360000002 HC RX W HCPCS: Performed by: NURSE PRACTITIONER

## 2022-06-07 PROCEDURE — 6370000000 HC RX 637 (ALT 250 FOR IP): Performed by: INTERNAL MEDICINE

## 2022-06-07 PROCEDURE — 96376 TX/PRO/DX INJ SAME DRUG ADON: CPT

## 2022-06-07 PROCEDURE — G0378 HOSPITAL OBSERVATION PER HR: HCPCS

## 2022-06-07 PROCEDURE — 2580000003 HC RX 258: Performed by: NURSE PRACTITIONER

## 2022-06-07 RX ADMIN — DEXAMETHASONE SODIUM PHOSPHATE 4 MG: 4 INJECTION, SOLUTION INTRAMUSCULAR; INTRAVENOUS at 15:33

## 2022-06-07 RX ADMIN — OXYCODONE 10 MG: 5 TABLET ORAL at 01:44

## 2022-06-07 RX ADMIN — TRAMADOL HYDROCHLORIDE 75 MG: 50 TABLET, COATED ORAL at 21:42

## 2022-06-07 RX ADMIN — Medication 10 ML: at 07:55

## 2022-06-07 RX ADMIN — ATORVASTATIN CALCIUM 40 MG: 40 TABLET, FILM COATED ORAL at 07:53

## 2022-06-07 RX ADMIN — DEXAMETHASONE SODIUM PHOSPHATE 4 MG: 4 INJECTION, SOLUTION INTRAMUSCULAR; INTRAVENOUS at 09:49

## 2022-06-07 RX ADMIN — Medication 10 ML: at 21:42

## 2022-06-07 RX ADMIN — OXYCODONE 10 MG: 5 TABLET ORAL at 11:38

## 2022-06-07 RX ADMIN — ENOXAPARIN SODIUM 40 MG: 100 INJECTION SUBCUTANEOUS at 07:53

## 2022-06-07 RX ADMIN — DEXAMETHASONE SODIUM PHOSPHATE 4 MG: 4 INJECTION, SOLUTION INTRAMUSCULAR; INTRAVENOUS at 04:11

## 2022-06-07 RX ADMIN — CLOPIDOGREL BISULFATE 75 MG: 75 TABLET ORAL at 07:53

## 2022-06-07 RX ADMIN — ASPIRIN 81 MG: 81 TABLET, COATED ORAL at 07:53

## 2022-06-07 RX ADMIN — SENNOSIDES AND DOCUSATE SODIUM 2 TABLET: 50; 8.6 TABLET ORAL at 07:53

## 2022-06-07 RX ADMIN — GLYCERIN 2 G: 2 SUPPOSITORY RECTAL at 14:25

## 2022-06-07 RX ADMIN — TAMSULOSIN HYDROCHLORIDE 0.4 MG: 0.4 CAPSULE ORAL at 07:53

## 2022-06-07 RX ADMIN — DEXAMETHASONE SODIUM PHOSPHATE 4 MG: 4 INJECTION, SOLUTION INTRAMUSCULAR; INTRAVENOUS at 21:42

## 2022-06-07 ASSESSMENT — ENCOUNTER SYMPTOMS
APNEA: 0
ABDOMINAL PAIN: 0
RECTAL PAIN: 0
COLOR CHANGE: 0
BACK PAIN: 1
SHORTNESS OF BREATH: 0
CHOKING: 0
TROUBLE SWALLOWING: 0
ANAL BLEEDING: 0
CONSTIPATION: 1
NAUSEA: 0
STRIDOR: 0
ABDOMINAL DISTENTION: 0
SORE THROAT: 0
DIARRHEA: 0
COUGH: 0
WHEEZING: 0
BLOOD IN STOOL: 0
CHEST TIGHTNESS: 0
VOMITING: 0
VOICE CHANGE: 0
EYE DISCHARGE: 0

## 2022-06-07 ASSESSMENT — PAIN SCALES - GENERAL
PAINLEVEL_OUTOF10: 7
PAINLEVEL_OUTOF10: 7
PAINLEVEL_OUTOF10: 0
PAINLEVEL_OUTOF10: 8

## 2022-06-07 ASSESSMENT — PAIN - FUNCTIONAL ASSESSMENT: PAIN_FUNCTIONAL_ASSESSMENT: ACTIVITIES ARE NOT PREVENTED

## 2022-06-07 ASSESSMENT — PAIN DESCRIPTION - ORIENTATION: ORIENTATION: MID

## 2022-06-07 ASSESSMENT — PAIN DESCRIPTION - LOCATION
LOCATION: BACK
LOCATION: BACK

## 2022-06-07 ASSESSMENT — PAIN DESCRIPTION - DESCRIPTORS: DESCRIPTORS: ACHING

## 2022-06-07 NOTE — PROGRESS NOTES
Assessment completed this shift. Alert and oriented x4. Strong hand grasps bilaterally. Limited movement  BLE. Lungs clear. Tramadol given for c/o back pain 7/10. In bed with call light in reach.   Electronically signed by Meghann Reynolds RN on 6/6/2022 at 10:30 PM

## 2022-06-07 NOTE — PROGRESS NOTES
Assumed care of patient at 2200. Reports that Tramadol is \"starting to work\". Aware Roxicodone is available if needed. Faint movement on request of bilat feet but unable to move legs. Assisted in repositioning legs.

## 2022-06-07 NOTE — PROGRESS NOTES
Physical Therapy Missed Treatment   Facility/Department: [facility] MED SURG V777/[id number]    NAME: Sheron Reynoso    : 1950 (73 y.o.)  MRN: [id number]    Account: [de-identified]  Gender: male      PT evaluation and treatment orders received. Chart reviewed. Pt declining at this time. Reports that he must finish his phone call with the [facility], and has just had a suppository and cannot get up at the moment. Pt requests therapy return later this PM. Nursing staff notified. Will follow and attempt PT evaluation again at earliest availability.        Jude Johnson PT, 22 at 3:07 PM

## 2022-06-07 NOTE — CARE COORDINATION
Attempted to meet with patient to clarify d/c goals. Patient just left for radiation appointment. Will meet with him when he returns. 1400-Met with patient at bedside. Patient was requesting to check into [address] rehab. Informed patient that since he was going to radiation, that it would be difficult to participate in 3 hours of therapy a day. Patient requested this CM to check into it. Spoke with Angela with [address]. Verified that since patient is going to daily radiation, that it would be difficult for patient to attend the amount of therapy required for IP rehab. Notified patient. Message to Jorden to see if there is an update with [facility] or [facility]. Awaiting response. Patient and team updated. CM/SW to continue to follow for d/c planning needs. [address] is able to accept patient. Pre-cert started 9/1/60.

## 2022-06-07 NOTE — PROGRESS NOTES
[facility] Occupational Therapy      Date: 2022  Patient Name: Nikkie Lezama        MRN: [id number]  Account: [de-identified]   : 1950  (73 y.o.)  Room: [place] [address]    Chart reviewed, attempted OT at [facility] for evaluation. Patient not seen 2° to: Other: Pt requested return for attempt to evaluate. Pt reports that he is currently on the phone and requests to return at a later time. . Will attempt again when able.     Electronically signed by ELISE Sherman on  at 3:08 PM

## 2022-06-07 NOTE — CARE COORDINATION
Ambulatory Care Coordination Note  6/7/2022  CM Risk Score: 5  Charlson 10 Year Mortality Risk Score: 100%     ACC: Jaspal Stephen RN    Summary Note:     Kevon Messer called to update me on what is happening. He says that he was readmitted on Sunday as he was unable to move his legs. He tells me that his legs are very weak and he is trying to get physical therapy to improve his strength and mobility. He adds that his pain is very controlled. He adds that he does not want to take the oxycontin and norco as he wants to \"be in control of his interactions. \"  He states that he is good to take the pain medication for sleep as this is when he feels he needs it most   He adds that he does not want to go back to [employer] and [facility]. He also asked about inpatient rehab  I have asked him to speak with the inpatient  reminding him that I do not have any authority on the inpatient side of care. Lab Results     None          Care Coordination Interventions    Program Enrollment: Complex Care  Referral from Primary Care Provider: No  Suggested Interventions and Community Resources  Disease Specific Clinic: In Process  Disease Association: In Process  Senior Services: In Process  Social Work: In Process  Zone Management Tools: In Process  Other Services or Interventions: Pharmacy referral declined (in Clinical Study) Dietician referral declined, Walk in Clinic hours and usage discussed  COPD zone tool education initiated         Goals Addressed    None         Prior to Admission medications    Medication Sig Start Date End Date Taking?  Authorizing Provider   pantoprazole sodium (PROTONIX) 40 MG PACK packet Take 40 mg by mouth every morning (before breakfast)    Historical Provider, MD   predniSONE (DELTASONE) 50 MG tablet Take 1 tablet by mouth daily for 10 days 6/2/22 6/12/22  Mima Dire, DO   polyethylene glycol (GLYCOLAX) 17 g packet Take 17 g by mouth daily as needed for Constipation 6/1/22 7/1/22  Hamp Spray, DO   clopidogrel (PLAVIX) 75 MG tablet TAKE 1 TABLET BY MOUTH  DAILY 5/5/22   JACQUELYN Childs Aas, CNP   Handicap Placard MISC by Does not apply route Exp 5 years 4/21/22   Denny Raya MD   aspirin 81 MG EC tablet Take 81 mg by mouth daily    Historical Provider, MD   atorvastatin (LIPITOR) 40 MG tablet Take 1 tablet by mouth daily 9/27/21   Adan J Holiday, DO   tamsulosin (FLOMAX) 0.4 MG capsule Take 0.4 mg by mouth daily  7/6/19   Historical Provider, MD   CPAP Machine MISC by Does not apply route Use nightly as directed. Pressure setting 5 cm H2O. Patient taking differently: 1 Device by Does not apply route Use nightly as directed.   Pressure setting 5 cm H2O. 1/9/18   Martina Nageotte, MD       Future Appointments   Date Time Provider Sana Ramos   6/7/2022 10:30 AM SCHEDULE, [facility] [place] H[facility]   6/8/2022 10:30 AM SCHEDULE, [facility] [place] [facility]   6/9/2022 10:30 AM SCHEDULE, [facility] [place] [facility]OD   6/10/2022 10:30 AM SCHEDULE, [facility] [place] HOD   6/13/2022 10:30 AM SCHEDULE, [facility] [place] HOD   9/15/2022 10:30 AM JACQUELYN Velázquez CNP [address]

## 2022-06-07 NOTE — PROGRESS NOTES
Hospitalist Progress Note      Date of Admission: 6/5/2022  Chief Complaint:    No chief complaint on file. Subjective:  No new complaints. No nausea, vomiting, chest pain, or headache. Reports constipation and is asking for a suppository. Had radiation treatment this am.       Medications:    Infusion Medications    sodium chloride       Scheduled Medications    sennosides-docusate sodium  2 tablet Oral Daily    aspirin  81 mg Oral Daily    atorvastatin  40 mg Oral Daily    clopidogrel  75 mg Oral Daily    tamsulosin  0.4 mg Oral Daily    sodium chloride flush  5-40 mL IntraVENous 2 times per day    enoxaparin  40 mg SubCUTAneous Daily    dexamethasone  4 mg IntraVENous Q6H     PRN Meds: oxyCODONE, polyethylene glycol, sodium chloride flush, sodium chloride, acetaminophen **OR** acetaminophen, trimethobenzamide, traMADol    Intake/Output Summary (Last 24 hours) at 6/7/2022 1308  Last data filed at 6/7/2022 0534  Gross per 24 hour   Intake 240 ml   Output 700 ml   Net -460 ml     Exam:  /61   Pulse 50   Temp 97.3 °F (36.3 °C) (Oral)   Resp 20   Ht 5' 10\" (1.778 m)   Wt 176 lb 14.4 oz (80.2 kg)   SpO2 99%   BMI 25.38 kg/m²   Head: Normocephalic, atraumatic  Sclera clear  Neck JVD flat  Lungs: normal effort of breathing    Labs:   Recent Labs     06/06/22  0555   WBC 6.6   HGB 9.2*   HCT 28.1*   *     Recent Labs     06/06/22  0555      K 4.8      CO2 17*   BUN 21   CREATININE 0.55*   CALCIUM 8.1*     No results for input(s): INR in the last 72 hours. No results for input(s): Deya Cano in the last 72 hours. Radiology:  No orders to display     Assessment/Plan:    Cord compression secondary to metastatic prostate cancer. Currently receiving radiation daily. Palliative care following. Likely require SNF at time of discharge    Anemia: Following with oncology. Thrombocytopenia. Following with oncology. Constipation: suppository ordered.  Continue senna    35 minutes total care time, >1/2 in unit/floor time and care coordination     Nia Del Cid APRN - NP ,MD

## 2022-06-07 NOTE — CONSULTS
Palliative Care Progress Note  Patient: Marcela Lennon  Gender: male  YOB: 1950  Unit/Bed: M668/K637-29  Code Status: Full Code  Inpatient Treatment Team: Treatment Team: Attending Provider: Miles Claude, MD; Consulting Physician: Jeanie Singh MD; Consulting Physician: Sandeep Gutierrez MD; Consulting Physician: Sudha Holland MD; : Bishnu Cameron RN; Utilization Reviewer: Liza Jaramillo RN; Registered Nurse: Raffy Cummings RN  Admit Date:  6/5/2022    Chief Complaint: Goals of care and Pain    History of Presenting Illness:      Marcela Lennon is a 73 y.o. male on hospital day 0 with a history of CAD s/p stent, anemia, HTN, HLD, PAD, UTE, malignant neoplasm of bone. initially presented with dizziness on 5/27. Dizziness and orthostasis was likely secondary to antihypertensives, fentanyl and Ultram therefore medications were stopped. Oncology and palliative medicine were consulted for further pain management. Patient was transfused for anemia. On 5/28 patient complained of bilateral lower extremity weakness and numbness. MRI showed a cord compression. Neurosurgery was consulted however did not recommend surgery. Patient was then started on steroids and transfer to a tertiary care center was offered however patient declined. While here at [facility] patient did receive 2 radiation treatments with no significant improvement. Patient was agreeable to SNF and transferred to [facility] (6/03) with the plan to continue radiation 5 times per week. The following day (06/04) patient presented back to the emergency room for increased weakness and generalized back pain which was not well controlled with Percocet. Neurosurgery and hospital medicine agreed that patient should be transferred for further radiation treatments at a tertiary care facility. Patient was then transferred to [facility] per, patient then declined treatments and requested to be come back to [facility].  He is unable to tolerate IV meds due to hypotension and he is to start radiation today. He had been using oxycodone 5 mg po every 4 hours when transferred to [facility]. He has developed paraplegia. Notes per oncology:  1. Spinal cord compression at T4-T5 level resulting in paraplegia  2. 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 Normocytic , Normochromic anemia and thrombocytopenia most likely secondary to bone marrow involvement by metastatic prostate cancer   4. Coronary artery disease .     Recommendations : Please continue Radiation therapy during his hospital stay along with Steroids . His prognosis is poor as He has developed paraplegia. Needs SNF placement for rehab. Agree with pain management as ordered . His prognosis is poor         He feels oxycodone 5 mg is not helpful with pain at all. Oxycodone 10 mg is helpful and takes the edge off,he does not feel oversedated, \" but kind of sleepy\", he is using Tramadol 75 mg po at [facility] as he feel this helps his pain best and allows him to sleep. He is hopeful the radiation will allow him to not use any narcotics at all. Discussed changing regimen entirely to tramadol as he feels it is most helpful, he declines. He feels the tramadol makes him sleepier than the oxycodone. He hopes to be only using Tramadol upon discharge. He is fearful of taking so many drugs he will not be able to participate in Pt and not be able to get home. Positive constipation, no results with daily Miralax, no abdominal or nausea. Denies emotional, spiritual or sleep disturbance. No SOB. Appetite and weight are stable. 6/7/22     Attempted to visit Patient, he was at radiation    Review of Systems:       Review of Systems   Constitutional: Negative for activity change, appetite change, chills, diaphoresis, fatigue, fever and unexpected weight change. HENT: Negative for drooling, hearing loss, mouth sores, sore throat, trouble swallowing and voice change.     Eyes: Negative for discharge and visual disturbance. Respiratory: Negative for apnea, cough, choking, chest tightness, shortness of breath, wheezing and stridor. Cardiovascular: Negative for chest pain, palpitations and leg swelling. Gastrointestinal: Positive for constipation. Negative for abdominal distention, abdominal pain, anal bleeding, blood in stool, diarrhea, nausea, rectal pain and vomiting. Genitourinary: Negative for difficulty urinating, dysuria, enuresis, frequency and hematuria. Musculoskeletal: Positive for back pain and gait problem. Negative for arthralgias, joint swelling and myalgias. Skin: Negative for color change, pallor, rash and wound. Allergic/Immunologic: Negative for food allergies and immunocompromised state. Neurological: Negative for dizziness, tremors, seizures, syncope, facial asymmetry, speech difficulty, weakness, light-headedness, numbness and headaches. Hematological: Negative for adenopathy. Does not bruise/bleed easily. Psychiatric/Behavioral: Negative for agitation, behavioral problems, confusion, decreased concentration, dysphoric mood, hallucinations, self-injury, sleep disturbance and suicidal ideas. The patient is not nervous/anxious and is not hyperactive. Physical Examination:       BP (!) 120/49   Pulse 55   Temp 97.5 °F (36.4 °C) (Oral)   Resp 16   Ht 5' 10\" (1.778 m)   Wt 176 lb 14.4 oz (80.2 kg)   SpO2 99%   BMI 25.38 kg/m²    Physical Exam  Constitutional:       Appearance: He is well-groomed. HENT:      Head: Normocephalic and atraumatic. Jaw: There is normal jaw occlusion. Right Ear: Hearing and external ear normal.      Left Ear: Hearing and external ear normal.      Nose: Nose normal.      Mouth/Throat:      Lips: Pink. Mouth: Mucous membranes are moist.   Eyes:      General: Lids are normal. Gaze aligned appropriately. Cardiovascular:      Rate and Rhythm: Normal rate and regular rhythm.    Pulmonary:      Effort: Pulmonary effort is normal.      Breath sounds: Normal breath sounds. Abdominal:      General: Abdomen is flat. Bowel sounds are normal.      Palpations: Abdomen is soft. Musculoskeletal:      Cervical back: Full passive range of motion without pain. Skin:     General: Skin is warm and dry. Capillary Refill: Capillary refill takes less than 2 seconds. Coloration: Skin is pale. Neurological:      Mental Status: He is alert and oriented to person, place, and time. Psychiatric:         Attention and Perception: Attention normal.         Mood and Affect: Mood normal.         Speech: Speech normal.         Behavior: Behavior is cooperative. Thought Content: Thought content normal.         Allergies:       Allergies   Allergen Reactions    Morphine Swelling and Rash       Medications:      Current Facility-Administered Medications   Medication Dose Route Frequency Provider Last Rate Last Admin    oxyCODONE (ROXICODONE) immediate release tablet 10 mg  10 mg Oral Q6H PRN Ade Salas, APRN - CNP   10 mg at 06/07/22 0144    sennosides-docusate sodium (SENOKOT-S) 8.6-50 MG tablet 2 tablet  2 tablet Oral Daily Ade Salas APRN - CNP   2 tablet at 06/06/22 1240    aspirin EC tablet 81 mg  81 mg Oral Daily Dasha Palomares, APRN - CNP   81 mg at 06/06/22 0858    atorvastatin (LIPITOR) tablet 40 mg  40 mg Oral Daily Dasha Palomares, APRN - CNP   40 mg at 06/06/22 0858    clopidogrel (PLAVIX) tablet 75 mg  75 mg Oral Daily Dasha Tristanly, APRN - CNP   75 mg at 06/06/22 0859    tamsulosin (FLOMAX) capsule 0.4 mg  0.4 mg Oral Daily Dasha Tristanly, APRN - CNP   0.4 mg at 06/06/22 0858    polyethylene glycol (GLYCOLAX) packet 17 g  17 g Oral Daily PRN Dasha Palomares, APRN - CNP        sodium chloride flush 0.9 % injection 5-40 mL  5-40 mL IntraVENous 2 times per day Dasha Palomares, APRN - CNP   10 mL at 06/06/22 2119    sodium chloride flush 0.9 % injection 5-40 mL  5-40 mL IntraVENous PRN Eileen Velez Brayden, APRN - CNP        0.9 % sodium chloride infusion   IntraVENous PRN Leann Mock, APRN - CNP        enoxaparin (LOVENOX) injection 40 mg  40 mg SubCUTAneous Daily Leann Mock, APRN - CNP   40 mg at 06/06/22 0858    acetaminophen (TYLENOL) tablet 650 mg  650 mg Oral Q6H PRN Leann Mock, APRN - CNP        Or    acetaminophen (TYLENOL) suppository 650 mg  650 mg Rectal Q6H PRN Leann Mock, APRN - CNP        trimethobenzamide (TIGAN) injection 200 mg  200 mg IntraMUSCular Q6H PRN Leann Mock, APRN - CNP        dexamethasone (DECADRON) injection 4 mg  4 mg IntraVENous Q6H [facility] Rol[facility]   4 mg at 06/07/22 0411    traMADol (ULTRAM) tablet 75 mg  75 mg Oral QHS PRN Maranda Menchaca MD   75 mg at 06/06/22 2132       History:       PMHx:  Past Medical History:   Diagnosis Date    CAD (coronary artery disease)     Family history of colon cancer     Hyperlipidemia     Hypertension     Old MI (myocardial infarction) 02/03/2006    UTE on CPAP     PAD (peripheral artery disease) (HCC)     Prostate cancer (HCC)        PSHx:  Past Surgical History:   Procedure Laterality Date    COLONOSCOPY N/A 08/19/2019    COLORECTAL CANCER SCREENING, NOT HIGH RISK performed by Marilia Guerrero MD at [address] [place] [address]      X3    THORACENTESIS Right 03/07/2022    870 ml removed by Dr. Diana Campuzano - diagnostics sent    THORACENTESIS Left 03/08/2022    285 ml removed by Dr Diana Campuzano - cytology only sent       Social Hx:  Social History     Socioeconomic History    Marital status: Single     Spouse name: Not on file    Number of children: 2    Years of education: Not on file    Highest education level: Associate degree: occupational, technical, or vocational program   Occupational History    Occupation:     Tobacco Use    Smoking status: Former Smoker     Packs/day: 2.00     Years: 25.00     Pack years: 50.00     Types: Cigarettes     Quit date: 1/1/1995 Years since quittin.4    Smokeless tobacco: Never Used    Tobacco comment: RARE CIGAR FROM TIME TO TIME   Vaping Use    Vaping Use: Never used   Substance and Sexual Activity    Alcohol use: Yes     Alcohol/week: 4.0 standard drinks     Types: 2 Cans of beer, 2 Shots of liquor per week     Comment: Social, but less in the last month    Drug use: Yes     Types: Marijuana Thania Lauren)    Sexual activity: Not Currently   Other Topics Concern    Not on file   Social History Narrative    Single floor home    4 steps to get into the home    Full basement does not go downstairs    Lives with girlfriend and her son    He has two children one in [place] and one in [place]    One Dog     Working smoke detectors and CO2     Social Determinants of Health     Financial Resource Strain:     Difficulty of Paying Living Expenses: Not on file   Food Insecurity:     Worried About [address] in the Last Year: Not on file    [address] in the Last Year: Not on file   Transportation Needs:     Lack of Transportation (Medical): Not on file    Lack of Transportation (Non-Medical):  Not on file   Physical Activity:     Days of Exercise per Week: Not on file    Minutes of Exercise per Session: Not on file   Stress:     Feeling of Stress : Not on file   Social Connections:     Frequency of Communication with Friends and Family: Not on file    Frequency of Social Gatherings with Friends and Family: Not on file    Attends Yarsanism Services: Not on file    Active Member of Clubs or Organizations: Not on file    Attends Club or Organization Meetings: Not on file    Marital Status: Not on file   Intimate Partner Violence:     Fear of Current or Ex-Partner: Not on file    Emotionally Abused: Not on file    Physically Abused: Not on file    Sexually Abused: Not on file   Housing Stability:     Unable to Pay for Housing in the Last Year: Not on file    Number of [place] in the Last Year: Not on file  Unstable Housing in the Last Year: Not on file       Family Hx:  Family History   Problem Relation Age of Onset    Cancer Mother 76        BONE    Cancer Father 64        COLON    Colon Cancer Father     Cancer Brother 43        THYROID    Diabetes Maternal Grandmother        LABS: Reviewed     CBC:  Lab Results   Component Value Date    WBC 6.6 06/06/2022    RBC 3.06 06/06/2022    HGB 9.2 06/06/2022    HCT 28.1 06/06/2022    MCV 92.0 06/06/2022    MCH 29.9 06/06/2022    MCHC 32.6 06/06/2022    RDW 20.9 06/06/2022     06/06/2022     CBC with Differential:   Lab Results   Component Value Date    WBC 6.6 06/06/2022    RBC 3.06 06/06/2022    HGB 9.2 06/06/2022    HCT 28.1 06/06/2022     06/06/2022    MCV 92.0 06/06/2022    MCH 29.9 06/06/2022    MCHC 32.6 06/06/2022    RDW 20.9 06/06/2022    NRBC 1 06/06/2022    BANDSPCT 6 06/06/2022    METASPCT 1 06/04/2022    LYMPHOPCT 14.0 06/06/2022    PROMYELOPCT 1 06/04/2022    MONOPCT 1.9 06/06/2022    MYELOPCT 3 06/04/2022    BASOPCT 0.1 06/06/2022    MONOSABS 0.1 06/06/2022    LYMPHSABS 0.9 06/06/2022    EOSABS 0.0 06/06/2022    BASOSABS 0.0 06/06/2022     CMP:    Lab Results   Component Value Date     06/06/2022    K 4.8 06/06/2022     06/06/2022    CO2 17 06/06/2022    BUN 21 06/06/2022    CREATININE 0.55 06/06/2022    GFRAA >60.0 06/06/2022    LABGLOM >60.0 06/06/2022    GLUCOSE 132 06/06/2022    PROT 6.1 06/04/2022    LABALBU 3.2 06/04/2022    CALCIUM 8.1 06/06/2022    BILITOT 0.5 06/04/2022    ALKPHOS 370 06/04/2022    AST 17 06/04/2022    ALT 15 06/04/2022     BMP:    Lab Results   Component Value Date     06/06/2022    K 4.8 06/06/2022     06/06/2022    CO2 17 06/06/2022    BUN 21 06/06/2022    LABALBU 3.2 06/04/2022    CREATININE 0.55 06/06/2022    CALCIUM 8.1 06/06/2022    GFRAA >60.0 06/06/2022    LABGLOM >60.0 06/06/2022    GLUCOSE 132 06/06/2022     TSH:   Lab Results   Component Value Date    TSH 1.700 05/18/2021 Vitamin B12 and Folate: No components found for: FOLIC,  N23  Urinalysis:   Lab Results   Component Value Date    NITRU Negative 05/26/2022    WBCUA 0-2 05/26/2022    BACTERIA Negative 05/26/2022    RBCUA 0-2 05/26/2022    BLOODU Negative 05/26/2022    SPECGRAV 1.022 05/26/2022    GLUCOSEU Negative 05/26/2022           FUNCTIONAL ADL´S:     Independent: [ x ] Eating, [   ] Dressing, [   ] Transferring, [   ] Rudy Rashida, [   ] Lisa Jamari, [   ] Continence  Dependent   : [  ] Eating, [   ] Dressing, [   ] Transferring, [   ] Rudy Rashida, [   ] Lisa Jamari, [   ] Continence  W. assistant : [  ] Eating, [ x  ] Dressing, [x   ] Transferring, [   x] Toileting, [ x  ] Bathing, [  x ] Continence    Radiology: Reviewed      No results found. Assessment and plan:    Back Pain rt Neoplasm  - Continue dexamethasone 6 mg IV every 6 hours  - Stop oxycodone 5 mg  - Start Oxycodone 10 mg po every 6 hours prn moderate to severe pain  - Continue Tramadol 75 mg po at HS prn      Constipation rt opiods and paraplegia  - Pericolace 2 tabs po daily, hold for loose stool      -Advance Care Planning  Discussed goals of care with patient. Explained in extensive detail nuances between full code, DNR CCA and DNR CC. Made the decision to be FULL CODE  MPOA and Living will in place      -Goals of Care Discussion:  Disease process and goals of treatment were discussed in basic terms. His goal is to optimize available comfort care measures to decrease hospitalizations and maximize function. We discussed the palliative care philosophy in light of those goals and our role in his care. We discussed all care options contingent on treatment response and QOL. Much active listening, presence, and emotional support were given.        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

## 2022-06-07 NOTE — PROGRESS NOTES
Shift assessment complete, vss, lung sounds clear, bs active x4, espinosa draining/functioning properly. pt denies pain, states \" I will hold off on latia until later\". No distress noted. Medications given per mar, leonard well. Assisted pt with repositioning/pillows left side, Denies further needs at this time, call light within reach. Electronically signed by Mohan Dey RN on 6/7/2022 at 11:46 AM  Pt arrived back on unit from radiation appt, states he is in pain 7/10, Prn medication given per mar, lights dim per pt request to rest. Electronically signed by Mohan Dey RN on 6/7/2022 at 11:48 AM   1700Pt given glycerin suppository, no results. Dr. Verna Klein notified, soap Charlie Boss ordered. Pt given soap sudz enema,results small hard bm an noticeable blood, pt states \"I still feel like im full\". Washed / cleaned and repositioned , call light within reach. Electronically signed by Mohan Dey RN on 6/7/2022 at 5:43 PM   Dr. Verna Klein aware of results.  Electronically signed by Mohan Dey RN on 6/7/2022 at 5:56 PM

## 2022-06-08 ENCOUNTER — APPOINTMENT (OUTPATIENT)
Dept: RADIATION ONCOLOGY | Age: 72
End: 2022-06-08
Payer: MEDICAID

## 2022-06-08 PROCEDURE — 6370000000 HC RX 637 (ALT 250 FOR IP): Performed by: NURSE PRACTITIONER

## 2022-06-08 PROCEDURE — 96376 TX/PRO/DX INJ SAME DRUG ADON: CPT

## 2022-06-08 PROCEDURE — G0378 HOSPITAL OBSERVATION PER HR: HCPCS

## 2022-06-08 PROCEDURE — 97167 OT EVAL HIGH COMPLEX 60 MIN: CPT

## 2022-06-08 PROCEDURE — 96372 THER/PROPH/DIAG INJ SC/IM: CPT

## 2022-06-08 PROCEDURE — 6360000002 HC RX W HCPCS: Performed by: NURSE PRACTITIONER

## 2022-06-08 PROCEDURE — 6360000002 HC RX W HCPCS: Performed by: INTERNAL MEDICINE

## 2022-06-08 PROCEDURE — 2580000003 HC RX 258: Performed by: NURSE PRACTITIONER

## 2022-06-08 PROCEDURE — 6370000000 HC RX 637 (ALT 250 FOR IP): Performed by: INTERNAL MEDICINE

## 2022-06-08 PROCEDURE — 97163 PT EVAL HIGH COMPLEX 45 MIN: CPT

## 2022-06-08 RX ORDER — SENNA AND DOCUSATE SODIUM 50; 8.6 MG/1; MG/1
2 TABLET, FILM COATED ORAL 2 TIMES DAILY
Status: DISCONTINUED | OUTPATIENT
Start: 2022-06-08 | End: 2022-06-10 | Stop reason: HOSPADM

## 2022-06-08 RX ADMIN — DEXAMETHASONE SODIUM PHOSPHATE 4 MG: 4 INJECTION, SOLUTION INTRAMUSCULAR; INTRAVENOUS at 11:08

## 2022-06-08 RX ADMIN — DEXAMETHASONE SODIUM PHOSPHATE 4 MG: 4 INJECTION, SOLUTION INTRAMUSCULAR; INTRAVENOUS at 21:37

## 2022-06-08 RX ADMIN — OXYCODONE 10 MG: 5 TABLET ORAL at 01:01

## 2022-06-08 RX ADMIN — TRAMADOL HYDROCHLORIDE 75 MG: 50 TABLET, COATED ORAL at 21:37

## 2022-06-08 RX ADMIN — CLOPIDOGREL BISULFATE 75 MG: 75 TABLET ORAL at 08:38

## 2022-06-08 RX ADMIN — OXYCODONE 10 MG: 5 TABLET ORAL at 16:35

## 2022-06-08 RX ADMIN — ENOXAPARIN SODIUM 40 MG: 100 INJECTION SUBCUTANEOUS at 08:38

## 2022-06-08 RX ADMIN — DEXAMETHASONE SODIUM PHOSPHATE 4 MG: 4 INJECTION, SOLUTION INTRAMUSCULAR; INTRAVENOUS at 05:30

## 2022-06-08 RX ADMIN — ASPIRIN 81 MG: 81 TABLET, COATED ORAL at 08:38

## 2022-06-08 RX ADMIN — SENNOSIDES AND DOCUSATE SODIUM 2 TABLET: 50; 8.6 TABLET ORAL at 08:38

## 2022-06-08 RX ADMIN — POLYETHYLENE GLYCOL 3350 17 G: 17 POWDER, FOR SOLUTION ORAL at 13:37

## 2022-06-08 RX ADMIN — Medication 10 ML: at 21:38

## 2022-06-08 RX ADMIN — ATORVASTATIN CALCIUM 40 MG: 40 TABLET, FILM COATED ORAL at 08:38

## 2022-06-08 RX ADMIN — OXYCODONE 10 MG: 5 TABLET ORAL at 06:47

## 2022-06-08 RX ADMIN — DEXAMETHASONE SODIUM PHOSPHATE 4 MG: 4 INJECTION, SOLUTION INTRAMUSCULAR; INTRAVENOUS at 16:30

## 2022-06-08 RX ADMIN — SENNOSIDES AND DOCUSATE SODIUM 2 TABLET: 50; 8.6 TABLET ORAL at 21:37

## 2022-06-08 RX ADMIN — Medication 10 ML: at 08:38

## 2022-06-08 RX ADMIN — TAMSULOSIN HYDROCHLORIDE 0.4 MG: 0.4 CAPSULE ORAL at 08:38

## 2022-06-08 ASSESSMENT — PAIN DESCRIPTION - DESCRIPTORS
DESCRIPTORS: ACHING

## 2022-06-08 ASSESSMENT — PAIN SCALES - GENERAL
PAINLEVEL_OUTOF10: 6
PAINLEVEL_OUTOF10: 6
PAINLEVEL_OUTOF10: 8
PAINLEVEL_OUTOF10: 7
PAINLEVEL_OUTOF10: 8

## 2022-06-08 ASSESSMENT — PAIN DESCRIPTION - ORIENTATION
ORIENTATION: MID

## 2022-06-08 ASSESSMENT — PAIN DESCRIPTION - LOCATION
LOCATION: BACK

## 2022-06-08 NOTE — ONCOLOGY
Hematology/Oncology  Attending Progress Note        CHIEF COMPLAINT/HPI:  Pain is controlled. Some constipation. REVIEW OF SYSTEMS:    Unremarkable except for symptoms mentioned in HPI. Current Inpatient Medications:    Current Facility-Administered Medications: sennosides-docusate sodium (SENOKOT-S) 8.6-50 MG tablet 2 tablet, 2 tablet, Oral, BID  oxyCODONE (ROXICODONE) immediate release tablet 10 mg, 10 mg, Oral, Q6H PRN  aspirin EC tablet 81 mg, 81 mg, Oral, Daily  atorvastatin (LIPITOR) tablet 40 mg, 40 mg, Oral, Daily  clopidogrel (PLAVIX) tablet 75 mg, 75 mg, Oral, Daily  tamsulosin (FLOMAX) capsule 0.4 mg, 0.4 mg, Oral, Daily  polyethylene glycol (GLYCOLAX) packet 17 g, 17 g, Oral, Daily PRN  sodium chloride flush 0.9 % injection 5-40 mL, 5-40 mL, IntraVENous, 2 times per day  sodium chloride flush 0.9 % injection 5-40 mL, 5-40 mL, IntraVENous, PRN  0.9 % sodium chloride infusion, , IntraVENous, PRN  enoxaparin (LOVENOX) injection 40 mg, 40 mg, SubCUTAneous, Daily  acetaminophen (TYLENOL) tablet 650 mg, 650 mg, Oral, Q6H PRN **OR** acetaminophen (TYLENOL) suppository 650 mg, 650 mg, Rectal, Q6H PRN  trimethobenzamide (TIGAN) injection 200 mg, 200 mg, IntraMUSCular, Q6H PRN  dexamethasone (DECADRON) injection 4 mg, 4 mg, IntraVENous, Q6H  traMADol (ULTRAM) tablet 75 mg, 75 mg, Oral, QHS PRN    PHYSICAL EXAM:    VITALS:  BP (!) 153/71   Pulse 67   Temp 97.7 °F (36.5 °C) (Oral)   Resp 18   Ht 5' 10\" (1.778 m)   Wt 176 lb 14.4 oz (80.2 kg)   SpO2 100%   BMI 25.38 kg/m²   INTAKE/OUTPUT:    Intake/Output Summary (Last 24 hours) at 6/8/2022 0824  Last data filed at 6/8/2022 6607  Gross per 24 hour   Intake 240 ml   Output 2400 ml   Net -2160 ml     CONSTITUTIONAL:  awake, alert, cooperative, no apparent distress, and appears stated age  ENT:  Normocephalic, without obvious abnormality.   NECK:  Supple, symmetrical, trachea midline, no adenopathy, thyroid symmetric, not enlarged and no tenderness, skin normal  LUNGS:  No increased work of breathing, good air exchange, clear to auscultation bilaterally, no crackles or wheezing  CARDIOVASCULAR:  Normal apical impulse, regular rate and rhythm, normal S1 and S2, no S3 or S4, and no murmur noted  ABDOMEN:  No scars, normal bowel sounds, soft, non-distended, non-tender, no masses palpated, no hepatosplenomegally  MUSCULOSKELETAL:  There is no redness, warmth, or swelling of the joints. NEUROLOGIC:  Awake, alert, oriented to name, place and time. Cranial nerves II-XII are grossly intact. Sensory is intact. Paraplegic below the waste with minor movement on left.   SKIN:  no bruising or bleeding and no rashes    DATA:      PT/INR:  No results found for: PTINR  PTT:    Lab Results   Component Value Date    APTT 35.4 05/28/2022     CMP:    Lab Results   Component Value Date     06/06/2022    K 4.8 06/06/2022     06/06/2022    CO2 17 06/06/2022    BUN 21 06/06/2022    PROT 6.1 06/04/2022     Magnesium:    Lab Results   Component Value Date    MG 2.2 05/26/2022     Phosphorus:  No components found for: PO4  Calcium:  No results found for: CA  CBC:    Lab Results   Component Value Date    WBC 6.6 06/06/2022    RBC 3.06 06/06/2022    HGB 9.2 06/06/2022    HCT 28.1 06/06/2022    MCV 92.0 06/06/2022    RDW 20.9 06/06/2022     06/06/2022     DIFF:    Lab Results   Component Value Date    MCV 92.0 06/06/2022    RDW 20.9 06/06/2022      LDH:  No results found for: LDH  Uric Acid:  No components found for: URIC    CBC with Differential:    Lab Results   Component Value Date    WBC 6.6 06/06/2022    RBC 3.06 06/06/2022    HGB 9.2 06/06/2022    HCT 28.1 06/06/2022     06/06/2022    MCV 92.0 06/06/2022    MCH 29.9 06/06/2022    MCHC 32.6 06/06/2022    RDW 20.9 06/06/2022    NRBC 1 06/06/2022    BANDSPCT 6 06/06/2022    METASPCT 1 06/04/2022    LYMPHOPCT 14.0 06/06/2022    PROMYELOPCT 1 06/04/2022    MONOPCT 1.9 06/06/2022    MYELOPCT 3 06/04/2022    BASOPCT 0.1 06/06/2022    MONOSABS 0.1 06/06/2022    LYMPHSABS 0.9 06/06/2022    EOSABS 0.0 06/06/2022    BASOSABS 0.0 06/06/2022     CMP:    Lab Results   Component Value Date     06/06/2022    K 4.8 06/06/2022     06/06/2022    CO2 17 06/06/2022    BUN 21 06/06/2022    CREATININE 0.55 06/06/2022    GFRAA >60.0 06/06/2022    LABGLOM >60.0 06/06/2022    GLUCOSE 132 06/06/2022    PROT 6.1 06/04/2022    LABALBU 3.2 06/04/2022    CALCIUM 8.1 06/06/2022    BILITOT 0.5 06/04/2022    ALKPHOS 370 06/04/2022    AST 17 06/04/2022    ALT 15 06/04/2022     LDH:  No results found for: LDH  Warfarin PT/INR:  No components found for: PTPATWAR, PTINRWAR  PTT:    Lab Results   Component Value Date    APTT 35.4 05/28/2022   [APTT}  VITAMIN B12: No components found for: B12  FOLATE:    Lab Results   Component Value Date    FOLATE 5.7 05/27/2022     IRON:    Lab Results   Component Value Date    IRON 108 05/27/2022     Iron Saturation:  No components found for: PERCENTFE  TIBC:    Lab Results   Component Value Date    TIBC 229 05/27/2022     PSA:   Lab Results   Component Value Date    .60 05/06/2022     RADIOLOGY RESULTS:  MRI THORACIC SPINE W WO CONTRAST    Result Date: 5/28/2022  MRI THORACIC SPINE W WO CONTRAST, MRI LUMBAR SPINE W WO CONTRAST HISTORY:   back pain  r/o spinal cord compression; pt has prostate cancer with bone mets  - TECHNIQUE: MR  thoracic and lumbar spine routine protocols without and with contrast. MR Contrast:  MultiHance Contrast Dose:  20 cc Route of Administration:  IV COMPARISON: None. RESULT: THORACIC SPINE: Counting reference:  Lumbosacral junction. For the purposes of this report, L4-5 is considered the level of the iliac crest and there are 5 lumbar-type vertebrae. Anatomic Variants: None. Alignment:  Thoracic kyphosis is maintained. Vertebral body heights and disc spaces are within normal limits.  Bone marrow signal/CORD: Diffuse heterogeneous marrow replacement secondary to osseous metastasis. At T4, there is enhancing expansile soft tissue mass involving the posterior left rib, left pedicle and lamina, right pedicle and proximal lamina with infiltration of the spinal canal. There is resultant rightward cord displacement and compression. No cord signal abnormality at this level. No suspicious parenchymal or leptomeningeal enhancement. Thoracic soft tissues: The thoracic paraspinal soft tissues are within normal limits. Thoracic canal and foramina:  Severe canal stenosis and cord compression at T4-T5 secondary to an infiltrative soft tissue mass otherwise, no high-grade canal stenosis or neural foraminal narrowing. LUMBAR SPINE: Counting reference:  Lumbosacral junction. For the purposes of this report, L4-5 is considered the level of the iliac crest and there are 5 lumbar-type vertebrae. Anatomic Variants: None. Alignment:  Lumbar lordosis is maintained. Vertebral body heights and disc spaces are within normal limits. Bone marrow signal/fracture:  Diffuse heterogeneous marrow replacement, compatible with metastatic disease. No evidence of prior fracture. Conus: The conus terminates at L1 and is within normal limits of morphology and signal. Normal course and caliber of the descending cauda equina nerve roots. No evidence of abnormal intradural enhancement following contrast administration. Lumbar soft tissues: The lumbar paraspinal soft tissues are within normal limits. T12-L1:  Canal and foramina are patent. L1-L2:  Canal and foramina are patent. L2-L3:  Circumferential disc bulge and mild facet degenerative changes without canal stenosis. Neural foramen are patent. L3-L4:  Disc bulge slightly asymmetric to the left, ligamentum flavum hypertrophy and facet degenerative changes with mild canal stenosis. Mild bilateral neural foraminal narrowing. L4-L5:  Facet degenerative changes and small disc bulge flattening the ventral thecal sac. No significant canal stenosis.  Mild bilateral spinal canal. There is resultant rightward cord displacement and compression. No cord signal abnormality at this level. No suspicious parenchymal or leptomeningeal enhancement. Thoracic soft tissues: The thoracic paraspinal soft tissues are within normal limits. Thoracic canal and foramina:  Severe canal stenosis and cord compression at T4-T5 secondary to an infiltrative soft tissue mass otherwise, no high-grade canal stenosis or neural foraminal narrowing. LUMBAR SPINE: Counting reference:  Lumbosacral junction. For the purposes of this report, L4-5 is considered the level of the iliac crest and there are 5 lumbar-type vertebrae. Anatomic Variants: None. Alignment:  Lumbar lordosis is maintained. Vertebral body heights and disc spaces are within normal limits. Bone marrow signal/fracture:  Diffuse heterogeneous marrow replacement, compatible with metastatic disease. No evidence of prior fracture. Conus: The conus terminates at L1 and is within normal limits of morphology and signal. Normal course and caliber of the descending cauda equina nerve roots. No evidence of abnormal intradural enhancement following contrast administration. Lumbar soft tissues: The lumbar paraspinal soft tissues are within normal limits. T12-L1:  Canal and foramina are patent. L1-L2:  Canal and foramina are patent. L2-L3:  Circumferential disc bulge and mild facet degenerative changes without canal stenosis. Neural foramen are patent. L3-L4:  Disc bulge slightly asymmetric to the left, ligamentum flavum hypertrophy and facet degenerative changes with mild canal stenosis. Mild bilateral neural foraminal narrowing. L4-L5:  Facet degenerative changes and small disc bulge flattening the ventral thecal sac. No significant canal stenosis. Mild bilateral neural foraminal narrowing. L5-S1:  Disc bulge asymmetric to the right, facet degenerative change and ligamentum flavum hypertrophy with flattening ventral thecal sac.  Right-sided subarticular recess narrowing. Moderate right and mild left neural foraminal narrowing. Sacrum and iliac wings:  Extensive heterogeneous marrow replacement of the sacroiliac loops, secondary to metastatic disease. Thoracic spine: 1. Extensive osseous metastatic disease with soft tissue component at left greater than right T4-T5 with infiltration of the spinal canal and cord compression at this level. 2.No abnormal cord signal, parenchymal or leptomeningeal enhancement. Lumbar spine: 1. Extensive osseous metastatic disease. 2.No abnormal cord signal, parenchymal edema and enhancement. 3.Multilevel degenerative change without high-grade canal stenosis or neural foraminal narrowing. COMMUNICATION:  Communicated with: Rohith Torrez RN on 5/28/2022 at 3:48 PM.      XR CHEST PORTABLE    Result Date: 5/27/2022  EXAMINATION: XR CHEST PORTABLE CLINICAL HISTORY: FATIGUE COMPARISONS: MARCH 26, 2022 FINDINGS: Osseous structures are intact. Cardiopericardial silhouette is normal. Pulmonary vasculature is normal. Ill-defined area increased opacity right lower lung. Blunting left costophrenic angle with increased opacity left lung base. BILATERAL LOWER LUNG ATELECTASIS/PNEUMONIA WITH LEFT PLEURAL EFFUSION    ASSESSMENT AND PLAN:  Androgen independent prostate cancer with diffuse bone metastasis and cord compression resulting in pain and paraplegia. ROM very limited in lower extremities and not much improvement with radiation. Continue pain medications (pallitive care) and Decadron. Can change to Decadron 2mg PO TID at discharge. Will discharge to NH until XRT completed next week, then back to rehab at [facility]. Patient can still respond to prostate cancer treatment, but life expectancy likely about 1 year. Fu with Dr. Martha Toledo in 2 weeks.           Electronically signed by Yosvany Alford DO on 6/8/22 at 8:24 AM EDT

## 2022-06-08 NOTE — PROGRESS NOTES
Assessment complete. VSS. Patient is alert and oriented X4. Denies any SOB, N/V or dizziness. Is complaining of pain; Tramadol and Blessing given per order. Denies any other needs at this time and call light is within reach.   Electronically signed by Jalil Barbosa RN on 6/7/2022 at 9:42 PM

## 2022-06-08 NOTE — PROGRESS NOTES
Pt alert/orientated, assessment complete, vss, denies sob/pain/nausea, no distress noted. Repositioned for comfort/ breakfast. Medications given per mar, leonard well. Denies further needs, call light within reach. Electronically signed by Radha Mcmillan RN on 6/8/2022 at 10:21 AM  Pt p/u by [facility] for radiation appt. Electronically signed by Radha Mcmillan RN on 6/8/2022 at 10:21 AM   1530 Complete bed change, external catheter leaked/bm. Pt washed/dressed, repositioned. Denies further needs, call light within reach.  Electronically signed by Radha Mcmillan RN on 6/8/2022 at 6:54 PM

## 2022-06-08 NOTE — PROGRESS NOTES
[facility] OCCUPATIONAL THERAPY EVALUATION - ACUTE     NAME: Nico Varma  : 1950 (73 y.o.)  MRN: [id number]  CODE STATUS: Full Code  Room: B7/E133-58    Date of Service: 2022    Patient Diagnosis(es): Back pain [M54.9]   Patient Active Problem List    Diagnosis Date Noted    Paroxysmal atrial fibrillation ([address]     PAD (peripheral artery disease) ([address]     B12 deficiency     Back pain 2022    Secondary malignant neoplasm of thoracic vertebral column ([address] 2022    Anemia 2022    Other emphysema ([address]     Abnormal stress test 2022    Pleural effusion 2022    Acute occlusion of artery of lower extremity ([address] 2021    History of colon polyps     Polyp of cecum     Family history of colon cancer     S/P radiation therapy 2019    Drug-induced erectile dysfunction 2019    Primary insomnia 2018    UTE on CPAP 2018    Prediabetes 2016    Hypertension     Prostate cancer ([address]     Hyperlipidemia     Coronary artery disease involving native coronary artery of native heart without angina pectoris 2006    Old MI (myocardial infarction) 2006        Past Medical History:   Diagnosis Date    CAD (coronary artery disease)     Family history of colon cancer     Hyperlipidemia     Hypertension     Old MI (myocardial infarction) 2006    UTE on CPAP     PAD (peripheral artery disease) (HCC)     Prostate cancer ([address]      Past Surgical History:   Procedure Laterality Date    COLONOSCOPY N/A 2019    COLORECTAL CANCER SCREENING, NOT HIGH RISK performed by Dimitris Sheehan MD at [address] [place] [address]      X3    THORACENTESIS Right 2022    870 ml removed by Dr. Isma Christianson - diagnostics sent    THORACENTESIS Left 2022    285 ml removed by Dr Isma Christianson - cytology only sent        Restrictions  Restrictions/Precautions: Fall Risk              Safety Devices: Safety Devices  Type of Devices: All fall risk precautions in place;Call light within reach; Left in bed     Patient's date of birth confirmed: Yes    General:       Subjective:\"I want to get better. \"          Pain at start of treatment: Yes: 5/10    Pain at end of treatment: Yes: 5/10    Location: back  Nursing notified: Declined  RN:   Intervention: Repositioned    Prior Level of Function:  Social/Functional History  Lives With: Significant other Kat Calderon)  Type of Home: [address] [place], [place] [address],[address]: Two level,Able to Live on Main level with bedroom/bathroom,Performs ADL's on one level  Home Access: Stairs to enter with rails  Entrance Stairs - Number of Steps: plans to get ramp through [employer] Shower/Tub: Walk-in shower  Bathroom Equipment: Grab bars in [address] [place]:  (adjustable eSight bed)  ADL Assistance: [address]: Needs assistance  Homemaking Responsibilities: No  Ambulation Assistance: Independent  Transfer Assistance: Independent  Active : No  Patient's  Info: spouse does all driving now  Additional Comments: Pain in back ~1 year ago    OBJECTIVE:     Orientation Status:  Orientation  Orientation Level: Oriented X4    Observation:  Observation/Palpation  Posture: Fair  Observation: catheter in place    Cognition Status:  Cognition  Overall Cognitive Status: WFL    Perception Status:  Perception  Overall Perceptual Status: WFL    Vision and Hearing Status:  Vision  Vision Exceptions: Wears glasses for reading  Hearing  Hearing: Within functional limits   Vision - Basic Assessment  Prior Vision: Wears glasses only for reading  Visual History: No significant visual history  Patient Visual Report: No visual complaint reported. Visual Field Cut: No  Oculo Motor Control:  WNL    GROSS ASSESSMENT AROM/PROM:  AROM: Within functional limits       ROM:   LUE AROM (degrees)  LUE AROM : WFL  Left Hand AROM (degrees)  Left Hand AROM: WFL  RUE AROM (degrees)  RUE AROM : WFL  Right Hand AROM (degrees)  Right Hand AROM: WFL    UE STRENGTH:  Strength: Within functional limits    UE COORDINATION:  Coordination: Within functional limits    UE TONE:  Tone: Normal    UE SENSATION:  Sensation: Impaired (bilateral LE diminished)    Hand Dominance:  Hand Dominance  Hand Dominance: Right    ADL Status:  ADL  Feeding: Unable to assess(comment)  Grooming: Setup; Increased time to complete  UE Bathing: Moderate assistance; Increased time to complete  LE Bathing: Dependent/Total  UE Dressing: Moderate assistance; Increased time to complete  LE Dressing: Dependent/Total  Toileting: Unable to assess(comment)    Functional Mobility:  Patient ambulated NT due to Pt unable to ambulate at this time. Bed Mobility  Bed mobility  Rolling to Left: Modified independent  Rolling to Right: Modified independent  Supine to Sit: Maximum assistance  Sit to Supine: Maximum assistance  Scooting:  Moderate assistance;2 Person assistance    Seated and Standing Balance:  Balance  Sitting:  (Mod A)  Standing:  (unable to at this time)    Functional Endurance:  Activity Tolerance  Activity Tolerance: Patient limited by fatigue;Treatment limited secondary to medical complications (free text)    D/C Recommendations:  OT D/C RECOMMENDATIONS  REQUIRES OT FOLLOW-UP: Yes    Equipment Recommendations:       OT Education:        OT Follow Up:  OT D/C RECOMMENDATIONS  REQUIRES OT FOLLOW-UP: Yes       Assessment/Discharge Disposition:     Performance deficits / Impairments: Decreased functional mobility ,Decreased balance,Decreased ADL status,Decreased posture,Decreased endurance,Decreased strength,Decreased high-level IADLs  Prognosis: Fair  Discharge Recommendations: Continue to assess pending progress  Decision Making: High Complexity  History: 6 complexities  Exam: 7 deficits  Assistance / Modification: Max A    AMPAC (Six Click) Self care Score    How much help for putting on and taking off regular lower body clothing?: Total  How much help for Bathing?: A Lot  How much help for Toileting?: Total  How much help for putting on and taking off regular upper body clothing?: A Lot  How much help for taking care of personal grooming?: None  How much help for eating meals?: None  AM-[facility] Inpatient Daily Activity Raw Score: 14  AM-PAC Inpatient ADL T-Scale Score : 33.39  ADL Inpatient CMS 0-100% Score: 59.67    Therapy key for assistance levels    Independent/Mod I = Pt. is able to perform task with no assistance but may require a device   Stand by assistance = Pt. does not perform task at an independent level but does not need physical assistance, requires verbal cues  Minimal, Moderate, Maximal Assistance = Pt. requires physical assistance (25%, 50%, 75% assist from helper) for task but is able to actively participate in task   Dependent = Pt. requires total assistance with task and is not able to actively participate with task completion     Plan:  Plan  Times per Week: 1-5x/wk  Current Treatment Recommendations: Strengthening,Balance training,Neuromuscular re-education,Functional mobility training,Endurance training,Self-Care / ADL    Goals:   Patient will:    - Improve functional endurance to tolerate/complete 8-12 mins of ADL's  - Be Min A in UB ADLs   - Be Mod A in LB ADLs  - Be Mod A in ADL transfers without LOB  - Be mod A in toileting tasks  - Improve bilateral UE strength and endurance to Fair+ in order to participate in self-care activities as projected. Patient Goal: Patient goals : \"I want to be able to get around. \"     Discussed and agreed upon: Yes Comments:       Therapy Time:   Individual   Time In 1400   Time Out 1430   Minutes 30          Eval: 30 minutes     Electronically signed by:    ELISE Kendrick,   9/3/6048, 2:54 PM Electronically signed by ELISE Kendrick on 2/0/78 at 2:54 PM EDT

## 2022-06-08 NOTE — CASE COMMUNICATION
Attempted to visit with Laurita Berumen, he was at radiation. I spoke with his nurse who tells me he  complains of pain, but he often refuses his pain meds.

## 2022-06-08 NOTE — CARE COORDINATION
MET WITH PATIENT, FREEDOM OF CHOICE OFFERED. STATES PLAN IS TO DC TO [facility] . AWAITING AUTH. HAS F/U WITH DR. HOLLINGSWORTH AT [facility] ON TUES, WILL DISCUSS POSSIBLE CHEMO AT THAT TIME.  WILL FOLLOW

## 2022-06-08 NOTE — PROGRESS NOTES
Physical Therapy Med Surg Initial Assessment  Fac[facility]/Department[facility]  [facility]     NAME: Nico Varma  : 1950 (68 y.o.)  MRN: [id number]  CODE STATUS: Full Code    Date of Service: 2022    Patient Diagnosis(es): Back pain [M54.9]   No chief complaint on file.     Patient Active Problem List    Diagnosis Date Noted    Paroxysmal atrial fibrillation (HCC)     PAD (peripheral artery disease) (HCC)     B12 deficiency     Back pain 2022    Secondary malignant neoplasm of thoracic vertebral column ([facility] 2022    Anemia 2022    Other emphysema ([facility]     Abnormal stress test 2022    Pleural effusion 2022    Acute occlusion of artery of lower extremity ([facility] 2021    History of colon polyps     Polyp of cecum     Family history of colon cancer     S/P radiation therapy 2019    Drug-induced erectile dysfunction 2019    Primary insomnia 2018    UTE on CPAP 2018    Prediabetes 2016    Hypertension     Prostate cancer ([facility]     Hyperlipidemia     Coronary artery disease involving native coronary artery of native heart without angina pectoris 2006    Old MI (myocardial infarction) 2006        Past Medical History:   Diagnosis Date    CAD (coronary artery disease)     Family history of colon cancer     Hyperlipidemia     Hypertension     Old MI (myocardial infarction) 2006    UTE on CPAP     PAD (peripheral artery disease) (HCC)     Prostate cancer ([facility]      Past Surgical History:   Procedure Laterality Date    COLONOSCOPY N/A 2019    COLORECTAL CANCER SCREENING, NOT HIGH RISK performed by Dimitris Sheehan MD at [address] [place] [address]      X3    THORACENTESIS Right 2022    870 ml removed by Dr. Isma Christianson - diagnostics sent    THORACENTESIS Left 2022    285 ml removed by Dr Isma Christianson - cytology only sent     Chart Reviewed: Yes  Patient assessed for rehabilitation services?: Yes  Family / Caregiver Present: No  Restrictions:  Restrictions/Precautions: Fall Risk (maintain spinal precautions T and L spine per clinical judgement d/t spine mets)     SUBJECTIVE:   Subjective: 2/10 pain in back    Pain   Pre treatment screenin/10 OR Faces 1-10  Location:mid back  Description: ache/sore  Onset/duration: ongoing/chronic, improving  [x]  Pt declined intervention  [x]  Pt able to proceed PT session  []  RN or physician notified  []  RN called to bedside to administer meds. Post treatment screening 2/10, described as same as above       Prior Level of Function:  Social/Functional History  Lives With: Significant other Chantel Brown)  Type of Home: House  Home Layout: Two level,Able to Live on Main level with bedroom/bathroom,Performs ADL's on one level  Home Access: Stairs to enter with rails  Entrance Stairs - Number of Steps: plans to get ramp through [place]  Bathroom Shower/Tub: Walk-in shower  Bathroom Equipment: Grab bars in shower  Home Equipment:  (adjustable Dimeres bed)  ADL Assistance: [address] [place] [address] Avenue: Needs assistance  Homemaking Responsibilities: No  Ambulation Assistance: Independent  Transfer Assistance: Independent  Active : No  Patient's  Info: spouse does all driving now  Additional Comments: Pain in back ~1 year ago    OBJECTIVE:   Vision  Vision: Impaired  Vision Exceptions: Wears glasses for reading  Hearing: Within functional limits    Cognition:  Orientation Level: Oriented X4  Follows Commands: Within Functional Limits  Overall Cognitive Status: WFL    Observation/Palpation  Posture: Good  Observation: pleasant, cooperative, no acute distress noted, on RA, espinosa catheter    ROM:  AROM: Grossly decreased, non-functional 1/5 hip and knee, DF 0/5.  PF 3+/5  PROM: Within functional limits    Strength:  Strength: Grossly decreased, non-functional    Neuro:  Balance  Sitting - Static: Fair;-  Sitting - Dynamic: Fair;-  Standing - Static:  (unsafe to attempt)                    Tone: Abnormal  Coordination:  (UE in tactile)    Sensation: Impaired (dimished light touch to B LEs R >L)    Bed mobility  Rolling to Left: Modified independent  Rolling to Right: Modified independent  Supine to Sit: Maximum assistance  Sit to Supine: Maximum assistance  Scooting: Moderate assistance;2 Person assistance    Transfers  Sit to Stand: Unable to assess  Stand to sit: Unable to assess  Comment: NT safety concern d/to poor trunk control      Activity Tolerance  Activity Tolerance: Patient tolerated evaluation without incident      Patient Education  Education Given To: Patient; Family  Education Provided: Plan of Care;Role of Therapy  Education Provided Comments: pressure relieving manuevers  Education Method: Verbal  Barriers to Learning: None  Education Outcome: Verbalized understanding;Demonstrated understanding     ASSESSMENT:   Body Structures, Functions, Activity Limitations Requiring Skilled Therapeutic Intervention: Decreased functional mobility ; Decreased ROM; Decreased strength;Decreased posture; Increased pain;Decreased balance;Decreased sensation  Decision Making: High Complexity  History: high  Exam: high  Clinical Presentation: high    Specific Instructions for Next Treatment: seated balance, ed in body mechanics, prep for slide board transfers, no to be left in chair unless recliner with marely sling  Therapy Prognosis: Good  Barriers to Learning: none     DISCHARGE RECOMMENDATIONS:  Discharge Recommendations: Patient would benefit from continued therapy after discharge  Assessment: Pt demonstrates the above deficits and decline in functional mobility status placing them at increased risk for falls. Pt would benefit from physical therapy to address above deficits and allow for safe return home at highest level of function, decrease risk for falls, and improve QOL.   Requires PT Follow-Up: Yes   PLAN OF CARE:  Plan  Plan: 1 time a day 3-6 times a week  Specific Instructions for Next Treatment: seated balance, ed in body mechanics, prep for slide board transfers, no to be left in chair unless recliner with marely sling  Current Treatment Recommendations: Strengthening,ROM,Balance training,Functional mobility training,Transfer training,Gait training,Neuromuscular re-education,Home exercise program,Patient/Caregiver education & training,Equipment evaluation, education, & procurement    Safety Devices  Type of Devices: All fall risk precautions in place,Call light within reach,Left in bed  Restraints  Restraints Initially in Place: No    Goals:  Patient goals : get strong and live indep in a wheelchair  Short Term Goals  Short term goal 1: bed mobility with Mod A  Short term goal 2: Unsupported sitting with good balance recovery with UE use with SBA  Short term goal 3: Lateral seates scoot with Min A to facilitate progression to safe transfers  Short term goal 4: Pt will be max A with slide board transfers  Short term goal 5: Pt will sit in supportive back chair x 5 min with supervision  Additional Goals?: Yes  Short Term Goal 6: Pt will progress to w/c parts management and mobility 50ft with SBA    [facility] (6 CLICK) BASIC MOBILITY  AM-PAC Inpatient Mobility Raw Score : 9    Therapy Time:   Individual   Time In 1400   Time Out 1430   Minutes 30         [place], [place], 06/08/22 at 3:03 PM       Definitions for assistance levels  Independent = pt does not require any physical supervision or assistance from another person for activity completion. Device may be needed.   Stand by assistance = pt requires verbal cues or instructions from another person, close to but not touching, to perform the activity  Minimal assistance= pt performs 75% or more of the activity; assistance is required to complete the activity  Moderate assistance= pt performs 50% of the activity; assistance is required to complete the activity  Maximal assistance = pt performs 25% of the activity; assistance is required to complete the activity  Dependent = pt requires total physical assistance to accomplish the task

## 2022-06-09 ENCOUNTER — APPOINTMENT (OUTPATIENT)
Dept: RADIATION ONCOLOGY | Age: 72
End: 2022-06-09
Payer: MEDICAID

## 2022-06-09 PROBLEM — C79.51 SPINE METASTASIS: Status: ACTIVE | Noted: 2022-06-09

## 2022-06-09 PROCEDURE — 6370000000 HC RX 637 (ALT 250 FOR IP): Performed by: NURSE PRACTITIONER

## 2022-06-09 PROCEDURE — 6360000002 HC RX W HCPCS: Performed by: INTERNAL MEDICINE

## 2022-06-09 PROCEDURE — 99232 SBSQ HOSP IP/OBS MODERATE 35: CPT | Performed by: NURSE PRACTITIONER

## 2022-06-09 PROCEDURE — 2580000003 HC RX 258: Performed by: NURSE PRACTITIONER

## 2022-06-09 PROCEDURE — 6360000002 HC RX W HCPCS: Performed by: NURSE PRACTITIONER

## 2022-06-09 PROCEDURE — 1210000000 HC MED SURG R&B

## 2022-06-09 PROCEDURE — 6370000000 HC RX 637 (ALT 250 FOR IP): Performed by: INTERNAL MEDICINE

## 2022-06-09 RX ADMIN — OXYCODONE 10 MG: 5 TABLET ORAL at 09:36

## 2022-06-09 RX ADMIN — DEXAMETHASONE SODIUM PHOSPHATE 4 MG: 4 INJECTION, SOLUTION INTRAMUSCULAR; INTRAVENOUS at 21:27

## 2022-06-09 RX ADMIN — ASPIRIN 81 MG: 81 TABLET, COATED ORAL at 09:37

## 2022-06-09 RX ADMIN — ATORVASTATIN CALCIUM 40 MG: 40 TABLET, FILM COATED ORAL at 09:38

## 2022-06-09 RX ADMIN — DEXAMETHASONE SODIUM PHOSPHATE 4 MG: 4 INJECTION, SOLUTION INTRAMUSCULAR; INTRAVENOUS at 16:32

## 2022-06-09 RX ADMIN — SENNOSIDES AND DOCUSATE SODIUM 2 TABLET: 50; 8.6 TABLET ORAL at 09:37

## 2022-06-09 RX ADMIN — TRAMADOL HYDROCHLORIDE 75 MG: 50 TABLET, COATED ORAL at 21:27

## 2022-06-09 RX ADMIN — Medication 10 ML: at 09:43

## 2022-06-09 RX ADMIN — ENOXAPARIN SODIUM 40 MG: 100 INJECTION SUBCUTANEOUS at 09:37

## 2022-06-09 RX ADMIN — DEXAMETHASONE SODIUM PHOSPHATE 4 MG: 4 INJECTION, SOLUTION INTRAMUSCULAR; INTRAVENOUS at 09:38

## 2022-06-09 RX ADMIN — TAMSULOSIN HYDROCHLORIDE 0.4 MG: 0.4 CAPSULE ORAL at 09:37

## 2022-06-09 RX ADMIN — DEXAMETHASONE SODIUM PHOSPHATE 4 MG: 4 INJECTION, SOLUTION INTRAMUSCULAR; INTRAVENOUS at 04:53

## 2022-06-09 RX ADMIN — Medication 10 ML: at 21:28

## 2022-06-09 RX ADMIN — CLOPIDOGREL BISULFATE 75 MG: 75 TABLET ORAL at 09:37

## 2022-06-09 ASSESSMENT — ENCOUNTER SYMPTOMS
VOMITING: 0
COLOR CHANGE: 0
EYE DISCHARGE: 0
TROUBLE SWALLOWING: 0
APNEA: 0
DIARRHEA: 0
CONSTIPATION: 1
NAUSEA: 0
ABDOMINAL DISTENTION: 0
CHEST TIGHTNESS: 0
ANAL BLEEDING: 0
VOICE CHANGE: 0
BACK PAIN: 1
STRIDOR: 0
RECTAL PAIN: 0
SORE THROAT: 0
ABDOMINAL PAIN: 0
SHORTNESS OF BREATH: 0
COUGH: 0
CHOKING: 0
WHEEZING: 0
BLOOD IN STOOL: 0

## 2022-06-09 ASSESSMENT — PAIN DESCRIPTION - DESCRIPTORS
DESCRIPTORS: ACHING
DESCRIPTORS: ACHING;CRUSHING

## 2022-06-09 ASSESSMENT — PAIN SCALES - GENERAL
PAINLEVEL_OUTOF10: 7
PAINLEVEL_OUTOF10: 7
PAINLEVEL_OUTOF10: 5
PAINLEVEL_OUTOF10: 7

## 2022-06-09 ASSESSMENT — PAIN DESCRIPTION - ORIENTATION
ORIENTATION: UPPER
ORIENTATION: UPPER
ORIENTATION: UPPER;MID
ORIENTATION: UPPER

## 2022-06-09 ASSESSMENT — PAIN DESCRIPTION - PAIN TYPE
TYPE: CHRONIC PAIN
TYPE: CHRONIC PAIN

## 2022-06-09 ASSESSMENT — PAIN DESCRIPTION - LOCATION
LOCATION: BACK

## 2022-06-09 NOTE — PROGRESS NOTES
Assessment complete. Patient is alert and oriented X4. VSS. Denies any SOB, N/V or dizziness. Is complaining of back pain; Tramadol was given per order. Denies any other needs at this time and call light is within reach.   Electronically signed by Jorge Jean RN on 6/8/2022 at 9:37 PM

## 2022-06-09 NOTE — PROGRESS NOTES
[facility]           Radiation Oncology      [address] [facility]        [address]        Maciej Wilner: [phone]        F: [phone]       [email or web address]                   Dr. Penny Batista MD PhD    ON TREATMENT VISIT (OTV) NOTE     Date of Service: 2022  Patient ID: Burt Wheeler   : 1950  MRN: [id number]   Acct Number: [de-identified]       DIAGNOSIS: Stage IV prostate cancer with extensive osseous involvement    Treatment Area: Bone    Current Total Dose(cGy): 2400 cGy  Current Fraction: 8    Patient was seen today for weekly visit. Wt Readings from Last 3 Encounters:   22 176 lb 14.4 oz (80.2 kg)   22 174 lb (78.9 kg)   22 174 lb (78.9 kg)       /60   Pulse 60   Temp 97.6 °F (36.4 °C)   Resp 18   Ht 5' 10\" (1.778 m)   Wt 176 lb 14.4 oz (80.2 kg)   SpO2 99%   BMI 25.38 kg/m²     Lab Results   Component Value Date    WBC 6.6 2022     (L) 2022       Comfort Alteration  Fatigue:Fatigues easily. Mostly in bed, and occasional chair. Pain Location: Middle of back  Pain Intensity (Current): 4 Moderate pain  Pain Treatment: Oxycodone 10 mg every 6 hrs prn, Tramadol 75 mg prn at bedtime  Pain Relief: Pain relieved 50%    Emotional Alteration:   Coping: effective    Nutritional Alteration  Anorexia: Loss of appetite but able to eat normal portions of food  Nausea: No nausea noted  Vomiting: No vomiting     Skin Alteration   Skin reaction: No changes noted    Additional Comments: Patient to [place] to [address] [place] [address] for rehab possibly today or tomorrow.     MEDICATIONS:     Current Facility-Administered Medications   Medication Dose Route Frequency Provider Last Rate Last Admin    sennosides-docusate sodium (SENOKOT-S) 8.6-50 MG tablet 2 tablet  2 tablet Oral BID Naina Oas, APRN - CNP   2 tablet at 22 7765    oxyCODONE (ROXICODONE) immediate release tablet 10 mg  10 mg Oral Q6H PRN Naina Oas, APRN - CNP   10 mg at 06/09/22 0936    aspirin EC tablet 81 mg  81 mg Oral Daily Padma Comes, APRN - CNP   81 mg at 06/09/22 3368    atorvastatin (LIPITOR) tablet 40 mg  40 mg Oral Daily Padma Comes, APRN - CNP   40 mg at 06/09/22 5577    clopidogrel (PLAVIX) tablet 75 mg  75 mg Oral Daily Padma Comes, APRN - CNP   75 mg at 06/09/22 0937    tamsulosin (FLOMAX) capsule 0.4 mg  0.4 mg Oral Daily Padma Comes, APRN - CNP   0.4 mg at 06/09/22 1845    polyethylene glycol (GLYCOLAX) packet 17 g  17 g Oral Daily PRN Padma Comes, APRN - CNP   17 g at 06/08/22 1337    sodium chloride flush 0.9 % injection 5-40 mL  5-40 mL IntraVENous 2 times per day Padma Comes, APRN - CNP   10 mL at 06/09/22 0943    sodium chloride flush 0.9 % injection 5-40 mL  5-40 mL IntraVENous PRN Padma Comes, APRN - CNP        0.9 % sodium chloride infusion   IntraVENous PRN Padma Comes, APRN - CNP        enoxaparin (LOVENOX) injection 40 mg  40 mg SubCUTAneous Daily Padma Comes, APRN - CNP   40 mg at 06/09/22 8622    acetaminophen (TYLENOL) tablet 650 mg  650 mg Oral Q6H PRN Padma Comes, APRN - CNP        Or    acetaminophen (TYLENOL) suppository 650 mg  650 mg Rectal Q6H PRN Padma Comes, APRN - CNP        trimethobenzamide (TIGAN) injection 200 mg  200 mg IntraMUSCular Q6H PRN Padma Comes, APRN - CNP        dexamethasone (DECADRON) injection 4 mg  4 mg IntraVENous Q6H [facility] Adolph Mendez MD   4 mg at 06/09/22 2132    traMADol (ULTRAM) tablet 75 mg  75 mg Oral QHS PRN Sandeep Gutierrez MD   75 mg at 06/08/22 2137     * New    PHYSICAL EXAM:       ECOG: 3 - Symptomatic, >50% in bed, but not bedbound (Capable of only limited self-care, confined to bed or chair 50% or more of waking hours)     General: NAD, AO x 3, Mentation is clear with appropriate affect.   HEENT: Normocephalic, atraumatic  Thorax:  Unlabored  Abdomen:  Non-distended    Chemotherapy Update: None    Treatment Imaging: Kv Pair    ASSESSMENT: Modest radiation side effects. Responding appropriately to symptomatic management. New medications, diagnostic results: Continue treatment as planned    PLAN: Again reviewed potential side effects of radiation for the patient's treatment. Continue local/topical care. Pain in the mid back has significantly improved however he has had progressively worsening urinary and bowel incontinence which is concerning and also progressively worsening bilateral lower extremity weakness. He has decent strength in the lower legs on exam so the weakness may be more attributable to deconditioning however his incontinence may be the result of either permanent nerve damage from T4/T5 compression or from existing disease in the lumbosacral spine. I will discuss with Dr. Sourav Moore with medical oncology regarding the merits of treating the sacrum if need be versus initiation of chemotherapy to see if there will be a benefit at this point to achieving additional systemic control given diffuse osseous metastatic disease burden. The patient is scheduled to see Dr. Sourav Moore on 6/14 to discuss chemotherapy. Continue current radiation course as prescribed.

## 2022-06-09 NOTE — PROGRESS NOTES
Palliative Care Consult Note  Patient: Nico Varma  Gender: male  YOB: 1950  Unit/Bed: N849/P573-98  Code Status: Full Code  Inpatient Treatment Team: Treatment Team: Attending Provider: Nick Schneider MD; Consulting Physician: Lillian Winn MD; Consulting Physician: Dary Lawler MD; Patient Care Tech: Keke Holcomb; : Magaly Hancock, RN; Registered Nurse: Jean Salmon, RN; : Arvell Brunner, RN; Utilization Reviewer: Cuauhtemoc Ramirez RN; Registered Nurse: Verónica Billingsley RN  Admit Date:  6/5/2022    Chief Complaint: Goals of care and Pain    History of Presenting Illness:      Nico Varma is a 73 y.o. male on hospital day 0 with a history of CAD s/p stent, anemia, HTN, HLD, PAD, UTE, malignant neoplasm of bone. initially presented with dizziness on 5/27. Dizziness and orthostasis was likely secondary to antihypertensives, fentanyl and Ultram therefore medications were stopped. Oncology and palliative medicine were consulted for further pain management. Patient was transfused for anemia. On 5/28 patient complained of bilateral lower extremity weakness and numbness. MRI showed a cord compression. Neurosurgery was consulted however did not recommend surgery. Patient was then started on steroids and transfer to a tertiary care center was offered however patient declined. While here at [facility] patient did receive 2 radiation treatments with no significant improvement. Patient was agreeable to SNF and transferred to [facility] (6/03) with the plan to continue radiation 5 times per week. The following day (06/04) patient presented back to the emergency room for increased weakness and generalized back pain which was not well controlled with Percocet. Neurosurgery and hospital medicine agreed that patient should be transferred for further radiation treatments at a tertiary care facility.   Patient was then transferred to [facility]; per, patient then declined treatments and requested to be come back to [facility]. He is unable to tolerate IV meds due to hypotension and he is to start radiation today. He had been using oxycodone 5 mg po every 4 hours when transferred to [facility]. He has developed paraplegia. Notes per oncology:  1. Spinal cord compression at T4-T5 level resulting in paraplegia  2. 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 Normocytic , Normochromic anemia and thrombocytopenia most likely secondary to bone marrow involvement by metastatic prostate cancer   4. Coronary artery disease .     Recommendations : Please continue Radiation therapy during his hospital stay along with Steroids . His prognosis is poor as He has developed paraplegia. Needs SNF placement for rehab. Agree with pain management as ordered . His prognosis is poor         He feels oxycodone 5 mg is not helpful with pain at all. Oxycodone 10 mg is helpful and takes the edge off,he does not feel oversedated, \" but kind of sleepy\", he is using Tramadol 75 mg po at [facility] as he feel this helps his pain best and allows him to sleep. He is hopeful the radiation will allow him to not use any narcotics at all. Discussed changing regimen entirely to tramadol as he feels it is most helpful, he declines. He feels the tramadol makes him sleepier than the oxycodone. He hopes to be only using Tramadol upon discharge. He is fearful of taking so many drugs he will not be able to participate in Pt and not be able to get home. Positive constipation, no results with daily Miralax, no abdominal or nausea. Denies emotional, spiritual or sleep disturbance. No SOB. Appetite and weight are stable. 6/9/22     Alert, demeanor calm and relaxed. He feels his pain is well controlled, \" I don't expect it to be gone completley\". He is using oxycodone sparingly, when discharged he only wants to be using tramadol.     He was very constipated and required manual disimpaction two days ago, but has had daily BM's since. Abdomen soft, no pain or nausea, he is attempting to increase fluids in his diet. Review of Systems:       Review of Systems   Constitutional: Negative for activity change, appetite change, chills, diaphoresis, fatigue, fever and unexpected weight change. HENT: Negative for drooling, hearing loss, mouth sores, sore throat, trouble swallowing and voice change. Eyes: Negative for discharge and visual disturbance. Respiratory: Negative for apnea, cough, choking, chest tightness, shortness of breath, wheezing and stridor. Cardiovascular: Negative for chest pain, palpitations and leg swelling. Gastrointestinal: Positive for constipation. Negative for abdominal distention, abdominal pain, anal bleeding, blood in stool, diarrhea, nausea, rectal pain and vomiting. Genitourinary: Negative for difficulty urinating, dysuria, enuresis, frequency and hematuria. Musculoskeletal: Positive for back pain and gait problem. Negative for arthralgias, joint swelling and myalgias. Skin: Negative for color change, pallor, rash and wound. Allergic/Immunologic: Negative for food allergies and immunocompromised state. Neurological: Negative for dizziness, tremors, seizures, syncope, facial asymmetry, speech difficulty, weakness, light-headedness, numbness and headaches. Hematological: Negative for adenopathy. Does not bruise/bleed easily. Psychiatric/Behavioral: Negative for agitation, behavioral problems, confusion, decreased concentration, dysphoric mood, hallucinations, self-injury, sleep disturbance and suicidal ideas. The patient is not nervous/anxious and is not hyperactive. Physical Examination:       /60   Pulse 60   Temp 97.6 °F (36.4 °C)   Resp 18   Ht 5' 10\" (1.778 m)   Wt 176 lb 14.4 oz (80.2 kg)   SpO2 99%   BMI 25.38 kg/m²    Physical Exam  Constitutional:       Appearance: He is well-groomed. HENT:      Head: Normocephalic and atraumatic. Jaw:  There is normal jaw occlusion. Right Ear: Hearing and external ear normal.      Left Ear: Hearing and external ear normal.      Nose: Nose normal.      Mouth/Throat:      Lips: Pink. Mouth: Mucous membranes are moist.   Eyes:      General: Lids are normal. Gaze aligned appropriately. Cardiovascular:      Rate and Rhythm: Normal rate and regular rhythm. Pulmonary:      Effort: Pulmonary effort is normal.      Breath sounds: Normal breath sounds. Abdominal:      General: Abdomen is flat. Bowel sounds are normal.      Palpations: Abdomen is soft. Musculoskeletal:      Cervical back: Full passive range of motion without pain. Skin:     General: Skin is warm and dry. Capillary Refill: Capillary refill takes less than 2 seconds. Coloration: Skin is pale. Neurological:      Mental Status: He is alert and oriented to person, place, and time. Psychiatric:         Attention and Perception: Attention normal.         Mood and Affect: Mood normal.         Speech: Speech normal.         Behavior: Behavior is cooperative. Thought Content: Thought content normal.         Allergies:       Allergies   Allergen Reactions    Morphine Swelling and Rash       Medications:      Current Facility-Administered Medications   Medication Dose Route Frequency Provider Last Rate Last Admin    sennosides-docusate sodium (SENOKOT-S) 8.6-50 MG tablet 2 tablet  2 tablet Oral BID Corinn Sever, APRN - CNP   2 tablet at 06/09/22 0705    oxyCODONE (ROXICODONE) immediate release tablet 10 mg  10 mg Oral Q6H PRN Kemi Salas APRN - CNP   10 mg at 06/09/22 0936    aspirin EC tablet 81 mg  81 mg Oral Daily Devika Khan APRN - CNP   81 mg at 06/09/22 0937    atorvastatin (LIPITOR) tablet 40 mg  40 mg Oral Daily Devika Khan APRN - CNP   40 mg at 06/09/22 0938    clopidogrel (PLAVIX) tablet 75 mg  75 mg Oral Daily Devika Khan APRN - CNP   75 mg at 06/09/22 0937    tamsulosin (FLOMAX) capsule 0.4 mg  0.4 mg Oral Daily Padma Comes, APRN - CNP   0.4 mg at 06/09/22 4824    polyethylene glycol (GLYCOLAX) packet 17 g  17 g Oral Daily PRN Padma Comes, APRN - CNP   17 g at 06/08/22 1337    sodium chloride flush 0.9 % injection 5-40 mL  5-40 mL IntraVENous 2 times per day Padma Comes, APRN - CNP   10 mL at 06/09/22 0943    sodium chloride flush 0.9 % injection 5-40 mL  5-40 mL IntraVENous PRN Padma Comes, APRN - CNP        0.9 % sodium chloride infusion   IntraVENous PRN Padma Comes, APRN - CNP        enoxaparin (LOVENOX) injection 40 mg  40 mg SubCUTAneous Daily Padma Comes, APRN - CNP   40 mg at 06/09/22 2679    acetaminophen (TYLENOL) tablet 650 mg  650 mg Oral Q6H PRN Padma Comes, APRN - CNP        Or    acetaminophen (TYLENOL) suppository 650 mg  650 mg Rectal Q6H PRN Padma Comes, APRN - CNP        trimethobenzamide (TIGAN) injection 200 mg  200 mg IntraMUSCular Q6H PRN Padma Comes, APRN - CNP        dexamethasone (DECADRON) injection 4 mg  4 mg IntraVENous Q6H [facility] Adolph Mendez MD   4 mg at 06/09/22 0938    traMADol (ULTRAM) tablet 75 mg  75 mg Oral QHS PRN Sandeep Gutierrez MD   75 mg at 06/08/22 2137       History:       PMHx:  Past Medical History:   Diagnosis Date    CAD (coronary artery disease)     Family history of colon cancer     Hyperlipidemia     Hypertension     Old MI (myocardial infarction) 02/03/2006    UTE on CPAP     PAD (peripheral artery disease) (HCC)     Prostate cancer (HCC)        PSHx:  Past Surgical History:   Procedure Laterality Date    COLONOSCOPY N/A 08/19/2019    COLORECTAL CANCER SCREENING, NOT HIGH RISK performed by Terrance Gonzalez MD at [address] [place] [address]      X3    THORACENTESIS Right 03/07/2022    870 ml removed by Dr. Yanira Aguilera - diagnostics sent    THORACENTESIS Left 03/08/2022    285 ml removed by Dr Yanira Aguilera - cytology only sent       Social Hx:  Social History     Socioeconomic History  Marital status: Single     Spouse name: Not on file    Number of children: 2    Years of education: Not on file    Highest education level: Associate degree: occupational, technical, or vocational program   Occupational History    Occupation:     Tobacco Use    Smoking status: Former Smoker     Packs/day: 2.00     Years: 25.00     Pack years: 50.00     Types: Cigarettes     Quit date: 1995     Years since quittin.4    Smokeless tobacco: Never Used    Tobacco comment: RARE CIGAR FROM TIME TO TIME   Vaping Use    Vaping Use: Never used   Substance and Sexual Activity    Alcohol use: Yes     Alcohol/week: 4.0 standard drinks     Types: 2 Cans of beer, 2 Shots of liquor per week     Comment: Social, but less in the last month    Drug use: Yes     Types: Marijuana Thania Lauren)    Sexual activity: Not Currently   Other Topics Concern    Not on file   Social History Narrative    Single floor home    4 steps to get into the home    Full basement does not go downstairs    Lives with girlfriend and her son    He has two children one in [place] and one in [place]    One Dog     Working smoke detectors and CO2     Social Determinants of Health     Financial Resource Strain:     Difficulty of Paying Living Expenses: Not on file   Food Insecurity:     Worried About [address] in the Last Year: Not on file    [address] in the Last Year: Not on file   Transportation Needs:     Lack of Transportation (Medical): Not on file    Lack of Transportation (Non-Medical):  Not on file   Physical Activity:     Days of Exercise per Week: Not on file    Minutes of Exercise per Session: Not on file   Stress:     Feeling of Stress : Not on file   Social Connections:     Frequency of Communication with Friends and Family: Not on file    Frequency of Social Gatherings with Friends and Family: Not on file    Attends Yarsani Services: Not on file    Active Member of Clubs or Organizations: Not on file    Attends Club or Organization Meetings: Not on file    Marital Status: Not on file   Intimate Partner Violence:     Fear of Current or Ex-Partner: Not on file    Emotionally Abused: Not on file    Physically Abused: Not on file    Sexually Abused: Not on file   Housing Stability:     Unable to Pay for Housing in the Last Year: Not on file    Number of Places Lived in the Last Year: Not on file    Unstable Housing in the Last Year: Not on file       Family Hx:  Family History   Problem Relation Age of Onset    Cancer Mother 76        BONE    Cancer Father 64        COLON    Colon Cancer Father     Cancer Brother 43        THYROID    Diabetes Maternal Grandmother        LABS: Reviewed     CBC:  Lab Results   Component Value Date    WBC 6.6 06/06/2022    RBC 3.06 06/06/2022    HGB 9.2 06/06/2022    HCT 28.1 06/06/2022    MCV 92.0 06/06/2022    MCH 29.9 06/06/2022    MCHC 32.6 06/06/2022    RDW 20.9 06/06/2022     06/06/2022     CBC with Differential:   Lab Results   Component Value Date    WBC 6.6 06/06/2022    RBC 3.06 06/06/2022    HGB 9.2 06/06/2022    HCT 28.1 06/06/2022     06/06/2022    MCV 92.0 06/06/2022    MCH 29.9 06/06/2022    MCHC 32.6 06/06/2022    RDW 20.9 06/06/2022    NRBC 1 06/06/2022    BANDSPCT 6 06/06/2022    METASPCT 1 06/04/2022    LYMPHOPCT 14.0 06/06/2022    PROMYELOPCT 1 06/04/2022    MONOPCT 1.9 06/06/2022    MYELOPCT 3 06/04/2022    BASOPCT 0.1 06/06/2022    MONOSABS 0.1 06/06/2022    LYMPHSABS 0.9 06/06/2022    EOSABS 0.0 06/06/2022    BASOSABS 0.0 06/06/2022     CMP:    Lab Results   Component Value Date     06/06/2022    K 4.8 06/06/2022     06/06/2022    CO2 17 06/06/2022    BUN 21 06/06/2022    CREATININE 0.55 06/06/2022    GFRAA >60.0 06/06/2022    LABGLOM >60.0 06/06/2022    GLUCOSE 132 06/06/2022    PROT 6.1 06/04/2022    LABALBU 3.2 06/04/2022    CALCIUM 8.1 06/06/2022    BILITOT 0.5 06/04/2022    ALKPHOS 370 06/04/2022    AST 17 06/04/2022    ALT 15 06/04/2022     BMP:    Lab Results   Component Value Date     06/06/2022    K 4.8 06/06/2022     06/06/2022    CO2 17 06/06/2022    BUN 21 06/06/2022    LABALBU 3.2 06/04/2022    CREATININE 0.55 06/06/2022    CALCIUM 8.1 06/06/2022    GFRAA >60.0 06/06/2022    LABGLOM >60.0 06/06/2022    GLUCOSE 132 06/06/2022     TSH:   Lab Results   Component Value Date    TSH 1.700 05/18/2021     Vitamin B12 and Folate: No components found for: FOLIC,  M77  Urinalysis:   Lab Results   Component Value Date    NITRU Negative 05/26/2022    WBCUA 0-2 05/26/2022    BACTERIA Negative 05/26/2022    RBCUA 0-2 05/26/2022    BLOODU Negative 05/26/2022    SPECGRAV 1.022 05/26/2022    GLUCOSEU Negative 05/26/2022           FUNCTIONAL ADL´S:     Independent: [ x ] Eating, [   ] Dressing, [   ] Transferring, [   ] Gayl [place], [   ] Clearence Cheadle, [   ] Continence  Dependent   : [  ] Eating, [   ] Dressing, [   ] Transferring, [   ] Gayl Anoop, [   ] Bathing, [   ] Continence  W. assistant : [  ] Eating, [ x  ] Dressing, [x   ] Transferring, [   x] Toileting, [ x  ] Bathing, [  x ] Continence    Radiology: Reviewed      No results found. Assessment and plan:    Back Pain rt Neoplasm  - Continue dexamethasone 6 mg IV every 6 hours  - Stop oxycodone 5 mg  - Start Oxycodone 10 mg po every 6 hours prn moderate to severe pain  - Continue Tramadol 75 mg po at HS prn      Constipation rt opioids and paraplegia  - Pericolace 2 tabs po daily, hold for loose stool      -Advance Care Planning  Discussed goals of care with patient. Explained in extensive detail nuances between full code, DNR CCA and DNR CC. Made the decision to be FULL CODE  MPOA and Living will in place      -Goals of Care Discussion:  Disease process and goals of treatment were discussed in basic terms. His goal is to optimize available comfort care measures to decrease hospitalizations and maximize function.   We discussed the palliative care philosophy

## 2022-06-09 NOTE — PROGRESS NOTES
Physical Therapy Missed Treatment   Facility/Department: [facility] MED SURG K987/V532-73    NAME: Luis Elliott  Patient Status:   : 1950 (73 y.o.)  MRN: [id number]  Account: [de-identified]  Gender: male        [] Patient Declines PT Treatment            [x] Patient Unavailable:     Pt currently at radiology oncology. Not available for PT tx. Will attempt PT Treatment again at earliest convenience.         Electronically signed by Kasey Gutiérrez PTA on 22 at 10:02 AM EDT

## 2022-06-09 NOTE — PROGRESS NOTES
Pt ANOX4. Pt stated he had pain of 7, Oxycodone was given. Pt went to Radiation today and returned. Assessment done. Pt [facility] in the [facility] after Radiation. Pt Stated that he has felt constipated but has had 2 bowel movements that I am aware of today. Pt denied any further needs and will continue to monitor.  Electronically signed by Bob Rosario RN on 6/9/2022 at 5:57 PM

## 2022-06-10 ENCOUNTER — APPOINTMENT (OUTPATIENT)
Dept: RADIATION ONCOLOGY | Age: 72
End: 2022-06-10
Payer: MEDICAID

## 2022-06-10 ENCOUNTER — CARE COORDINATION (OUTPATIENT)
Dept: CARE COORDINATION | Age: 72
End: 2022-06-10

## 2022-06-10 VITALS
HEART RATE: 69 BPM | TEMPERATURE: 97.9 F | SYSTOLIC BLOOD PRESSURE: 137 MMHG | WEIGHT: 176.9 LBS | BODY MASS INDEX: 25.33 KG/M2 | HEIGHT: 70 IN | OXYGEN SATURATION: 99 % | RESPIRATION RATE: 18 BRPM | DIASTOLIC BLOOD PRESSURE: 54 MMHG

## 2022-06-10 DIAGNOSIS — C61 PROSTATE CANCER (HCC): Primary | ICD-10-CM

## 2022-06-10 DIAGNOSIS — C79.51 SPINE METASTASIS (HCC): ICD-10-CM

## 2022-06-10 DIAGNOSIS — C79.51 SECONDARY MALIGNANT NEOPLASM OF THORACIC VERTEBRAL COLUMN (HCC): ICD-10-CM

## 2022-06-10 PROCEDURE — 6370000000 HC RX 637 (ALT 250 FOR IP): Performed by: NURSE PRACTITIONER

## 2022-06-10 PROCEDURE — 99232 SBSQ HOSP IP/OBS MODERATE 35: CPT | Performed by: NURSE PRACTITIONER

## 2022-06-10 PROCEDURE — 2580000003 HC RX 258: Performed by: NURSE PRACTITIONER

## 2022-06-10 PROCEDURE — 6360000002 HC RX W HCPCS: Performed by: NURSE PRACTITIONER

## 2022-06-10 PROCEDURE — 97535 SELF CARE MNGMENT TRAINING: CPT

## 2022-06-10 PROCEDURE — 77412 RADIATION TX DELIVERY LVL 3: CPT | Performed by: RADIOLOGY

## 2022-06-10 PROCEDURE — 77387 GUIDANCE FOR RADJ TX DLVR: CPT | Performed by: RADIOLOGY

## 2022-06-10 PROCEDURE — 6360000002 HC RX W HCPCS: Performed by: INTERNAL MEDICINE

## 2022-06-10 PROCEDURE — 97112 NEUROMUSCULAR REEDUCATION: CPT

## 2022-06-10 RX ORDER — OXYCODONE HYDROCHLORIDE 10 MG/1
10 TABLET ORAL EVERY 6 HOURS PRN
Qty: 120 TABLET | Refills: 0 | Status: SHIPPED | OUTPATIENT
Start: 2022-06-10 | End: 2022-07-10

## 2022-06-10 RX ORDER — OXYCODONE HYDROCHLORIDE 10 MG/1
10 TABLET ORAL EVERY 6 HOURS PRN
Refills: 0 | Status: SHIPPED | DISCHARGE
Start: 2022-06-10 | End: 2022-06-10 | Stop reason: SDUPTHER

## 2022-06-10 RX ORDER — TRAMADOL HYDROCHLORIDE 50 MG/1
75 TABLET ORAL EVERY 6 HOURS PRN
Qty: 12 TABLET | Refills: 0 | Status: SHIPPED | OUTPATIENT
Start: 2022-06-10 | End: 2022-06-10

## 2022-06-10 RX ADMIN — DEXAMETHASONE SODIUM PHOSPHATE 4 MG: 4 INJECTION, SOLUTION INTRAMUSCULAR; INTRAVENOUS at 09:08

## 2022-06-10 RX ADMIN — TAMSULOSIN HYDROCHLORIDE 0.4 MG: 0.4 CAPSULE ORAL at 09:08

## 2022-06-10 RX ADMIN — ENOXAPARIN SODIUM 40 MG: 100 INJECTION SUBCUTANEOUS at 09:07

## 2022-06-10 RX ADMIN — OXYCODONE 10 MG: 5 TABLET ORAL at 09:16

## 2022-06-10 RX ADMIN — OXYCODONE 10 MG: 5 TABLET ORAL at 02:22

## 2022-06-10 RX ADMIN — ATORVASTATIN CALCIUM 40 MG: 40 TABLET, FILM COATED ORAL at 09:07

## 2022-06-10 RX ADMIN — SENNOSIDES AND DOCUSATE SODIUM 2 TABLET: 50; 8.6 TABLET ORAL at 09:07

## 2022-06-10 RX ADMIN — DEXAMETHASONE SODIUM PHOSPHATE 4 MG: 4 INJECTION, SOLUTION INTRAMUSCULAR; INTRAVENOUS at 04:36

## 2022-06-10 RX ADMIN — CLOPIDOGREL BISULFATE 75 MG: 75 TABLET ORAL at 09:08

## 2022-06-10 RX ADMIN — ASPIRIN 81 MG: 81 TABLET, COATED ORAL at 09:07

## 2022-06-10 RX ADMIN — Medication 10 ML: at 09:11

## 2022-06-10 ASSESSMENT — PAIN SCALES - GENERAL
PAINLEVEL_OUTOF10: 5
PAINLEVEL_OUTOF10: 6
PAINLEVEL_OUTOF10: 5

## 2022-06-10 ASSESSMENT — ENCOUNTER SYMPTOMS
APNEA: 0
CHEST TIGHTNESS: 0
NAUSEA: 0
COLOR CHANGE: 0
VOMITING: 0
COUGH: 0
EYE DISCHARGE: 0
STRIDOR: 0
BLOOD IN STOOL: 0
CHOKING: 0
RECTAL PAIN: 0
ABDOMINAL PAIN: 0
VOICE CHANGE: 0
ABDOMINAL DISTENTION: 0
TROUBLE SWALLOWING: 0
BACK PAIN: 1
CONSTIPATION: 1
ANAL BLEEDING: 0
WHEEZING: 0
SORE THROAT: 0
DIARRHEA: 0
SHORTNESS OF BREATH: 0

## 2022-06-10 ASSESSMENT — PAIN DESCRIPTION - LOCATION
LOCATION: BACK

## 2022-06-10 ASSESSMENT — PAIN DESCRIPTION - ORIENTATION
ORIENTATION: MID;UPPER
ORIENTATION: MID
ORIENTATION: MID

## 2022-06-10 ASSESSMENT — PAIN DESCRIPTION - DESCRIPTORS
DESCRIPTORS: ACHING

## 2022-06-10 ASSESSMENT — PAIN - FUNCTIONAL ASSESSMENT: PAIN_FUNCTIONAL_ASSESSMENT: PREVENTS OR INTERFERES SOME ACTIVE ACTIVITIES AND ADLS

## 2022-06-10 ASSESSMENT — PAIN DESCRIPTION - PAIN TYPE: TYPE: CHRONIC PAIN

## 2022-06-10 NOTE — PROGRESS NOTES
Palliative Care Consult Note  Patient: Frankey Side  Gender: male  YOB: 1950  Unit/Bed: J995/L793-22  Code Status: Full Code  Inpatient Treatment Team: Treatment Team: Attending Provider: Gabriela Banegas MD; Consulting Physician: Agustin Duron MD; Consulting Physician: Reji Alvarado MD; Utilization Reviewer: Judith Arndt RN; : Cristian Bullard, RN; Registered Nurse: Martha Albright, RN; : Kim Winn, MATTY; Registered Nurse: Rafia Sarmiento RN  Admit Date:  6/5/2022    Chief Complaint: Goals of care and Pain    History of Presenting Illness:      Frankey Side is a 73 y.o. male on hospital day 1 with a history of CAD s/p stent, anemia, HTN, HLD, PAD, UTE, malignant neoplasm of bone. initially presented with dizziness on 5/27. Dizziness and orthostasis was likely secondary to antihypertensives, fentanyl and Ultram therefore medications were stopped. Oncology and palliative medicine were consulted for further pain management. Patient was transfused for anemia. On 5/28 patient complained of bilateral lower extremity weakness and numbness. MRI showed a cord compression. Neurosurgery was consulted however did not recommend surgery. Patient was then started on steroids and transfer to a tertiary care center was offered however patient declined. While here at [facility] patient did receive 2 radiation treatments with no significant improvement. Patient was agreeable to SNF and transferred to [facility] (6/03) with the plan to continue radiation 5 times per week. The following day (06/04) patient presented back to the emergency room for increased weakness and generalized back pain which was not well controlled with Percocet. Neurosurgery and hospital medicine agreed that patient should be transferred for further radiation treatments at a tertiary care facility.   Patient was then transferred to [facility]; per, patient then declined treatments and requested to be come back to [facility]. He is unable to tolerate IV meds due to hypotension and he is to start radiation today. He had been using oxycodone 5 mg po every 4 hours when transferred to [facility]. He has developed paraplegia. Notes per oncology:  1. Spinal cord compression at T4-T5 level resulting in paraplegia  2. 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 Normocytic , Normochromic anemia and thrombocytopenia most likely secondary to bone marrow involvement by metastatic prostate cancer   4. Coronary artery disease .     Recommendations : Please continue Radiation therapy during his hospital stay along with Steroids . His prognosis is poor as He has developed paraplegia. Needs SNF placement for rehab. Agree with pain management as ordered . His prognosis is poor         He feels oxycodone 5 mg is not helpful with pain at all. Oxycodone 10 mg is helpful and takes the edge off,he does not feel oversedated, \" but kind of sleepy\", he is using Tramadol 75 mg po at [facility] as he feel this helps his pain best and allows him to sleep. He is hopeful the radiation will allow him to not use any narcotics at all. Discussed changing regimen entirely to tramadol as he feels it is most helpful, he declines. He feels the tramadol makes him sleepier than the oxycodone. He hopes to be only using Tramadol upon discharge. He is fearful of taking so many drugs he will not be able to participate in Pt and not be able to get home. Positive constipation, no results with daily Miralax, no abdominal or nausea. Denies emotional, spiritual or sleep disturbance. No SOB. Appetite and weight are stable. 6/9/22     Alert, demeanor calm and relaxed. He feels his pain is well controlled, \" I don't expect it to be gone completley\". He is using oxycodone sparingly, when discharged he only wants to be using tramadol. He was very constipated and required manual disimpaction two days ago, but has had daily BM's since.  Abdomen soft, no pain or nausea, he is attempting to increase fluids in his diet. 6/10/22     Tolerating radiation well, participating in therapy, plan is [facility] with Pall care following. He requests that Tramadol rx be sent with him to nursing home, not oxycodone. Review of Systems:       Review of Systems   Constitutional: Negative for activity change, appetite change, chills, diaphoresis, fatigue, fever and unexpected weight change. HENT: Negative for drooling, hearing loss, mouth sores, sore throat, trouble swallowing and voice change. Eyes: Negative for discharge and visual disturbance. Respiratory: Negative for apnea, cough, choking, chest tightness, shortness of breath, wheezing and stridor. Cardiovascular: Negative for chest pain, palpitations and leg swelling. Gastrointestinal: Positive for constipation. Negative for abdominal distention, abdominal pain, anal bleeding, blood in stool, diarrhea, nausea, rectal pain and vomiting. Genitourinary: Negative for difficulty urinating, dysuria, enuresis, frequency and hematuria. Musculoskeletal: Positive for back pain and gait problem. Negative for arthralgias, joint swelling and myalgias. Skin: Negative for color change, pallor, rash and wound. Allergic/Immunologic: Negative for food allergies and immunocompromised state. Neurological: Negative for dizziness, tremors, seizures, syncope, facial asymmetry, speech difficulty, weakness, light-headedness, numbness and headaches. Hematological: Negative for adenopathy. Does not bruise/bleed easily. Psychiatric/Behavioral: Negative for agitation, behavioral problems, confusion, decreased concentration, dysphoric mood, hallucinations, self-injury, sleep disturbance and suicidal ideas. The patient is not nervous/anxious and is not hyperactive.         Physical Examination:       BP (!) 137/54   Pulse 69   Temp 97.9 °F (36.6 °C) (Oral)   Resp 18   Ht 5' 10\" (1.778 m)   Wt 176 lb 14.4 oz (80.2 kg)   SpO2 99% BMI 25.38 kg/m²    Physical Exam  Constitutional:       Appearance: He is well-groomed. HENT:      Head: Normocephalic and atraumatic. Jaw: There is normal jaw occlusion. Right Ear: Hearing and external ear normal.      Left Ear: Hearing and external ear normal.      Nose: Nose normal.      Mouth/Throat:      Lips: Pink. Mouth: Mucous membranes are moist.   Eyes:      General: Lids are normal. Gaze aligned appropriately. Cardiovascular:      Rate and Rhythm: Normal rate and regular rhythm. Pulmonary:      Effort: Pulmonary effort is normal.      Breath sounds: Normal breath sounds. Abdominal:      General: Abdomen is flat. Bowel sounds are normal.      Palpations: Abdomen is soft. Musculoskeletal:      Cervical back: Full passive range of motion without pain. Skin:     General: Skin is warm and dry. Capillary Refill: Capillary refill takes less than 2 seconds. Coloration: Skin is pale. Neurological:      Mental Status: He is alert and oriented to person, place, and time. Psychiatric:         Attention and Perception: Attention normal.         Mood and Affect: Mood normal.         Speech: Speech normal.         Behavior: Behavior is cooperative. Thought Content: Thought content normal.         Allergies:       Allergies   Allergen Reactions    Morphine Swelling and Rash       Medications:      Current Facility-Administered Medications   Medication Dose Route Frequency Provider Last Rate Last Admin    sennosides-docusate sodium (SENOKOT-S) 8.6-50 MG tablet 2 tablet  2 tablet Oral BID JACQUELYN Oakes CNP   2 tablet at 06/10/22 6441    oxyCODONE (ROXICODONE) immediate release tablet 10 mg  10 mg Oral Q6H PRN JACQUELYN Orellana CNP   10 mg at 06/10/22 0916    aspirin EC tablet 81 mg  81 mg Oral Daily JACQUELYN Stevens CNP   81 mg at 06/10/22 0907    atorvastatin (LIPITOR) tablet 40 mg  40 mg Oral Daily Chucho Cooper APRN - [place]   40 mg at 06/10/22 0907    clopidogrel (PLAVIX) tablet 75 mg  75 mg Oral Daily JACQUELYN Shannon CNP   75 mg at 06/10/22 0908    tamsulosin (FLOMAX) capsule 0.4 mg  0.4 mg Oral Daily JACQUELYN Shannon CNP   0.4 mg at 06/10/22 0908    polyethylene glycol (GLYCOLAX) packet 17 g  17 g Oral Daily PRN JACQUELYN Shannon CNP   17 g at 06/08/22 1337    sodium chloride flush 0.9 % injection 5-40 mL  5-40 mL IntraVENous 2 times per day JACQUELYN Shannon CNP   10 mL at 06/10/22 0911    sodium chloride flush 0.9 % injection 5-40 mL  5-40 mL IntraVENous PRN JACQUELYN Shannon CNP        0.9 % sodium chloride infusion   IntraVENous PRN JACQUELYN Shannon CNP        enoxaparin (LOVENOX) injection 40 mg  40 mg SubCUTAneous Daily JACQUELYN Shannon CNP   40 mg at 06/10/22 9872    acetaminophen (TYLENOL) tablet 650 mg  650 mg Oral Q6H PRN JACQUELYN Shannon CNP        Or    acetaminophen (TYLENOL) suppository 650 mg  650 mg Rectal Q6H PRN JACQUELYN Shannon CNP        trimethobenzamide (TIGAN) injection 200 mg  200 mg IntraMUSCular Q6H PRN JACQUELYN Shannon CNP        dexamethasone (DECADRON) injection 4 mg  4 mg IntraVENous Q6H [facility] Freeman MD   4 mg at 06/10/22 0908    traMADol (ULTRAM) tablet 75 mg  75 mg Oral QHS PRN [address] MD   75 mg at 06/09/22 2127       History:       PMHx:  Past Medical History:   Diagnosis Date    CAD (coronary artery disease)     Family history of colon cancer     Hyperlipidemia     Hypertension     Old MI (myocardial infarction) 02/03/2006    UTE on CPAP     PAD (peripheral artery disease) (HCC)     Prostate cancer (HCC)        PSHx:  Past Surgical History:   Procedure Laterality Date    COLONOSCOPY N/A 08/19/2019    COLORECTAL CANCER SCREENING, NOT HIGH RISK performed by Benitez Sen MD at [address] [place] [address]      X3    THORACENTESIS Right 03/07/2022    870 ml removed by Dr. Clemente Caputo - diagnostics sent    THORACENTESIS Left 2022    285 ml removed by Dr Tim Wells - cytology only sent       Social Hx:  Social History     Socioeconomic History    Marital status: Single     Spouse name: Not on file    Number of children: 2    Years of education: Not on file    Highest education level: Associate degree: occupational, technical, or vocational program   Occupational History    Occupation:     Tobacco Use    Smoking status: Former Smoker     Packs/day: 2.00     Years: 25.00     Pack years: 50.00     Types: Cigarettes     Quit date: 1995     Years since quittin.4    Smokeless tobacco: Never Used    Tobacco comment: RARE CIGAR FROM TIME TO TIME   Vaping Use    Vaping Use: Never used   Substance and Sexual Activity    Alcohol use: Yes     Alcohol/week: 4.0 standard drinks     Types: 2 Cans of beer, 2 Shots of liquor per week     Comment: Social, but less in the last month    Drug use: Yes     Types: Marijuana Fronie Christina)    Sexual activity: Not Currently   Other Topics Concern    Not on file   Social History Narrative    Single floor home    4 steps to get into the home    Full basement does not go downstairs    Lives with girlfriend and her son    He has two children one in [place] and one in [place]    One Dog     Working smoke detectors and CO2     Social Determinants of Health     Financial Resource Strain:     Difficulty of Paying Living Expenses: Not on file   Food Insecurity:     Worried About [address] in the Last Year: Not on file    [address] in the Last Year: Not on file   Transportation Needs:     Lack of Transportation (Medical): Not on file    Lack of Transportation (Non-Medical):  Not on file   Physical Activity:     Days of Exercise per Week: Not on file    Minutes of Exercise per Session: Not on file   Stress:     Feeling of Stress : Not on file   Social Connections:     Frequency of Communication with Friends and Family: Not on file    Frequency of Social Gatherings with Friends and Family: Not on file    Attends Gnosticism Services: Not on file    Active Member of Clubs or Organizations: Not on file    Attends Club or Organization Meetings: Not on file    Marital Status: Not on file   Intimate Partner Violence:     Fear of Current or Ex-Partner: Not on file    Emotionally Abused: Not on file    Physically Abused: Not on file    Sexually Abused: Not on file   Housing Stability:     Unable to Pay for Housing in the Last Year: Not on file    Number of Places Lived in the Last Year: Not on file    Unstable Housing in the Last Year: Not on file       Family Hx:  Family History   Problem Relation Age of Onset    Cancer Mother 76        BONE    Cancer Father 64        COLON    Colon Cancer Father     Cancer Brother 43        THYROID    Diabetes Maternal Grandmother        LABS: Reviewed     CBC:  Lab Results   Component Value Date    WBC 6.6 06/06/2022    RBC 3.06 06/06/2022    HGB 9.2 06/06/2022    HCT 28.1 06/06/2022    MCV 92.0 06/06/2022    MCH 29.9 06/06/2022    MCHC 32.6 06/06/2022    RDW 20.9 06/06/2022     06/06/2022     CBC with Differential:   Lab Results   Component Value Date    WBC 6.6 06/06/2022    RBC 3.06 06/06/2022    HGB 9.2 06/06/2022    HCT 28.1 06/06/2022     06/06/2022    MCV 92.0 06/06/2022    MCH 29.9 06/06/2022    MCHC 32.6 06/06/2022    RDW 20.9 06/06/2022    NRBC 1 06/06/2022    BANDSPCT 6 06/06/2022    METASPCT 1 06/04/2022    LYMPHOPCT 14.0 06/06/2022    PROMYELOPCT 1 06/04/2022    MONOPCT 1.9 06/06/2022    MYELOPCT 3 06/04/2022    BASOPCT 0.1 06/06/2022    MONOSABS 0.1 06/06/2022    LYMPHSABS 0.9 06/06/2022    EOSABS 0.0 06/06/2022    BASOSABS 0.0 06/06/2022     CMP:    Lab Results   Component Value Date     06/06/2022    K 4.8 06/06/2022     06/06/2022    CO2 17 06/06/2022    BUN 21 06/06/2022    CREATININE 0.55 06/06/2022    GFRAA >60.0 06/06/2022    LABGLOM >60.0 06/06/2022 GLUCOSE 132 06/06/2022    PROT 6.1 06/04/2022    LABALBU 3.2 06/04/2022    CALCIUM 8.1 06/06/2022    BILITOT 0.5 06/04/2022    ALKPHOS 370 06/04/2022    AST 17 06/04/2022    ALT 15 06/04/2022     BMP:    Lab Results   Component Value Date     06/06/2022    K 4.8 06/06/2022     06/06/2022    CO2 17 06/06/2022    BUN 21 06/06/2022    LABALBU 3.2 06/04/2022    CREATININE 0.55 06/06/2022    CALCIUM 8.1 06/06/2022    GFRAA >60.0 06/06/2022    LABGLOM >60.0 06/06/2022    GLUCOSE 132 06/06/2022     TSH:   Lab Results   Component Value Date    TSH 1.700 05/18/2021     Vitamin B12 and Folate: No components found for: FOLIC,  T24  Urinalysis:   Lab Results   Component Value Date    NITRU Negative 05/26/2022    WBCUA 0-2 05/26/2022    BACTERIA Negative 05/26/2022    RBCUA 0-2 05/26/2022    BLOODU Negative 05/26/2022    SPECGRAV 1.022 05/26/2022    GLUCOSEU Negative 05/26/2022           FUNCTIONAL ADL´S:     Independent: [ x ] Eating, [   ] Dressing, [   ] Transferring, [   ] Otis Flax, [   ] Caralyn Kalata, [   ] Continence  Dependent   : [  ] Eating, [   ] Dressing, [   ] Transferring, [   ] Otis Flax, [   ] Bathing, [   ] Continence  W. assistant : [  ] Eating, [ x  ] Dressing, [x   ] Transferring, [   x] Toileting, [ x  ] Bathing, [  x ] Continence    Radiology: Reviewed      No results found. Assessment and plan:    Back Pain rt Neoplasm  - Continue dexamethasone 6 mg IV every 6 hours  - Stop oxycodone 5 mg  - Start Oxycodone 10 mg po every 6 hours prn moderate to severe pain  - Continue Tramadol 75 mg po at HS prn      Constipation rt opioids and paraplegia  - Pericolace 2 tabs po daily, hold for loose stool      -Advance Care Planning  Discussed goals of care with patient. Explained in extensive detail nuances between full code, DNR CCA and DNR CC.    Made the decision to be FULL CODE  MPOA and Living will in place      -Goals of Care Discussion:  Disease process and goals of treatment were discussed in basic terms. His goal is to optimize available comfort care measures to decrease hospitalizations and maximize function. We discussed the palliative care philosophy in light of those goals and our role in his care. We discussed all care options contingent on treatment response and QOL. Much active listening, presence, and emotional support were given.        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

## 2022-06-10 NOTE — PROGRESS NOTES
Pt ANOx4. Pt stated he had pain of 5, Oxycodone was given. Assessment done and morning med's given. Pt denied any further needs and continued to monitor. Pt was picked up by [facility] and taken to his radiation treatment at around 10 A. M. Pt was then taken to [address] [place] [address] after his treatment. I called and gave report to [facility] at 12:15 P.M.  Electronically signed by Bob Rosario RN on 6/10/2022 at 12:29 PM

## 2022-06-10 NOTE — ADT AUTH CERT
Utilization Reviews         Medical Oncology [address] Day 5 (6/9/2022) by Meche Carreon, RN       Review Status Review Entered   Completed 6/10/2022 10:10      Criteria Review      Care Day: 5 Care Date: 6/9/2022 Level of Care:    Guideline Day 2    Level Of Care    (X) Floor    6/10/2022 10:10 AM EDT by Melly Stout      [facility]    Clinical Status    (X) * No ICU or intermediate care needs    6/10/2022 10:10 AM EDT by Melly Stout      No ICU or intermediate needs identified    Interventions    (X) Inpatient interventions continue    6/10/2022 10:10 AM EDT by Melly Stout      Decadron IV, pain management, daily radiation    * Milestone   Additional Notes   DATE:   06/09/2022 Care day 5         Pertinent Updates: Ongoing pain management and XRT         Vitals:   124/56, 62, 18, 97.9, 99% on RA         Abnl/Pertinent Labs/Radiology/Diagnostic Studies:   No labs or imaging         Physical Exam:   General: NAD, AO x 3, Mentation is clear with appropriate affect. HEENT: Normocephalic, atraumatic   Thorax:  Unlabored   Abdomen:  Non-distended         MD Consults/Assessments & Plans:   RADIATION ASSESSMENT:   Treatment Imaging: Kv Pair    Modest radiation side effects. Responding appropriately to symptomatic management. New medications, diagnostic results: Continue treatment as planned   PLAN: Again reviewed potential side effects of radiation for the patient's treatment.                Continue local/topical care. Pain in the mid back has significantly improved however he has had progressively worsening urinary and bowel incontinence which is concerning and also progressively worsening bilateral lower extremity weakness. He has decent strength in the lower legs on exam so the weakness may be more attributable to deconditioning however his incontinence may be the result of either permanent nerve damage from T4/T5 compression or from existing disease in the lumbosacral spine.  I will discuss with Dr. Anny Tanner with medical oncology regarding the merits of treating the sacrum if need be versus initiation of chemotherapy to see if there will be a benefit at this point to achieving additional systemic control given diffuse osseous metastatic disease burden. The patient is scheduled to see Dr. Shai Palacios on 6/14 to discuss chemotherapy.                Continue current radiation course as prescribed.          Medications:   Scheduled Meds:   aspirin, 81 mg, Oral, Daily   atorvastatin, 40 mg, Oral, Daily   clopidogrel, 75 mg, Oral, Daily   tamsulosin, 0.4 mg, Oral, Daily   enoxaparin, 40 mg, SubCUTAneous, Daily   dexamethasone, 4 mg, IntraVENous, Q6H   Continuous Infusions: hep-locked   PRN Meds:.oxyCODONE immediate release tablet 10 mg po X 1, Ultram 75 mg po X 1            Orders: Admit inpatient         PT/OT/SLP/CM Assessments or Notes:   CM - anticipated discharge to SNF tomorrow after radiation therapy

## 2022-06-10 NOTE — CARE COORDINATION
MET WITH PATIENT, NOTIFIED NO DC ORDER OBTAINED YET AND WILL RETURN TO HOSPITAL AFTER RAD. [place]. , [address] NOTIFIED OF PLAN. [facility] TRANSPORTING PATIENT TO [facility] FOR RAD TX AT THIS TIME.  TO DELIVER PERSONAL BELONGINGS,  AND DC PACKET. DR Alejandro Yang TO PROVIDE PAIN RX AND I WILL FAX SCRIPT WHEN SIGNED. ALSO SPOKE WITH S.O TO NOTIFY OF PLAN AS WELL AS RAD. [facility] SPOKE WITH THERAPIST AND HE WILL NOTIFY PATIENT.

## 2022-06-10 NOTE — CARE COORDINATION
Review of chart indicates patient is inpatient at [facility]. Discharge plan is for patient to go to [facility] today.

## 2022-06-10 NOTE — PROGRESS NOTES
Physical Therapy Med Surg Daily Treatment Note  Facility/Department: [facility]  Room: [facility]/V621-36       NAME: Sadia Bell  : 1950 (28 y.o.)  MRN: [id number]  CODE STATUS: Full Code    Date of Service: 6/10/2022    Patient Diagnosis(es): Back pain [M54.9]  Spine metastasis ([address] [C79.51]   No chief complaint on file.     Patient Active Problem List    Diagnosis Date Noted    Paroxysmal atrial fibrillation ([address]     PAD (peripheral artery disease) ([address]     Spine metastasis ([address] 2022    B12 deficiency     Back pain 2022    Secondary malignant neoplasm of thoracic vertebral column ([address] 2022    Anemia 2022    Other emphysema ([address]     Abnormal stress test 2022    Pleural effusion 2022    Acute occlusion of artery of lower extremity ([address] 2021    History of colon polyps     Polyp of cecum     Family history of colon cancer     S/P radiation therapy 2019    Drug-induced erectile dysfunction 2019    Primary insomnia 2018    UTE on CPAP 2018    Prediabetes 2016    Hypertension     Prostate cancer ([address]     Hyperlipidemia     Coronary artery disease involving native coronary artery of native heart without angina pectoris 2006    Old MI (myocardial infarction) 2006        Past Medical History:   Diagnosis Date    CAD (coronary artery disease)     Family history of colon cancer     Hyperlipidemia     Hypertension     Old MI (myocardial infarction) 2006    UTE on CPAP     PAD (peripheral artery disease) (HCC)     Prostate cancer ([address]      Past Surgical History:   Procedure Laterality Date    COLONOSCOPY N/A 2019    COLORECTAL CANCER SCREENING, NOT HIGH RISK performed by Nayla Kohli MD at [address] [place] [address]      X3    THORACENTESIS Right 2022    870 ml removed by Dr. Tad Paredes - diagnostics sent    THORACENTESIS Left 03/08/2022    285 ml removed by Dr Clemente Caputo - cytology only sent            Restrictions:fall risk. SUBJECTIVE:   Subjective: \"I really want to sit up\"    Pain  Pain: 5/10 back pain. pt reports being medicated  OBJECTIVE:   Orientation  Overall Orientation Status: Within Functional Limits  Cognition  Overall Cognitive Status: WFL    Bed Mobility Training  Bed Mobility Training: Yes  Overall Level of Assistance: Moderate assistance;Assist X2  Interventions: Weight shifting training/pressure relief; Safety awareness training  Rolling: Stand-by assistance  Supine to Sit: Moderate assistance;Assist X2  Sit to Supine: Moderate assistance;Assist X2  Duration: 15  Neuro: pt able to hold self up edge of bed as long as he could hold onto bed rail with right and footboard rail with left. Pt able to shift weight with seated. Once pt lets go of support with both hands he is retropulsive. Pt can hold self with his right upper extremity. ASSESSMENT pt pleased that he was able to sit up and maintain balance with support. He reports he is going to nursing home after his radiation today. Pt received phone call from spouse while he was sitting up and he was able to manage phone with right hand while his trunk was supported with +2 assist. Pt very upset at his phone call since he was told a ramp was not possible the way he wanted it. Pt very emotional and requested to lay down after this call. Pt able to move right leg laterally and move toes. No movement today on left leg.          Discharge Recommendations:  Patient would benefit from continued therapy after discharge         Goals  Short Term Goals  Short term goal 1: bed mobility with Mod A  Short term goal 2: Unsupported sitting with good balance recovery with UE use with SBA  Short term goal 3: Lateral seates scoot with Min A to facilitate progression to safe transfers  Short term goal 4: Pt will be max A with slide board transfers  Short term goal 5: Pt will sit in supportive back chair x 5 min with supervision  Additional Goals?: Yes  Short Term Goal 6: Pt will progress to w/c parts management and mobility 50ft with SBA  Patient Goals   Patient goals : get strong and live indep in a wheelchair    PLAN    Plan: 1 time a day 3-6 times a week        [facility] (6 CLICK) BASIC MOBILITY  AM-PAC Inpatient Mobility Raw Score : 9     Therapy Time   Individual   Time In  0935   Time Out 1000   Minutes 25   15 minutes bed mob  10 minutes neuro          Thania Garcia PTA, 06/10/22 at 10:17 AM         Definitions for assistance levels  Independent = pt does not require any physical supervision or assistance from another person for activity completion. Device may be needed.   Stand by assistance = pt requires verbal cues or instructions from another person, close to but not touching, to perform the activity  Minimal assistance= pt performs 75% or more of the activity; assistance is required to complete the activity  Moderate assistance= pt performs 50% of the activity; assistance is required to complete the activity  Maximal assistance = pt performs 25% of the activity; assistance is required to complete the activity  Dependent = pt requires total physical assistance to accomplish the task

## 2022-06-10 NOTE — DISCHARGE INSTR - COC
Continuity of Care Form    Patient Name: Marcela Lennon   :  1950  MRN:  [id number]    [address] [place] [address] date:  2022  Discharge date:  ***    Code Status Order: Full Code   Advance Directives:      Admitting Physician:  No admitting provider for patient encounter.   PCP: Gilson Cormier MD    Discharging Nurse: [facility] Unit/Room#: V128/I552-69  Discharging Unit Phone Number: ***    Emergency Contact:   Extended Emergency Contact Information  Primary Emergency Contact: [address] Phone: [phone]  Work Phone: [phone]  Mobile Phone: [phone]  Relation: Other    Past Surgical History:  Past Surgical History:   Procedure Laterality Date    COLONOSCOPY N/A 2019    COLORECTAL CANCER SCREENING, NOT HIGH RISK performed by Terrance Gonzalez MD at  [address] Right 2022    870 ml removed by Dr. Yanira Aguilera - diagnostics sent    THORACENTESIS Left 2022    285 ml removed by Dr Yanira Aguilera - cytology only sent       Immunization History:   Immunization History   Administered Date(s) Administered    COVID-19, Pfizer Purple top, DILUTE for use, 12+ yrs, 30mcg/0.3mL dose 2021, 2021, 12/10/2021    Influenza, Quadv, adjuvanted, 65 yrs +, IM, PF (Fluad) 2020, 2021    Influenza, Triv, inactivated, subunit, adjuvanted, IM (Fluad 65 yrs and older) 2019    Pneumococcal Polysaccharide (Afgvposuj65) 2019    Tdap (Boostrix, Adacel) 2016       Active Problems:  Patient Active Problem List   Diagnosis Code    Hypertension I10    Prostate cancer ([facility] C61    Hyperlipidemia E78.5    Coronary artery disease involving native coronary artery of native heart without angina pectoris I25.10    Old MI (myocardial infarction) I25.2    Prediabetes R73.03    UTE on CPAP G47.33, Z99.89    Primary insomnia F51.01    S/P radiation therapy Z92.3    Drug-induced erectile dysfunction N52.2    Family history of colon cancer Z80.0    History of colon polyps Z86.010    Polyp of cecum K63.5    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) I70.209    PAD (peripheral artery disease) (HCC) I73.9    Paroxysmal atrial fibrillation (HCC) I48.0    Pleural effusion J90    Abnormal stress test R94.39    Other emphysema (HCC) J43.8    Anemia D64.9    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) C79.51    Back pain M54.9    B12 deficiency E53.8    Spine metastasis (HCC) C79.51       Isolation/Infection:   Isolation            No Isolation          Patient Infection Status       Infection Onset Added Last Indicated Last Indicated By Review Planned Expiration Resolved Resolved By    None active    Resolved    COVID-19 (Rule Out) 05/26/22 05/26/22 05/26/22 COVID-19, Rapid (Ordered)   05/26/22 Rule-Out Test Resulted    COVID-19 (Rule Out) 03/03/22 03/03/22 03/03/22 COVID-19, Rapid (Ordered)   03/03/22 Rule-Out Test Resulted            Nurse Assessment:  Last Vital Signs: BP (!) 137/54   Pulse 69   Temp 97.9 °F (36.6 °C) (Oral)   Resp 18   Ht 5' 10\" (1.778 m)   Wt 176 lb 14.4 oz (80.2 kg)   SpO2 99%   BMI 25.38 kg/m²     Last documented pain score (0-10 scale): Pain Level: 5  Last Weight:   Wt Readings from Last 1 Encounters:   06/06/22 176 lb 14.4 oz (80.2 kg)     Mental Status:  oriented, alert, and thought processes intact    IV Access:  - None    Nursing Mobility/ADLs:  Walking   Assisted  Transfer  Assisted  Bathing  Assisted  Dressing  Assisted  Toileting  Assisted  Feeding  Independent  Med Admin  Assisted  Med Delivery   whole    Wound Care Documentation and Therapy:        Elimination:  Continence:    Bowel: No  Bladder: No  Urinary Catheter: None   Colostomy/Ileostomy/Ileal Conduit: No       Date of Last BM:   6/10/22    Intake/Output Summary (Last 24 hours) at 6/10/2022 1033  Last data filed at 6/10/2022 0549  Gross per 24 hour   Intake --   Output 950 ml   Net -950 ml     I/O last 3 completed shifts: In: 240 [P.O.:240]  Out: 2950 [Urine:2950]    Safety Concerns: At Risk for Falls    Impairments/Disabilities:     Difficulty walking due two metastasis on spine    Nutrition Therapy:  Current Nutrition Therapy:   - Oral Diet:  General  - Oral Nutrition Supplement:  [address] KIRTI Oral Nutrition:[id number]}    Routes of Feeding: Oral  Liquids: No Restrictions  Daily Fluid Restriction: no  Last Modified Barium Swallow with Video (Video Swallowing Test): not done    Treatments at the Time of Hospital Discharge:   Respiratory Treatments:   Oxygen Therapy:  is not on home oxygen therapy. Ventilator:    - No ventilator support    Rehab Therapies: Physical Therapy  Weight Bearing Status/Restrictions: No weight bearing restrictions  Other Medical Equipment (for information only, NOT a DME order):  wheelchair and walker  Other Treatments: skin assessments    Patient's personal belongings (please select all that are sent with patient):  Glasses, Dentures upper and lower    RN SIGNATURE:  Electronically signed by Cecilia Wilson RN on 6/10/22 at 12:55 PM EDT    CASE MANAGEMENT/SOCIAL WORK SECTION    Inpatient Status Date: ***    Readmission Risk Assessment Score:  Readmission Risk              Risk of Unplanned Readmission:  24           Discharging to Facility/ Agency   Name: Alvin Meléndez  Address:  Phone: [phone]  Fax:    Dialysis Facility (if applicable)   Name:  Address:  Dialysis Schedule:  Phone:  Fax:    / signature: Electronically signed by SHIKHA Epperson, MARIOW on 6/10/22 at 10:45 AM EDT     PHYSICIAN SECTION    Prognosis: Good    Condition at Discharge: Stable  Physician Certification: I certify the above information and transfer of Marcela Lennon  is necessary for the continuing treatment of the diagnosis listed and that he requires [facility] for less 30 days.      Update Admission H&P: No change in H&P    PHYSICIAN SIGNATURE: Electronically signed by Onel Hurtado MD on 6/10/22 at 10:34 AM NEYDAT

## 2022-06-12 NOTE — PROGRESS NOTES
Physical Therapy  Facility/Department: [facility] MED SURG E868/V105-01  Physical Therapy Discharge      NAME: Guerita Kat    : 1950 (9 y.o.)  MRN: [id number]    Account: [de-identified]  Gender: male      Patient has been discharged from acute care hospital. DC patient from current PT program.      Electronically signed by Trisha Reyes PT on 22 at 12:52 PM EDT

## 2022-06-13 ENCOUNTER — OFFICE VISIT (OUTPATIENT)
Dept: GERIATRIC MEDICINE | Age: 72
End: 2022-06-13
Payer: MEDICARE

## 2022-06-13 ENCOUNTER — APPOINTMENT (OUTPATIENT)
Dept: RADIATION ONCOLOGY | Age: 72
End: 2022-06-13
Payer: MEDICAID

## 2022-06-13 ENCOUNTER — HOSPITAL ENCOUNTER (OUTPATIENT)
Dept: RADIATION ONCOLOGY | Age: 72
Discharge: HOME | End: 2022-06-13
Payer: MEDICAID

## 2022-06-13 DIAGNOSIS — I73.9 PAD (PERIPHERAL ARTERY DISEASE) (HCC): ICD-10-CM

## 2022-06-13 DIAGNOSIS — I10 PRIMARY HYPERTENSION: ICD-10-CM

## 2022-06-13 DIAGNOSIS — I48.0 PAROXYSMAL ATRIAL FIBRILLATION (HCC): ICD-10-CM

## 2022-06-13 DIAGNOSIS — C61 PROSTATE CANCER METASTATIC TO BONE (HCC): Primary | ICD-10-CM

## 2022-06-13 DIAGNOSIS — C79.51 PROSTATE CANCER METASTATIC TO BONE (HCC): Primary | ICD-10-CM

## 2022-06-13 DIAGNOSIS — R53.1 WEAKNESS: ICD-10-CM

## 2022-06-13 DIAGNOSIS — G95.20 SPINAL CORD COMPRESSION (HCC): ICD-10-CM

## 2022-06-13 PROCEDURE — 77427 RADIATION TX MANAGEMENT X5: CPT | Performed by: RADIOLOGY

## 2022-06-13 PROCEDURE — 77336 RADIATION PHYSICS CONSULT: CPT | Performed by: RADIOLOGY

## 2022-06-13 PROCEDURE — 77387 GUIDANCE FOR RADJ TX DLVR: CPT | Performed by: RADIOLOGY

## 2022-06-13 PROCEDURE — 1124F ACP DISCUSS-NO DSCNMKR DOCD: CPT | Performed by: INTERNAL MEDICINE

## 2022-06-13 PROCEDURE — 77412 RADIATION TX DELIVERY LVL 3: CPT | Performed by: RADIOLOGY

## 2022-06-13 PROCEDURE — 99305 1ST NF CARE MODERATE MDM 35: CPT | Performed by: INTERNAL MEDICINE

## 2022-06-13 NOTE — PROGRESS NOTES
[facility]           Radiation Oncology      [address] [facility]        [address]        Georgie Simper: [phone]        F: [phone]       [place]                   Dr. 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 He developed severe back pain with rapidly progressive neurologic symptoms for which she was admitted to the hospital and went on to initiate radiation therapy while inpatient to the upper thoracic spine where he was found to have spinal cord compromise. Site Start TX Last [place] ED Fractions Dose Fx Dose Technique   T3  thru T5  22  13     10  3000 cGy   300 cGy  AP/PA                 Response/Tolerance: He tolerated therapy well with some improvement in pain however his neurologic functioning continued to deteriorate while he was receiving treatment to the point where he developed bowel and fecal incontinence. He did not require any treatment breaks. Follow-up: I will see him again as clinically needed and he will follow-up with palliative care. He will continue to receive physical therapy and may receive additional radiation therapy to other sites of osseous metastases as needed moving forward.       Sita Matt MD PhD  Radiation Oncologist, [address] [place] [address]

## 2022-06-13 NOTE — PROGRESS NOTES
Discharge instructions given and reviewed with patient. Follow up with Dr. Jose Raul Lora as needed. Encouraged patient to call with any questions or concerns.

## 2022-06-15 ENCOUNTER — OFFICE VISIT (OUTPATIENT)
Dept: GERIATRIC MEDICINE | Age: 72
End: 2022-06-15
Payer: MEDICARE

## 2022-06-15 DIAGNOSIS — I50.22 CHRONIC SYSTOLIC (CONGESTIVE) HEART FAILURE (HCC): ICD-10-CM

## 2022-06-15 DIAGNOSIS — C79.51 SPINE METASTASIS (HCC): Primary | ICD-10-CM

## 2022-06-15 DIAGNOSIS — C61 PROSTATE CANCER (HCC): ICD-10-CM

## 2022-06-15 DIAGNOSIS — J43.8 OTHER EMPHYSEMA (HCC): ICD-10-CM

## 2022-06-15 LAB
BASOPHILS ABSOLUTE: ABNORMAL
BASOPHILS RELATIVE PERCENT: 0.4 %
BUN BLDV-MCNC: 15 MG/DL
CALCIUM SERPL-MCNC: 8 MG/DL
CHLORIDE BLD-SCNC: 101 MMOL/L
CO2: 21 MMOL/L
CREAT SERPL-MCNC: 0.5 MG/DL
EOSINOPHILS ABSOLUTE: 0.1 /ΜL
EOSINOPHILS RELATIVE PERCENT: 2 %
GFR CALCULATED: NORMAL
GLUCOSE BLD-MCNC: 81 MG/DL
HCT VFR BLD CALC: 23.7 % (ref 41–53)
HEMOGLOBIN: 7.7 G/DL (ref 13.5–17.5)
LYMPHOCYTES ABSOLUTE: 0.7 /ΜL
LYMPHOCYTES RELATIVE PERCENT: 15.6 %
MCH RBC QN AUTO: 30.1 PG
MCHC RBC AUTO-ENTMCNC: 32.5 G/DL
MCV RBC AUTO: 92.7 FL
MONOCYTES ABSOLUTE: 0.3 /ΜL
MONOCYTES RELATIVE PERCENT: 5.9 %
NEUTROPHILS ABSOLUTE: 3.3 /ΜL
NEUTROPHILS RELATIVE PERCENT: 76.1 %
PLATELET # BLD: 121 K/ΜL
PMV BLD AUTO: 7.6 FL
POTASSIUM SERPL-SCNC: 4.1 MMOL/L
RBC # BLD: 2.55 10^6/ΜL
SODIUM BLD-SCNC: 134 MMOL/L
WBC # BLD: 4.3 10^3/ML

## 2022-06-15 PROCEDURE — 99309 SBSQ NF CARE MODERATE MDM 30: CPT | Performed by: FAMILY MEDICINE

## 2022-06-15 PROCEDURE — 1124F ACP DISCUSS-NO DSCNMKR DOCD: CPT | Performed by: FAMILY MEDICINE

## 2022-06-15 NOTE — PROGRESS NOTES
Subjective:      Patient was examined at [facility]  Address: [address] [place] [address], [facility], [address] [facility]  Phone: [phone]      Patient ID: Ofelia Ibarra is a 73 y.o. male being seen today. Patient recently had [facility] IR changed to Ultram p.o. twice daily as needed. Is tolerating this well and rates his pain 2 out of 10 today. Denies sedation on current med regime. States he is having constipation with just 1 small BM daily though occasionally going 2 to 3 days with only small bowel movements. Noted to have firm area in lower quadrant of abdomen.     Past Medical History:   Diagnosis Date    CAD (coronary artery disease)     Family history of colon cancer     Hyperlipidemia     Hypertension     Old MI (myocardial infarction) 02/03/2006    UTE on CPAP     PAD (peripheral artery disease) (HCC)     Prostate cancer [facility])      Past Surgical History:   Procedure Laterality Date    COLONOSCOPY N/A 08/19/2019    COLORECTAL CANCER SCREENING, NOT HIGH RISK performed by Rajni Cavazos MD at [facility]      X3    THORACENTESIS Right 03/07/2022    870 ml removed by Dr. Darwin Cesar - diagnostics sent    THORACENTESIS Left 03/08/2022    285 ml removed by Dr Darwin Cesar - cytology only sent     Family History   Problem Relation Age of Onset    Cancer Mother 76        BONE    Cancer Father 64        COLON    Colon Cancer Father     Cancer Brother 43        THYROID    Diabetes Maternal Grandmother      Social History     Socioeconomic History    Marital status: Single     Spouse name: Not on file    Number of children: 2    Years of education: Not on file    Highest education level: Associate degree: occupational, technical, or vocational program   Occupational History    Occupation:     Tobacco Use    Smoking status: Former Smoker     Packs/day: 2.00     Years: 25.00     Pack years: 50.00     Types: Cigarettes Quit date: 1995     Years since quittin.4    Smokeless tobacco: Never Used    Tobacco comment: RARE CIGAR FROM TIME TO TIME   Vaping Use    Vaping Use: Never used   Substance and Sexual Activity    Alcohol use: Yes     Alcohol/week: 4.0 standard drinks     Types: 2 Cans of beer, 2 Shots of liquor per week     Comment: Social, but less in the last month    Drug use: Yes     Types: Marijuana Margkris Hernandez)    Sexual activity: Not Currently   Other Topics Concern    Not on file   Social History Narrative    Single floor home    4 steps to get into the home    Full basement does not go downstairs    Lives with girlfriend and her son    He has two children one in [place] and one in [place]    One Dog     Working smoke detectors and CO2     Social Determinants of Health     Financial Resource Strain:     Difficulty of Paying Living Expenses: Not on file   Food Insecurity:     Worried About [address] in the Last Year: Not on file    [address] in the Last Year: Not on file   Transportation Needs:     Lack of Transportation (Medical): Not on file    Lack of Transportation (Non-Medical):  Not on file   Physical Activity:     Days of Exercise per Week: Not on file    Minutes of Exercise per Session: Not on file   Stress:     Feeling of Stress : Not on file   Social Connections:     Frequency of Communication with Friends and Family: Not on file    Frequency of Social Gatherings with Friends and Family: Not on file    Attends Adventism Services: Not on file    Active Member of Clubs or Organizations: Not on file    Attends Club or Organization Meetings: Not on file    Marital Status: Not on file   Intimate Partner Violence:     Fear of Current or Ex-Partner: Not on file    Emotionally Abused: Not on file    Physically Abused: Not on file    Sexually Abused: Not on file   Housing Stability:     Unable to Pay for Housing in the Last Year: Not on file    Number of [place] in the Last Year: Not on file    Unstable Housing in the Last Year: Not on file       Allergies: Morphine  NF MEDICATIONS REVIEWED    Review of Systems   Constitutional: Positive for fatigue. Negative for appetite change, fever and unexpected weight change. HENT: Negative. Eyes: Negative. Respiratory: Negative. Negative for cough, shortness of breath and wheezing. Cardiovascular: Negative. Gastrointestinal: Positive for constipation. Negative for abdominal distention, diarrhea, nausea and vomiting. Endocrine: Negative. Genitourinary: Negative. Musculoskeletal: Positive for back pain and gait problem. Skin: Negative. Neurological: Positive for weakness. Negative for dizziness and syncope. Psychiatric/Behavioral: Negative for confusion, sleep disturbance and suicidal ideas. The patient is not nervous/anxious. Objective:   /62   Pulse 61   Temp 97.9 °F (36.6 °C)   Resp 18   Wt 166 lb (75.3 kg)   SpO2 98%   BMI 23.82 kg/m²   Physical Exam  Vitals reviewed. Constitutional:       Appearance: He is ill-appearing. HENT:      Head: Normocephalic and atraumatic. Eyes:      Pupils: Pupils are equal, round, and reactive to light. Cardiovascular:      Rate and Rhythm: Normal rate. Pulmonary:      Effort: Pulmonary effort is normal.      Breath sounds: Normal breath sounds. Abdominal:      General: Bowel sounds are normal. There is distension. Palpations: Abdomen is soft. Tenderness: There is no abdominal tenderness. There is no guarding. Musculoskeletal:      Cervical back: Normal range of motion. Skin:     General: Skin is warm and dry. Neurological:      Mental Status: He is alert and oriented to person, place, and time. Motor: Weakness present.       Gait: Gait abnormal.   Psychiatric:         Behavior: Behavior normal.         Weight Trends  Wt Readings from Last 10 Encounters:   06/20/22 166 lb (75.3 kg)   06/06/22 176 lb 14.4 oz (80.2 kg)   06/04/22 174 lb (78.9 kg)   05/31/22 174 lb (78.9 kg)   05/16/22 177 lb 9.6 oz (80.6 kg)   04/25/22 180 lb (81.6 kg)   04/11/22 183 lb (83 kg)   03/24/22 192 lb (87.1 kg)   03/16/22 187 lb 6.4 oz (85 kg)   03/05/22 195 lb (88.5 kg)        Assessment and Plan:      Diagnosis Orders   1. Spine metastasis ([facility]     2. Prostate cancer ([facility]     3. Chronic systolic (congestive) heart failure     4. Other emphysema ([facility]        At this time patient states he is doing well in terms of pain management on Ultram p.o. twice daily as needed. Abdominal mass likely related to constipation bowel sounds normal at this time. Will start on senna S p.o. 2 tabs daily. States he is progressing well in therapy though still remains weaker than baseline    adhere to the JNC VIII guidelines for HTN managementand the NCEP ATP III guidelines for LDL-C management. No orders of the defined types were placed in this encounter. No orders of the defined types were placed in this encounter. Return if symptoms worsen or fail to improve. Encourage fluids and good nutrition. Stress fall prevention strategies. Electronically signed by JACQUELYN Mulligan CNP    Total time includes reviewing Plan of Care, labs, reviewing history, medications, and allergies, counseling patient, performing medically appropriate evaluation and examination, ordering medications, and documenting in the medical record. Please note this report is partially produced by using speech recognition hardware. It may contain errors related to the system, including grammar, punctuation and spelling as well as words and phrases that may seem inaccurate.   For any questions or concerns feel free to contact me for clarification

## 2022-06-17 ENCOUNTER — CARE COORDINATION (OUTPATIENT)
Dept: CARE COORDINATION | Age: 72
End: 2022-06-17

## 2022-06-17 ENCOUNTER — OFFICE VISIT (OUTPATIENT)
Dept: GERIATRIC MEDICINE | Age: 72
End: 2022-06-17
Payer: MEDICARE

## 2022-06-17 DIAGNOSIS — K59.03 CONSTIPATION DUE TO OPIOID THERAPY: Primary | ICD-10-CM

## 2022-06-17 DIAGNOSIS — T40.2X5A CONSTIPATION DUE TO OPIOID THERAPY: Primary | ICD-10-CM

## 2022-06-17 DIAGNOSIS — C61 PROSTATE CANCER (HCC): ICD-10-CM

## 2022-06-17 DIAGNOSIS — C79.51 SPINE METASTASIS (HCC): ICD-10-CM

## 2022-06-17 DIAGNOSIS — D64.9 ANEMIA, UNSPECIFIED TYPE: ICD-10-CM

## 2022-06-17 PROCEDURE — 99309 SBSQ NF CARE MODERATE MDM 30: CPT | Performed by: FAMILY MEDICINE

## 2022-06-17 PROCEDURE — 1124F ACP DISCUSS-NO DSCNMKR DOCD: CPT | Performed by: FAMILY MEDICINE

## 2022-06-17 NOTE — PROGRESS NOTES
Subjective:      Patient was examined at [facility]  Address: [address] [place] [address] Marija, [address] [place] [address]  Phone: [phone]      Patient ID: Guerita Kat is a 73 y.o. male being seen today. Patient's hemoglobin noted to be at 7.7 which is slight decline from last.  Patient states he is having bowel movement every 1 to 2 days without issue now. Noted decrease in size of middle lower quadrant abdominal mass.   Pain controlled well on Ultram. no further issues or concerns      Past Medical History:   Diagnosis Date    CAD (coronary artery disease)     Family history of colon cancer     Hyperlipidemia     Hypertension     Old MI (myocardial infarction) 2006    UTE on CPAP     PAD (peripheral artery disease) (HCC)     Prostate cancer [facility])      Past Surgical History:   Procedure Laterality Date    COLONOSCOPY N/A 2019    COLORECTAL CANCER SCREENING, NOT HIGH RISK performed by Darrick Pham MD at [address] [place] [address]      X3    THORACENTESIS Right 2022    870 ml removed by Dr. Yani Bailey - diagnostics sent    THORACENTESIS Left 2022    285 ml removed by Dr Yani Bailey - cytology only sent     Family History   Problem Relation Age of Onset    Cancer Mother 76        BONE    Cancer Father 64        COLON    Colon Cancer Father     Cancer Brother 43        THYROID    Diabetes Maternal Grandmother      Social History     Socioeconomic History    Marital status: Single     Spouse name: Not on file    Number of children: 2    Years of education: Not on file    Highest education level: Associate degree: occupational, technical, or vocational program   Occupational History    Occupation:     Tobacco Use    Smoking status: Former Smoker     Packs/day: 2.00     Years: 25.00     Pack years: 50.00     Types: Cigarettes     Quit date: 1995     Years since quittin.4    Smokeless tobacco: Never Used    Tobacco comment: RARE CIGAR FROM TIME TO TIME   Vaping Use    Vaping Use: Never used   Substance and Sexual Activity    Alcohol use: Yes     Alcohol/week: 4.0 standard drinks     Types: 2 Cans of beer, 2 Shots of liquor per week     Comment: Social, but less in the last month    Drug use: Yes     Types: Marijuana Teressa November)    Sexual activity: Not Currently   Other Topics Concern    Not on file   Social History Narrative    Single floor home    4 steps to get into the home    Full basement does not go downstairs    Lives with girlfriend and her son    He has two children one in [place] and one in [place]    One Dog     Working smoke detectors and CO2     Social Determinants of Health     Financial Resource Strain:     Difficulty of Paying Living Expenses: Not on file   Food Insecurity:     Worried About [address] in the Last Year: Not on file    [address] in the Last Year: Not on file   Transportation Needs:     Lack of Transportation (Medical): Not on file    Lack of Transportation (Non-Medical):  Not on file   Physical Activity:     Days of Exercise per Week: Not on file    Minutes of Exercise per Session: Not on file   Stress:     Feeling of Stress : Not on file   Social Connections:     Frequency of Communication with Friends and Family: Not on file    Frequency of Social Gatherings with Friends and Family: Not on file    Attends Catholic Services: Not on file    Active Member of [address] [place] [address] or Organizations: Not on file    Attends Club or Organization Meetings: Not on file    Marital Status: Not on file   Intimate Partner Violence:     Fear of Current or Ex-Partner: Not on file    Emotionally Abused: Not on file    Physically Abused: Not on file    Sexually Abused: Not on file   Housing Stability:     Unable to Pay for Housing in the Last Year: Not on file    Number of [place] in the Last Year: Not on file    Unstable Housing in the Last Year: Not on file Allergies: Morphine  NF MEDICATIONS REVIEWED    Review of Systems   Constitutional: Positive for fatigue. Negative for appetite change, fever and unexpected weight change. HENT: Negative. Eyes: Negative. Respiratory: Negative. Negative for cough, shortness of breath and wheezing. Cardiovascular: Negative. Gastrointestinal: Positive for constipation. Negative for abdominal distention, diarrhea, nausea and vomiting. Endocrine: Negative. Genitourinary: Negative. Musculoskeletal: Positive for arthralgias, back pain and gait problem. Skin: Negative. Neurological: Positive for weakness. Negative for dizziness and syncope. Psychiatric/Behavioral: Negative for confusion, sleep disturbance and suicidal ideas. The patient is not nervous/anxious. Objective:   BP (!) 112/58   Pulse 68   Temp 97.4 °F (36.3 °C)   Resp 18   SpO2 97%   Physical Exam  Vitals reviewed. Constitutional:       Appearance: He is ill-appearing. HENT:      Head: Normocephalic and atraumatic. Eyes:      Pupils: Pupils are equal, round, and reactive to light. Cardiovascular:      Rate and Rhythm: Normal rate. Pulmonary:      Effort: Pulmonary effort is normal.      Breath sounds: Normal breath sounds. Abdominal:      General: Bowel sounds are normal. There is distension. Palpations: Abdomen is soft. Tenderness: There is no abdominal tenderness. There is no guarding. Musculoskeletal:      Cervical back: Normal range of motion. Skin:     General: Skin is warm and dry. Neurological:      Mental Status: He is alert and oriented to person, place, and time. Motor: Weakness present.       Gait: Gait abnormal.   Psychiatric:         Behavior: Behavior normal.         Weight Trends  Wt Readings from Last 10 Encounters:   06/20/22 166 lb (75.3 kg)   06/06/22 176 lb 14.4 oz (80.2 kg)   06/04/22 174 lb (78.9 kg)   05/31/22 174 lb (78.9 kg)   05/16/22 177 lb 9.6 oz (80.6 kg)   04/25/22 180 lb (81.6 kg)   04/11/22 183 lb (83 kg)   03/24/22 192 lb (87.1 kg)   03/16/22 187 lb 6.4 oz (85 kg)   03/05/22 195 lb (88.5 kg)        Assessment and Plan:      Diagnosis Orders   1. Constipation due to opioid therapy     2. Anemia, unspecified type     3. Prostate cancer ([facility]     4. Spine metastasis ([facility]        Patient's bowel movements have improved ordered repeat CBC. Pain controlled well on current regime vital signs stable no other acute concerns or complaints    adhere to the JNC VIII guidelines for HTN managementand the NCEP ATP III guidelines for LDL-C management. No orders of the defined types were placed in this encounter. No orders of the defined types were placed in this encounter. Return if symptoms worsen or fail to improve. Encourage fluids and good nutrition. Stress fall prevention strategies. Electronically signed by JACQUELYN Flores CNP    Total time includes reviewing Plan of Care, labs, reviewing history, medications, and allergies, counseling patient, performing medically appropriate evaluation and examination, ordering medications, and documenting in the medical record. Please note this report is partially produced by using speech recognition hardware. It may contain errors related to the system, including grammar, punctuation and spelling as well as words and phrases that may seem inaccurate.   For any questions or concerns feel free to contact me for clarification

## 2022-06-17 NOTE — CARE COORDINATION
Patient has called several times this afternoon without leaving messages  Attempted to contact patient for care coordination follow up. Unable to reach patient by phone. Message left regarding purpose of call. Number provided and call back requested.

## 2022-06-20 ENCOUNTER — TELEPHONE (OUTPATIENT)
Dept: FAMILY MEDICINE CLINIC | Age: 72
End: 2022-06-20

## 2022-06-20 VITALS
BODY MASS INDEX: 23.82 KG/M2 | WEIGHT: 166 LBS | HEART RATE: 61 BPM | OXYGEN SATURATION: 98 % | DIASTOLIC BLOOD PRESSURE: 62 MMHG | TEMPERATURE: 97.9 F | RESPIRATION RATE: 18 BRPM | SYSTOLIC BLOOD PRESSURE: 115 MMHG

## 2022-06-20 DIAGNOSIS — G83.0: Primary | ICD-10-CM

## 2022-06-20 PROBLEM — I50.22 CHRONIC SYSTOLIC (CONGESTIVE) HEART FAILURE (HCC): Status: ACTIVE | Noted: 2022-06-20

## 2022-06-20 LAB
EKG ATRIAL RATE: 75 BPM
EKG P AXIS: -1 DEGREES
EKG P-R INTERVAL: 192 MS
EKG Q-T INTERVAL: 482 MS
EKG QRS DURATION: 116 MS
EKG QTC CALCULATION (BAZETT): 538 MS
EKG R AXIS: -16 DEGREES
EKG T AXIS: 246 DEGREES
EKG VENTRICULAR RATE: 75 BPM

## 2022-06-20 RX ORDER — OXYCODONE HYDROCHLORIDE 5 MG/1
5 TABLET ORAL EVERY 4 HOURS PRN
COMMUNITY
End: 2022-07-26

## 2022-06-20 RX ORDER — DOCUSATE SODIUM 100 MG/1
100 CAPSULE, LIQUID FILLED ORAL DAILY
COMMUNITY
End: 2022-07-26

## 2022-06-20 RX ORDER — LOSARTAN POTASSIUM 25 MG/1
25 TABLET ORAL DAILY
COMMUNITY
End: 2022-07-26

## 2022-06-20 RX ORDER — PREDNISONE 50 MG/1
50 TABLET ORAL DAILY
COMMUNITY
Start: 2022-06-04 | End: 2022-07-26

## 2022-06-20 RX ORDER — METOPROLOL SUCCINATE 25 MG/1
25 TABLET, EXTENDED RELEASE ORAL DAILY
COMMUNITY
End: 2022-07-26

## 2022-06-20 RX ORDER — ASCORBIC ACID 500 MG
500 TABLET ORAL DAILY
COMMUNITY
End: 2022-07-26

## 2022-06-20 ASSESSMENT — ENCOUNTER SYMPTOMS
VOMITING: 0
NAUSEA: 0
DIARRHEA: 0
BACK PAIN: 1
CONSTIPATION: 1
COUGH: 0
SHORTNESS OF BREATH: 0
ABDOMINAL DISTENTION: 0
RESPIRATORY NEGATIVE: 1
EYES NEGATIVE: 1
WHEEZING: 0

## 2022-06-20 NOTE — TELEPHONE ENCOUNTER
----- Message from Kemar Ospina sent at 6/20/2022  9:54 AM EDT -----  Subject: Referral Request    QUESTIONS   Reason for referral request? 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 No   Preferred Specialist (if applicable)? Do you already have an appointment scheduled? No  Additional Information for Provider?   ---------------------------------------------------------------------------  --------------  CALL BACK INFO  What is the best way for the office to contact you? OK to leave message on   voicemail  Preferred Call Back Phone Number? [phone]  ---------------------------------------------------------------------------  --------------  SCRIPT ANSWERS  Relationship to Patient?  Self

## 2022-06-21 ENCOUNTER — HOSPITAL ENCOUNTER (INPATIENT)
Age: 72
LOS: 2 days | Discharge: HOSPICE | DRG: 543 | End: 2022-06-24
Attending: INTERNAL MEDICINE | Admitting: INTERNAL MEDICINE
Payer: MEDICARE

## 2022-06-21 ENCOUNTER — APPOINTMENT (OUTPATIENT)
Dept: GENERAL RADIOLOGY | Age: 72
DRG: 543 | End: 2022-06-21
Payer: MEDICARE

## 2022-06-21 ENCOUNTER — CARE COORDINATION (OUTPATIENT)
Dept: CARE COORDINATION | Age: 72
End: 2022-06-21

## 2022-06-21 ENCOUNTER — OFFICE VISIT (OUTPATIENT)
Dept: GERIATRIC MEDICINE | Age: 72
End: 2022-06-21
Payer: MEDICARE

## 2022-06-21 ENCOUNTER — APPOINTMENT (OUTPATIENT)
Dept: CT IMAGING | Age: 72
DRG: 543 | End: 2022-06-21
Payer: MEDICARE

## 2022-06-21 VITALS
SYSTOLIC BLOOD PRESSURE: 112 MMHG | TEMPERATURE: 97.4 F | RESPIRATION RATE: 18 BRPM | DIASTOLIC BLOOD PRESSURE: 58 MMHG | OXYGEN SATURATION: 97 % | HEART RATE: 68 BPM

## 2022-06-21 DIAGNOSIS — G95.20 SPINAL CORD COMPRESSION (HCC): ICD-10-CM

## 2022-06-21 DIAGNOSIS — R33.9 URINARY RETENTION: ICD-10-CM

## 2022-06-21 DIAGNOSIS — C41.9 METASTATIC BONE CANCER (HCC): Primary | ICD-10-CM

## 2022-06-21 DIAGNOSIS — R11.2 INTRACTABLE NAUSEA AND VOMITING: Primary | ICD-10-CM

## 2022-06-21 DIAGNOSIS — I50.22 CHRONIC SYSTOLIC (CONGESTIVE) HEART FAILURE (HCC): ICD-10-CM

## 2022-06-21 PROBLEM — K59.03 CONSTIPATION DUE TO OPIOID THERAPY: Status: ACTIVE | Noted: 2022-06-21

## 2022-06-21 PROBLEM — T40.2X5A CONSTIPATION DUE TO OPIOID THERAPY: Status: ACTIVE | Noted: 2022-06-21

## 2022-06-21 LAB
ALBUMIN SERPL-MCNC: 3.7 G/DL (ref 3.5–4.6)
ALP BLD-CCNC: 430 U/L (ref 35–104)
ALT SERPL-CCNC: 15 U/L (ref 0–41)
ANION GAP SERPL CALCULATED.3IONS-SCNC: 23 MEQ/L (ref 9–15)
APTT: 33.2 SEC (ref 24.4–36.8)
AST SERPL-CCNC: 26 U/L (ref 0–40)
BASOPHILS ABSOLUTE: 0 K/UL (ref 0–0.2)
BASOPHILS RELATIVE PERCENT: 0.6 %
BILIRUB SERPL-MCNC: 1 MG/DL (ref 0.2–0.7)
BUN BLDV-MCNC: 12 MG/DL (ref 8–23)
CALCIUM SERPL-MCNC: 9.3 MG/DL (ref 8.5–9.9)
CHLORIDE BLD-SCNC: 96 MEQ/L (ref 95–107)
CO2: 16 MEQ/L (ref 20–31)
CREAT SERPL-MCNC: 0.81 MG/DL (ref 0.7–1.2)
EOSINOPHILS ABSOLUTE: 0 K/UL (ref 0–0.7)
EOSINOPHILS RELATIVE PERCENT: 0.8 %
GFR AFRICAN AMERICAN: >60
GFR NON-AFRICAN AMERICAN: >60
GLOBULIN: 2.9 G/DL (ref 2.3–3.5)
GLUCOSE BLD-MCNC: 127 MG/DL (ref 70–99)
HCT VFR BLD CALC: 30.2 % (ref 42–52)
HEMOGLOBIN: 10.1 G/DL (ref 14–18)
INR BLD: 1.2
LYMPHOCYTES ABSOLUTE: 0.7 K/UL (ref 1–4.8)
LYMPHOCYTES RELATIVE PERCENT: 13.7 %
MAGNESIUM: 2.5 MG/DL (ref 1.7–2.4)
MCH RBC QN AUTO: 30.4 PG (ref 27–31.3)
MCHC RBC AUTO-ENTMCNC: 33.3 % (ref 33–37)
MCV RBC AUTO: 91.2 FL (ref 80–100)
MONOCYTES ABSOLUTE: 0.3 K/UL (ref 0.2–0.8)
MONOCYTES RELATIVE PERCENT: 6.5 %
NEUTROPHILS ABSOLUTE: 3.7 K/UL (ref 1.4–6.5)
NEUTROPHILS RELATIVE PERCENT: 78.4 %
PDW BLD-RTO: 21.3 % (ref 11.5–14.5)
PLATELET # BLD: 153 K/UL (ref 130–400)
POC CREATININE WHOLE BLOOD: 0.7
POTASSIUM SERPL-SCNC: 3.8 MEQ/L (ref 3.4–4.9)
PRO-BNP: 4341 PG/ML
PROCALCITONIN: 0.11 NG/ML (ref 0–0.15)
PROTHROMBIN TIME: 15 SEC (ref 12.3–14.9)
RBC # BLD: 3.31 M/UL (ref 4.7–6.1)
SODIUM BLD-SCNC: 135 MEQ/L (ref 135–144)
TOTAL PROTEIN: 6.6 G/DL (ref 6.3–8)
TROPONIN: <0.01 NG/ML (ref 0–0.01)
TSH REFLEX: 1.84 UIU/ML (ref 0.44–3.86)
WBC # BLD: 4.8 K/UL (ref 4.8–10.8)

## 2022-06-21 PROCEDURE — 1124F ACP DISCUSS-NO DSCNMKR DOCD: CPT | Performed by: INTERNAL MEDICINE

## 2022-06-21 PROCEDURE — 2580000003 HC RX 258: Performed by: STUDENT IN AN ORGANIZED HEALTH CARE EDUCATION/TRAINING PROGRAM

## 2022-06-21 PROCEDURE — 84484 ASSAY OF TROPONIN QUANT: CPT

## 2022-06-21 PROCEDURE — A4216 STERILE WATER/SALINE, 10 ML: HCPCS | Performed by: STUDENT IN AN ORGANIZED HEALTH CARE EDUCATION/TRAINING PROGRAM

## 2022-06-21 PROCEDURE — 2500000003 HC RX 250 WO HCPCS: Performed by: STUDENT IN AN ORGANIZED HEALTH CARE EDUCATION/TRAINING PROGRAM

## 2022-06-21 PROCEDURE — 96374 THER/PROPH/DIAG INJ IV PUSH: CPT

## 2022-06-21 PROCEDURE — 71045 X-RAY EXAM CHEST 1 VIEW: CPT

## 2022-06-21 PROCEDURE — 96375 TX/PRO/DX INJ NEW DRUG ADDON: CPT

## 2022-06-21 PROCEDURE — 99285 EMERGENCY DEPT VISIT HI MDM: CPT

## 2022-06-21 PROCEDURE — 93005 ELECTROCARDIOGRAM TRACING: CPT | Performed by: STUDENT IN AN ORGANIZED HEALTH CARE EDUCATION/TRAINING PROGRAM

## 2022-06-21 PROCEDURE — 36415 COLL VENOUS BLD VENIPUNCTURE: CPT

## 2022-06-21 PROCEDURE — 6360000004 HC RX CONTRAST MEDICATION: Performed by: STUDENT IN AN ORGANIZED HEALTH CARE EDUCATION/TRAINING PROGRAM

## 2022-06-21 PROCEDURE — 80053 COMPREHEN METABOLIC PANEL: CPT

## 2022-06-21 PROCEDURE — 81003 URINALYSIS AUTO W/O SCOPE: CPT

## 2022-06-21 PROCEDURE — 84443 ASSAY THYROID STIM HORMONE: CPT

## 2022-06-21 PROCEDURE — 83880 ASSAY OF NATRIURETIC PEPTIDE: CPT

## 2022-06-21 PROCEDURE — 74177 CT ABD & PELVIS W/CONTRAST: CPT

## 2022-06-21 PROCEDURE — 84145 PROCALCITONIN (PCT): CPT

## 2022-06-21 PROCEDURE — 6360000002 HC RX W HCPCS: Performed by: STUDENT IN AN ORGANIZED HEALTH CARE EDUCATION/TRAINING PROGRAM

## 2022-06-21 PROCEDURE — 99308 SBSQ NF CARE LOW MDM 20: CPT | Performed by: INTERNAL MEDICINE

## 2022-06-21 PROCEDURE — 96372 THER/PROPH/DIAG INJ SC/IM: CPT

## 2022-06-21 PROCEDURE — 85025 COMPLETE CBC W/AUTO DIFF WBC: CPT

## 2022-06-21 PROCEDURE — 85730 THROMBOPLASTIN TIME PARTIAL: CPT

## 2022-06-21 PROCEDURE — 71275 CT ANGIOGRAPHY CHEST: CPT

## 2022-06-21 PROCEDURE — 96376 TX/PRO/DX INJ SAME DRUG ADON: CPT

## 2022-06-21 PROCEDURE — 85610 PROTHROMBIN TIME: CPT

## 2022-06-21 PROCEDURE — 83735 ASSAY OF MAGNESIUM: CPT

## 2022-06-21 RX ORDER — 0.9 % SODIUM CHLORIDE 0.9 %
1000 INTRAVENOUS SOLUTION INTRAVENOUS ONCE
Status: COMPLETED | OUTPATIENT
Start: 2022-06-21 | End: 2022-06-21

## 2022-06-21 RX ORDER — DILTIAZEM HYDROCHLORIDE 5 MG/ML
10 INJECTION INTRAVENOUS ONCE
Status: DISCONTINUED | OUTPATIENT
Start: 2022-06-21 | End: 2022-06-21

## 2022-06-21 RX ORDER — 0.9 % SODIUM CHLORIDE 0.9 %
1000 INTRAVENOUS SOLUTION INTRAVENOUS ONCE
Status: COMPLETED | OUTPATIENT
Start: 2022-06-21 | End: 2022-06-22

## 2022-06-21 RX ORDER — METOPROLOL TARTRATE 5 MG/5ML
5 INJECTION INTRAVENOUS ONCE
Status: COMPLETED | OUTPATIENT
Start: 2022-06-21 | End: 2022-06-21

## 2022-06-21 RX ORDER — ONDANSETRON 2 MG/ML
4 INJECTION INTRAMUSCULAR; INTRAVENOUS ONCE
Status: COMPLETED | OUTPATIENT
Start: 2022-06-21 | End: 2022-06-21

## 2022-06-21 RX ADMIN — ONDANSETRON 4 MG: 2 INJECTION INTRAMUSCULAR; INTRAVENOUS at 21:11

## 2022-06-21 RX ADMIN — FAMOTIDINE 20 MG: 10 INJECTION, SOLUTION INTRAVENOUS at 21:09

## 2022-06-21 RX ADMIN — SODIUM CHLORIDE 1000 ML: 9 INJECTION, SOLUTION INTRAVENOUS at 21:14

## 2022-06-21 RX ADMIN — METOPROLOL TARTRATE 5 MG: 1 INJECTION, SOLUTION INTRAVENOUS at 22:35

## 2022-06-21 RX ADMIN — IOPAMIDOL 75 ML: 612 INJECTION, SOLUTION INTRAVENOUS at 21:41

## 2022-06-21 RX ADMIN — HYDROMORPHONE HYDROCHLORIDE 0.5 MG: 1 INJECTION, SOLUTION INTRAMUSCULAR; INTRAVENOUS; SUBCUTANEOUS at 22:33

## 2022-06-21 RX ADMIN — HYDROMORPHONE HYDROCHLORIDE 0.5 MG: 1 INJECTION, SOLUTION INTRAMUSCULAR; INTRAVENOUS; SUBCUTANEOUS at 21:12

## 2022-06-21 RX ADMIN — ONDANSETRON 4 MG: 2 INJECTION INTRAMUSCULAR; INTRAVENOUS at 22:32

## 2022-06-21 ASSESSMENT — PAIN SCALES - GENERAL
PAINLEVEL_OUTOF10: 8
PAINLEVEL_OUTOF10: 10
PAINLEVEL_OUTOF10: 5
PAINLEVEL_OUTOF10: 7
PAINLEVEL_OUTOF10: 7
PAINLEVEL_OUTOF10: 6

## 2022-06-21 ASSESSMENT — ENCOUNTER SYMPTOMS
COUGH: 0
BACK PAIN: 1
NAUSEA: 0
DIARRHEA: 0
SHORTNESS OF BREATH: 0
CONSTIPATION: 1
VOMITING: 0
EYES NEGATIVE: 1
ABDOMINAL DISTENTION: 0
WHEEZING: 0
RESPIRATORY NEGATIVE: 1

## 2022-06-21 ASSESSMENT — PAIN - FUNCTIONAL ASSESSMENT
PAIN_FUNCTIONAL_ASSESSMENT: 0-10

## 2022-06-21 NOTE — CARE COORDINATION
Patient left a message asking about getting help for admission to [facility] in [facility].     I called back leaving a message asking for details related to his request.

## 2022-06-22 PROBLEM — C41.9 METASTATIC BONE CANCER (HCC): Status: ACTIVE | Noted: 2022-06-22

## 2022-06-22 LAB
BILIRUBIN URINE: NEGATIVE
BLOOD, URINE: NEGATIVE
CLARITY: CLEAR
COLOR: YELLOW
EKG ATRIAL RATE: 122 BPM
EKG Q-T INTERVAL: 370 MS
EKG QRS DURATION: 106 MS
EKG QTC CALCULATION (BAZETT): 542 MS
EKG R AXIS: -40 DEGREES
EKG T AXIS: 6 DEGREES
EKG VENTRICULAR RATE: 129 BPM
GLUCOSE URINE: NEGATIVE MG/DL
KETONES, URINE: NEGATIVE MG/DL
LACTIC ACID: 4.3 MMOL/L (ref 0.5–2.2)
LEUKOCYTE ESTERASE, URINE: NEGATIVE
NITRITE, URINE: NEGATIVE
PH UA: 7.5 (ref 5–9)
PROTEIN UA: NEGATIVE MG/DL
SPECIFIC GRAVITY UA: 1.01 (ref 1–1.03)
TROPONIN: 0.03 NG/ML (ref 0–0.01)
TROPONIN: 0.04 NG/ML (ref 0–0.01)
URINE REFLEX TO CULTURE: NORMAL
UROBILINOGEN, URINE: 0.2 E.U./DL

## 2022-06-22 PROCEDURE — 6370000000 HC RX 637 (ALT 250 FOR IP): Performed by: INTERNAL MEDICINE

## 2022-06-22 PROCEDURE — 2580000003 HC RX 258: Performed by: INTERNAL MEDICINE

## 2022-06-22 PROCEDURE — 36415 COLL VENOUS BLD VENIPUNCTURE: CPT

## 2022-06-22 PROCEDURE — 6360000002 HC RX W HCPCS: Performed by: INTERNAL MEDICINE

## 2022-06-22 PROCEDURE — 83605 ASSAY OF LACTIC ACID: CPT

## 2022-06-22 PROCEDURE — 1210000000 HC MED SURG R&B

## 2022-06-22 PROCEDURE — 6360000002 HC RX W HCPCS: Performed by: STUDENT IN AN ORGANIZED HEALTH CARE EDUCATION/TRAINING PROGRAM

## 2022-06-22 PROCEDURE — 93010 ELECTROCARDIOGRAM REPORT: CPT | Performed by: INTERNAL MEDICINE

## 2022-06-22 PROCEDURE — 2580000003 HC RX 258: Performed by: STUDENT IN AN ORGANIZED HEALTH CARE EDUCATION/TRAINING PROGRAM

## 2022-06-22 PROCEDURE — 84484 ASSAY OF TROPONIN QUANT: CPT

## 2022-06-22 PROCEDURE — 2500000003 HC RX 250 WO HCPCS: Performed by: INTERNAL MEDICINE

## 2022-06-22 RX ORDER — CLOPIDOGREL BISULFATE 75 MG/1
75 TABLET ORAL DAILY
Status: DISCONTINUED | OUTPATIENT
Start: 2022-06-22 | End: 2022-06-24 | Stop reason: HOSPADM

## 2022-06-22 RX ORDER — ACETAMINOPHEN 650 MG/1
650 SUPPOSITORY RECTAL EVERY 6 HOURS PRN
Status: DISCONTINUED | OUTPATIENT
Start: 2022-06-22 | End: 2022-06-24 | Stop reason: HOSPADM

## 2022-06-22 RX ORDER — ENOXAPARIN SODIUM 100 MG/ML
40 INJECTION SUBCUTANEOUS DAILY
Status: DISCONTINUED | OUTPATIENT
Start: 2022-06-22 | End: 2022-06-24 | Stop reason: HOSPADM

## 2022-06-22 RX ORDER — POLYETHYLENE GLYCOL 3350 17 G/17G
17 POWDER, FOR SOLUTION ORAL DAILY PRN
Status: DISCONTINUED | OUTPATIENT
Start: 2022-06-22 | End: 2022-06-24 | Stop reason: HOSPADM

## 2022-06-22 RX ORDER — TAMSULOSIN HYDROCHLORIDE 0.4 MG/1
0.4 CAPSULE ORAL DAILY
Status: DISCONTINUED | OUTPATIENT
Start: 2022-06-22 | End: 2022-06-24 | Stop reason: HOSPADM

## 2022-06-22 RX ORDER — ACETAMINOPHEN 325 MG/1
650 TABLET ORAL EVERY 6 HOURS PRN
Status: DISCONTINUED | OUTPATIENT
Start: 2022-06-22 | End: 2022-06-24 | Stop reason: HOSPADM

## 2022-06-22 RX ORDER — METOPROLOL TARTRATE 5 MG/5ML
2.5 INJECTION INTRAVENOUS EVERY 5 MIN PRN
Status: DISCONTINUED | OUTPATIENT
Start: 2022-06-22 | End: 2022-06-22

## 2022-06-22 RX ORDER — METOPROLOL TARTRATE 5 MG/5ML
2.5 INJECTION INTRAVENOUS EVERY 4 HOURS PRN
Status: DISCONTINUED | OUTPATIENT
Start: 2022-06-22 | End: 2022-06-24 | Stop reason: HOSPADM

## 2022-06-22 RX ORDER — ASCORBIC ACID 500 MG
500 TABLET ORAL DAILY
Status: DISCONTINUED | OUTPATIENT
Start: 2022-06-22 | End: 2022-06-24 | Stop reason: HOSPADM

## 2022-06-22 RX ORDER — SODIUM CHLORIDE, SODIUM LACTATE, POTASSIUM CHLORIDE, AND CALCIUM CHLORIDE .6; .31; .03; .02 G/100ML; G/100ML; G/100ML; G/100ML
1000 INJECTION, SOLUTION INTRAVENOUS ONCE
Status: COMPLETED | OUTPATIENT
Start: 2022-06-22 | End: 2022-06-22

## 2022-06-22 RX ORDER — ONDANSETRON 4 MG/1
4 TABLET, ORALLY DISINTEGRATING ORAL EVERY 8 HOURS PRN
Status: DISCONTINUED | OUTPATIENT
Start: 2022-06-22 | End: 2022-06-22

## 2022-06-22 RX ORDER — ONDANSETRON 2 MG/ML
4 INJECTION INTRAMUSCULAR; INTRAVENOUS EVERY 6 HOURS PRN
Status: DISCONTINUED | OUTPATIENT
Start: 2022-06-22 | End: 2022-06-22

## 2022-06-22 RX ORDER — PROMETHAZINE HYDROCHLORIDE 25 MG/ML
25 INJECTION, SOLUTION INTRAMUSCULAR; INTRAVENOUS ONCE
Status: COMPLETED | OUTPATIENT
Start: 2022-06-22 | End: 2022-06-22

## 2022-06-22 RX ORDER — POLYETHYLENE GLYCOL 3350 17 G/17G
17 POWDER, FOR SOLUTION ORAL DAILY PRN
Status: DISCONTINUED | OUTPATIENT
Start: 2022-06-22 | End: 2022-06-22

## 2022-06-22 RX ORDER — ATORVASTATIN CALCIUM 40 MG/1
40 TABLET, FILM COATED ORAL DAILY
Status: DISCONTINUED | OUTPATIENT
Start: 2022-06-22 | End: 2022-06-24 | Stop reason: HOSPADM

## 2022-06-22 RX ORDER — ASPIRIN 81 MG/1
81 TABLET ORAL DAILY
Status: DISCONTINUED | OUTPATIENT
Start: 2022-06-22 | End: 2022-06-24 | Stop reason: HOSPADM

## 2022-06-22 RX ORDER — ACETAMINOPHEN 325 MG/1
650 TABLET ORAL EVERY 6 HOURS PRN
COMMUNITY

## 2022-06-22 RX ORDER — TRAMADOL HYDROCHLORIDE 50 MG/1
50 TABLET ORAL EVERY 6 HOURS PRN
Status: DISCONTINUED | OUTPATIENT
Start: 2022-06-22 | End: 2022-06-24 | Stop reason: HOSPADM

## 2022-06-22 RX ADMIN — SODIUM CHLORIDE 1000 ML: 9 INJECTION, SOLUTION INTRAVENOUS at 00:06

## 2022-06-22 RX ADMIN — METOPROLOL TARTRATE 25 MG: 25 TABLET, FILM COATED ORAL at 16:20

## 2022-06-22 RX ADMIN — HYDROMORPHONE HYDROCHLORIDE 1 MG: 1 INJECTION, SOLUTION INTRAMUSCULAR; INTRAVENOUS; SUBCUTANEOUS at 00:40

## 2022-06-22 RX ADMIN — OXYCODONE HYDROCHLORIDE AND ACETAMINOPHEN 500 MG: 500 TABLET ORAL at 08:51

## 2022-06-22 RX ADMIN — ASPIRIN 81 MG: 81 TABLET, COATED ORAL at 08:52

## 2022-06-22 RX ADMIN — SODIUM CHLORIDE, POTASSIUM CHLORIDE, SODIUM LACTATE AND CALCIUM CHLORIDE 1000 ML: 600; 310; 30; 20 INJECTION, SOLUTION INTRAVENOUS at 05:17

## 2022-06-22 RX ADMIN — HYDROMORPHONE HYDROCHLORIDE 0.25 MG: 1 INJECTION, SOLUTION INTRAMUSCULAR; INTRAVENOUS; SUBCUTANEOUS at 05:36

## 2022-06-22 RX ADMIN — METOPROLOL TARTRATE 25 MG: 25 TABLET, FILM COATED ORAL at 20:02

## 2022-06-22 RX ADMIN — ATORVASTATIN CALCIUM 40 MG: 40 TABLET, FILM COATED ORAL at 08:51

## 2022-06-22 RX ADMIN — ENOXAPARIN SODIUM 40 MG: 100 INJECTION SUBCUTANEOUS at 08:52

## 2022-06-22 RX ADMIN — METOROPROLOL TARTRATE 2.5 MG: 5 INJECTION, SOLUTION INTRAVENOUS at 05:17

## 2022-06-22 RX ADMIN — HYDROMORPHONE HYDROCHLORIDE 0.5 MG: 1 INJECTION, SOLUTION INTRAMUSCULAR; INTRAVENOUS; SUBCUTANEOUS at 20:02

## 2022-06-22 RX ADMIN — TAMSULOSIN HYDROCHLORIDE 0.4 MG: 0.4 CAPSULE ORAL at 08:52

## 2022-06-22 RX ADMIN — PROMETHAZINE HYDROCHLORIDE 25 MG: 25 INJECTION INTRAMUSCULAR; INTRAVENOUS at 01:25

## 2022-06-22 RX ADMIN — HYDROMORPHONE HYDROCHLORIDE 0.5 MG: 1 INJECTION, SOLUTION INTRAMUSCULAR; INTRAVENOUS; SUBCUTANEOUS at 12:27

## 2022-06-22 RX ADMIN — CLOPIDOGREL BISULFATE 75 MG: 75 TABLET ORAL at 08:51

## 2022-06-22 ASSESSMENT — ENCOUNTER SYMPTOMS
DIARRHEA: 0
BACK PAIN: 1
SHORTNESS OF BREATH: 0
SORE THROAT: 0
ABDOMINAL PAIN: 1
CHEST TIGHTNESS: 0
EYE PAIN: 0
NAUSEA: 0

## 2022-06-22 ASSESSMENT — PAIN SCALES - GENERAL
PAINLEVEL_OUTOF10: 4
PAINLEVEL_OUTOF10: 9
PAINLEVEL_OUTOF10: 10
PAINLEVEL_OUTOF10: 7
PAINLEVEL_OUTOF10: 7
PAINLEVEL_OUTOF10: 5
PAINLEVEL_OUTOF10: 4
PAINLEVEL_OUTOF10: 3
PAINLEVEL_OUTOF10: 3

## 2022-06-22 ASSESSMENT — PAIN - FUNCTIONAL ASSESSMENT
PAIN_FUNCTIONAL_ASSESSMENT: PREVENTS OR INTERFERES SOME ACTIVE ACTIVITIES AND ADLS
PAIN_FUNCTIONAL_ASSESSMENT: 0-10
PAIN_FUNCTIONAL_ASSESSMENT: PREVENTS OR INTERFERES SOME ACTIVE ACTIVITIES AND ADLS
PAIN_FUNCTIONAL_ASSESSMENT: 0-10

## 2022-06-22 ASSESSMENT — PAIN DESCRIPTION - FREQUENCY: FREQUENCY: CONTINUOUS

## 2022-06-22 ASSESSMENT — PAIN DESCRIPTION - LOCATION
LOCATION: BACK

## 2022-06-22 ASSESSMENT — PAIN DESCRIPTION - PAIN TYPE: TYPE: CHRONIC PAIN

## 2022-06-22 ASSESSMENT — PAIN DESCRIPTION - ORIENTATION
ORIENTATION: LOWER;MID
ORIENTATION: MID

## 2022-06-22 ASSESSMENT — PAIN DESCRIPTION - DESCRIPTORS
DESCRIPTORS: ACHING
DESCRIPTORS: ACHING;BURNING;CRUSHING
DESCRIPTORS: ACHING

## 2022-06-22 NOTE — CARE COORDINATION
[facility] Case Management Initial Discharge Assessment    Met with Patient to discuss discharge plan. PCP: Varsha Sears MD                                Date of Last Visit: [address] 61 Patient: Yes        [facility] Not[facility]: [facility] - [facility] 6/22 @ 3178. If no PCP, list provided? N/A    Discharge Planning    Living Arrangements: in nursing home [address] SNF, [address] Who do you live with? SIG OTHER    Who helps you with your care:  self    If lives at home:     Do you have any barriers navigating in your home? no    Patient can perform ADL? Yes    Current Services (outpatient and in home) :  From SNF ([address])    Dialysis: No    Is transportation available to get to your appointments? Yes    DME Equipment:  no    Respiratory equipment: None    Respiratory provider:  no     Pharmacy:  yes -     Consult with Medication Assistance Program?  No      Patient agreeable to [address]? Declined    Patient agreeable to SNF/Rehab? Yes, Company AWAITING C/ [address]    Other discharge needs identified? Hospice FREEDOM OF CHOICE IS OFFERED, [facility] IS CHOICE. Does Patient Have a High-Risk for Readmission Diagnosis (CHF, PN, MI, COPD)? No      Initial Discharge Plan? (Note: please see concurrent daily documentation for any updates after initial note). PT SPOKE WITH ATTENDING ABOUT HOSPICE. PT REQUESTING [facility] CONSULT.    [facility] contacted at [phone] for referral- call to [place]    Readmission Risk              Risk of Unplanned Readmission:  33         Electronically signed by Dayne Canela RN on 6/22/2022 at 3:29 PM

## 2022-06-22 NOTE — H&P
Hospital Medicine  History and Physical    Patient:  Maribel Tobar  MRN: [id number]    CHIEF COMPLAINT:    Chief Complaint   Patient presents with    Chest Pain     mid-sternal CP x2h; non-radiating    Shortness of Breath     x2h; tachypneic; 100% RA; + anxiety       History Obtained From:  patient, electronic medical record  Primary Care Physician: Antnoy Henao MD    HISTORY OF PRESENT ILLNESS:   The patient is a 73 y.o. male who presents with urinary retention from skilled nursing facility. Patient is a 62-year-old male he has a history of metastatic prostate cancer which has been complicated by D8-8 metastatic disease with spinal cord compression stents of lumbar metastatic disease. He was previously admitted several weeks previously and evaluated for neurosurgical intervention of these metastatic processes but was deemed too high risk at [facility] given antiplatelet use in the setting of CAD, and was transferred to [facility] for further neurosurgical evaluation. At [facility], he was still determined to be too high risk for surgical evaluation. He underwent palliative radiation therapy though continues to have pain in the lower extremities and his back he has since been following with oncology as well as palliative care. He is currently setting in a skilled nursing facility following this recent hospitalization. Patient seen evaluated at the bedside with Duke catheter placed for urine retention. He continues to have lower back pain as well as chest pain. Patient metabolic panel LFT and CBC in the ED were significant mostly for elevated alk phos level consistent with bony metastasis troponin is negative and urinalysis is benign CT imaging of the abdomen and chest showed large amounts of bony metastasis disease with no pulmonary emboli and diffusely calcified coronary arteries.   Patient has associated mediastinal lymphadenopathy and compression of thoracic spinal metastasis    Patient declines much conversation during evaluation, but states that he is not aware that his condition is essentially terminal with no interventional offerings to cure his disease. He states that he was not aware that his radiation therapy was really palliative and he wants to continue aggressive care. I did discuss rather poor prognosis with him and the options of hospice versus palliative care and he instead wishes to be given some time to think about these options    Past Medical History:      Diagnosis Date    [facility]     CAD (coronary artery disease)     CHF [facility]congestive heart failure) [facility]     COPD (chronic obstructive pulmonary disease) ([facility]     Family history of colon cancer     Hyperlipidemia     Hypertension     Old MI (myocardial infarction) 02/03/2006    UTE on CPAP     PAD (peripheral artery disease) (HCC)     Prostate cancer ([facility]        Past Surgical History:      Procedure Laterality Date    COLONOSCOPY N/A 08/19/2019    COLORECTAL CANCER SCREENING, NOT HIGH RISK performed by Terrance Gonzalez MD at [address] [place] [address]      X3    THORACENTESIS Right 03/07/2022    870 ml removed by Dr. Yanira Aguilera - diagnostics sent    THORACENTESIS Left 03/08/2022    285 ml removed by Dr Yanira Aguilera - cytology only sent       Medications Prior to Admission:    Prior to Admission medications    Medication Sig Start Date End Date Taking?  Authorizing Provider   acetaminophen (TYLENOL) 325 MG tablet Take 650 mg by mouth every 6 hours as needed for Pain or Fever   Yes Historical Provider, MD   Methylcobalamin 1 MG CHEW Take 1 tablet by mouth daily Indications: Nutritional Support  Patient not taking: Reported on 6/22/2022    Historical Provider, MD   docusate sodium (COLACE) 100 MG capsule Take 100 mg by mouth daily Indications: Constipation  Patient not taking: Reported on 6/22/2022    Historical Provider, MD   losartan (COZAAR) 25 MG tablet 4/21/22   Brenda Cleary MD   aspirin 81 MG EC tablet Take 81 mg by mouth daily Indications: CORONARY ARTERY DISEASE     Historical Provider, MD   atorvastatin (LIPITOR) 40 MG tablet Take 1 tablet by mouth daily  Patient taking differently: Take 40 mg by mouth daily Indications: High Amount of Fats in the Blood  9/27/21   Adan J Holiday, DO   tamsulosin (FLOMAX) 0.4 MG capsule Take 0.4 mg by mouth daily Indications: Benign Enlargement of Prostate  7/6/19   Historical Provider, MD   CPAP Machine MISC by Does not apply route Use nightly as directed. Pressure setting 5 cm H2O. Patient taking differently: 1 Device by Does not apply route Use nightly as directed. Pressure setting 5 cm H2O. 1/9/18   Love Orozco MD       Allergies:  Morphine    Social History:   TOBACCO:   reports that he quit smoking about 27 years ago. His smoking use included cigarettes. He has a 50.00 pack-year smoking history. He has never used smokeless tobacco.  ETOH:   reports previous alcohol use of about 4.0 standard drinks of alcohol per week. OCCUPATION:  none    Family History:       Problem Relation Age of Onset    Cancer Mother 76        BONE    Cancer Father 64        COLON    Colon Cancer Father     Cancer Brother 43        THYROID    Diabetes Maternal Grandmother        REVIEW OF SYSTEMS:  Ten systems reviewed and negative except for as above. Physical Exam:    Vitals: BP (!) 147/68   Pulse (!) 117   Temp 97.6 °F (36.4 °C) (Oral)   Resp 22   Ht 5' 10\" (1.778 m)   Wt 166 lb (75.3 kg)   SpO2 100%   BMI 23.82 kg/m²   Constitutional: alert, appears stated age and cooperative, sleepy but arousable  Skin: Skin color, texture, turgor normal. No rashes or lesions  Eyes:Eye: Normal external eye, conjunctiva, JA. ENT: Head: Normocephalic, no lesions, without obvious abnormality.   Neck: no adenopathy, no carotid bruit, no JVD, supple, symmetrical, trachea midline and thyroid not enlarged, symmetric, no tenderness/mass/nodules  Respiratory: clear to auscultation bilaterally  Cardiovascular: regular rate and rhythm, S1, S2 normal, no murmur, click, rub or gallop  Gastrointestinal: soft, non-tender;   Genitourinary: Deferred  Musculoskeletal: Decreased muscle strength bilateral lower extremities Toya able to raise legs off the bed at the hips flex the knees he does have some motion of the midfoot bilaterally sensation  Neurologic: Mental status AAOx3 No facial asymmetry or droop. Normal muscle strength b/l. Psychiatric: Appropriate mood and affect. Good insight and judgement  Hematologic: No obvious bruising or bleeding    Recent Labs     06/21/22 2045   WBC 4.8   HGB 10.1*        Recent Labs     06/21/22 2045      K 3.8   CL 96   CO2 16*   BUN 12   CREATININE 0.81   GLUCOSE 127*   AST 26   ALT 15   BILITOT 1.0*   ALKPHOS 430*     Troponin T:   Recent Labs     06/21/22 2045   TROPONINI <0.010     INR:   Recent Labs     06/21/22 2045   INR 1.2     URINALYSIS:  Recent Labs     06/21/22 2045   COLORU Yellow   PHUR 7.5   CLARITYU Clear   SPECGRAV 1.014   LEUKOCYTESUR Negative   UROBILINOGEN 0.2   BILIRUBINUR Negative   BLOODU Negative   GLUCOSEU Negative     -----------------------------------------------------------------       Assessment and Plan   1. Metastatic prostate cancer with paraplegia: Dilaudid as needed for pain control consult to palliative care for discussion of prognosis and goals of care discussion. Also consult to oncology to discuss prognosis and determination of hospice is appropriate. Neurosurgical evaluation has been completed twice and patient remains a poor candidate for any surgical intervention. 2. Coronary disease with resume home regimen, DAPT, statin  3.  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 Chronic constipation secondary to opiate pain control: MiraLAX, increase bowel regimen as necessary.   5. DVT prophylaxis Lovenox    Patient Active Problem List   Diagnosis Code    Hypertension I10    Prostate cancer ([facility] C61    Hyperlipidemia E78.5    Coronary artery disease involving native coronary artery of native heart without angina pectoris I25.10    Old MI (myocardial infarction) I25.2    Prediabetes R73.03    BRENNEN on CPAP G47.33, Z99.89    Primary insomnia F51.01    S/P radiation therapy Z92.3    Drug-induced erectile dysfunction N52.2    Family history of colon cancer Z80.0    History of colon polyps Z86.010    Polyp of cecum K63.5    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) I70.209    PAD (peripheral artery disease) (HCC) I73.9    Paroxysmal atrial fibrillation (HCC) I48.0    Pleural effusion J90    Abnormal stress test R94.39    Other emphysema (HCC) J43.8    Anemia D64.9    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

## 2022-06-22 NOTE — CARE COORDINATION
06/22/22    From: [facility] SNF. Prior to SNF, patient lived at home with his significant other and was independent. Admit: To ER with SOB, chest pain, tachypneic, urinary retention. PMH: CAD s/p MI, htn, afib, CHF, COPD, UTE, metastatic prostate CA complicated by Q7-1 metastatic disease with spinal cord compression stents of lumbar metastatic disease. Anticipated Discharge Disposition: Referral to [facility], meeting 6/23 at [phone]. Patient Mobility or PT/OT ordered: NA    Consults: Oncology, [facility]. Hospice Referral --[facility]. Clinical: Receiving palliative radiation. Duke placed for retention. Worsening pain due to bone mets. Afib with RVR. Barriers to Discharge: Receiving IV Dilaudid, IVF. Pending oncology consult. Assessments: CMI done.

## 2022-06-22 NOTE — ED TRIAGE NOTES
Pt presents to ED via EMS from [facility]. Per NH report and EMS report, pt began feeling SOB and having mid-sternal, non-radiating CP at 1830. Pt has hx MI and HTN. On arrival to ED, pt tachypneic appears anxious. spO2 100% RA. Pt a.fib on EKG. Pt a&o x4.

## 2022-06-22 NOTE — PROGRESS NOTES
Pt lying in bed, awake, call light within reach. No acute events reported from night RN. Pt being medicated Q3hrs for metastatic bone cancer. Pt pleasant. VSS. RN will continue to monitor.   Omar Mathew RN

## 2022-06-22 NOTE — PROGRESS NOTES
Nico Varma arrived to room # 487. Presented with: Nausea/Vomiting/Urinary retention/spinal cord compression  Mental Status: Patient is oriented, alert, coherent and able to concentrate and follow conversation. Vitals:    06/22/22 0406   BP: (!) 147/68   Pulse: (!) 117   Resp: 22   Temp: 97.6 °F (36.4 °C)   SpO2: 100%       Patient arrives to unit via stretcher, vital signs taken and MEWS score of 4. Dr. Rommel Zimmer notified. Tele room reports HR in the 130's, new order in place. Patient oriented to room, call light system. Admission completed. 4 eyes skin assessment done with Thereasa Goldmann, RN. Skin is intact. Mepilex applied to sacrum for prevention.        Electronically signed by Camille Ruvlacaba RN on 6/22/2022 at 4:52 AM

## 2022-06-22 NOTE — CONSULTS
Hospice referral meeting scheduled with pt and his significant other Yanely Garcia for 6/23 at [phone]

## 2022-06-22 NOTE — ED PROVIDER NOTES
[facility]  4W MED SURG UNIT  eMERGENCYdEPARTMENT eNCOUnter      Pt Name: Ofelia Ibarra  MRN: [id number]  Arielgfjosette 1950  Date of evaluation: 6/21/2022  Provider:Avi Norton PA-C    CHIEF COMPLAINT           HPI  Ofelia Ibarra is a 73 y.o. male per chart review has a h/o metastatic cancer at the spine with T4/T5 cord compression, CHF, PAD, A. fib, UTE presents with chest pain. Patient reports gradual onset, severe, substernal chest pressure that began earlier today. Patient also notes fatigue and sweating. Denies exertional pain, pain radiation. Patient seen in the ED on 6/4 and sent to [facility] who determined his cancer was inoperable and sent him back to [facility]. Since then patient was placed for rehab and has been progressing well. Patient does have bilateral leg paralysis, bladder retention, constipation as chronic issues. Patient also complaining of abdominal pain and some shortness of breath. He denies fever, chills. Patient followed by palliative care. His goals today are placement in [facility]'s rehab so he can gain enough strength to return home. ROS  Review of Systems   Constitutional: Positive for activity change. Negative for chills, fatigue and fever. HENT: Negative for ear pain, hearing loss and sore throat. Eyes: Negative for pain and visual disturbance. Respiratory: Negative for chest tightness and shortness of breath. Cardiovascular: Positive for chest pain. Gastrointestinal: Positive for abdominal pain. Negative for diarrhea and nausea. Endocrine: Negative for cold intolerance. Genitourinary: Positive for difficulty urinating. Negative for hematuria. Musculoskeletal: Positive for back pain. Skin: Negative for rash and wound. Neurological: Positive for dizziness. Negative for headaches. Psychiatric/Behavioral: Negative for behavioral problems and confusion. Except as noted above the remainder of the review of systems was reviewed and negative.        PAST MEDICAL HISTORY     Past Medical History:   Diagnosis Date    Atrial fibrillation ([facility]     CAD (coronary artery disease)     CHF (congestive heart failure) (HCC)     [facility] obstructive [facility]     Family history of colon cancer     Hyperlipidemia     Hypertension     Old MI (myocardial infarction) 02/03/2006    UTE on CPAP     PAD (peripheral artery disease) (HCC)     Prostate cancer ([facility]          SURGICAL HISTORY       Past Surgical History:   Procedure Laterality Date    COLONOSCOPY N/A 08/19/2019    COLORECTAL CANCER SCREENING, NOT HIGH RISK performed by Edison Hawkins MD at [facility]      X3    THORACENTESIS Right 03/07/2022    870 ml removed by Dr. Cassidy Lynn - diagnostics sent    THORACENTESIS Left 03/08/2022    285 ml removed by Dr Cassidy Lynn - cytology only sent         [address]       Current Discharge Medication List      CONTINUE these medications which have NOT CHANGED    Details   Methylcobalamin 1 MG CHEW Take 1 tablet by mouth daily Indications: Nutritional Support      docusate sodium (COLACE) 100 MG capsule Take 100 mg by mouth daily Indications: Constipation      losartan (COZAAR) 25 MG tablet Take 25 mg by mouth daily Indications: High Blood Pressure Disorder      metoprolol succinate (TOPROL XL) 25 MG extended release tablet Take 25 mg by mouth daily Indications: High Blood Pressure Disorder      !! oxyCODONE (ROXICODONE) 5 MG immediate release tablet Take 5 mg by mouth every 4 hours as needed for Pain.       predniSONE (DELTASONE) 50 MG tablet Take 50 mg by mouth daily Indications: Redness Surrounding Tumor X 10 days      Talazoparib Tosylate 0.25 MG CAPS Take 1 capsule by mouth daily Indications: Cancer of Bone      ascorbic acid (VITAMIN C) 500 MG tablet Take 500 mg by mouth daily Indications: Nutritional Support      !! oxyCODONE HCl (OXY-IR) 10 MG immediate release tablet Take 1 tablet by mouth every 6 hours as needed for Pain for up to 30 days. 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 Intended supply: 5 days. Take lowest dose possible to manage pain  Qty: 15 tablet, Refills: 0    Comments: Reduce doses taken as pain becomes manageable  Associated Diagnoses: S/P radiation therapy; Malignant neoplasm of bone, unspecified location (HCC)      clopidogrel (PLAVIX) 75 MG tablet TAKE 1 TABLET BY MOUTH  DAILY  Qty: 90 tablet, Refills: 3    Comments: Requesting 1 year supply      Handicap Placard MISC by Does not apply route Exp 5 years  Qty: 1 each, Refills: 0      aspirin 81 MG EC tablet Take 81 mg by mouth daily Indications: CORONARY ARTERY DISEASE       atorvastatin (LIPITOR) 40 MG tablet Take 1 tablet by mouth daily  Qty: 90 tablet, Refills: 1      tamsulosin (FLOMAX) 0.4 MG capsule Take 0.4 mg by mouth daily Indications: Benign Enlargement of Prostate   Refills: 11      CPAP Machine MISC by Does not apply route Use nightly as directed. Pressure setting 5 cm H2O. Qty: 1 each, Refills: 0       !! - Potential duplicate medications found. Please discuss with provider.           ALLERGIES     Morphine    FAMILY HISTORY       Family History   Problem Relation Age of Onset    Cancer Mother 76        BONE    Cancer Father 64        COLON    Colon Cancer Father     Cancer Brother 43        THYROID    Diabetes Maternal Grandmother           SOCIAL HISTORY       Social History     Socioeconomic History    Marital status: Single     Spouse name: None    Number of children: 2    Years of education: None    Highest education level: Associate degree: occupational, technical, or vocational program   Occupational History    Occupation:     Tobacco Use    Smoking status: Former Smoker     Packs/day: 2.00     Years: 25.00     Pack years: 50.00     Types: Cigarettes     Quit date: 1995     Years since quittin.4    Smokeless tobacco: Never Used    Tobacco comment: RARE CIGAR FROM TIME TO TIME   Vaping Use    Vaping Use: Never used   Substance and Sexual Activity    Alcohol use: Not Currently     Alcohol/week: 4.0 standard drinks     Types: 2 Cans of beer, 2 Shots of liquor per week     Comment: Social, but less in the last month    Drug use: Not Currently     Types: Marijuana Veryl Diop)    Sexual activity: Not Currently   Other Topics Concern    None   Social History Narrative    Single floor home    4 steps to get into the home    Full basement does not go downstairs    Lives with girlfriend and her son    He has two children one in [place] and one in [place]    One Dog     Working smoke detectors and CO2     Social Determinants of Health     Financial Resource Strain:     Difficulty of Paying Living Expenses: Not on file   Food Insecurity:     Worried About [address] in the Last Year: Not on file    [address] in the Last Year: Not on file   Transportation Needs:     Lack of Transportation (Medical): Not on file    Lack of Transportation (Non-Medical):  Not on file   Physical Activity:     Days of Exercise per Week: Not on file    Minutes of Exercise per Session: Not on file   Stress:     Feeling of Stress : Not on file   Social Connections:     Frequency of Communication with Friends and Family: Not on file    Frequency of Social Gatherings with Friends and Family: Not on file    Attends Nondenominational Services: Not on file    Active Member of Clubs or Organizations: Not on file    Attends Club or Organization Meetings: Not on file    Marital Status: Not on file   Intimate Partner Violence:     Fear of Current or Ex-Partner: Not on file    Emotionally Abused: Not on file    Physically Abused: Not on file    Sexually Abused: Not on file   Housing Stability:     Unable to Pay for Housing in the Last Year: Not on file    Number of [place] in the Last Year: Not on file    Unstable Housing in the Last Year: Not on file         PHYSICAL EXAM       ED Triage Vitals [06/21/22 2020]   BP Temp Temp Source Heart Rate Resp SpO2 Height Weight   (!) 153/70 97.8 °F (36.6 °C) Oral 98 28 100 % 5' 10\" (1.778 m) 166 lb (75.3 kg)       Physical Exam  Constitutional:       Appearance: Normal appearance. HENT:      Head: Normocephalic and atraumatic. Nose: Nose normal.      Mouth/Throat:      Mouth: Mucous membranes are moist.      Pharynx: No oropharyngeal exudate or posterior oropharyngeal erythema. Eyes:      Extraocular Movements: Extraocular movements intact. Conjunctiva/sclera: Conjunctivae normal.      Pupils: Pupils are equal, round, and reactive to light. Cardiovascular:      Rate and Rhythm: Normal rate and regular rhythm. Heart sounds: Normal heart sounds. Pulmonary:      Effort: Pulmonary effort is normal.      Breath sounds: Normal breath sounds. No wheezing or rhonchi. Abdominal:      General: Bowel sounds are normal.      Palpations: Abdomen is soft. Tenderness: There is no abdominal tenderness. There is no guarding. Musculoskeletal:         General: No deformity. Normal range of motion. Cervical back: Normal range of motion and neck supple. Skin:     General: Skin is warm and dry. Coloration: Skin is not jaundiced. Neurological:      General: No focal deficit present. Mental Status: He is alert and oriented to person, place, and time. GCS: GCS eye subscore is 4. GCS verbal subscore is 5. GCS motor subscore is 6. Cranial Nerves: Cranial nerves are intact. Sensory: Sensation is intact.       Comments: Patient paralyzed from the waist down   Psychiatric:         Mood and Affect: Mood normal.         Behavior: Behavior normal.           MDM  68-year-old male presenting with chest pain. Patient is afebrile hemodynamically stable. EKG shows afib with  bpm left axis, normal intervals, no ST changes. Patient given IV Dilaudid, IV normal saline, IV Zofran, IV Lopressor, IV Pepcid. Put in [facility] which retrieved 900 mL of fluid. Patient has had intractable nausea and vomiting. Patient given another dose of Zofran, IM Phenergan. Patient reiterates that his goals are to get into rehab so that he can improve enough to live at home. Consulted attending Christen Babb who recommends admission for intractable vomiting and possible worsening cord compression leading to worsening bladder function. Spoke with Dr. Renee Riggins who agrees to accept patient for comfort care and therapy evaluation. FINAL IMPRESSION      1. Intractable nausea and vomiting    2. Urinary retention    3.  Spinal cord compression [facility])          DISPOSITION/PLAN   DISPOSITION Admitted 06/22/2022 01:52:12 AM        DISCHARGE MEDICATIONS:  [unfilled]         Dulce Carbajal PA-C(electronically signed)  Attending Emergency Physician           Dulce Carbajal PA-C  06/22/22 8333

## 2022-06-22 NOTE — PROGRESS NOTES
Hospitalist Progress Note      PCP: Michelle Gaspar MD    Date of Admission: 6/21/2022    Chief Complaint:  Patient is in AFIB with HR in the 130-140s, normotensive, afebrile, on RA    Medications:  Reviewed    Infusion Medications   Scheduled Medications    enoxaparin  40 mg SubCUTAneous Daily    lactated ringers bolus  1,000 mL IntraVENous Once    clopidogrel  75 mg Oral Daily    atorvastatin  40 mg Oral Daily    aspirin  81 mg Oral Daily    ascorbic acid  500 mg Oral Daily    tamsulosin  0.4 mg Oral Daily     PRN Meds: acetaminophen **OR** acetaminophen, HYDROmorphone **OR** HYDROmorphone, acetaminophen, polyethylene glycol, trimethobenzamide, metoprolol      Intake/Output Summary (Last 24 hours) at 6/22/2022 1247  Last data filed at 6/22/2022 5627  Gross per 24 hour   Intake 2314.64 ml   Output 1500 ml   Net 814.64 ml       Exam:    BP (!) 147/68   Pulse (!) 117   Temp 97.6 °F (36.4 °C) (Oral)   Resp 18   Ht 5' 10\" (1.778 m)   Wt 160 lb 4.4 oz (72.7 kg)   SpO2 100%   BMI 23.00 kg/m²     General appearance: appears stated age and cooperative. Respiratory:  clear to auscultation, bilaterally   Cardiovascular: irregular rate and rhythm, S1/S2, tachycardic  Abdomen: Soft, active bowel sounds. Musculoskeletal: No edema bilaterally.      Labs:   Recent Labs     06/21/22 2045   WBC 4.8   HGB 10.1*   HCT 30.2*        Recent Labs     06/21/22 2045      K 3.8   CL 96   CO2 16*   BUN 12   CREATININE 0.81   CALCIUM 9.3     Recent Labs     06/21/22 2045   AST 26   ALT 15   BILITOT 1.0*   ALKPHOS 430*     Recent Labs     06/21/22 2045   INR 1.2     Recent Labs     06/21/22 2045 06/22/22  1038   TROPONINI <0.010 0.035*       Urinalysis:      Lab Results   Component Value Date    NITRU Negative 06/21/2022    WBCUA 0-2 05/26/2022    BACTERIA Negative 05/26/2022    RBCUA 0-2 05/26/2022    BLOODU Negative 06/21/2022    SPECGRAV 1.014 06/21/2022    GLUCOSEU Negative 06/21/2022       Radiology:  CTA Chest W WO  (PE study)   Final Result      No CT evidence pulmonary embolism. Diffuse osteoblastic and osteolytic changes as described. Soft tissue attenuation T4 spinous process, left, into central canal. Malignancy diagnosis of exclusion. These findings have progressed since March, 2022. Right hilar lymph node enlargement, as discussed, unchanged from March, 2022. Bilateral thyroid nodules as described. If clinical concern warrants, thyroid sonography may be obtained for further evaluation to rule out malignancy. All CT scans at this facility use dose modulation, iterative reconstruction, and/or weight based dosing when appropriate to reduce radiation dose to as low as reasonably achievable. XR CHEST PORTABLE   Final Result   NO ACUTE ACTIVE CARDIOPULMONARY PROCESS      CT ABDOMEN PELVIS W IV CONTRAST Additional Contrast? Radiologist Recommendation    (Results Pending)           Assessment/Plan:    74 y/o man NH resident with history of CAD s/p PCI to LAD on 4/2022, PAD s/p angioplasty, UTE, prostate cancer with diffuse bone mets and T4-5 cord compression who presented with : Worsening chest and back pain  - due to diffuse bone mets  - pain control  - palliative care and Hospice consulted    AFIB with RVR  - started Lopressor  - on IV lopressor as needed  - monitor on telemetry    Elevated troponin  - in the setting of AF with RVR  - continue ASA,plavix, statin therapy     Lactic acidosis  - IV hydration  - follow lactate level    Urine retention  - [facility] catheter placed   - on Flomax     Chronic constipation   - secondary to opiates  - bowel regimen       Diet: ADULT DIET;  Regular    Code Status: Limited            Electronically signed by Kim Ashraf MD on 6/22/2022 at 12:47 PM

## 2022-06-22 NOTE — PLAN OF CARE
Problem: Discharge Planning  Goal: Discharge to home or other facility with appropriate resources  Outcome: Progressing  Flowsheets  Taken 6/22/2022 0459 by Jett Vitale RN  Discharge to home or other facility with appropriate resources:   Identify barriers to discharge with patient and caregiver   Arrange for needed discharge resources and transportation as appropriate  Taken 6/22/2022 0420 by Jett Vitale RN  Discharge to home or other facility with appropriate resources: Identify barriers to discharge with patient and caregiver     Problem: Pain  Goal: Verbalizes/displays adequate comfort level or baseline comfort level  Outcome: Progressing  Flowsheets (Taken 6/22/2022 0406 by Jett Vitale RN)  Verbalizes/displays adequate comfort level or baseline comfort level:   Encourage patient to monitor pain and request assistance   Assess pain using appropriate pain scale     Problem: Skin/Tissue Integrity  Goal: Absence of new skin breakdown  Description: 1. Monitor for areas of redness and/or skin breakdown  2. Assess vascular access sites hourly  3. Every 4-6 hours minimum:  Change oxygen saturation probe site  4. Every 4-6 hours:  If on nasal continuous positive airway pressure, respiratory therapy assess nares and determine need for appliance change or resting period.   Outcome: Progressing     Problem: Safety - Adult  Goal: Free from fall injury  Outcome: Progressing  Flowsheets (Taken 6/22/2022 041 by Jett Vitale RN)  Free From Fall Injury: Instruct family/caregiver on patient safety     Problem: Chronic Conditions and Co-morbidities  Goal: Patient's chronic conditions and co-morbidity symptoms are monitored and maintained or improved  Outcome: Progressing

## 2022-06-22 NOTE — ED NOTES
SBAR nursing report to 4W RN. Portable tele placed on pt. Pt identifiers confirmed. Pt stable for transport.      Mary Altamirano RN  06/22/22 3650

## 2022-06-23 LAB
ANION GAP SERPL CALCULATED.3IONS-SCNC: 12 MEQ/L (ref 9–15)
ANISOCYTOSIS: ABNORMAL
BANDED NEUTROPHILS RELATIVE PERCENT: 19 %
BASOPHILS ABSOLUTE: 0 K/UL (ref 0–0.2)
BASOPHILS RELATIVE PERCENT: 0.2 %
BUN BLDV-MCNC: 18 MG/DL (ref 8–23)
CALCIUM SERPL-MCNC: 8.3 MG/DL (ref 8.5–9.9)
CHLORIDE BLD-SCNC: 94 MEQ/L (ref 95–107)
CO2: 21 MEQ/L (ref 20–31)
CREAT SERPL-MCNC: 0.55 MG/DL (ref 0.7–1.2)
DOHLE BODIES: ABNORMAL
EKG ATRIAL RATE: 163 BPM
EKG P AXIS: 5 DEGREES
EKG P-R INTERVAL: 174 MS
EKG Q-T INTERVAL: 290 MS
EKG QRS DURATION: 98 MS
EKG QTC CALCULATION (BAZETT): 406 MS
EKG R AXIS: -30 DEGREES
EKG T AXIS: 177 DEGREES
EKG VENTRICULAR RATE: 118 BPM
EOSINOPHILS ABSOLUTE: 0 K/UL (ref 0–0.7)
EOSINOPHILS RELATIVE PERCENT: 0.2 %
GFR AFRICAN AMERICAN: >60
GFR NON-AFRICAN AMERICAN: >60
GLUCOSE BLD-MCNC: 114 MG/DL (ref 70–99)
HCT VFR BLD CALC: 22.4 % (ref 42–52)
HEMATOLOGY PATH CONSULT: NORMAL
HEMATOLOGY PATH CONSULT: YES
HEMOGLOBIN: 7.4 G/DL (ref 14–18)
LACTIC ACID: 1.3 MMOL/L (ref 0.5–2.2)
LYMPHOCYTES ABSOLUTE: 0.7 K/UL (ref 1–4.8)
LYMPHOCYTES RELATIVE PERCENT: 10 %
MCH RBC QN AUTO: 30.8 PG (ref 27–31.3)
MCHC RBC AUTO-ENTMCNC: 32.9 % (ref 33–37)
MCV RBC AUTO: 93.8 FL (ref 80–100)
METAMYELOCYTES RELATIVE PERCENT: 1 %
MONOCYTES ABSOLUTE: 0.1 K/UL (ref 0.2–0.8)
MONOCYTES RELATIVE PERCENT: 1.9 %
NEUTROPHILS ABSOLUTE: 6.2 K/UL (ref 1.4–6.5)
NEUTROPHILS RELATIVE PERCENT: 68 %
OVALOCYTES: ABNORMAL
PDW BLD-RTO: 22.5 % (ref 11.5–14.5)
PLATELET # BLD: 99 K/UL (ref 130–400)
PLATELET SLIDE REVIEW: ABNORMAL
POIKILOCYTES: ABNORMAL
POTASSIUM REFLEX MAGNESIUM: 4.8 MEQ/L (ref 3.4–4.9)
RBC # BLD: 2.39 M/UL (ref 4.7–6.1)
SARS-COV-2, NAAT: NOT DETECTED
SODIUM BLD-SCNC: 127 MEQ/L (ref 135–144)
TEAR DROP CELLS: ABNORMAL
WBC # BLD: 7 K/UL (ref 4.8–10.8)

## 2022-06-23 PROCEDURE — 6370000000 HC RX 637 (ALT 250 FOR IP): Performed by: INTERNAL MEDICINE

## 2022-06-23 PROCEDURE — 93010 ELECTROCARDIOGRAM REPORT: CPT | Performed by: INTERNAL MEDICINE

## 2022-06-23 PROCEDURE — 83605 ASSAY OF LACTIC ACID: CPT

## 2022-06-23 PROCEDURE — 6360000002 HC RX W HCPCS: Performed by: INTERNAL MEDICINE

## 2022-06-23 PROCEDURE — 87635 SARS-COV-2 COVID-19 AMP PRB: CPT

## 2022-06-23 PROCEDURE — 36415 COLL VENOUS BLD VENIPUNCTURE: CPT

## 2022-06-23 PROCEDURE — 85025 COMPLETE CBC W/AUTO DIFF WBC: CPT

## 2022-06-23 PROCEDURE — 99221 1ST HOSP IP/OBS SF/LOW 40: CPT | Performed by: NURSE PRACTITIONER

## 2022-06-23 PROCEDURE — 80048 BASIC METABOLIC PNL TOTAL CA: CPT

## 2022-06-23 PROCEDURE — 1210000000 HC MED SURG R&B

## 2022-06-23 PROCEDURE — 93005 ELECTROCARDIOGRAM TRACING: CPT | Performed by: INTERNAL MEDICINE

## 2022-06-23 RX ORDER — ONDANSETRON 2 MG/ML
4 INJECTION INTRAMUSCULAR; INTRAVENOUS EVERY 6 HOURS PRN
Status: DISCONTINUED | OUTPATIENT
Start: 2022-06-23 | End: 2022-06-24 | Stop reason: HOSPADM

## 2022-06-23 RX ADMIN — OXYCODONE HYDROCHLORIDE AND ACETAMINOPHEN 500 MG: 500 TABLET ORAL at 07:58

## 2022-06-23 RX ADMIN — TAMSULOSIN HYDROCHLORIDE 0.4 MG: 0.4 CAPSULE ORAL at 07:58

## 2022-06-23 RX ADMIN — HYDROMORPHONE HYDROCHLORIDE 0.5 MG: 1 INJECTION, SOLUTION INTRAMUSCULAR; INTRAVENOUS; SUBCUTANEOUS at 17:08

## 2022-06-23 RX ADMIN — METOPROLOL TARTRATE 25 MG: 25 TABLET, FILM COATED ORAL at 22:14

## 2022-06-23 RX ADMIN — ASPIRIN 81 MG: 81 TABLET, COATED ORAL at 07:57

## 2022-06-23 RX ADMIN — METOPROLOL TARTRATE 25 MG: 25 TABLET, FILM COATED ORAL at 07:58

## 2022-06-23 RX ADMIN — HYDROMORPHONE HYDROCHLORIDE 0.5 MG: 1 INJECTION, SOLUTION INTRAMUSCULAR; INTRAVENOUS; SUBCUTANEOUS at 07:57

## 2022-06-23 RX ADMIN — HYDROMORPHONE HYDROCHLORIDE 0.5 MG: 1 INJECTION, SOLUTION INTRAMUSCULAR; INTRAVENOUS; SUBCUTANEOUS at 00:30

## 2022-06-23 RX ADMIN — ENOXAPARIN SODIUM 40 MG: 100 INJECTION SUBCUTANEOUS at 07:57

## 2022-06-23 RX ADMIN — CLOPIDOGREL BISULFATE 75 MG: 75 TABLET ORAL at 07:58

## 2022-06-23 RX ADMIN — HYDROMORPHONE HYDROCHLORIDE 0.5 MG: 1 INJECTION, SOLUTION INTRAMUSCULAR; INTRAVENOUS; SUBCUTANEOUS at 04:11

## 2022-06-23 RX ADMIN — HYDROMORPHONE HYDROCHLORIDE 0.5 MG: 1 INJECTION, SOLUTION INTRAMUSCULAR; INTRAVENOUS; SUBCUTANEOUS at 22:15

## 2022-06-23 RX ADMIN — ATORVASTATIN CALCIUM 40 MG: 40 TABLET, FILM COATED ORAL at 07:58

## 2022-06-23 RX ADMIN — HYDROMORPHONE HYDROCHLORIDE 0.5 MG: 1 INJECTION, SOLUTION INTRAMUSCULAR; INTRAVENOUS; SUBCUTANEOUS at 13:06

## 2022-06-23 ASSESSMENT — PAIN DESCRIPTION - DESCRIPTORS
DESCRIPTORS: ACHING;DISCOMFORT
DESCRIPTORS: ACHING;DISCOMFORT

## 2022-06-23 ASSESSMENT — ENCOUNTER SYMPTOMS
CONSTIPATION: 0
SHORTNESS OF BREATH: 0
CHEST TIGHTNESS: 0
STRIDOR: 0
ANAL BLEEDING: 0
CHOKING: 0
VOMITING: 0
TROUBLE SWALLOWING: 0
ABDOMINAL PAIN: 0
SORE THROAT: 0
APNEA: 0
WHEEZING: 0
EYE DISCHARGE: 0
DIARRHEA: 0
ABDOMINAL DISTENTION: 0
COUGH: 0
NAUSEA: 0
VOICE CHANGE: 0
COLOR CHANGE: 0
RECTAL PAIN: 0
BLOOD IN STOOL: 0
BACK PAIN: 1

## 2022-06-23 ASSESSMENT — PAIN SCALES - GENERAL
PAINLEVEL_OUTOF10: 9
PAINLEVEL_OUTOF10: 7
PAINLEVEL_OUTOF10: 6
PAINLEVEL_OUTOF10: 8
PAINLEVEL_OUTOF10: 5
PAINLEVEL_OUTOF10: 7

## 2022-06-23 ASSESSMENT — PAIN DESCRIPTION - LOCATION
LOCATION: BACK
LOCATION: BACK;SHOULDER

## 2022-06-23 NOTE — CARE COORDINATION
Care Coordination Notes:    2211- This CM was notified by Trent Soler RN with [facility] that patient is appropriate for hospice services and is interested in signing consents. Trent Soler states patient may be eligible for inpatient hospice placement, she will be calling Dr Oj Thomas to determine acceptance and level of care. [address] [address] RN to update CM with response. If accepted for inpt, patient may transfer to hospice center today. 1605- Per Trent Soler with [address], patient meets criteria for inpatient hospice care and will transfer to the hospice center [facility]. Call placed to [facility] to update; no answer so message was left with updates and POC.

## 2022-06-23 NOTE — PROGRESS NOTES
Pt assessment and vitals complete and stable. Patient resting in bed at this time. Medicated for pain per EMAR, voices relief from such. Tolerating diet, denies needs. Turned and repositioned as needed every 2 hours. Denies needs. Safety maintained. Afib on the monitor, between 1-teens and 130s. Will medicated PRN as able.

## 2022-06-23 NOTE — CONSULTS
[id number] referral completed with pt. Pt's significant other Siomara Rankin and her son were initially present but became upset and left room and then facilty when writer began to explain medicare benefits related to room and board fees at center or at long term care facility not being part of Medicare benefit with routine care. Advised her  would be able to check on pt's VA benefits. Writer reviewed with pt philosophy, services. levels of care and medicare benefits including room and board fees not being part of benefit. Pt voices desire to just be kept comfort able at this time and no further hospitalizations. Kayleigh Ramírez director and pt was found to be appropriate for services and inpatient level of care at this time. Pt updated and consents signed. Pt to be transferred to hospice center this evening with [facility]  at 2100. Contacted pt's significant other and updated her regarding level of care and  time.

## 2022-06-23 NOTE — CONSULTS
Hematology/Oncology Consult  Encounter Date: 2022 9:14 PM    Mr. Frankey Side is a 73 y.o. male  : 1950  MRN: [id number]  [facility] Number: [de-identified]  Requesting Provider: Dr. Domingo Anne    Reason for request: metastatic prostate cancer; discussion regarding prognosis      CONSULTANT: Brionna Lezama MD    HPI: The pt was initially diagnosed with prostate cancer in 2008; Cheshire score 7  (4+3) which was tx with Radiation tx. He developed recurrence in  which was tx with androgen blockade. [place] He developed further disease progression in  on bone scan for which he was entered in clinical trial  and received Xtandi and PARP inhibitor. Pain was controlled with Fentanyl patch and prn Tramadol. He has also been using medical marijuana for control of nausea. PSA increased to 500 range in 2022; Testosterone was 10. Patient was an inpatient a few weeks ago at which time He was noted to have spinal cord compression and He was started on Palliative Radiation therapy and steroids. He was transferred to [facility] on 6/3/2022 . Patient did not like the care at [facility] . He developed marked lower extremity weakness . He was seen in ER and was transferred to [address] [place] [address] for the consideration of surgery. Patient was informed of the poor prognosis and increased risk of bleeding as patient is on plavix. Patient was transferred to [facility] for continuity of care . The back pain is under better control. He had urinary retention last night with associated lower abd pain which was relieved after placement of Duke catheter.     No fever;no gross bleeding      Patient Active Problem List   Diagnosis    Hypertension    [facility]state cancer ([facility]    Hyperlipidemia    Coronary artery disease involving native coronary artery of native heart without angina pectoris    Old MI (myocardial infarction)    Prediabetes    UTE on CPAP    Primary insomnia    S/P radiation therapy    Drug-induced erectile dysfunction    Family history of colon cancer    History of colon polyps    Polyp of cecum    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)    PAD (peripheral artery disease) (HCC)    Paroxysmal atrial fibrillation (HCC)    Pleural effusion    Abnormal stress test    Other emphysema (HCC)    Anemia    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)    Back pain    B12 deficiency    Spine metastasis (HCC)    Chronic systolic (congestive) heart failure    Constipation due to opioid therapy    Metastatic bone cancer (HCC)     Past Medical History:   Diagnosis Date    Atrial fibrillation ([facility]     CAD (coronary artery disease)     CHF (congestive heart failure) (HCC)     COPD (chronic obstructive pulmonary disease) ([facility]     Family history of colon cancer     Hyperlipidemia     Hypertension     Old MI (myocardial infarction) 02/03/2006    UTE on CPAP     PAD (peripheral artery disease) (HCC)     [facility]      Past Surgical History:   Procedure Laterality Date    COLONOSCOPY N/A 08/19/2019    COLORECTAL CANCER SCREENING, NOT HIGH RISK performed by Bola Bills MD at [address] [place] [address]      X3    THORACENTESIS Right 03/07/2022    870 ml removed by Dr. Jhoana Del Cid - diagnostics sent    THORACENTESIS Left 03/08/2022    285 ml removed by Dr Jhoana Del Cid - cytology only sent     Family History   Problem Relation Age of Onset    Cancer Mother 76        BONE    Cancer Father 64        COLON    Colon Cancer Father     Cancer Brother 43        THYROID    Diabetes Maternal Grandmother      Social History     Socioeconomic History    Marital status: Single     Spouse name: Not on file    Number of children: 2    Years of education: Not on file    Highest education level: Associate degree: occupational, technical, or vocational program   Occupational History    Occupation:     Tobacco Use    Smoking status: Former Smoker     Packs/day: 2.00     Years: 25.00     Pack years: 50.00     Types: Cigarettes     Quit date: 1995     Years since quittin.4    Smokeless tobacco: Never Used    Tobacco comment: RARE CIGAR FROM TIME TO TIME   Vaping Use    Vaping Use: Never used   Substance and Sexual Activity    Alcohol use: Not Currently     Alcohol/week: 4.0 standard drinks     Types: 2 Cans of beer, 2 Shots of liquor per week     Comment: Social, but less in the last month    Drug use: Not Currently     Types: Marijuana Garon Kettle)    Sexual activity: Not Currently   Other Topics Concern    Not on file   Social History Narrative    Single floor home    4 steps to get into the home    Full basement does not go downstairs    Lives with girlfriend and her son    He has two children one in [place] and one in [facility] [place]    One Dog     Working smoke detectors and CO2     Social Determinants of Health     Financial Resource Strain:     Difficulty of Paying Living Expenses: Not on file   Food Insecurity:     Worried About [address] in the Last Year: Not on file    [address] in the Last Year: Not on file   Transportation Needs:     Lack of Transportation (Medical): Not on file    Lack of Transportation (Non-Medical):  Not on file   Physical Activity:     Days of Exercise per Week: Not on file    Minutes of Exercise per Session: Not on file   Stress:     Feeling of Stress : Not on file   Social Connections:     Frequency of Communication with Friends and Family: Not on file    Frequency of Social Gatherings with Friends and Family: Not on file    Attends Jainism Services: Not on file    Active Member of Clubs or Organizations: Not on file    Attends Club or Organization Meetings: Not on file    Marital Status: Not on file   Intimate Partner Violence:     Fear of Current or Ex-Partner: Not on file    Emotionally Abused: Not on file    Physically Abused: Not on file    Sexually Abused: Not on file   Housing Stability:     Unable to Pay for Housing in the Last Year: Not on file    Number of Places Lived in the Last Year: Not on file    Unstable Housing in the Last Year: Not on file          Current Facility-Administered Medications   Medication Dose Route Frequency Provider Last Rate Last Admin    enoxaparin (LOVENOX) injection 40 mg  40 mg SubCUTAneous Daily Kevon Scripture Sedar, DO   40 mg at 06/22/22 2185    acetaminophen (TYLENOL) tablet 650 mg  650 mg Oral Q6H PRN Kevon Scripture Sedar, DO        Or    acetaminophen (TYLENOL) suppository 650 mg  650 mg Rectal Q6H PRN Kevon Scripture Sedar, DO        HYDROmorphone (DILAUDID) injection 0.25 mg  0.25 mg IntraVENous Q4H PRN Kevon Scripture Sedar, DO   0.25 mg at 06/22/22 0536    Or    HYDROmorphone (DILAUDID) injection 0.5 mg  0.5 mg IntraVENous Q4H PRN Kevon Scripture Sedar, DO   0.5 mg at 06/22/22 2002    clopidogrel (PLAVIX) tablet 75 mg  75 mg Oral Daily Enrrique D Sedar, DO   75 mg at 06/22/22 0851    atorvastatin (LIPITOR) tablet 40 mg  40 mg Oral Daily Enrrique D Sedar, DO   40 mg at 06/22/22 0851    aspirin EC tablet 81 mg  81 mg Oral Daily Enrrique D Sedar, DO   81 mg at 06/22/22 4262    ascorbic acid (VITAMIN C) tablet 500 mg  500 mg Oral Daily Eric Finical D Sedar, DO   500 mg at 06/22/22 2344    acetaminophen (TYLENOL) tablet 650 mg  650 mg Oral Q6H PRN Kevon Scripture Sedar, DO        polyethylene glycol (GLYCOLAX) packet 17 g  17 g Oral Daily PRN Kevon Scripture Sedar, DO        tamsulosin (FLOMAX) capsule 0.4 mg  0.4 mg Oral Daily Eric Finical D Sedar, DO   0.4 mg at 06/22/22 7461    trimethobenzamide (TIGAN) injection 200 mg  200 mg IntraMUSCular Q6H PRN Kevon Scripture Sedar, DO        metoprolol (LOPRESSOR) injection 2.5 mg  2.5 mg IntraVENous Q4H PRN Deep Freeman MD        traMADol (ULTRAM) tablet 50 mg  50 mg Oral Q6H PRN Deep Freeman MD        metoprolol tartrate (LOPRESSOR) tablet 25 mg  25 mg Oral BID Deep Freeman MD   25 mg at 06/22/22 2002     Outpatient Medications Marked as Taking for the 6/21/22 encounter [facility] Encounter)   Medication Sig Dispense Refill    acetaminophen (TYLENOL) 325 MG tablet Take 650 mg by mouth every 6 hours as needed for Pain or Fever       Allergies   Allergen Reactions    Morphine Swelling and Rash         ROS:  Unremarkable except for symptoms mentioned in HPI. PHYSICAL EXAMINATION:   VITAL SIGNS: /81   Pulse 65   Temp 98.2 °F (36.8 °C)   Resp 18   Ht 5' 10\" (1.778 m)   Wt 160 lb 4.4 oz (72.7 kg)   SpO2 98%   BMI 23.00 kg/m²       GENERAL: In no acute distress, well- nourished, well- developed, alert and oriented to person place and time. SKIN: Warm and dry, without jaundice, ecchymoses, or petechiae. HEENT: Normocephalic, sclera anicteric, oral mucosa moist without lesion or exudate in the visible oral cavity or oropharynx, \  noepistaxis  NECK: no JVD; trachea midline  NODES: No palpable adenopathy in the neck Levels I-V, bilateral supraclavicular fossae, axillary chains, or inguinal regions. LUNGS: Good inspiratory effort, no accessory muscle use, clear bilaterally, no focal wheeze, rales or rhonchi. CARDIAC: Valeri HS; Regular rate and rhythm,  ABDOMINAL: Normal bowel sounds present, soft, non-tender, no mass or organomegaly.   MUSKL: + tenderness over  Mid-thoracic spine  EXTREMITIES:no pedal edema; no calf tenderness  NEUROLOGIC: + paraplegic with grade 0-1/5 bilateral leg strength;   moving both arms well; no facial asymmetry      LAB RESULTS:  Recent Results (from the past 24 hour(s))   POCT CREATININE    Collection Time: 06/21/22  9:24 PM   Result Value Ref Range    POC CREATININE WHOLE BLOOD 0.7    EKG 12 Lead - Chest Pain    Collection Time: 06/21/22 10:28 PM   Result Value Ref Range    Ventricular Rate 129 BPM    Atrial Rate 122 BPM    QRS Duration 106 ms    Q-T Interval 370 ms    QTc Calculation (Bazett) 542 ms    R Axis -40 degrees    T Axis 6 degrees   Lactic Acid    Collection Time: 06/22/22 10:38 AM   Result Value Ref Range    Lactic Acid 4.3 (HH) 0.5 - 2.2 mmol/L   Troponin    Collection Time: 06/22/22 10:38 AM   Result Value Ref Range    Troponin 0.035 (HH) 0.000 - 0.010 ng/mL   Troponin    Collection Time: 06/22/22  4:25 PM   Result Value Ref Range    Troponin 0.032 (HH) 0.000 - 0.010 ng/mL     Recent Labs     06/21/22 2045   COLORU Yellow   PHUR 7.5   CLARITYU Clear   SPECGRAV 1.014   LEUKOCYTESUR Negative   UROBILINOGEN 0.2   BILIRUBINUR Negative   BLOODU Negative   GLUCOSE 127*      Results for Naeem Wade (MRN [id number])    Ref. Range 6/21/2022 20:45   WBC Latest Ref Range: 4.8 - 10.8 K/uL 4.8   RBC Latest Ref Range: 4.70 - 6.10 M/uL 3.31 (L)   Hemoglobin Quant Latest Ref Range: 14.0 - 18.0 g/dL 10.1 (L)   Hematocrit Latest Ref Range: 42.0 - 52.0 % 30.2 (L)   MCV Latest Ref Range: 80.0 - 100.0 fL 91.2   MCH Latest Ref Range: 27.0 - 31.3 pg 30.4   MCHC Latest Ref Range: 33.0 - 37.0 % 33.3   RDW Latest Ref Range: 11.5 - 14.5 % 21.3 (H)   Platelet Count Latest Ref Range: 130 - 400 K/uL 153   Neutrophils % Latest Units: % 78.4   Lymphocyte % Latest Units: % 13.7   Monocytes % Latest Units: % 6.5   Eosinophils % Latest Units: % 0.8   Basophils % Latest Units: % 0.6   Neutrophils Absolute Latest Ref Range: 1.4 - 6.5 K/uL 3.7   Lymphocytes Absolute Latest Ref Range: 1.0 - 4.8 K/uL 0.7 (L)   Monocytes Absolute Latest Ref Range: 0.2 - 0.8 K/uL 0.3   Eosinophils Absolute Latest Ref Range: 0.0 - 0.7 K/uL 0.0   Basophils Absolute Latest Ref Range: 0.0 - 0.2 K/uL 0.0     Results for Naeem Wade (MRN [id number])    Ref.  Range 6/21/2022 20:45   Sodium Latest Ref Range: 135 - 144 mEq/L 135   Potassium Latest Ref Range: 3.4 - 4.9 mEq/L 3.8   Chloride Latest Ref Range: 95 - 107 mEq/L 96   CO2 Latest Ref Range: 20 - 31 mEq/L 16 (L)   BUN,BUNPL Latest Ref Range: 8 - 23 mg/dL 12   Creatinine Latest Ref Range: 0.70 - 1.20 mg/dL 0.81   Anion Gap Latest Ref Range: 9 - 15 mEq/L 23 (H)   GFR Non- American Latest Ref Range: >60  >60.0   GFR African American Latest Ref Range: >60  >60.0   Magnesium Latest Ref Range: 1.7 - 2.4 mg/dL 2.5 (H)   GLUCOSE, FASTING,GF Latest Ref Range: 70 - 99 mg/dL 127 (H)   CALCIUM, SERUM, 085710 Latest Ref Range: 8.5 - 9.9 mg/dL 9.3   Total Protein Latest Ref Range: 6.3 - 8.0 g/dL 6.6   Procalcitonin Latest Ref Range: 0.00 - 0.15 ng/mL 0.11   Pro-BNP Latest Units: pg/mL 4,341   Troponin Latest Ref Range: 0.000 - 0.010 ng/mL <0.010   Albumin Latest Ref Range: 3.5 - 4.6 g/dL 3.7   Globulin Latest Ref Range: 2.3 - 3.5 g/dL 2.9   Alk Phos Latest Ref Range: 35 - 104 U/L 430 (H)   ALT Latest Ref Range: 0 - 41 U/L 15   AST Latest Ref Range: 0 - 40 U/L 26   Bilirubin Latest Ref Range: 0.2 - 0.7 mg/dL 1.0 (H)   TSH Latest Ref Range: 0.440 - 3.860 uIU/mL 1.840         RADIOLOGY RESULTS:  CTA Chest W WO  (PE study)    Result Date: 6/22/2022  CT PULMONARY ANGIOGRAM WITH INTRAVENOUS CONTRAST MEDIUM. REASON FOR EXAMINATION:  SHORTNESS OF BREATH, CHEST PAIN, TACHYCARDIA. HISTORY OF PROSTATE CANCER. TECHNIQUE: Helical CTA was performed through the chest utilizing 100 ml of Isovue-300 intravenous contrast.  Images were obtained with bolus tracking in order to opacify the pulmonary arteries. Thick section coronal MIP 3D reconstructions were performed  on a separate workstation. COMPARISON: CT chest, March 5, 2022. CTA chest, March 30, 2022. FINDINGS:  Pulmonary arteries: No intraluminal filling defects. Thoracic aorta: Normal in course and caliber. Cardiac Size: Normal. Pericardial effusion: None. Right lung: No nodules, masses, consolidation, pleural effusion, pneumothorax. Left lung: No nodules, masses, consolidation, pleural effusion, pneumothorax. Lymph nodes: Right hilar lymph nodes, measuring 1.4 cm, 1 cm, unchanged from March, 2022. No axillary, no mediastinal lymph node enlargement.  Mediastinum: Multiple bilateral low-attenuation nodules, right and left lobes thyroid, measuring up to 7 mm right lobe, and 6 mm left lobe. Upper abdomen:Limited imaging upper abdomen shows no gross anomaly. Musculoskeletal: Diffuse mottled osteoblastic and osteolytic thoracic spine. Osteoblastic changes also found, bilateral ribs and sternum, and scapula. Lytic changes, left T4 spinous process, with soft tissue extension into central canal (series 2, images  27 through 34). No CT evidence pulmonary embolism. Diffuse osteoblastic and osteolytic changes as described. Soft tissue attenuation T4 spinous process, left, into central canal. Malignancy diagnosis of exclusion. These findings have progressed since March, 2022. Right hilar lymph node enlargement, as discussed, unchanged from March, 2022. Bilateral thyroid nodules as described. If clinical concern warrants, thyroid sonography may be obtained for further evaluation to rule out malignancy. All CT scans at this facility use dose modulation, iterative reconstruction, and/or weight based dosing when appropriate to reduce radiation dose to as low as reasonably achievable. MRI THORACIC SPINE W WO CONTRAST    Result Date: 5/28/2022  MRI THORACIC SPINE W WO CONTRAST, MRI LUMBAR SPINE W WO CONTRAST HISTORY:   back pain  r/o spinal cord compression; pt has prostate cancer with bone mets  - TECHNIQUE: MR  thoracic and lumbar spine routine protocols without and with contrast. MR Contrast:  MultiHance Contrast Dose:  20 cc Route of Administration:  IV COMPARISON: None. RESULT: THORACIC SPINE: Counting reference:  Lumbosacral junction. For the purposes of this report, L4-5 is considered the level of the iliac crest and there are 5 lumbar-type vertebrae. Anatomic Variants: None. Alignment:  Thoracic kyphosis is maintained. Vertebral body heights and disc spaces are within normal limits. Bone marrow signal/CORD: Diffuse heterogeneous marrow replacement secondary to osseous metastasis.  At T4, there is enhancing expansile soft tissue mass involving the posterior left rib, left pedicle and lamina, right pedicle and proximal lamina with infiltration of the spinal canal. There is resultant rightward cord displacement and compression. No cord signal abnormality at this level. No suspicious parenchymal or leptomeningeal enhancement. Thoracic soft tissues: The thoracic paraspinal soft tissues are within normal limits. Thoracic canal and foramina:  Severe canal stenosis and cord compression at T4-T5 secondary to an infiltrative soft tissue mass otherwise, no high-grade canal stenosis or neural foraminal narrowing. LUMBAR SPINE: Counting reference:  Lumbosacral junction. For the purposes of this report, L4-5 is considered the level of the iliac crest and there are 5 lumbar-type vertebrae. Anatomic Variants: None. Alignment:  Lumbar lordosis is maintained. Vertebral body heights and disc spaces are within normal limits. Bone marrow signal/fracture:  Diffuse heterogeneous marrow replacement, compatible with metastatic disease. No evidence of prior fracture. Conus: The conus terminates at L1 and is within normal limits of morphology and signal. Normal course and caliber of the descending cauda equina nerve roots. No evidence of abnormal intradural enhancement following contrast administration. Lumbar soft tissues: The lumbar paraspinal soft tissues are within normal limits. T12-L1:  Canal and foramina are patent. L1-L2:  Canal and foramina are patent. L2-L3:  Circumferential disc bulge and mild facet degenerative changes without canal stenosis. Neural foramen are patent. L3-L4:  Disc bulge slightly asymmetric to the left, ligamentum flavum hypertrophy and facet degenerative changes with mild canal stenosis. Mild bilateral neural foraminal narrowing. L4-L5:  Facet degenerative changes and small disc bulge flattening the ventral thecal sac. No significant canal stenosis. Mild bilateral neural foraminal narrowing.  L5-S1:  Disc bulge asymmetric to the right, facet degenerative change and ligamentum flavum hypertrophy with flattening ventral thecal sac. Right-sided subarticular recess narrowing. Moderate right and mild left neural foraminal narrowing. Sacrum and iliac wings:  Extensive heterogeneous marrow replacement of the sacroiliac loops, secondary to metastatic disease. Thoracic spine: 1. Extensive osseous metastatic disease with soft tissue component at left greater than right T4-T5 with infiltration of the spinal canal and cord compression at this level. 2.No abnormal cord signal, parenchymal or leptomeningeal enhancement. Lumbar spine: 1. Extensive osseous metastatic disease. 2.No abnormal cord signal, parenchymal edema and enhancement. 3.Multilevel degenerative change without high-grade canal stenosis or neural foraminal narrowing. COMMUNICATION:  Communicated with: Sherren Or, RN on 5/28/2022 at 3:48 PM.      MRI Lumbar Spine W WO Contrast    Result Date: 5/28/2022  MRI THORACIC SPINE W WO CONTRAST, MRI LUMBAR SPINE W WO CONTRAST HISTORY:   back pain  r/o spinal cord compression; pt has prostate cancer with bone mets  - TECHNIQUE: MR  thoracic and lumbar spine routine protocols without and with contrast. MR Contrast:  MultiHance Contrast Dose:  20 cc Route of Administration:  IV COMPARISON: None. RESULT: THORACIC SPINE: Counting reference:  Lumbosacral junction. For the purposes of this report, L4-5 is considered the level of the iliac crest and there are 5 lumbar-type vertebrae. Anatomic Variants: None. Alignment:  Thoracic kyphosis is maintained. Vertebral body heights and disc spaces are within normal limits. Bone marrow signal/CORD: Diffuse heterogeneous marrow replacement secondary to osseous metastasis. At T4, there is enhancing expansile soft tissue mass involving the posterior left rib, left pedicle and lamina, right pedicle and proximal lamina with infiltration of the spinal canal. There is resultant rightward cord displacement and compression.  No cord signal abnormality at this level. No suspicious parenchymal or leptomeningeal enhancement. Thoracic soft tissues: The thoracic paraspinal soft tissues are within normal limits. Thoracic canal and foramina:  Severe canal stenosis and cord compression at T4-T5 secondary to an infiltrative soft tissue mass otherwise, no high-grade canal stenosis or neural foraminal narrowing. LUMBAR SPINE: Counting reference:  Lumbosacral junction. For the purposes of this report, L4-5 is considered the level of the iliac crest and there are 5 lumbar-type vertebrae. Anatomic Variants: None. Alignment:  Lumbar lordosis is maintained. Vertebral body heights and disc spaces are within normal limits. Bone marrow signal/fracture:  Diffuse heterogeneous marrow replacement, compatible with metastatic disease. No evidence of prior fracture. Conus: The conus terminates at L1 and is within normal limits of morphology and signal. Normal course and caliber of the descending cauda equina nerve roots. No evidence of abnormal intradural enhancement following contrast administration. Lumbar soft tissues: The lumbar paraspinal soft tissues are within normal limits. T12-L1:  Canal and foramina are patent. L1-L2:  Canal and foramina are patent. L2-L3:  Circumferential disc bulge and mild facet degenerative changes without canal stenosis. Neural foramen are patent. L3-L4:  Disc bulge slightly asymmetric to the left, ligamentum flavum hypertrophy and facet degenerative changes with mild canal stenosis. Mild bilateral neural foraminal narrowing. L4-L5:  Facet degenerative changes and small disc bulge flattening the ventral thecal sac. No significant canal stenosis. Mild bilateral neural foraminal narrowing. L5-S1:  Disc bulge asymmetric to the right, facet degenerative change and ligamentum flavum hypertrophy with flattening ventral thecal sac. Right-sided subarticular recess narrowing. Moderate right and mild left neural foraminal narrowing.  Sacrum and iliac wings: Extensive heterogeneous marrow replacement of the sacroiliac loops, secondary to metastatic disease. Thoracic spine: 1. Extensive osseous metastatic disease with soft tissue component at left greater than right T4-T5 with infiltration of the spinal canal and cord compression at this level. 2.No abnormal cord signal, parenchymal or leptomeningeal enhancement. Lumbar spine: 1. Extensive osseous metastatic disease. 2.No abnormal cord signal, parenchymal edema and enhancement. 3.Multilevel degenerative change without high-grade canal stenosis or neural foraminal narrowing. COMMUNICATION:  Communicated with: Byron Alonzo RN on 5/28/2022 at 3:48 PM.      CT ABDOMEN PELVIS W IV CONTRAST Additional Contrast? Radiologist Recommendation    Result Date: 6/22/2022  Examination: CT ABDOMEN PELVIS W IV CONTRAST Indication:   metastatic cancer, abd pain, diarrhea Technique: Multiple serial axial images was performed through the abdomen and pelvis utilizing 100cc of Isovue 300. Images were reconstructed in the axial and coronal and sagittal planes. Comparison: January 24, 2000 2011 4 hours Findings: The visualized basal lungs show no focal parenchymal abnormalities. The liver, gallbladder, spleen, pancreas, adrenals,  are unremarkable. No significant stranding. There is mild right-sided process. No nephrolithiasis. Dislocations no significant any. Process. No nephrolithiasis. Large and small bowel show no sign of obstruction. A few scattered air-fluid levels in nondilated large bowel noted. There is stool scattered throughout the large bowel. The appendix is visualized. No periappendiceal stranding. No diverticulitis. The bladder is markedly distended. The [place] of the bladder reaches the level of the umbilicus. Correlate clinically. Consider decompression No free air. No free fluid. The visualized abdominal aorta is of normal size and caliber. No significant retroperitoneal adenopathy.  There are diffuse lytic and increased his risk for bleeding. I discussed with the pt about his poor prognosis and that I agree with his decision for referral to the  Hospice program  with the aim to be transferred to the Hospice residential unit  . I told him that the there are options for chemotx but would not result in improvement in his quality of life. He is considering enrollment in the hospice program for palliative care including pain control and  he prefers not to receive chemotx. Thank you, Dr. Kera Sierra, for this consultation.       Electronically signed by Shaunna Pimentel MD on 6/22/2022 at 9:14 PM

## 2022-06-23 NOTE — PROGRESS NOTES
Hospitalist Progress Note      PCP: Nhan Horn MD    Date of Admission: 6/21/2022    Chief Complaint:  Patient remains in AFIB with HR in the 110-130s, afebrile, on RA, is meeting with hospice today per nursing staff    Medications:  Reviewed    Infusion Medications   Scheduled Medications    enoxaparin  40 mg SubCUTAneous Daily    clopidogrel  75 mg Oral Daily    atorvastatin  40 mg Oral Daily    aspirin  81 mg Oral Daily    ascorbic acid  500 mg Oral Daily    tamsulosin  0.4 mg Oral Daily    metoprolol tartrate  25 mg Oral BID     PRN Meds: acetaminophen **OR** acetaminophen, HYDROmorphone **OR** HYDROmorphone, acetaminophen, polyethylene glycol, trimethobenzamide, metoprolol, traMADol      Intake/Output Summary (Last 24 hours) at 6/23/2022 1348  Last data filed at 6/23/2022 0040  Gross per 24 hour   Intake 240 ml   Output 910 ml   Net -670 ml       Exam:    /61   Pulse (!) 127   Temp 99 °F (37.2 °C)   Resp 18   Ht 5' 10\" (1.778 m)   Wt 162 lb (73.5 kg)   SpO2 99%   BMI 23.24 kg/m²     General appearance: appears stated age and cooperative. Respiratory:  clear to auscultation, bilaterally   Cardiovascular: irregular rate and rhythm, S1/S2, tachycardic  Abdomen: Soft, active bowel sounds. Musculoskeletal: No edema bilaterally.      Labs:   Recent Labs     06/21/22 2045 06/23/22  0601   WBC 4.8 7.0   HGB 10.1* 7.4*   HCT 30.2* 22.4*    99*     Recent Labs     06/21/22 2045 06/23/22  0601    127*   K 3.8 4.8   CL 96 94*   CO2 16* 21   BUN 12 18   CREATININE 0.81 0.55*   CALCIUM 9.3 8.3*     Recent Labs     06/21/22 2045   AST 26   ALT 15   BILITOT 1.0*   ALKPHOS 430*     Recent Labs     06/21/22 2045   INR 1.2     Recent Labs     06/21/22 2045 06/22/22  1038 06/22/22  1625   TROPONINI <0.010 0.035* 0.032*       Urinalysis:      Lab Results   Component Value Date    NITRU Negative 06/21/2022    WBCUA 0-2 05/26/2022    BACTERIA Negative 05/26/2022    RBCUA 0-2 05/26/2022 BLOODU Negative 06/21/2022    SPECGRAV 1.014 06/21/2022    GLUCOSEU Negative 06/21/2022       Radiology:  CTA Chest W WO  (PE study)   Final Result      No CT evidence pulmonary embolism. Diffuse osteoblastic and osteolytic changes as described. Soft tissue attenuation T4 spinous process, left, into central canal. Malignancy diagnosis of exclusion. These findings have progressed since March, 2022. Right hilar lymph node enlargement, as discussed, unchanged from March, 2022. Bilateral thyroid nodules as described. If clinical concern warrants, thyroid sonography may be obtained for further evaluation to rule out malignancy. All CT scans at this facility use dose modulation, iterative reconstruction, and/or weight based dosing when appropriate to reduce radiation dose to as low as reasonably achievable. CT ABDOMEN PELVIS W IV CONTRAST Additional Contrast? Radiologist Recommendation   Final Result   1. THE BLADDER IS MARKEDLY DISTENDED. CORRELATE CLINICALLY. CONSIDER DECOMPRESSION. 2. THERE IS MILD RIGHT-SIDED HYDRONEPHROSIS. COULD BE PHYSIOLOGIC GIVEN THE DISTENTION OF BLADDER. CORRELATE CLINICALLY FOLLOW-UP   2. THERE IS DIFFUSE LYTIC AND SCLEROTIC CHANGES SEEN THROUGHOUT THE LUMBAR AND ILIAC BONES. FINDINGS ARE THAT OF MALIGNANCY, METASTATIC DISEASE UNTIL PROVEN OTHERWISE. CORRELATE WITH PATIENT HISTORY. All CT scans at this facility use dose modulation, iterative reconstruction, and/or weight based dosing when appropriate to reduce radiation dose to as low as reasonably achievable. XR CHEST PORTABLE   Final Result   NO ACUTE ACTIVE CARDIOPULMONARY PROCESS              Assessment/Plan:    72 y/o man NH resident with history of CAD s/p PCI to LAD on 4/2022, PAD s/p angioplasty, UTE, prostate cancer with diffuse bone mets and T4-5 cord compression who presented with :     Worsening chest and back pain  - due to diffuse bone mets  - pain control  - palliative care and Hospice consulted    New onset AFIB with RVR  - started Lopressor  - on IV lopressor as needed  - monitor on telemetry    Elevated troponin  - in the setting of AF with RVR  - on ASA,plavix, statin therapy     Lactic acidosis  - improved with IV hydration    Hyponatremia   - monitor closely    Urine retention  - [facility] catheter placed   - on Flomax     Anemia / thrombocytopenia  - monitor counts       Diet: ADULT DIET;  Regular    Code Status: Limited, meeting with hospice today            Electronically signed by Cecilia Leo MD on 6/23/2022 at 1:48 PM

## 2022-06-23 NOTE — CARE COORDINATION
Care Coordination Notes: This CM provided updates to the [facility], spoke with Monica Colunga. She states the [address] will contact 4W CM if any additional information is needed. Patient/family will have a meeting with [facility] this afternoon at [phone]. DC plan pending goals of care decision and whether patient signs on with [facility].

## 2022-06-23 NOTE — PROGRESS NOTES
Spiritual Care Services     Summary of Visit:   visited patient in his room on 2W. Patient was in bed reading. Patient declined spiritual care services because he needed to read papers related to hospice.  offerd support and active listening and encouraged patient to contact  for further support. Spiritual Assessment/Intervention/Outcomes:    Encounter Summary  Encounter Overview/Reason : Initial Encounter  Service Provided For[de-identified] Patient  Referral/Consult From[de-identified] Rounding  Support System: Significant other  Complexity of Encounter: Moderate  Begin Time: 1500  End Time : 1515  Total Time Calculated: 15 min  Encounter   Type: Initial Screen/Assessment     Spiritual/Emotional needs  Type: Spiritual Support     Grief, Loss, and Adjustments  Type: New Diagnosis,Hospice              Values / Beliefs  Do You Have Any Ethnic, Cultural, Sacramental, or Spiritual Sikh Needs You Would Like Us To Be Aware of While You Are in the Hospital : No    Care Plan:    Provide support to patient at his request    [id number] [address]   Electronically signed by Prosper Sharma on 6/23/22 at 3:30 PM EDT     To reach a  for emotional and spiritual support, place an [facility]'S \[facility] [place] [facility]\"" consult request.   If a  is needed immediately, dial 0 and ask to page the on-call .

## 2022-06-23 NOTE — DISCHARGE SUMMARY
Hospital Medicine Discharge Summary    Christopher Tejeda  :  1950  MRN:  [id number]    Admit date:  2022  Discharge date:  2022    Admitting Physician:  Ashlyn Armendariz DO  Primary Care Physician:  Osmar Aguero MD      Discharge Diagnoses:    Principal Problem:    Metastatic bone cancer [facility])  Active Problems:    Intractable nausea and vomiting  Resolved Problems:    * No resolved hospital problems. *      Hospital Course:   Christopher Tejeda is a 73 y.o. male that was admitted and treated at [facility] for the following medical issues:     Worsening chest and back pain  - due to diffuse bone mets from prostate cancer  - continued pain control  - followed palliative care   - discharged to Hospice       Patient was seen by the following consultants while admitted to [facility]:   Consults:  Carole Phillips.    Significant Diagnostic Studies:    CTA Chest W WO  (PE study)    Result Date: 2022  CT PULMONARY ANGIOGRAM WITH INTRAVENOUS CONTRAST MEDIUM. REASON FOR EXAMINATION:  SHORTNESS OF BREATH, CHEST PAIN, TACHYCARDIA. HISTORY OF PROSTATE CANCER. TECHNIQUE: Helical CTA was performed through the chest utilizing 100 ml of Isovue-300 intravenous contrast.  Images were obtained with bolus tracking in order to opacify the pulmonary arteries. Thick section coronal MIP 3D reconstructions were performed  on a separate workstation. COMPARISON: CT chest, 2022. CTA chest, 2022. FINDINGS:  Pulmonary arteries: No intraluminal filling defects. Thoracic aorta: Normal in course and caliber. Cardiac Size: Normal. Pericardial effusion: None. Right lung: No nodules, masses, consolidation, pleural effusion, pneumothorax. Left lung: No nodules, masses, consolidation, pleural effusion, pneumothorax. Lymph nodes: Right hilar lymph nodes, measuring 1.4 cm, 1 cm, unchanged from .  No axillary, no mediastinal lymph node enlargement. Mediastinum: Multiple bilateral low-attenuation nodules, right and left lobes thyroid, measuring up to 7 mm right lobe, and 6 mm left lobe. Upper abdomen:Limited imaging upper abdomen shows no gross anomaly. Musculoskeletal: Diffuse mottled osteoblastic and osteolytic thoracic spine. Osteoblastic changes also found, bilateral ribs and sternum, and scapula. Lytic changes, left T4 spinous process, with soft tissue extension into central canal (series 2, images  27 through 34). No CT evidence pulmonary embolism. Diffuse osteoblastic and osteolytic changes as described. Soft tissue attenuation T4 spinous process, left, into central canal. Malignancy diagnosis of exclusion. These findings have progressed since March, 2022. Right hilar lymph node enlargement, as discussed, unchanged from March, 2022. Bilateral thyroid nodules as described. If clinical concern warrants, thyroid sonography may be obtained for further evaluation to rule out malignancy. All CT scans at this facility use dose modulation, iterative reconstruction, and/or weight based dosing when appropriate to reduce radiation dose to as low as reasonably achievable. CT ABDOMEN PELVIS W IV CONTRAST Additional Contrast? Radiologist Recommendation    Result Date: 6/22/2022  Examination: CT ABDOMEN PELVIS W IV CONTRAST Indication:   metastatic cancer, abd pain, diarrhea Technique: Multiple serial axial images was performed through the abdomen and pelvis utilizing 100cc of Isovue 300. Images were reconstructed in the axial and coronal and sagittal planes. Comparison: January 24, 2000 2011 4 hours Findings: The visualized basal lungs show no focal parenchymal abnormalities. The liver, gallbladder, spleen, pancreas, adrenals,  are unremarkable. No significant stranding. There is mild right-sided process. No nephrolithiasis. Dislocations no significant any. Process. No nephrolithiasis.  Large and small bowel show no sign of obstruction. A few scattered air-fluid levels in nondilated large bowel noted. There is stool scattered throughout the large bowel. The appendix is visualized. No periappendiceal stranding. No diverticulitis. The bladder is markedly distended. The [place] of the bladder reaches the level of the umbilicus. Correlate clinically. Consider decompression No free air. No free fluid. The visualized abdominal aorta is of normal size and caliber. No significant retroperitoneal adenopathy. There are diffuse lytic and sclerotic changes seen throughout the visualized lumbar, and iliac bones. Findings are that of malignancy, metastatic disease until proven otherwise. 1. THE BLADDER IS MARKEDLY DISTENDED. CORRELATE CLINICALLY. CONSIDER DECOMPRESSION. 2. THERE IS MILD RIGHT-SIDED HYDRONEPHROSIS. COULD BE PHYSIOLOGIC GIVEN THE DISTENTION OF BLADDER. CORRELATE CLINICALLY FOLLOW-UP 2. THERE IS DIFFUSE LYTIC AND SCLEROTIC CHANGES SEEN THROUGHOUT THE LUMBAR AND ILIAC BONES. FINDINGS ARE THAT OF MALIGNANCY, METASTATIC DISEASE UNTIL PROVEN OTHERWISE. CORRELATE WITH PATIENT HISTORY. All CT scans at this facility use dose modulation, iterative reconstruction, and/or weight based dosing when appropriate to reduce radiation dose to as low as reasonably achievable. XR CHEST PORTABLE    Result Date: 6/22/2022  EXAMINATION: CHEST PORTABLE VIEW  CLINICAL HISTORY: Midsternal chest pain COMPARISONS: May 26, 2022 2020 hours  FINDINGS: Single  views of the chest is submitted. The cardiac silhouette is enlarged Pulmonary vascular unremarkable. Right sided trachea. No focal infiltrates. No Pneumothoraces. NO ACUTE ACTIVE CARDIOPULMONARY PROCESS      Discharge Medications:         Medication List      CHANGE how you take these medications    CPAP Machine Misc  by Does not apply route Use nightly as directed. Pressure setting 5 cm H2O.   What changed: how much to take        CONTINUE taking these medications    Handicap Placard Misc  by Does not apply route Exp 5 years        ASK your doctor about these medications    acetaminophen 325 MG tablet  Commonly known as: TYLENOL     ascorbic acid 500 MG tablet  Commonly known as: VITAMIN C     aspirin 81 MG EC tablet     atorvastatin 40 MG tablet  Commonly known as: LIPITOR  Take 1 tablet by mouth daily     clopidogrel 75 MG tablet  Commonly known as: PLAVIX  TAKE 1 TABLET BY MOUTH  DAILY     docusate sodium 100 MG capsule  Commonly known as: COLACE     losartan 25 MG tablet  Commonly known as: COZAAR     Methylcobalamin 1 MG Chew     metoprolol succinate 25 MG extended release tablet  Commonly known as: TOPROL XL     * oxyCODONE 5 MG immediate release tablet  Commonly known as: ROXICODONE     * oxyCODONE HCl 10 MG immediate release tablet  Commonly known as: OXY-IR  Take 1 tablet by mouth every 6 hours as needed for Pain for up to 30 days. polyethylene glycol 17 g packet  Commonly known as: GLYCOLAX  Take 17 g by mouth daily as needed for Constipation     predniSONE 50 MG tablet  Commonly known as: DELTASONE     Talazoparib Tosylate 0.25 MG Caps     tamsulosin 0.4 MG capsule  Commonly known as: FLOMAX     traMADol 50 MG tablet  Commonly known as: Ultram  Take 1 tablet by mouth every 8 hours as needed for Pain for up to 5 days. Intended supply: 5 days. Take lowest dose possible to manage pain         * This list has 2 medication(s) that are the same as other medications prescribed for you. Read the directions carefully, and ask your doctor or other care provider to review them with you. Disposition:   Discharged to Hospice. Condition at discharge: Pt was medically stable at the time of discharge.          Signed:  Christa Burkett MD  6/23/2022, 4:13 PM  ----------------------------------------------------------------------------------------------------------------------

## 2022-06-23 NOTE — CONSULTS
Palliative Care Consult Note  Patient: Glory Wilson  Gender: male  YOB: 1950  Unit/Bed: J791/K372-46  Code Status: Limited  Inpatient Treatment Team: Treatment Team: Attending Provider: Torres Arshad MD; Consulting Physician: Demetrius Hi MD; Utilization Reviewer: Leanna Niño, RN; Registered Nurse: Lionel Sanders, RN; Patient Care Tech: Ernesto Timmons  Admit Date:  6/21/2022    Chief Complaint: Pain     History of Presenting Illness:      Glory Wilson is a 73 y.o. 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 Known to palliative care as he is a current community patient. Presented with increased chest and lower back pain, workup showed progression of his cancer. His disease is progressing to the point where palliative oncological treatment is failing. Oncology feels further treatment will not prolong life or alleviate symptoms. EMANUELdeny Restrepo will be meeting with hospice this afternoon. I find him sleeping, he wakes to voice. He tells me he is comfortable, pain controlled, no nausea, SOB, edema, cough. He is very fatigued, and sad. He is not anxious or depressed. He offers some life review. Review of Systems:       Review of Systems   Constitutional: Positive for fatigue. Negative for activity change, appetite change, chills, diaphoresis, fever and unexpected weight change. HENT: Negative for drooling, hearing loss, mouth sores, sore throat, trouble swallowing and voice change. Eyes: Negative for discharge and visual disturbance. Respiratory: Negative for apnea, cough, choking, chest tightness, shortness of breath, wheezing and stridor. Cardiovascular: Negative for chest pain, palpitations and leg swelling.    Gastrointestinal: Negative for abdominal distention, abdominal pain, anal bleeding, blood in stool, constipation, diarrhea, nausea, rectal pain and vomiting. Genitourinary: Negative for difficulty urinating, dysuria, enuresis, frequency and hematuria. Musculoskeletal: Positive for arthralgias, back pain and gait problem. Negative for joint swelling and myalgias. Skin: Negative for color change, pallor, rash and wound. Allergic/Immunologic: Negative for food allergies and immunocompromised state. Neurological: Negative for dizziness, tremors, seizures, syncope, facial asymmetry, speech difficulty, weakness, light-headedness, numbness and headaches. Hematological: Negative for adenopathy. Does not bruise/bleed easily. Psychiatric/Behavioral: Negative for agitation, behavioral problems, confusion, decreased concentration, dysphoric mood, hallucinations, self-injury, sleep disturbance and suicidal ideas. The patient is not nervous/anxious and is not hyperactive. Physical Examination:       BP (!) 99/58   Pulse (!) 124   Temp 99 °F (37.2 °C)   Resp 18   Ht 5' 10\" (1.778 m)   Wt 162 lb (73.5 kg)   SpO2 97%   BMI 23.24 kg/m²    Physical Exam  Constitutional:       Appearance: He is ill-appearing. HENT:      Head: Normocephalic and atraumatic. Jaw: There is normal jaw occlusion. Eyes:      General: Lids are normal. Gaze aligned appropriately. Cardiovascular:      Rate and Rhythm: Normal rate and regular rhythm. Pulmonary:      Effort: Pulmonary effort is normal.      Breath sounds: Decreased breath sounds present. Abdominal:      General: Abdomen is flat. Palpations: Abdomen is soft. Tenderness: There is no abdominal tenderness. Musculoskeletal:      Cervical back: Full passive range of motion without pain. Skin:     General: Skin is warm and dry. Coloration: Skin is pale. Neurological:      Mental Status: He is oriented to person, place, and time. He is lethargic.    Psychiatric:         Attention and Perception: Attention normal.         Mood and Affect: Affect is blunt. Behavior: Behavior is slowed. Cognition and Memory: Cognition and memory normal.         Allergies:       Allergies   Allergen Reactions    Morphine Swelling and Rash       Medications:      Current Facility-Administered Medications   Medication Dose Route Frequency Provider Last Rate Last Admin    enoxaparin (LOVENOX) injection 40 mg  40 mg SubCUTAneous Daily Charm Beady D Sedar, DO   40 mg at 06/23/22 0757    acetaminophen (TYLENOL) tablet 650 mg  650 mg Oral Q6H PRN Wesne Arsalanr Sedar, DO        Or    acetaminophen (TYLENOL) suppository 650 mg  650 mg Rectal Q6H PRN Bethanne Mayor Sedar, DO        HYDROmorphone (DILAUDID) injection 0.25 mg  0.25 mg IntraVENous Q4H PRN Betclne Arsalanr Sedar, DO   0.25 mg at 06/22/22 0536    Or    HYDROmorphone (DILAUDID) injection 0.5 mg  0.5 mg IntraVENous Q4H PRN Betclne Mayor Sedar, DO   0.5 mg at 06/23/22 0757    clopidogrel (PLAVIX) tablet 75 mg  75 mg Oral Daily Enrrique SHYLA Sedar, DO   75 mg at 06/23/22 0758    atorvastatin (LIPITOR) tablet 40 mg  40 mg Oral Daily Enrrique SHYLA Sedar, DO   40 mg at 06/23/22 0758    aspirin EC tablet 81 mg  81 mg Oral Daily Enrrique SHYLA Sedar, DO   81 mg at 06/23/22 0757    ascorbic acid (VITAMIN C) tablet 500 mg  500 mg Oral Daily Lori MANSFIELD Sedar, DO   500 mg at 06/23/22 3698    acetaminophen (TYLENOL) tablet 650 mg  650 mg Oral Q6H PRN Wesne Arsalanr Sedar, DO        polyethylene glycol (GLYCOLAX) packet 17 g  17 g Oral Daily PRN Betclne Mayor Sedar, DO        tamsulosin (FLOMAX) capsule 0.4 mg  0.4 mg Oral Daily Lori MANSFIELD Sedar, DO   0.4 mg at 06/23/22 0758    trimethobenzamide (TIGAN) injection 200 mg  200 mg IntraMUSCular Q6H PRN Betclne Mayor Sedar, DO        metoprolol (LOPRESSOR) injection 2.5 mg  2.5 mg IntraVENous Q4H PRN Marco Hutchinson MD        traMADol (ULTRAM) tablet 50 mg  50 mg Oral Q6H PRN Marco Hutchinson MD        metoprolol tartrate (LOPRESSOR) tablet 25 mg  25 mg Oral BID Marco Hutchinson MD   25 mg at 06/23/22 7484 History:       PMHx:  Past Medical History:   Diagnosis Date    Atrial fibrillation [facility] [facility]     CAD (coronary artery disease)     CHF (congestive heart failure) (HCC)     COPD (chronic obstructive pulmonary disease) ([facility] [facility]     Family history of colon cancer     Hyperlipidemia     Hypertension     Old MI (myocardial infarction) 2006    UTE on CPAP     PAD (peripheral artery disease) (HCC)     Prostate cancer (HCC)        PSHx:  Past Surgical History:   Procedure Laterality Date    COLONOSCOPY N/A 2019    COLORECTAL CANCER SCREENING, NOT HIGH RISK performed by Terrance Gonzalez MD at [address] [place] [address]      X3    THORACENTESIS Right 2022    870 ml removed by Dr. Yanira Aguilera - diagnostics sent    THORACENTESIS Left 2022    285 ml removed by Dr Yanira Aguilera - cytology only sent       Social Hx:  Social History     Socioeconomic History    Marital status: Single     Spouse name: None    Number of children: 2    Years of education: None    Highest education level: Associate degree: occupational, technical, or vocational program   Occupational History    Occupation:     Tobacco Use    Smoking status: Former Smoker     Packs/day: 2.00     Years: 25.00     Pack years: 50.00     Types: Cigarettes     Quit date: 1995     Years since quittin.4    Smokeless tobacco: Never Used    Tobacco comment: RARE CIGAR FROM TIME TO TIME   Vaping Use    Vaping Use: Never used   Substance and Sexual Activity    Alcohol use: Not Currently     Alcohol/week: 4.0 standard drinks     Types: 2 Cans of beer, 2 Shots of liquor per week     Comment: Social, but less in the last month    Drug use: Not Currently     Types: Marijuana [place])    Sexual activity: Not Currently   Other Topics Concern    None   Social History Narrative    Single floor home    4 steps to get into the home    Full basement does not go downstairs    Lives with girlfriend and her son He has two children one in [place] and one in [place]    One Dog     Working smoke detectors and CO2     Social Determinants of Health     Financial Resource Strain:     Difficulty of Paying Living Expenses: Not on file   Food Insecurity:     Worried About [address] in the Last Year: Not on file    Eloy of Food in the Last Year: Not on file   Transportation Needs:     Lack of Transportation (Medical): Not on file    Lack of Transportation (Non-Medical):  Not on file   Physical Activity:     Days of Exercise per Week: Not on file    Minutes of Exercise per Session: Not on file   Stress:     Feeling of Stress : Not on file   Social Connections:     Frequency of Communication with Friends and Family: Not on file    Frequency of Social Gatherings with Friends and Family: Not on file    Attends [employer] Services: Not on file    Active Member of [address] [place] [address] or Organizations: Not on file    Attends Club or Organization Meetings: Not on file    Marital Status: Not on file   Intimate Partner Violence:     Fear of Current or Ex-Partner: Not on file    Emotionally Abused: Not on file    Physically Abused: Not on file    Sexually Abused: Not on file   Housing Stability:     Unable to Pay for Housing in the Last Year: Not on file    Number of [place] in the Last Year: Not on file    Unstable Housing in the Last Year: Not on file       Family Hx:  Family History   Problem Relation Age of Onset    Cancer Mother 76        BONE    Cancer Father 64        COLON    Colon Cancer Father     Cancer Brother 43        THYROID    Diabetes Maternal Grandmother        LABS: Reviewed     CBC:  Lab Results   Component Value Date    WBC 7.0 06/23/2022    RBC 2.39 06/23/2022    HGB 7.4 06/23/2022    HCT 22.4 06/23/2022    MCV 93.8 06/23/2022    MCH 30.8 06/23/2022    MCHC 32.9 06/23/2022    RDW 22.5 06/23/2022    PLT 99 06/23/2022    MPV 7.6 06/15/2022     CBC with Differential:   Lab Results   Component Value Date    WBC 7.0 06/23/2022    RBC 2.39 06/23/2022    HGB 7.4 06/23/2022    HCT 22.4 06/23/2022    PLT 99 06/23/2022    MCV 93.8 06/23/2022    MCH 30.8 06/23/2022    MCHC 32.9 06/23/2022    RDW 22.5 06/23/2022    NRBC 1 06/06/2022    BANDSPCT 6 06/06/2022    METASPCT 1 06/04/2022    LYMPHOPCT 13.7 06/21/2022    PROMYELOPCT 1 06/04/2022    MONOPCT 6.5 06/21/2022    MYELOPCT 3 06/04/2022    EOSPCT 2.0 06/15/2022    BASOPCT 0.6 06/21/2022    MONOSABS 0.3 06/21/2022    LYMPHSABS 0.7 06/21/2022    EOSABS 0.0 06/21/2022    BASOSABS 0.0 06/21/2022     CMP:    Lab Results   Component Value Date     06/23/2022    K 4.8 06/23/2022    CL 94 06/23/2022    CO2 21 06/23/2022    BUN 18 06/23/2022    CREATININE 0.55 06/23/2022    GFRAA >60.0 06/23/2022    LABGLOM >60.0 06/23/2022    GLUCOSE 114 06/23/2022    PROT 6.6 06/21/2022    LABALBU 3.7 06/21/2022    CALCIUM 8.3 06/23/2022    BILITOT 1.0 06/21/2022    ALKPHOS 430 06/21/2022    AST 26 06/21/2022    ALT 15 06/21/2022     BMP:    Lab Results   Component Value Date     06/23/2022    K 4.8 06/23/2022    CL 94 06/23/2022    CO2 21 06/23/2022    BUN 18 06/23/2022    LABALBU 3.7 06/21/2022    CREATININE 0.55 06/23/2022    CALCIUM 8.3 06/23/2022    GFRAA >60.0 06/23/2022    LABGLOM >60.0 06/23/2022    GLUCOSE 114 06/23/2022     TSH:   Lab Results   Component Value Date    TSH 1.700 05/18/2021     Vitamin B12 and Folate: No components found for: FOLIC,  O21  Urinalysis:   Lab Results   Component Value Date    NITRU Negative 06/21/2022    WBCUA 0-2 05/26/2022    BACTERIA Negative 05/26/2022    RBCUA 0-2 05/26/2022    BLOODU Negative 06/21/2022    SPECGRAV 1.014 06/21/2022    GLUCOSEU Negative 06/21/2022           FUNCTIONAL ADL´S:     Independent: [x  ] Eating, [   ] Dressing, [   ] Transferring, [   ] Toileting, [   ] Bathing, [   ] Continence  Dependent   : [  ] Eating, [  x ] Dressing, [ x  ] Transferring, [  x ] Toileting, [ x  ] Bathing, [  x ] Continence  W. assistant : [  ] Eating, [   ] Dressing, [   ] Transferring, [   ] Dayla Shadia, [   ] Eron Safe, [   ] Continence    Radiology: Reviewed      CTA Chest W WO  (PE study)    Result Date: 6/22/2022  CT PULMONARY ANGIOGRAM WITH INTRAVENOUS CONTRAST MEDIUM. REASON FOR EXAMINATION:  SHORTNESS OF BREATH, CHEST PAIN, TACHYCARDIA. HISTORY OF PROSTATE CANCER. TECHNIQUE: Helical CTA was performed through the chest utilizing 100 ml of Isovue-300 intravenous contrast.  Images were obtained with bolus tracking in order to opacify the pulmonary arteries. Thick section coronal MIP 3D reconstructions were performed  on a separate workstation. COMPARISON: CT chest, March 5, 2022. CTA chest, March 30, 2022. FINDINGS:  Pulmonary arteries: No intraluminal filling defects. Thoracic aorta: Normal in course and caliber. Cardiac Size: Normal. Pericardial effusion: None. Right lung: No nodules, masses, consolidation, pleural effusion, pneumothorax. Left lung: No nodules, masses, consolidation, pleural effusion, pneumothorax. Lymph nodes: Right hilar lymph nodes, measuring 1.4 cm, 1 cm, unchanged from March, 2022. No axillary, no mediastinal lymph node enlargement. Mediastinum: Multiple bilateral low-attenuation nodules, right and left lobes thyroid, measuring up to 7 mm right lobe, and 6 mm left lobe. Upper abdomen:Limited imaging upper abdomen shows no gross anomaly. Musculoskeletal: Diffuse mottled osteoblastic and osteolytic thoracic spine. Osteoblastic changes also found, bilateral ribs and sternum, and scapula. Lytic changes, left T4 spinous process, with soft tissue extension into central canal (series 2, images  27 through 34). No CT evidence pulmonary embolism. Diffuse osteoblastic and osteolytic changes as described. Soft tissue attenuation T4 spinous process, left, into central canal. Malignancy diagnosis of exclusion. These findings have progressed since March, 2022.  Right hilar lymph node enlargement, as discussed, unchanged from March, 2022. Bilateral thyroid nodules as described. If clinical concern warrants, thyroid sonography may be obtained for further evaluation to rule out malignancy. All CT scans at this facility use dose modulation, iterative reconstruction, and/or weight based dosing when appropriate to reduce radiation dose to as low as reasonably achievable. CT ABDOMEN PELVIS W IV CONTRAST Additional Contrast? Radiologist Recommendation    Result Date: 6/22/2022  Examination: CT ABDOMEN PELVIS W IV CONTRAST Indication:   metastatic cancer, abd pain, diarrhea Technique: Multiple serial axial images was performed through the abdomen and pelvis utilizing 100cc of Isovue 300. Images were reconstructed in the axial and coronal and sagittal planes. Comparison: January 24, 2000 2011 4 hours Findings: The visualized basal lungs show no focal parenchymal abnormalities. The liver, gallbladder, spleen, pancreas, adrenals,  are unremarkable. No significant stranding. There is mild right-sided process. No nephrolithiasis. Dislocations no significant any. Process. No nephrolithiasis. Large and small bowel show no sign of obstruction. A few scattered air-fluid levels in nondilated large bowel noted. There is stool scattered throughout the large bowel. The appendix is visualized. No periappendiceal stranding. No diverticulitis. The bladder is markedly distended. The [place] of the bladder reaches the level of the umbilicus. Correlate clinically. Consider decompression No free air. No free fluid. The visualized abdominal aorta is of normal size and caliber. No significant retroperitoneal adenopathy. There are diffuse lytic and sclerotic changes seen throughout the visualized lumbar, and iliac bones. Findings are that of malignancy, metastatic disease until proven otherwise. 1. THE BLADDER IS MARKEDLY DISTENDED. CORRELATE CLINICALLY. CONSIDER DECOMPRESSION. 2. THERE IS MILD RIGHT-SIDED HYDRONEPHROSIS.  COULD BE PHYSIOLOGIC GIVEN THE DISTENTION OF BLADDER. CORRELATE CLINICALLY FOLLOW-UP 2. THERE IS DIFFUSE LYTIC AND SCLEROTIC CHANGES SEEN THROUGHOUT THE LUMBAR AND ILIAC BONES. FINDINGS ARE THAT OF MALIGNANCY, METASTATIC DISEASE UNTIL PROVEN OTHERWISE. CORRELATE WITH PATIENT HISTORY. All CT scans at this facility use dose modulation, iterative reconstruction, and/or weight based dosing when appropriate to reduce radiation dose to as low as reasonably achievable. XR CHEST PORTABLE    Result Date: 6/22/2022  EXAMINATION: CHEST PORTABLE VIEW  CLINICAL HISTORY: Midsternal chest pain COMPARISONS: May 26, 2022 2020 hours  FINDINGS: Single  views of the chest is submitted. The cardiac silhouette is enlarged Pulmonary vascular unremarkable. Right sided trachea. No focal infiltrates. No Pneumothoraces. NO ACUTE ACTIVE CARDIOPULMONARY PROCESS         Assessment and plan:      -Advance Care Planning  Discussed goals of care with patient. Explained in extensive detail nuances between full code, DNR CCA and DNR CC. Discuss risks vs benefits of ACLS resuscitation efforts    Charli Galicia wants to remain Full Code, but wants only two rounds of CPR, defibrillation, and meds. If he signs with hospice he will consider DNR CCA. -Goals of Care Discussion:  Disease process and goals of treatment were discussed in basic terms. His goal is to optimize available comfort care measures to focus on comfort. We discussed the palliative care philosophy in light of those goals. We discussed all care options contingent on treatment response and QOL. Much active listening, presence, and emotional support were given. Charli Galicia is hospice eligible due to metastatic prostate cancer with spinal cord compression resulting in paraplegia, Afib with RVR, lactic acidosis, COPD, CHF. He has agreed to meet with hospice.       While hospitalized treated for multiple medical problems including:   Hypertension    Prostate cancer ([facility] [address]    Hyperlipidemia    Coronary artery disease involving native coronary artery of native heart without angina pectoris    Old MI (myocardial infarction)    Prediabetes    UTE on CPAP    Primary insomnia    S/P radiation therapy    Drug-induced erectile dysfunction    Family history of colon cancer    History of colon polyps    Polyp of cecum    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)    PAD (peripheral artery disease) (HCC)    Paroxysmal atrial fibrillation (HCC)    Pleural effusion    Abnormal stress test    Other emphysema (HCC)    Anemia    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

## 2022-06-24 ENCOUNTER — CARE COORDINATION (OUTPATIENT)
Dept: CARE COORDINATION | Age: 72
End: 2022-06-24

## 2022-06-24 VITALS
TEMPERATURE: 99.9 F | WEIGHT: 162 LBS | RESPIRATION RATE: 16 BRPM | BODY MASS INDEX: 23.19 KG/M2 | HEIGHT: 70 IN | SYSTOLIC BLOOD PRESSURE: 84 MMHG | DIASTOLIC BLOOD PRESSURE: 53 MMHG | HEART RATE: 122 BPM | OXYGEN SATURATION: 99 %

## 2022-06-24 PROBLEM — C79.51 PROSTATE CANCER METASTATIC TO BONE (HCC): Status: ACTIVE | Noted: 2022-06-24

## 2022-06-24 PROBLEM — C61 PROSTATE CANCER METASTATIC TO BONE (HCC): Status: ACTIVE | Noted: 2022-06-24

## 2022-06-24 NOTE — CARE COORDINATION
Review of chart indicates patient is now with Hospice Services and at the [facility].. Will discharge patient from [facility].

## 2022-06-24 NOTE — PROGRESS NOTES
Assessment complete. Pt states pain at a 7. PRN pain medicine was administered. Pt was discharged to hospice around 12:30 by [facility].

## 2022-06-26 NOTE — CONSULTS
[facility]           Radiation Oncology       [facility]        Nithin Martinez [address]        Lorrie Cogan: [phone]        F: [phone]       [facility]                   Dr. Genia Sanabria MD PhD    CONSULT NOTE     Date of Service: 2022  Patient ID: Nadja Awan   : 1950  MRN: [id number]   Acct Number: [de-identified]       Nadja Awan  73 y.o.   1950    REFERRING PROVIDER: No ref. provider found    PCP:  Nighat Skelton MD    DIAGNOSIS:  1. Anemia, unspecified type    2. Malignant neoplasm of bone, unspecified location ([facility]    3. Lightheadedness    4. Bilateral pleural effusion    5. PAD (peripheral artery disease) ([facility]    6. 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)    7. History of colon polyps    8. Cancer associated pain        STAGING: Stage IV    HISTORY OF PRESENT ILLNESS: Mr. Nadja Awan  is a 73y.o. year old male with history of cardiac stents, PAD with bilateral right and left lower extremity stents, UTE, hypertension, hyperlipidemia, and diagnosis of prostate cancer, [place] 7 (4+3) status post definitive radiation therapy in . He 1 developed recurrent disease in  and started androgen blockade at that time. He subsequently developed progression of disease with PSA history as follows:   2016 26.14  2017 20.1  2017 15.13  2019 31.74  2019 18.46  2019 41.38  2022 546  2022 533    The patient was admitted to [facility] on 2022 with anemia, lightheadedness, bilateral pleural effusions, and severe back pain with weakness. He was seen by medical oncology who ordered additional work-up and MRI of the thoracic and lumbar spine on 2022 revealed extensive osseous metastases with soft tissue component at T4-T5 left greater than right with infiltration of spinal canal cord compression.   The patient was started on Decadron and referral to radiation oncology for urgent initiation of radiation therapy was made. Patient presents today from the hospital to discuss neck steps of radiation therapy. He notes pain in the back rated 4-5 out of 10. He is on Norco and Dilaudid with some relief. Palliative care is following the patient. He is on steroids with some relief. He has good strength in the bilateral extremities. He notes urinary incontinence. He denies any saddle anesthesia. PAST MEDICAL HISTORY:      Diagnosis Date    Atrial fibrillation ([facility]     CAD (coronary artery disease)     CHF (congestive heart failure) (HCC)     COPD (chronic obstructive pulmonary disease) ([facility]     Family history of colon cancer     Hyperlipidemia     Hypertension     Old MI (myocardial infarction) 02/03/2006    UTE on CPAP     PAD (peripheral artery disease) (HCC)     Prostate cancer ([facility]        PAST SURGICAL HISTORY:      Procedure Laterality Date    COLONOSCOPY N/A 08/19/2019    COLORECTAL CANCER SCREENING, NOT HIGH RISK performed by Iona Benitez MD at [address] [place] [address]      X3    THORACENTESIS Right 03/07/2022    870 ml removed by Dr. Christopher Maloney - diagnostics sent    THORACENTESIS Left 03/08/2022    285 ml removed by Dr Christopher Maloney - cytology only sent       Allergies   Allergen Reactions    Morphine Swelling and Rash       MEDICATIONS:  Medications reviewed and reconciled. No current facility-administered medications for this encounter. No current outpatient medications on file.      Facility-Administered Medications Ordered in Other Encounters   Medication Dose Route Frequency Provider Last Rate Last Admin    baclofen (LIORESAL) tablet 10 mg  10 mg Oral TID Twan Call MD   10 mg at 06/26/22 1354    HYDROmorphone (DILAUDID) tablet 2 mg  2 mg Oral Q4H PRN Jose Todd MD   2 mg at 06/26/22 1334    dexamethasone (DECADRON) tablet 4 mg  4 mg Oral 3 times per day Twan Call MD   4 mg at 06/26/22 1334    lidocaine 4 % external patch 4 patch  4 patch TransDERmal Daily PRN Jenn Woody MD   2 patch at 06/26/22 0237    ondansetron (ZOFRAN) tablet 4 mg  4 mg Oral Q4H PRN Shmuel Mota MD        acetaminophen (TYLENOL) tablet 650 mg  650 mg Oral Q6H PRN Shmuel Mota MD        aspirin chewable tablet 162 mg  162 mg Oral Daily Jose Todd MD   162 mg at 06/26/22 0958    polyethylene glycol (GLYCOLAX) packet 17 g  17 g Oral Daily PRN Shmuel Mota MD   17 g at 06/24/22 2129    bisacodyl (DULCOLAX) suppository 10 mg  10 mg Rectal Daily PRN Shmuel Mota MD   10 mg at 06/25/22 0309    metoprolol tartrate (LOPRESSOR) tablet 25 mg  25 mg Oral BID Shmuel Mota MD   25 mg at 06/26/22 0957    sennosides-docusate sodium (SENOKOT-S) 8.6-50 MG tablet 1 tablet  1 tablet Oral BID Shmuel Mota MD   1 tablet at 06/26/22 0957       FAMILY HISTORY:  Family History   Problem Relation Age of Onset    Cancer Mother 76        BONE    Cancer Father 64        COLON    Colon Cancer Father     Cancer Brother 43        THYROID    Diabetes Maternal Grandmother        SOCIAL HISTORY:       reports that he quit smoking about 27 years ago. His smoking use included cigarettes. He has a 50.00 pack-year smoking history. He has never used smokeless tobacco..   reports previous alcohol use of about 4.0 standard drinks of alcohol per week. .   reports previous drug use. Drug: Marijuana Fronie Christina). REVIEW OF SYSTEMS:  Obtained from the patient, chart review and nursing assessment. Review of Systems   All other systems reviewed and are negative.        PHYSICAL EXAMINATION:      Vitals:    06/03/22 0149 06/03/22 0637 06/03/22 0935 06/03/22 1544   BP:  119/68 (!) 123/58    Pulse:  62 73 73   Resp: 16 16     Temp:  97.3 °F (36.3 °C)     TempSrc:  Oral     SpO2:  100%     Weight:       Height:           Wt Readings from Last 3 Encounters:   06/23/22 162 lb (73.5 kg)   06/20/22 166 lb (75.3 kg)   06/06/22 176 lb 14.4 oz (80.2 kg)       ECOG PERFORMANCE STATUS:  2    Physical Exam  Vitals and nursing note reviewed. Constitutional:       Appearance: Normal appearance. Comments: He is on a stretcher. He is here unaccompanied. HENT:      Head: Normocephalic and atraumatic. Eyes:      General: No scleral icterus. Extraocular Movements: Extraocular movements intact. Conjunctiva/sclera: Conjunctivae normal.   Cardiovascular:      Heart sounds: Normal heart sounds. Pulmonary:      Breath sounds: Normal breath sounds. Abdominal:      Palpations: Abdomen is soft. Musculoskeletal:      Cervical back: Normal range of motion and neck supple. No rigidity. Comments: Diminished range of motion of the bilateral lower extremities. Lymphadenopathy:      Cervical: No cervical adenopathy. Skin:     General: Skin is warm and dry. Neurological:      General: No focal deficit present. Mental Status: He is alert and oriented to person, place, and time. Comments: Strength in the bilateral lower extremities is 3+ out of 5. Strength otherwise in the bilateral upper extremities is 4+ out of 5. Psychiatric:         Mood and Affect: Mood normal.         Behavior: Behavior normal.         RADIATION SAFETY AND ONCOLOGIC TREATMENT SUPPORT:    - Previous radiation history: Per HPI  - History of autoimmune or connective tissue disease: None  - Nutritional support/ PEG: Not applicable  - Dental evaluation: Not applicable  -  requested:   met with the patient, please refer to the note from that encounter.  - Transportation for daily treatment: We will coordinate with the inpatient team regarding daily transport from the hospital while the patient is admitted.     TUMOR MARKERS:   Lab Results   Component Value Date    .60 05/06/2022       IMAGING REVIEWED: Per HPI    PATHOLOGY REVIEWED: Per HPI    IMPRESSION: This is a 62-year-old gentleman with a history of organ confined future medical care with respect to potential modifications/establishing of advanced directives and any modifications of current goals of care. A combined total of 65 minutes was spent in preparing this consultation as well as in meeting with the patient and her  in person. Please excuse any typos in this consultation note as voice dictation was used.     ORDERS: CT simulation for today    FOLLOW-UP: Return to clinic tomorrow for initiation of radiation therapy    Mitzy Klein MD PHD  Radiation Oncologist, [address] [place] [address] Director    Department of [address] [place] [address]  Barber Martinez

## 2022-06-27 LAB
EKG ATRIAL RATE: 208 BPM
EKG Q-T INTERVAL: 418 MS
EKG QRS DURATION: 114 MS
EKG QTC CALCULATION (BAZETT): 516 MS
EKG R AXIS: -29 DEGREES
EKG T AXIS: 84 DEGREES
EKG VENTRICULAR RATE: 92 BPM

## 2022-06-28 ENCOUNTER — CARE COORDINATION (OUTPATIENT)
Dept: CARE COORDINATION | Age: 72
End: 2022-06-28

## 2022-06-28 NOTE — CARE COORDINATION
Ambulatory Care Coordination Note  6/28/2022  CM Risk Score: 14  [place] 10 Year Mortality Risk Score: 100%     ACC: Neptali Ventura RN    Summary Note:     Chaz Dolan called me to make me aware that he is currently in [place] 14. He states that he will be going to [facility] in [place]  He states that he is \"permanently paralyzed from the waist down. \"  He is not going to get any further radiation   He adds [facility] will be his final location   I will sign off of the case at this point. Lab Results     None          Care Coordination Interventions    Program Enrollment: Complex Care  Referral from Primary Care Provider: No  Suggested Interventions and Community Resources  Disease Specific Clinic: In Process  Disease Association: In Process  Senior Services: In Process  Social Work: In Process  Zone Management Tools: In Process  Other Services or Interventions: Pharmacy referral declined (in Clinical Study) Dietician referral declined, Walk in Clinic hours and usage discussed  COPD zone tool education initiated         Goals Addressed    None         Prior to Admission medications    Medication Sig Start Date End Date Taking?  Authorizing Provider   acetaminophen (TYLENOL) 325 MG tablet Take 650 mg by mouth every 6 hours as needed for Pain or Fever    Historical Provider, MD   Methylcobalamin 1 MG CHEW Take 1 tablet by mouth daily Indications: Nutritional Support  Patient not taking: Reported on 6/22/2022    Historical Provider, MD   docusate sodium (COLACE) 100 MG capsule Take 100 mg by mouth daily Indications: Constipation  Patient not taking: Reported on 6/22/2022    Historical Provider, MD   losartan (COZAAR) 25 MG tablet Take 25 mg by mouth daily Indications: High Blood Pressure Disorder  Patient not taking: Reported on 6/22/2022    Historical Provider, MD   metoprolol succinate (TOPROL XL) 25 MG extended release tablet Take 25 mg by mouth daily Indications: High Blood Pressure Disorder  Patient not taking: Reported on 6/22/2022    Historical Provider, MD   oxyCODONE (ROXICODONE) 5 MG immediate release tablet Take 5 mg by mouth every 4 hours as needed for Pain. Patient not taking: Reported on 6/22/2022    Historical Provider, MD   predniSONE (DELTASONE) 50 MG tablet Take 50 mg by mouth daily Indications: Redness Surrounding Tumor X 10 days  Patient not taking: Reported on 6/22/2022 6/4/22   Historical Provider, MD   Talazoparib Tosylate 0.25 MG CAPS Take 1 capsule by mouth daily Indications: Cancer of Bone  Patient not taking: Reported on 6/22/2022    Historical Provider, MD   ascorbic acid (VITAMIN C) 500 MG tablet Take 500 mg by mouth daily Indications: Nutritional Support  Patient not taking: Reported on 6/22/2022    Historical Provider, MD   oxyCODONE HCl (OXY-IR) 10 MG immediate release tablet Take 1 tablet by mouth every 6 hours as needed for Pain for up to 30 days. Patient not taking: Reported on 6/22/2022 6/10/22 7/10/22  Clarine Lesch, APRN - CNP   polyethylene glycol [facility]) 17 g packet Take 17 g by mouth daily as needed for Constipation 6/1/22 7/1/22  [facility] DO   traMADol (ULTRAM) 50 MG tablet Take 1 tablet by mouth every 8 hours as needed for Pain for up to 5 days. Intended supply: 5 days. Take lowest dose possible to manage pain  Patient taking differently: Take 50 mg by mouth every 12 hours as needed for Pain.   5/16/22 5/21/22  Timoteo Nixon,    clopidogrel (PLAVIX) 75 MG tablet TAKE 1 TABLET BY MOUTH  DAILY  Patient taking differently: Take 75 mg by mouth daily Indications: CORONARY ARTERY DISEASE  5/5/22   JACQUELYN Davila CNP   Handicap Placard MISC by Does not apply route Exp 5 years 4/21/22   Evangelista Campbell MD   aspirin 81 MG EC tablet Take 81 mg by mouth daily Indications: CORONARY ARTERY DISEASE     Historical Provider, MD   atorvastatin (LIPITOR) 40 MG tablet Take 1 tablet by mouth daily  Patient taking differently: Take 40 mg by mouth daily Indications: High

## 2022-06-30 PROBLEM — G95.20 SPINAL CORD COMPRESSION (HCC): Status: ACTIVE | Noted: 2022-06-30

## 2022-07-01 NOTE — PROGRESS NOTES
Patient Name: Sandra Mario  YOB: 1950  Medical Record Number: [id number]      History of Present Illness:  68-year-old gentleman admitted here after recent hospitalization. Patient has a known history of cancer to the bone patient has follow-up with oncology palliative care. Patient has been hospitalized with weakness lower extremity patient did undergo MRI there is concern possibly of cord compression was seen by neurosurgical services who did not feel patient required surgical intervention was given IV steroids was stabilized patient did undergo protracted course of care rehabilitation simply transferred here patient focused on the [facility]. Patient is in his room. Ongoing pain patient recent nausea vomiting is globally weak below his baseline. He is functionally impaired at this time. Review of Systems   Constitutional: Positive for appetite change and fatigue. HENT: Positive for congestion. Respiratory: Positive for cough. Negative for shortness of breath. Cardiovascular: Negative for chest pain and leg swelling. Gastrointestinal: Positive for diarrhea. Negative for abdominal distention. Genitourinary: Negative for dysuria. Musculoskeletal: Positive for arthralgias, back pain, gait problem and myalgias. Skin: Positive for pallor. Neurological: Positive for weakness. All other systems reviewed and are negative.       Review of Systems: All 14 review of systems negative other than as stated above    Social History     Tobacco Use    Smoking status: Former Smoker     Packs/day: 2.00     Years: 25.00     Pack years: 50.00     Types: Cigarettes     Quit date: 1995     Years since quittin.5    Smokeless tobacco: Never Used    Tobacco comment: RARE CIGAR FROM TIME TO TIME   Vaping Use    Vaping Use: Never used   Substance Use Topics    Alcohol use: Not Currently     Alcohol/week: 4.0 standard drinks     Types: 2 Cans of beer, 2 Shots of liquor per week Comment: Social, but less in the last month    Drug use: Not Currently     Types: Marijuana Gaynelle Powersite)         Past Medical History:   Diagnosis Date    Atrial fibrillation ([facility]     CAD (coronary artery disease)     CHF (congestive heart failure) (HCC)     COPD (chronic obstructive pulmonary disease) ([facility]     Family history of colon cancer     Hyperlipidemia     Hypertension     Old MI (myocardial infarction) 02/03/2006    UTE on CPAP     PAD (peripheral artery disease) (HCC)     [facility]            Past Surgical History:   Procedure Laterality Date    COLONOSCOPY N/A 08/19/2019    COLORECTAL CANCER SCREENING, NOT HIGH RISK performed by Jey Boykin MD at [address] [place] [address]      X3    THORACENTESIS Right 03/07/2022    870 ml removed by Dr. Beau Tripp - diagnostics sent    THORACENTESIS Left 03/08/2022    285 ml removed by Dr Beau Tripp - cytology only sent         Current Outpatient Medications on File Prior to Visit   Medication Sig Dispense Refill    oxyCODONE HCl (OXY-IR) 10 MG immediate release tablet Take 1 tablet by mouth every 6 hours as needed for Pain for up to 30 days. (Patient not taking: Reported on 6/22/2022) 120 tablet 0    polyethylene glycol (GLYCOLAX) 17 g packet Take 17 g by mouth daily as needed for Constipation 527 g 1    traMADol (ULTRAM) 50 MG tablet Take 1 tablet by mouth every 8 hours as needed for Pain for up to 5 days. Intended supply: 5 days.  Take lowest dose possible to manage pain (Patient taking differently: Take 50 mg by mouth every 12 hours as needed for Pain. ) 15 tablet 0    clopidogrel (PLAVIX) 75 MG tablet TAKE 1 TABLET BY MOUTH  DAILY (Patient taking differently: Take 75 mg by mouth daily Indications: CORONARY ARTERY DISEASE ) 90 tablet 3    Handicap Placard MISC by Does not apply route Exp 5 years 1 each 0    aspirin 81 MG EC tablet Take 81 mg by mouth daily Indications: CORONARY ARTERY DISEASE       atorvastatin (LIPITOR) 40 MG tablet Take 1 tablet by mouth daily (Patient taking differently: Take 40 mg by mouth daily Indications: High Amount of Fats in the Blood ) 90 tablet 1    tamsulosin (FLOMAX) 0.4 MG capsule Take 0.4 mg by mouth daily Indications: Benign Enlargement of Prostate   11    CPAP Machine MISC by Does not apply route Use nightly as directed. Pressure setting 5 cm H2O. (Patient taking differently: 1 Device by Does not apply route Use nightly as directed. Pressure setting 5 cm H2O.) 1 each 0     No current facility-administered medications on file prior to visit. Allergies   Allergen Reactions    Morphine Swelling and Rash         Family History   Problem Relation Age of Onset    Cancer Mother 76        BONE    Cancer Father 64        COLON    Colon Cancer Father     Cancer Brother 43        THYROID    Diabetes Maternal Grandmother          Physical Exam:      Physical Exam  Vitals and nursing note reviewed. Constitutional:       Appearance: Normal appearance. He is ill-appearing. HENT:      Head: Normocephalic and atraumatic. Nose: Nose normal.      Mouth/Throat:      Mouth: Mucous membranes are moist.   Eyes:      Extraocular Movements: Extraocular movements intact. Cardiovascular:      Rate and Rhythm: Regular rhythm. Heart sounds: Normal heart sounds. Pulmonary:      Effort: Pulmonary effort is normal.      Breath sounds: No wheezing. Abdominal:      General: Bowel sounds are normal.      Palpations: Abdomen is soft. Tenderness: There is no abdominal tenderness. Musculoskeletal:         General: Swelling present. Cervical back: Neck supple. No tenderness. Skin:     General: Skin is dry. Findings: Bruising present. Neurological:      Motor: Weakness present. Psychiatric:         Mood and Affect: Mood normal.         There were no vitals taken for this visit.       .   Lab Results   Component Value Date    WBC 7.0 06/23/2022    HGB 7.4 (L) 06/23/2022    HCT 22.4 (L) 06/23/2022    MCV 93.8 06/23/2022    PLT 99 (L) 06/23/2022     Lab Results   Component Value Date/Time     06/23/2022 06:01 AM    K 4.8 06/23/2022 06:01 AM    CL 94 06/23/2022 06:01 AM    CO2 21 06/23/2022 06:01 AM    BUN 18 06/23/2022 06:01 AM    CREATININE 0.55 06/23/2022 06:01 AM    GLUCOSE 114 06/23/2022 06:01 AM    CALCIUM 8.3 06/23/2022 06:01 AM                ASSESSMENT:  Patient Active Problem List   Diagnosis    Hypertension    Prostate cancer ([address]    Hyperlipidemia    Coronary artery disease involving native coronary artery of native heart without angina pectoris    Old MI (myocardial infarction)    Prediabetes    UTE on CPAP    Primary insomnia    S/P radiation therapy    Drug-induced erectile dysfunction    Family history of colon cancer    History of colon polyps    Polyp of cecum    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)    PAD (peripheral artery disease) (HCC)    Paroxysmal atrial fibrillation (HCC)    Pleural effusion    Abnormal stress test    Other emphysema (HCC)    Anemia    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 Prostate cancer metastatic to bone ([address]     2. Spinal cord compression (HCC)     3. Paroxysmal atrial fibrillation ([address]     4. PAD (peripheral artery disease) ([address]     5. Primary hypertension     6. Weakness       Following up with oncology as well as palliative services continue current pain regimen.   Patient is on weakness lower extremities course of physical therapy outpatient therapy patient completed course of steroid burst.  Continue nutritional support skin surveillance. Patient remains at high risk for further functional decline. Continue reorientation as able notes no acute RVR continue with rate control monitor for need for anticoagulation repeat CBC be pending. Continue functional reassessments    Please note orders entered on site at facility after discussion with appropriate facility nursing/therapy/ / nutritional staff. Current longstanding medical problems and acute medical issues addressed with staff. Available data and data elements in on site paper chart reviewed and analyzed. Current external consultant notes reviewed in on site chart. Ordered laboratory testing and imaging will be reviewed when available. This patient's need for psychiatric medication has been reviewed. Will consider further adjustment and possible further evaluations by mental health services.

## 2022-07-01 NOTE — PROGRESS NOTES
SUBJECTIVE:  This 68-year-old gentleman seen in follow-up visit for his bone cancer heart failure prior spinal cord compression. Patient functionally weak patient under course of therapy follow-up oncology ongoing rest pain regimen follow-up yearly services      ROS:limited cognition  The rest of the 14 point ROS negative    PHYSICAL EXAM: VSS per facility record  Frail in appearance  Chest CTAPB  [facility] rr  ABD +BS NT   EXT no edema    ASSESSMENT & PLAN:   Diagnosis Orders   1. Metastatic bone cancer ([facility]     2. Chronic systolic (congestive) heart failure     3.  Spinal cord compression (HCC)       Continue supportive care continue pain regimen diurese intermittently course physical therapy as therapy            Past Medical History:   Diagnosis Date    Atrial fibrillation (HCC)     CAD (coronary artery disease)     CHF (congestive heart failure) (HCC)     COPD (chronic obstructive pulmonary disease) ([facility]     Family history of colon cancer     Hyperlipidemia     Hypertension     Old MI (myocardial infarction) 02/03/2006    UTE on CPAP     PAD (peripheral artery disease) (HCC)     Prostate cancer ([facility]          Past Surgical History:   Procedure Laterality Date    COLONOSCOPY N/A 08/19/2019    COLORECTAL CANCER SCREENING, NOT HIGH RISK performed by Iona Benitez MD at [address] [place] [address]      X3    THORACENTESIS Right 03/07/2022    870 ml removed by Dr. Crhistopher Maloney - diagnostics sent    THORACENTESIS Left 03/08/2022    285 ml removed by Dr Christopher Maloney - cytology only sent         Current Outpatient Medications on File Prior to Visit   Medication Sig Dispense Refill    acetaminophen (TYLENOL) 325 MG tablet Take 650 mg by mouth every 6 hours as needed for Pain or Fever      Methylcobalamin 1 MG CHEW Take 1 tablet by mouth daily Indications: Nutritional Support (Patient not taking: Reported on 6/22/2022)      docusate sodium (COLACE) 100 MG capsule Take 100 mg by mouth daily Indications: Constipation (Patient not taking: Reported on 6/22/2022)      losartan (COZAAR) 25 MG tablet Take 25 mg by mouth daily Indications: High Blood Pressure Disorder (Patient not taking: Reported on 6/22/2022)      metoprolol succinate (TOPROL XL) 25 MG extended release tablet Take 25 mg by mouth daily Indications: High Blood Pressure Disorder (Patient not taking: Reported on 6/22/2022)      oxyCODONE (ROXICODONE) 5 MG immediate release tablet Take 5 mg by mouth every 4 hours as needed for Pain. (Patient not taking: Reported on 6/22/2022)      predniSONE (DELTASONE) 50 MG tablet Take 50 mg by mouth daily Indications: Redness Surrounding Tumor X 10 days (Patient not taking: Reported on 6/22/2022)      Talazoparib Tosylate 0.25 MG CAPS Take 1 capsule by mouth daily Indications: Cancer of Bone (Patient not taking: Reported on 6/22/2022)      ascorbic acid (VITAMIN C) 500 MG tablet Take 500 mg by mouth daily Indications: Nutritional Support (Patient not taking: Reported on 6/22/2022)      oxyCODONE HCl (OXY-IR) 10 MG immediate release tablet Take 1 tablet by mouth every 6 hours as needed for Pain for up to 30 days. (Patient not taking: Reported on 6/22/2022) 120 tablet 0    traMADol (ULTRAM) 50 MG tablet Take 1 tablet by mouth every 8 hours as needed for Pain for up to 5 days. Intended supply: 5 days.  Take lowest dose possible to manage pain (Patient taking differently: Take 50 mg by mouth every 12 hours as needed for Pain. ) 15 tablet 0    clopidogrel (PLAVIX) 75 MG tablet TAKE 1 TABLET BY MOUTH  DAILY (Patient taking differently: Take 75 mg by mouth daily Indications: CORONARY ARTERY DISEASE ) 90 tablet 3    Handicap Placard MISC by Does not apply route Exp 5 years 1 each 0    aspirin 81 MG EC tablet Take 81 mg by mouth daily Indications: CORONARY ARTERY DISEASE       atorvastatin (LIPITOR) 40 MG tablet Take 1 tablet by mouth daily (Patient taking differently: Take 40 mg by mouth daily Indications: High Amount of Fats in the Blood ) 90 tablet 1    tamsulosin (FLOMAX) 0.4 MG capsule Take 0.4 mg by mouth daily Indications: Benign Enlargement of Prostate   11    CPAP Machine MISC by Does not apply route Use nightly as directed. Pressure setting 5 cm H2O. (Patient taking differently: 1 Device by Does not apply route Use nightly as directed. Pressure setting 5 cm H2O.) 1 each 0     No current facility-administered medications on file prior to visit.          Family History   Problem Relation Age of Onset    Cancer Mother 76        BONE    Cancer Father 64        COLON    Colon Cancer Father     Cancer Brother 43        THYROID    Diabetes Maternal Grandmother        Social History     Socioeconomic History    Marital status: Single     Spouse name: Not on file    Number of children: 2    Years of education: Not on file    Highest education level: Associate degree: occupational, technical, or vocational program   Occupational History    Occupation:     Tobacco Use    Smoking status: Former Smoker     Packs/day: 2.00     Years: 25.00     Pack years: 50.00     Types: Cigarettes     Quit date: 1995     Years since quittin.5    Smokeless tobacco: Never Used    Tobacco comment: RARE CIGAR FROM TIME TO TIME   Vaping Use    Vaping Use: Never used   Substance and Sexual Activity    Alcohol use: Not Currently     Alcohol/week: 4.0 standard drinks     Types: 2 Cans of beer, 2 Shots of liquor per week     Comment: Social, but less in the last month    Drug use: Not Currently     Types: Marijuana [place])    Sexual activity: Not Currently   Other Topics Concern    Not on file   Social History Narrative    Single floor home    4 steps to get into the home    Full basement does not go downstairs    Lives with girlfriend and her son    He has two children one in [place] and one in [place]    One Dog     Working smoke detectors and CO2     Social Determinants of Health     Financial Resource Strain:     Difficulty of Paying Living Expenses: Not on file   Food Insecurity:     Worried About Running Out of Food in the Last Year: Not on file    Eloy of Food in the Last Year: Not on file   Transportation Needs:     Lack of Transportation (Medical): Not on file    Lack of Transportation (Non-Medical):  Not on file   Physical Activity:     Days of Exercise per Week: Not on file    Minutes of Exercise per Session: Not on file   Stress:     Feeling of Stress : Not on file   Social Connections:     Frequency of Communication with Friends and Family: Not on file    Frequency of Social Gatherings with Friends and Family: Not on file    Attends Oriental orthodox Services: Not on file    Active Member of [address] [place] [address] [employer] or Organizations: Not on file    Attends Club or Organization Meetings: Not on file    Marital Status: Not on file   Intimate Partner Violence:     Fear of Current or Ex-Partner: Not on file    Emotionally Abused: Not on file    Physically Abused: Not on file    Sexually Abused: Not on file   Housing Stability:     Unable to Pay for Housing in the Last Year: Not on file    Number of [place] in the Last Year: Not on file    Unstable Housing in the Last Year: Not on file         Lab Results   Component Value Date    LABA1C 5.8 12/16/2020     No results found for: EAG    Lab Results   Component Value Date/Time     06/23/2022 06:01 AM    K 4.8 06/23/2022 06:01 AM    CL 94 06/23/2022 06:01 AM    CO2 21 06/23/2022 06:01 AM    BUN 18 06/23/2022 06:01 AM    CREATININE 0.55 06/23/2022 06:01 AM    GLUCOSE 114 06/23/2022 06:01 AM    CALCIUM 8.3 06/23/2022 06:01 AM        Lab Results   Component Value Date    CHOL 185 04/11/2022    CHOL 215 (H) 07/29/2021    CHOL 229 (H) 07/14/2021     Lab Results   Component Value Date    TRIG 196 (H) 04/11/2022    TRIG 206 (H) 07/29/2021    TRIG 223 (H) 07/14/2021     Lab Results   Component Value Date    HDL 29 (L) 04/11/2022    HDL 39 (L) 07/29/2021 HDL 30 (L) 07/14/2021     Lab Results   Component Value Date    LDLCALC 117 04/11/2022    LDLCALC 135 (H) 07/29/2021    LDLCALC 154 (H) 07/14/2021     Lab Results   Component Value Date    VLDL 40 01/12/2016     Lab Results   Component Value Date    CHOLHDLRATIO 6.4 01/12/2016       Lab Results   Component Value Date    TSH 1.700 05/18/2021       Lab Results   Component Value Date    WBC 7.0 06/23/2022    HGB 7.4 (L) 06/23/2022    HCT 22.4 (L) 06/23/2022    MCV 93.8 06/23/2022    PLT 99 (L) 06/23/2022       Please note orders entered on site at facility after discussion with appropriate facility nursing/therapy/ / nutritional staff. Current longstanding medical problems and acute medical issues addressed with staff. Available data and data elements in on site paper chart reviewed and analyzed. Current external consultant notes reviewed in on site chart. Ordered laboratory testing and imaging will be reviewed when available.

## 2022-07-04 ENCOUNTER — OFFICE VISIT (OUTPATIENT)
Dept: GERIATRIC MEDICINE | Age: 72
End: 2022-07-04
Payer: MEDICARE

## 2022-07-04 DIAGNOSIS — I50.22 CHRONIC SYSTOLIC (CONGESTIVE) HEART FAILURE (HCC): ICD-10-CM

## 2022-07-04 DIAGNOSIS — C79.51 PROSTATE CANCER METASTATIC TO BONE (HCC): ICD-10-CM

## 2022-07-04 DIAGNOSIS — R62.7 ADULT FAILURE TO THRIVE: ICD-10-CM

## 2022-07-04 DIAGNOSIS — C61 PROSTATE CANCER METASTATIC TO BONE (HCC): ICD-10-CM

## 2022-07-04 DIAGNOSIS — G95.20 SPINAL CORD COMPRESSION (HCC): Primary | ICD-10-CM

## 2022-07-04 DIAGNOSIS — I48.0 PAROXYSMAL ATRIAL FIBRILLATION (HCC): ICD-10-CM

## 2022-07-04 PROCEDURE — 99305 1ST NF CARE MODERATE MDM 35: CPT | Performed by: INTERNAL MEDICINE

## 2022-07-04 PROCEDURE — 1124F ACP DISCUSS-NO DSCNMKR DOCD: CPT | Performed by: INTERNAL MEDICINE

## 2022-07-04 ASSESSMENT — ENCOUNTER SYMPTOMS
DIARRHEA: 1
SHORTNESS OF BREATH: 0
BACK PAIN: 1
ABDOMINAL DISTENTION: 0
COUGH: 1

## 2022-07-09 NOTE — DISCHARGE SUMMARY
Hospital Medicine Discharge Summary    Marcela Lennon  :  1950  MRN:  [id number]    Admit date:  2022  Discharge date: 6/10/22    Admitting Physician:  No admitting provider for patient encounter. Primary Care Physician:  Gilson Cormier MD    Marcela Lennon is a 73 y.o. male that was admitted and treated at [facility] for the following medical issues:     Principal Problem:    Back pain  Active Problems:    B12 deficiency    Spine metastasis ([address]  Resolved Problems:    * No resolved hospital problems. *      Discharge Diagnoses:    Principal Problem:    Back pain  Active Problems:    B12 deficiency    Spine metastasis (HCC)  Resolved Problems:    * No resolved hospital problems. *    No chief complaint on file. Hospital Course:   Marcela Lennon is a 73 y.o. male who was admitted with prostate cancer, cord compression, for radiation. Subsequently dc'd to snf. Pt was discharge in a stable condition. BP (!) 137/54   Pulse 69   Temp 97.9 °F (36.6 °C) (Oral)   Resp 18   Ht 5' 10\" (1.778 m)   Wt 176 lb 14.4 oz (80.2 kg)   SpO2 99%   BMI 25.38 kg/m²     Patient was seen by the following consultants while admitted to [facility]:   Consults:  IP CONSULT TO RADIATION ONCOLOGY  IP CONSULT TO PALLIATIVE CARE  IP CONSULT TO RADIATION ONCOLOGY  IP CONSULT TO ONCOLOGY  IP CONSULT TO PALLIATIVE CARE  IP CONSULT TO CASE MANAGEMENT    Significant Diagnostic Studies:    Refer to chart if  No results found. Discharge Medications:         Medication List      CHANGE how you take these medications    CPAP Machine Misc  by Does not apply route Use nightly as directed. Pressure setting 5 cm H2O.   What changed: how much to take        CONTINUE taking these medications    aspirin 81 MG EC tablet     atorvastatin 40 MG tablet  Commonly known as: LIPITOR  Take 1 tablet by mouth daily     clopidogrel 75 MG tablet  Commonly known as: PLAVIX  TAKE 1 TABLET BY MOUTH DAILY     Handicap Placard Misc  by Does not apply route Exp 5 years     tamsulosin 0.4 MG capsule  Commonly known as: FLOMAX        STOP taking these medications    dexamethasone 4 MG tablet  Commonly known as: DECADRON     docusate sodium 100 MG capsule  Commonly known as: COLACE     enoxaparin 300 MG/3ML injection  Commonly known as: LOVENOX     losartan 25 MG tablet  Commonly known as: COZAAR     metoprolol succinate 25 MG extended release tablet  Commonly known as: TOPROL XL     oxyCODONE 5 MG immediate release tablet  Commonly known as: ROXICODONE     oxyCODONE HCl 10 MG immediate release tablet  Commonly known as: OXY-IR     predniSONE 50 MG tablet  Commonly known as: DELTASONE     Talazoparib Tosylate 0.25 MG Caps     VITAMIN B-12 CR PO     VITAMIN C PO        ASK your doctor about these medications    polyethylene glycol 17 g packet  Commonly known as: GLYCOLAX  Take 17 g by mouth daily as needed for Constipation  Ask about: Should I take this medication?     traMADol 50 MG tablet  Commonly known as: Ultram  Take 1 tablet by mouth every 8 hours as needed for Pain for up to 5 days. Intended supply: 5 days. Take lowest dose possible to manage pain           Where to Get Your Medications      These medications were sent to [address] [place] [address] [address] [place] [address], [address] [place] [address]    Phone: [phone]   · traMADol 50 MG tablet           Disposition:   If discharged to Home, Any [place] [address] needs that were indicated and/or required as been addressed and set up by Social Work. Condition at discharge: Pt was medically stable at the time of discharge. Activity: activity as tolerated    Total time taken for discharging this patient: 40 minutes. Greater than 70% of time was spent focused exclusively on this patient.  Time was taken to review chart, discuss plans with consultants, reconciling medications, discussing plan answering questions with patient.      Signed:  Pau Perez MD

## 2022-07-12 ENCOUNTER — OFFICE VISIT (OUTPATIENT)
Dept: GERIATRIC MEDICINE | Age: 72
End: 2022-07-12
Payer: MEDICARE

## 2022-07-12 DIAGNOSIS — R19.7 DIARRHEA OF PRESUMED INFECTIOUS ORIGIN: Primary | ICD-10-CM

## 2022-07-12 PROCEDURE — 1124F ACP DISCUSS-NO DSCNMKR DOCD: CPT | Performed by: PHYSICIAN ASSISTANT

## 2022-07-12 PROCEDURE — 99309 SBSQ NF CARE MODERATE MDM 30: CPT | Performed by: PHYSICIAN ASSISTANT

## 2022-07-15 DIAGNOSIS — C41.9 METASTATIC BONE CANCER (HCC): Primary | ICD-10-CM

## 2022-07-15 DIAGNOSIS — C79.51 SPINE METASTASIS (HCC): ICD-10-CM

## 2022-07-15 DIAGNOSIS — C61 PROSTATE CANCER (HCC): ICD-10-CM

## 2022-07-15 RX ORDER — HYDROMORPHONE HYDROCHLORIDE 2 MG/1
2 TABLET ORAL EVERY 4 HOURS PRN
Qty: 30 TABLET | Refills: 0 | Status: SHIPPED | OUTPATIENT
Start: 2022-07-15 | End: 2022-07-20

## 2022-07-21 ENCOUNTER — OFFICE VISIT (OUTPATIENT)
Dept: GERIATRIC MEDICINE | Age: 72
End: 2022-07-21
Payer: MEDICARE

## 2022-07-21 DIAGNOSIS — R19.7 DIARRHEA, UNSPECIFIED TYPE: ICD-10-CM

## 2022-07-21 DIAGNOSIS — G95.20 SPINAL CORD COMPRESSION (HCC): Primary | ICD-10-CM

## 2022-07-21 PROCEDURE — 99309 SBSQ NF CARE MODERATE MDM 30: CPT | Performed by: INTERNAL MEDICINE

## 2022-07-21 PROCEDURE — 1124F ACP DISCUSS-NO DSCNMKR DOCD: CPT | Performed by: INTERNAL MEDICINE

## 2022-07-26 ENCOUNTER — TELEMEDICINE (OUTPATIENT)
Dept: FAMILY MEDICINE CLINIC | Age: 72
End: 2022-07-26
Payer: MEDICARE

## 2022-07-26 DIAGNOSIS — I73.9 PAD (PERIPHERAL ARTERY DISEASE) (HCC): ICD-10-CM

## 2022-07-26 DIAGNOSIS — G83.0: ICD-10-CM

## 2022-07-26 DIAGNOSIS — C41.9 MALIGNANT NEOPLASM OF BONE, UNSPECIFIED LOCATION (HCC): Primary | ICD-10-CM

## 2022-07-26 PROCEDURE — 99441 PR PHYS/QHP TELEPHONE EVALUATION 5-10 MIN: CPT | Performed by: FAMILY MEDICINE

## 2022-07-26 SDOH — ECONOMIC STABILITY: FOOD INSECURITY: WITHIN THE PAST 12 MONTHS, YOU WORRIED THAT YOUR FOOD WOULD RUN OUT BEFORE YOU GOT MONEY TO BUY MORE.: NEVER TRUE

## 2022-07-26 SDOH — ECONOMIC STABILITY: TRANSPORTATION INSECURITY
IN THE PAST 12 MONTHS, HAS THE LACK OF TRANSPORTATION KEPT YOU FROM MEDICAL APPOINTMENTS OR FROM GETTING MEDICATIONS?: NO

## 2022-07-26 SDOH — ECONOMIC STABILITY: FOOD INSECURITY: WITHIN THE PAST 12 MONTHS, THE FOOD YOU BOUGHT JUST DIDN'T LAST AND YOU DIDN'T HAVE MONEY TO GET MORE.: NEVER TRUE

## 2022-07-26 SDOH — ECONOMIC STABILITY: TRANSPORTATION INSECURITY
IN THE PAST 12 MONTHS, HAS LACK OF TRANSPORTATION KEPT YOU FROM MEETINGS, WORK, OR FROM GETTING THINGS NEEDED FOR DAILY LIVING?: NO

## 2022-07-26 ASSESSMENT — SOCIAL DETERMINANTS OF HEALTH (SDOH): HOW HARD IS IT FOR YOU TO PAY FOR THE VERY BASICS LIKE FOOD, HOUSING, MEDICAL CARE, AND HEATING?: NOT HARD AT ALL

## 2022-07-26 NOTE — PROGRESS NOTES
2022    TELEHEALTH EVALUATION -- Audio/Visual (During CDKCS-41 public health emergency)    Due to [place] 19 outbreak, patient's office visit was converted to a virtual visit. Patient was contacted and agreed to proceed with a virtual visit via Telephone Visit  The risks and benefits of converting to a virtual visit were discussed in light of the current infectious disease epidemic. Patient also understood that insurance coverage and co-pays are up to their individual insurance plans.     HPI:    Tasneem Elizabeth (:  1950) has requested an audio/video evaluation for the following concern(s):  Chief Complaint   Patient presents with    Other     Tumor on 4th and 5th vertebrae,paralyzed now, in hospice, see doctor once a month oncology, doesn't understand everything in his chart    Medication Management     Taking dilaudid     Bottom line, patient has bony mets from prostate CA  He is now paralyzed/bedridden    He is in Hospice at [facility]    Oncology/palliative/hospice       Patient Active Problem List   Diagnosis    Hypertension    Prostate cancer ([facility]    Hyperlipidemia    Coronary artery disease involving native coronary artery of native heart without angina pectoris    Old MI (myocardial infarction)    Prediabetes    UTE on CPAP    Primary insomnia    S/P radiation therapy    Drug-induced erectile dysfunction    Family history of colon cancer    History of colon polyps    Polyp of cecum    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)    PAD (peripheral artery disease) (HCC)    Paroxysmal atrial fibrillation (HCC)    Pleural effusion    Abnormal stress test    Other emphysema (HCC)    Anemia    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)    Back pain    B12 deficiency    Spine metastasis (HCC)    Chronic systolic (congestive) heart failure    Constipation due to opioid therapy    Metastatic bone cancer (HCC)    Intractable nausea and vomiting    Prostate cancer metastatic to bone [facility])    Spinal cord compression [facility])     Past Medical History:   Diagnosis Date    Atrial fibrillation (HCC)     CAD (coronary artery disease)     CHF (congestive heart failure) (HCC)     COPD (chronic obstructive pulmonary disease) ([facility]     Family history of colon cancer     Hyperlipidemia     Hypertension     Old MI (myocardial infarction) 2006    UTE on CPAP     PAD (peripheral artery disease) (HCC)     Prostate cancer ([facility]          Review of Systems    Otherwise physically feels healthy without sob/palpitations/chest pain/f/c/n/v/weight gain/weight loss/abd pain      Prior to Visit Medications    Medication Sig Taking? Authorizing Provider   acetaminophen (TYLENOL) 325 MG tablet Take 650 mg by mouth every 6 hours as needed for Pain or Fever Yes Historical Provider, MD   clopidogrel (PLAVIX) 75 MG tablet TAKE 1 TABLET BY MOUTH  DAILY  Patient taking differently: Take 75 mg by mouth in the morning. Indications: CORONARY ARTERY DISEASE. Yes JACQUELYN Cardenas - CNP   Handicap Placard MISC by Does not apply route Exp 5 years Yes Aly Dodge MD   aspirin 81 MG EC tablet Take 81 mg by mouth daily Indications: CORONARY ARTERY DISEASE  Yes Historical Provider, MD   atorvastatin (LIPITOR) 40 MG tablet Take 1 tablet by mouth daily  Patient taking differently: Take 40 mg by mouth in the morning. Indications: High Amount of Fats in the Blood.  Yes Adan Cortez, DO       Social History     Tobacco Use    Smoking status: Former     Packs/day: 2.00     Years: 25.00     Pack years: 50.00     Types: Cigarettes     Quit date: 1995     Years since quittin.5    Smokeless tobacco: Never    Tobacco comments:     RARE CIGAR FROM TIME TO TIME   Vaping Use    Vaping Use: Never used   Substance Use Topics    Alcohol use: Not Currently     Alcohol/week: 4.0 standard drinks     Types: 2 Cans of beer, 2 Shots of liquor per week     Comment: Social, but less in the last month    Drug use: Not Currently     Types: Marijuana Charmayne Stasuellen)            PHYSICAL EXAMINATION:  Patient-Reported Vitals 3/15/2022   Patient-Reported Weight 186 lb   Patient-Reported Height 5'10   Patient-Reported Systolic 868   Patient-Reported Diastolic 74   Patient-Reported Pulse 72   Patient-Reported SpO2 97         No exam  Phone visit  Patient in no significant distress, conversant      Due to this being a TeleHealth encounter, evaluation of the following organ systems is limited: Vitals/Constitutional/EENT/Resp/CV/GI//MS/Neuro/Skin/Heme-Lymph-Imm. Lab Results   Component Value Date    WBC 7.0 06/23/2022    HGB 7.4 (L) 06/23/2022    HCT 22.4 (L) 06/23/2022    PLT 99 (L) 06/23/2022    CHOL 185 04/11/2022    TRIG 196 (H) 04/11/2022    HDL 29 (L) 04/11/2022    ALT 15 06/21/2022    AST 26 06/21/2022     (L) 06/23/2022    K 4.8 06/23/2022    CL 94 (L) 06/23/2022    CREATININE 0.55 (L) 06/23/2022    BUN 18 06/23/2022    CO2 21 06/23/2022    TSH 1.700 05/18/2021    .60 (H) 05/06/2022    INR 1.2 06/21/2022    LABA1C 5.8 12/16/2020         ASSESSMENT/PLAN:     Diagnosis Orders   1. Malignant neoplasm of bone, unspecified location ([address]        2. Bilateral paralysis ([address]        3. PAD (peripheral artery disease) (HCC)          Bottom line, patient expressed desire to update us on his condition    He does understand that his care is now in the hands of the oncologist, hospice team and house physician at his facility     He voiced concern for his significant other, who is our patient     It does appear that his prognosis is poor. No follow-ups on file. An  electronic signature was used to authenticate this note.     --Cynthia Leavitt MD on 7/26/2022 at 4:02 PM        Pursuant to the emergency declaration under the [facility], [address] [place] [address] authority and the [employer] and [employer] Act, this Virtual  Visit was conducted, with patient's consent, to reduce the patient's risk of exposure to COVID-19 and provide continuity of care for an established patient.     5 minute call

## 2022-07-27 ENCOUNTER — OFFICE VISIT (OUTPATIENT)
Dept: GERIATRIC MEDICINE | Age: 72
End: 2022-07-27
Payer: MEDICARE

## 2022-07-27 DIAGNOSIS — I50.22 CHRONIC SYSTOLIC (CONGESTIVE) HEART FAILURE (HCC): Primary | ICD-10-CM

## 2022-07-27 DIAGNOSIS — C79.51 PROSTATE CANCER METASTATIC TO BONE (HCC): ICD-10-CM

## 2022-07-27 DIAGNOSIS — C61 PROSTATE CANCER METASTATIC TO BONE (HCC): ICD-10-CM

## 2022-07-27 PROCEDURE — 99308 SBSQ NF CARE LOW MDM 20: CPT | Performed by: INTERNAL MEDICINE

## 2022-07-27 PROCEDURE — 1124F ACP DISCUSS-NO DSCNMKR DOCD: CPT | Performed by: INTERNAL MEDICINE

## 2022-08-02 NOTE — PROGRESS NOTES
Patient Name: Riky Islas    YOB: 1950  Medical Record Number: [id number]      History of Present Illness: This 72-year-old M is been here for functional evaluation. 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 Patient is ongoing from pain at this time patient is functionally weak at this baseline. Patient has been nauseated at times intractable at times no current abdominal discomfort. Functionally weak. Review of Systems   Constitutional:  Positive for activity change and fatigue. HENT:  Negative for congestion. Respiratory:  Positive for cough. Cardiovascular:  Negative for chest pain and leg swelling. Gastrointestinal:  Positive for nausea and vomiting. Musculoskeletal:  Positive for gait problem and myalgias. Skin:  Negative for pallor. Neurological:  Positive for weakness. All other systems reviewed and are negative.     Review of Systems: All 14 review of systems negative other than as stated above    Social History     Tobacco Use    Smoking status: Former     Packs/day: 2.00     Years: 25.00     Pack years: 50.00     Types: Cigarettes     Quit date: 1995     Years since quittin.6    Smokeless tobacco: Never    Tobacco comments:     RARE CIGAR FROM TIME TO TIME   Vaping Use    Vaping Use: Never used   Substance Use Topics    Alcohol use: Not Currently     Alcohol/week: 4.0 standard drinks     Types: 2 Cans of beer, 2 Shots of liquor per week     Comment: Social, but less in the last month    Drug use: Not Currently     Types: Marijuana Candiss Littler)         Past Medical History:   Diagnosis Date    Atrial fibrillation ([facility]     CAD (coronary artery disease)     CHF (congestive heart failure) (HCC)     COPD (chronic obstructive pulmonary disease) ([facility]     Family history of colon cancer     Hyperlipidemia     Hypertension Old MI (myocardial infarction) 02/03/2006    UTE on CPAP     PAD (peripheral artery disease) (HCC)     Prostate cancer [facility])            Past Surgical History:   Procedure Laterality Date    COLONOSCOPY N/A 08/19/2019    COLORECTAL CANCER SCREENING, NOT HIGH RISK performed by Enzo Valenzuela MD at [address]      X3    THORACENTESIS Right 03/07/2022    870 ml removed by Dr. Lilia Nelson - diagnostics sent    THORACENTESIS Left 03/08/2022    285 ml removed by Dr Lilia Nelson - cytology only sent         Current Outpatient Medications on File Prior to Visit   Medication Sig Dispense Refill    acetaminophen (TYLENOL) 325 MG tablet Take 650 mg by mouth every 6 hours as needed for Pain or Fever      clopidogrel (PLAVIX) 75 MG tablet TAKE 1 TABLET BY MOUTH  DAILY (Patient taking differently: Take 75 mg by mouth in the morning. Indications: CORONARY ARTERY DISEASE.) 90 tablet 3    Handicap Placard MISC by Does not apply route Exp 5 years 1 each 0    aspirin 81 MG EC tablet Take 81 mg by mouth daily Indications: CORONARY ARTERY DISEASE       atorvastatin (LIPITOR) 40 MG tablet Take 1 tablet by mouth daily (Patient taking differently: Take 40 mg by mouth in the morning. Indications: High Amount of Fats in the Blood.) 90 tablet 1     No current facility-administered medications on file prior to visit. Allergies   Allergen Reactions    Morphine Swelling and Rash         Family History   Problem Relation Age of Onset    Cancer Mother 76        BONE    Cancer Father 64        COLON    Colon Cancer Father     Cancer Brother 43        THYROID    Diabetes Maternal Grandmother          Physical Exam:      Physical Exam  Vitals and nursing note reviewed. Constitutional:       Appearance: Normal appearance. HENT:      Head: Normocephalic and atraumatic. Nose: Nose normal.      Mouth/Throat:      Mouth: Mucous membranes are dry.    Eyes:      Extraocular Movements: Extraocular movements intact. Cardiovascular:      Rate and Rhythm: Normal rate. Pulses: Normal pulses. Pulmonary:      Effort: Pulmonary effort is normal.   Abdominal:      General: Bowel sounds are normal.      Palpations: Abdomen is soft. Tenderness: There is no abdominal tenderness. Musculoskeletal:      Cervical back: Neck supple. Neurological:      Motor: Weakness present. Psychiatric:         Mood and Affect: Mood normal.       There were no vitals taken for this visit.       .   Lab Results   Component Value Date    WBC 7.0 06/23/2022    HGB 7.4 (L) 06/23/2022    HCT 22.4 (L) 06/23/2022    MCV 93.8 06/23/2022    PLT 99 (L) 06/23/2022     Lab Results   Component Value Date/Time     06/23/2022 06:01 AM    K 4.8 06/23/2022 06:01 AM    CL 94 06/23/2022 06:01 AM    CO2 21 06/23/2022 06:01 AM    BUN 18 06/23/2022 06:01 AM    CREATININE 0.55 06/23/2022 06:01 AM    GLUCOSE 114 06/23/2022 06:01 AM    CALCIUM 8.3 06/23/2022 06:01 AM                ASSESSMENT:  Patient Active Problem List   Diagnosis    Hypertension    Prostate cancer ([address]    Hyperlipidemia    Coronary artery disease involving native coronary artery of native heart without angina pectoris    Old MI (myocardial infarction)    Prediabetes    UTE on CPAP    Primary insomnia    S/P radiation therapy    Drug-induced erectile dysfunction    Family history of colon cancer    History of colon polyps    Polyp of cecum    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)    PAD (peripheral artery disease) (HCC)    Paroxysmal atrial fibrillation (HCC)    Pleural effusion    Abnormal stress test    Other emphysema (HCC)    Anemia    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 Spinal cord compression ([facility] [address]        2. Prostate cancer metastatic to bone ([facility] [address]        3. Chronic systolic (congestive) heart failure        4. Paroxysmal atrial fibrillation (HCC)        5. Adult failure to thrive          Continue supportive care nutritional support. Goal of maximizing functional status. Continue with with therapies recently. Patient is pain-free at this time continue antiemetic as needed. Please note orders entered on site at facility after discussion with appropriate facility nursing/therapy/ / nutritional staff. Current longstanding medical problems and acute medical issues addressed with staff. Available data and data elements in on site paper chart reviewed and analyzed. Current external consultant notes reviewed in on site chart. Ordered laboratory testing and imaging will be reviewed when available. This patient's need for psychiatric medication has been reviewed. Will consider further adjustment and possible further evaluations by mental health services.

## 2022-08-14 ASSESSMENT — ENCOUNTER SYMPTOMS
NAUSEA: 0
ABDOMINAL DISTENTION: 0
COUGH: 0
VOMITING: 0
EYES NEGATIVE: 1
DIARRHEA: 0
BACK PAIN: 1
WHEEZING: 0
RESPIRATORY NEGATIVE: 1
SHORTNESS OF BREATH: 0
CONSTIPATION: 1

## 2022-08-14 NOTE — PROGRESS NOTES
Subjective:      Patient ID: Helen Smalls is a pleasant 81 y.o. male who presents today for:  No chief complaint on file. [facility]    Patient is being seen today due to diarrhea/possible loose stools. Questionable dehydration. Vital signs 112/58, 72 bpm, 18 RR, 98.7 °F, WT = 136.4 LBS. Patient admitted for history of malignant neoplasm of prostate with multiple mets. Currently on hospice and palliative care service. Seen today to discuss performing stool culture for potential C. difficile. Patient is in agreement. Will take stool sample today and send to lab. Will treat with oral vancomycin as needed. Continue with hospice and palliative care services. Overall pain control seems to be adequate at this time. No new interventions that regard. We will check labs for dehydration as well and perform IV fluids if indicated.     Patient Active Problem List   Diagnosis    Hypertension    Prostate cancer ([address]    Hyperlipidemia    Coronary artery disease involving native coronary artery of native heart without angina pectoris    Old MI (myocardial infarction)    Prediabetes    UTE on CPAP    Primary insomnia    S/P radiation therapy    Drug-induced erectile dysfunction    Family history of colon cancer    History of colon polyps    Polyp of cecum    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)    PAD (peripheral artery disease) (HCC)    Paroxysmal atrial fibrillation (HCC)    Pleural effusion    Abnormal stress test    Other emphysema (HCC)    Anemia    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)    Back pain    B12 deficiency    Spine metastasis (HCC)    Chronic systolic (congestive) heart failure    Constipation due to opioid therapy    Metastatic bone cancer (HCC)    Intractable nausea and vomiting    Prostate cancer metastatic to bone ([address]    Spinal cord compression [facility])     Past Medical History:   Diagnosis Date    Atrial fibrillation ([address]     CAD (coronary artery disease)     CHF (congestive heart failure) (HCC)     COPD (chronic obstructive pulmonary disease) (HCC)     Family history of colon cancer     Hyperlipidemia     Hypertension     Old MI (myocardial infarction) 2006    UTE on CPAP     PAD (peripheral artery disease) (HCC)     Prostate cancer [facility])      Past Surgical History:   Procedure Laterality Date    COLONOSCOPY N/A 2019    COLORECTAL CANCER SCREENING, NOT HIGH RISK performed by Miko Kellogg MD at  [address] Right 2022    870 ml removed by Dr. Lyndsey Hou - diagnostics sent    THORACENTESIS Left 2022    285 ml removed by Dr Lyndsey Hou - cytology only sent     Social History     Socioeconomic History    Marital status: Single     Spouse name: Not on file    Number of children: 2    Years of education: Not on file    Highest education level: Associate degree: occupational, technical, or vocational program   Occupational History    Occupation:     Tobacco Use    Smoking status: Former     Packs/day: 2.00     Years: 25.00     Pack years: 50.00     Types: Cigarettes     Quit date: 1995     Years since quittin.6    Smokeless tobacco: Never    Tobacco comments:     RARE CIGAR FROM TIME TO TIME   Vaping Use    Vaping Use: Never used   Substance and Sexual Activity    Alcohol use: Not Currently     Alcohol/week: 4.0 standard drinks     Types: 2 Cans of beer, 2 Shots of liquor per week     Comment: Social, but less in the last month    Drug use: Not Currently     Types: Marijuana Abdulaziz Weathers)    Sexual activity: Not Currently   Other Topics Concern    Not on file   Social History Narrative    Single floor home    4 steps to get into the home    Full basement does not go downstairs    Lives with girlfriend and her son    He has two children one in [place] and one in [place]    One Dog     Working smoke detectors and CO2     Social Determinants of Health Financial Resource Strain: Low Risk     Difficulty of Paying Living Expenses: Not hard at all   Food Insecurity: No Food Insecurity    Worried About Running Out of Food in the Last Year: Never true    Ran Out of Food in the Last Year: Never true   Transportation Needs: No Transportation Needs    Lack of Transportation (Medical): No    Lack of Transportation (Non-Medical): No   Physical Activity: Not on file   Stress: Not on file   Social Connections: Not on file   Intimate Partner Violence: Not on file   Housing Stability: Not on file     Family History   Problem Relation Age of Onset    Cancer Mother 76        BONE    Cancer Father 64        COLON    Colon Cancer Father     Cancer Brother 43        THYROID    Diabetes Maternal Grandmother      Allergies   Allergen Reactions    Morphine Swelling and Rash         Review of Systems   Constitutional:  Positive for fatigue. Negative for appetite change, fever and unexpected weight change. HENT: Negative. Eyes: Negative. Respiratory: Negative. Negative for cough, shortness of breath and wheezing. Cardiovascular: Negative. Gastrointestinal:  Positive for constipation. Negative for abdominal distention, diarrhea, nausea and vomiting. Endocrine: Negative. Genitourinary: Negative. Musculoskeletal:  Positive for arthralgias, back pain and gait problem. Skin: Negative. Neurological:  Positive for weakness. Negative for dizziness and syncope. Psychiatric/Behavioral:  Negative for confusion, sleep disturbance and suicidal ideas. The patient is not nervous/anxious. Objective:   VS: See HPI. Physical Exam  Vitals reviewed. Constitutional:       Appearance: He is ill-appearing and diaphoretic. HENT:      Head: Normocephalic and atraumatic. Nose: Nose normal. No congestion. Mouth/Throat:      Mouth: Mucous membranes are moist.      Pharynx: Oropharynx is clear.    Eyes:      Pupils: Pupils are equal, round, and reactive to light. Cardiovascular:      Rate and Rhythm: Normal rate. Pulmonary:      Effort: Pulmonary effort is normal.      Breath sounds: Normal breath sounds. Abdominal:      General: Bowel sounds are normal. There is distension. Palpations: Abdomen is soft. Tenderness: There is no abdominal tenderness. There is no guarding. Musculoskeletal:      Cervical back: Normal range of motion. Skin:     General: Skin is warm. Neurological:      Mental Status: He is alert and oriented to person, place, and time. Motor: Weakness present. Gait: Gait abnormal.   Psychiatric:         Behavior: Behavior normal.         Thought Content: Thought content normal.      Comments: Agitated/distressed       Assessment:       Diagnosis Orders   1. Diarrhea of presumed infectious origin              Plan:          Perform stool culture to check for C. difficile. We will check a BMP for dehydration as well as needed. Add IV fluids if needed. Vital signs stable. Monitor for acute decompensation. No follow-ups on file. Side effects, adverse effects of the medication prescribed today, as well as treatment plan and result expectations have been discussed withthe patient who expresses understanding and desires to proceed.     [place]

## 2022-08-21 NOTE — PROGRESS NOTES
Sexual activity: Not Currently   Other Topics Concern    Not on file   Social History Narrative    Single floor home    4 steps to get into the home    Full basement does not go downstairs    Lives with girlfriend and her son    He has two children one in [place] and one in [facility] [place]    One Dog     Working smoke detectors and CO2     Social Determinants of Health     Financial Resource Strain: Low Risk     Difficulty of Paying Living Expenses: Not hard at all   Food Insecurity: No Food Insecurity    Worried About Running Out of Food in the Last Year: Never true    [address] in the Last Year: Never true   Transportation Needs: No Transportation Needs    Lack of Transportation (Medical): No    Lack of Transportation (Non-Medical):  No   Physical Activity: Not on file   Stress: Not on file   Social Connections: Not on file   Intimate Partner Violence: Not on file   Housing Stability: Not on file         Lab Results   Component Value Date    LABA1C 5.8 12/16/2020     No results found for: EAG    Lab Results   Component Value Date/Time     06/23/2022 06:01 AM    K 4.8 06/23/2022 06:01 AM    CL 94 06/23/2022 06:01 AM    CO2 21 06/23/2022 06:01 AM    BUN 18 06/23/2022 06:01 AM    CREATININE 0.55 06/23/2022 06:01 AM    GLUCOSE 114 06/23/2022 06:01 AM    CALCIUM 8.3 06/23/2022 06:01 AM        Lab Results   Component Value Date    CHOL 185 04/11/2022    CHOL 215 (H) 07/29/2021    CHOL 229 (H) 07/14/2021     Lab Results   Component Value Date    TRIG 196 (H) 04/11/2022    TRIG 206 (H) 07/29/2021    TRIG 223 (H) 07/14/2021     Lab Results   Component Value Date    HDL 29 (L) 04/11/2022    HDL 39 (L) 07/29/2021    HDL 30 (L) 07/14/2021     Lab Results   Component Value Date    LDLCALC 117 04/11/2022    LDLCALC 135 (H) 07/29/2021    LDLCALC 154 (H) 07/14/2021     Lab Results   Component Value Date    VLDL 40 01/12/2016     Lab Results   Component Value Date    CHOLHDLRATIO 6.4 01/12/2016       Lab Results   Component Value Date    TSH 1.700 05/18/2021       Lab Results   Component Value Date    WBC 7.0 06/23/2022    HGB 7.4 (L) 06/23/2022    HCT 22.4 (L) 06/23/2022    MCV 93.8 06/23/2022    PLT 99 (L) 06/23/2022       Please note orders entered on site at facility after discussion with appropriate facility nursing/therapy/ / nutritional staff. Current longstanding medical problems and acute medical issues addressed with staff. Available data and data elements in on site paper chart reviewed and analyzed. Current external consultant notes reviewed in on site chart. Ordered laboratory testing and imaging will be reviewed when available.

## 2022-08-26 ASSESSMENT — ENCOUNTER SYMPTOMS
NAUSEA: 1
VOMITING: 1
COUGH: 1

## 2022-08-26 NOTE — PROGRESS NOTES
SUBJECTIVE:  Dax Has was eval in his room for follow-up visit for his heart failure patient has ongoing intractable nausea vomiting. Patient has been on conservative management for hospice services for his prostate cancer with metastatic disease. Patient has had intermittent shortness of breath at times predominantly his main complaint today's complaints of general pain shortness of breath at times nausea somewhat improved at this time. Pain is generalized throughout his body but only in his lower back. ROS:Denies headache fevers chills  The rest of the 14 point ROS negative    PHYSICAL EXAM: VSS per facility record  Chronically ill in appearance pupils are small they are reactive oral mucosa moist no thrush chest with diminished breath sounds no crackles cardiovascular showed an irregular rate abdomen soft no involuntary guarding. Extremity trace pedal pulse no edema no calf tenderness    ASSESSMENT & PLAN:   Diagnosis Orders   1. Chronic systolic (congestive) heart failure        2. Prostate cancer metastatic to bone [facility])            Supportive care titrate up as antiemetic may need intermittent diuretic will monitor clinically continue with hospice services at this time.           Past Medical History:   Diagnosis Date    Atrial fibrillation (HCC)     CAD (coronary artery disease)     CHF (congestive heart failure) (HCC)     COPD (chronic obstructive pulmonary disease) (HCC)     Family history of colon cancer     Hyperlipidemia     Hypertension     Old MI (myocardial infarction) 02/03/2006    UTE on CPAP     PAD (peripheral artery disease) (HCC)     Prostate cancer [facility])          Past Surgical History:   Procedure Laterality Date    COLONOSCOPY N/A 08/19/2019    COLORECTAL CANCER SCREENING, NOT HIGH RISK performed by Arely Aguero MD at [address]      X3    THORACENTESIS Right 03/07/2022    870 ml removed by Dr. Herbert Severin - diagnostics sent THORACENTESIS Left 2022    285 ml removed by Dr Tim Bahena - cytology only sent         Current Outpatient Medications on File Prior to Visit   Medication Sig Dispense Refill    acetaminophen (TYLENOL) 325 MG tablet Take 650 mg by mouth every 6 hours as needed for Pain or Fever      clopidogrel (PLAVIX) 75 MG tablet TAKE 1 TABLET BY MOUTH  DAILY (Patient taking differently: Take 75 mg by mouth in the morning. Indications: CORONARY ARTERY DISEASE.) 90 tablet 3    Handicap Placard MISC by Does not apply route Exp 5 years 1 each 0    aspirin 81 MG EC tablet Take 81 mg by mouth daily Indications: CORONARY ARTERY DISEASE       atorvastatin (LIPITOR) 40 MG tablet Take 1 tablet by mouth daily (Patient taking differently: Take 40 mg by mouth in the morning. Indications: High Amount of Fats in the Blood.) 90 tablet 1     No current facility-administered medications on file prior to visit.          Family History   Problem Relation Age of Onset    Cancer Mother 76        BONE    Cancer Father 64        COLON    Colon Cancer Father     Cancer Brother 43        THYROID    Diabetes Maternal Grandmother        Social History     Socioeconomic History    Marital status: Single     Spouse name: Not on file    Number of children: 2    Years of education: Not on file    Highest education level: Associate degree: occupational, technical, or vocational program   Occupational History    Occupation:     Tobacco Use    Smoking status: Former     Packs/day: 2.00     Years: 25.00     Pack years: 50.00     Types: Cigarettes     Quit date: 1995     Years since quittin.6    Smokeless tobacco: Never    Tobacco comments:     RARE CIGAR FROM TIME TO TIME   Vaping Use    Vaping Use: Never used   Substance and Sexual Activity    Alcohol use: Not Currently     Alcohol/week: 4.0 standard drinks     Types: 2 Cans of beer, 2 Shots of liquor per week     Comment: Social, but less in the last month    Drug use: Not Currently     Types: Results   Component Value Date    TSH 1.700 05/18/2021       Lab Results   Component Value Date    WBC 7.0 06/23/2022    HGB 7.4 (L) 06/23/2022    HCT 22.4 (L) 06/23/2022    MCV 93.8 06/23/2022    PLT 99 (L) 06/23/2022       Please note orders entered on site at facility after discussion with appropriate facility nursing/therapy/ / nutritional staff. Current longstanding medical problems and acute medical issues addressed with staff. Available data and data elements in on site paper chart reviewed and analyzed. Current external consultant notes reviewed in on site chart. Ordered laboratory testing and imaging will be reviewed when available.

## 2024-05-19 NOTE — TELEPHONE ENCOUNTER
[facility] CLINICAL PHARMACY REVIEW: ADHERENCE REVIEW  Identified care gap per [place]; fills at [facility]: Statin adherence    Last Visit: 6/18/21 with cardiology; 5/25/21 with PCP office    Patient also appears to be prescribed: ACEi     Patient not found in Outcomes MTM    ASSESSMENT  ACE/ARB ADHERENCE    Per Insurance Records through 9/19/21 ([facility] Darek Staples = 100%; Potential Fail Date: 12/29/21):   Lisinopril 20mg daily last filled on 7/16/21 for 90 day supply. Next refill due: 10/22/21    Per chart, should have refills remaining    BP Readings from Last 3 Encounters:   08/23/21 (!) 143/49   07/29/21 135/67   07/14/21 (!) 130/104     Estimated Creatinine Clearance: 95 mL/min (based on SCr of 0.82 mg/dL). STATIN ADHERENCE    Per Insurance Records through 9/19/21 ([facility] Darek Thakkarandra = 76%; Potential Fail Date: 9/29/21):   Lovastatin 20mg daily last filled on 4/18/21 for 90 day supply. Next refill due: 7/24/21    Per chart, 5/20/21 rx from cardiology to change to atorvastatin 40mg daily, #30, 3 refills to [facility] (hospital admission d/t acute occlusion of artery in lower extremity)    Lab Results   Component Value Date    CHOL 215 (H) 07/29/2021    TRIG 206 (H) 07/29/2021    HDL 39 (L) 07/29/2021    LDLCALC 135 (H) 07/29/2021     ALT   Date Value Ref Range Status   08/23/2021 7 0 - 41 U/L Final     AST   Date Value Ref Range Status   08/23/2021 11 0 - 40 U/L Final     The ASCVD Risk score (Marley Rick, et al., 2013) failed to calculate for the following reasons: The patient has a prior MI or stroke diagnosis     PLAN  The following are interventions that have been identified:   - Need to confirm if patient is taking atorvastatin? (was to replace lovastatin following May hospital admission)    Attempting to reach patient to review.  Left message asking for return call. Spoke to Arun - confirm the 5/20 atorva rx is on profile; hasn't been filled or picked up by patient.     Will pend atorvastatin refill request to cardiology for rx to patient's [facility] mail order pharmacy.     Future Appointments   Date Time Provider Sana Rachel   10/21/2021  3:45 PM DO Maya Reyna [facility]   11/8/2021  1:00 PM Zhanna Srinivasan MD [facility] EMERGENCY MEDICAL [place] AT [facility]   12/2/2021 12:30 PM Adan Madrid DO [facility] [place], PharmD, [facility]  Department, toll free: [phone], option 1 DISPLAY PLAN FREE TEXT